# Patient Record
Sex: FEMALE | Race: WHITE | NOT HISPANIC OR LATINO | Employment: OTHER | URBAN - NONMETROPOLITAN AREA
[De-identification: names, ages, dates, MRNs, and addresses within clinical notes are randomized per-mention and may not be internally consistent; named-entity substitution may affect disease eponyms.]

---

## 2017-01-13 ENCOUNTER — APPOINTMENT (EMERGENCY)
Dept: RADIOLOGY | Facility: HOSPITAL | Age: 82
End: 2017-01-13
Payer: COMMERCIAL

## 2017-01-13 ENCOUNTER — HOSPITAL ENCOUNTER (OUTPATIENT)
Facility: HOSPITAL | Age: 82
Setting detail: OBSERVATION
Discharge: HOME | End: 2017-01-14
Attending: EMERGENCY MEDICINE | Admitting: HOSPITALIST
Payer: COMMERCIAL

## 2017-01-13 DIAGNOSIS — R07.9 CHEST PAIN: Primary | ICD-10-CM

## 2017-01-13 DIAGNOSIS — R11.2 NAUSEA AND VOMITING: ICD-10-CM

## 2017-01-13 DIAGNOSIS — R19.7 DIARRHEA: ICD-10-CM

## 2017-01-13 PROBLEM — R79.89 ELEVATED LACTIC ACID LEVEL: Status: ACTIVE | Noted: 2017-01-13

## 2017-01-13 PROBLEM — I10 HYPERTENSION: Status: ACTIVE | Noted: 2017-01-13

## 2017-01-13 PROBLEM — E11.8 TYPE 2 DIABETES MELLITUS WITH COMPLICATION, WITHOUT LONG-TERM CURRENT USE OF INSULIN (HCC): Status: ACTIVE | Noted: 2017-01-13

## 2017-01-13 PROBLEM — E78.5 HYPERLIPIDEMIA: Status: ACTIVE | Noted: 2017-01-13

## 2017-01-13 LAB
ALBUMIN SERPL BCP-MCNC: 3.8 G/DL (ref 3.5–5)
ALP SERPL-CCNC: 47 U/L (ref 46–116)
ALT SERPL W P-5'-P-CCNC: 23 U/L (ref 12–78)
ANION GAP SERPL CALCULATED.3IONS-SCNC: 8 MMOL/L (ref 4–13)
AST SERPL W P-5'-P-CCNC: 19 U/L (ref 5–45)
ATRIAL RATE: 62 BPM
BASOPHILS # BLD AUTO: 0.02 THOUSANDS/ΜL (ref 0–0.1)
BASOPHILS NFR BLD AUTO: 0 % (ref 0–1)
BILIRUB DIRECT SERPL-MCNC: 0.13 MG/DL (ref 0–0.2)
BILIRUB SERPL-MCNC: 0.6 MG/DL (ref 0.2–1)
BUN SERPL-MCNC: 11 MG/DL (ref 5–25)
CALCIUM SERPL-MCNC: 9 MG/DL (ref 8.3–10.1)
CHLORIDE SERPL-SCNC: 101 MMOL/L (ref 100–108)
CO2 SERPL-SCNC: 32 MMOL/L (ref 21–32)
CREAT SERPL-MCNC: 0.81 MG/DL (ref 0.6–1.3)
EOSINOPHIL # BLD AUTO: 0.11 THOUSAND/ΜL (ref 0–0.61)
EOSINOPHIL NFR BLD AUTO: 1 % (ref 0–6)
ERYTHROCYTE [DISTWIDTH] IN BLOOD BY AUTOMATED COUNT: 13.9 % (ref 11.6–15.1)
GFR SERPL CREATININE-BSD FRML MDRD: >60 ML/MIN/1.73SQ M
GLUCOSE SERPL-MCNC: 109 MG/DL (ref 65–140)
GLUCOSE SERPL-MCNC: 117 MG/DL (ref 65–140)
GLUCOSE SERPL-MCNC: 123 MG/DL (ref 65–140)
GLUCOSE SERPL-MCNC: 131 MG/DL (ref 65–140)
GLUCOSE SERPL-MCNC: 77 MG/DL (ref 65–140)
GLUCOSE SERPL-MCNC: 89 MG/DL (ref 65–140)
HCT VFR BLD AUTO: 41.8 % (ref 34.8–46.1)
HGB BLD-MCNC: 13.3 G/DL (ref 11.5–15.4)
HOLD SPECIMEN: NORMAL
HOLD SPECIMEN: NORMAL
HOLD SPECIMEN: YES
LACTATE SERPL-SCNC: 0.9 MMOL/L (ref 0.5–2)
LACTATE SERPL-SCNC: 2.4 MMOL/L (ref 0.5–2)
LACTATE SERPL-SCNC: 2.5 MMOL/L (ref 0.5–2)
LACTATE SERPL-SCNC: 2.6 MMOL/L (ref 0.5–2)
LACTATE SERPL-SCNC: 2.9 MMOL/L (ref 0.5–2)
LIPASE SERPL-CCNC: 107 U/L (ref 73–393)
LYMPHOCYTES # BLD AUTO: 2.84 THOUSANDS/ΜL (ref 0.6–4.47)
LYMPHOCYTES NFR BLD AUTO: 30 % (ref 14–44)
MCH RBC QN AUTO: 29.8 PG (ref 26.8–34.3)
MCHC RBC AUTO-ENTMCNC: 31.8 G/DL (ref 31.4–37.4)
MCV RBC AUTO: 94 FL (ref 82–98)
MONOCYTES # BLD AUTO: 0.85 THOUSAND/ΜL (ref 0.17–1.22)
MONOCYTES NFR BLD AUTO: 9 % (ref 4–12)
NEUTROPHILS # BLD AUTO: 5.55 THOUSANDS/ΜL (ref 1.85–7.62)
NEUTS SEG NFR BLD AUTO: 60 % (ref 43–75)
P AXIS: 26 DEGREES
PLATELET # BLD AUTO: 242 THOUSANDS/UL (ref 149–390)
PLATELET # BLD AUTO: 258 THOUSANDS/UL (ref 149–390)
PMV BLD AUTO: 10.2 FL (ref 8.9–12.7)
PMV BLD AUTO: 10.2 FL (ref 8.9–12.7)
POTASSIUM SERPL-SCNC: 3.8 MMOL/L (ref 3.5–5.3)
PR INTERVAL: 170 MS
PROT SERPL-MCNC: 6.5 G/DL (ref 6.4–8.2)
QRS AXIS: -54 DEGREES
QRSD INTERVAL: 104 MS
QT INTERVAL: 372 MS
QTC INTERVAL: 377 MS
RBC # BLD AUTO: 4.47 MILLION/UL (ref 3.81–5.12)
SODIUM SERPL-SCNC: 141 MMOL/L (ref 136–145)
T WAVE AXIS: 70 DEGREES
TROPONIN I SERPL-MCNC: 0.04 NG/ML
TROPONIN I SERPL-MCNC: 0.05 NG/ML
VENTRICULAR RATE: 62 BPM
WBC # BLD AUTO: 9.37 THOUSAND/UL (ref 4.31–10.16)

## 2017-01-13 PROCEDURE — 93005 ELECTROCARDIOGRAM TRACING: CPT | Performed by: EMERGENCY MEDICINE

## 2017-01-13 PROCEDURE — 71020 HB CHEST X-RAY 2VW FRONTAL&LATL: CPT

## 2017-01-13 PROCEDURE — 83605 ASSAY OF LACTIC ACID: CPT | Performed by: INTERNAL MEDICINE

## 2017-01-13 PROCEDURE — 82948 REAGENT STRIP/BLOOD GLUCOSE: CPT

## 2017-01-13 PROCEDURE — 96374 THER/PROPH/DIAG INJ IV PUSH: CPT

## 2017-01-13 PROCEDURE — 83690 ASSAY OF LIPASE: CPT | Performed by: EMERGENCY MEDICINE

## 2017-01-13 PROCEDURE — 83605 ASSAY OF LACTIC ACID: CPT | Performed by: PHYSICIAN ASSISTANT

## 2017-01-13 PROCEDURE — 36415 COLL VENOUS BLD VENIPUNCTURE: CPT | Performed by: EMERGENCY MEDICINE

## 2017-01-13 PROCEDURE — 85049 AUTOMATED PLATELET COUNT: CPT | Performed by: PHYSICIAN ASSISTANT

## 2017-01-13 PROCEDURE — 84484 ASSAY OF TROPONIN QUANT: CPT | Performed by: EMERGENCY MEDICINE

## 2017-01-13 PROCEDURE — 99285 EMERGENCY DEPT VISIT HI MDM: CPT

## 2017-01-13 PROCEDURE — 85025 COMPLETE CBC W/AUTO DIFF WBC: CPT | Performed by: EMERGENCY MEDICINE

## 2017-01-13 PROCEDURE — 83605 ASSAY OF LACTIC ACID: CPT | Performed by: HOSPITALIST

## 2017-01-13 PROCEDURE — 84484 ASSAY OF TROPONIN QUANT: CPT | Performed by: PHYSICIAN ASSISTANT

## 2017-01-13 PROCEDURE — 83605 ASSAY OF LACTIC ACID: CPT | Performed by: EMERGENCY MEDICINE

## 2017-01-13 PROCEDURE — 80076 HEPATIC FUNCTION PANEL: CPT | Performed by: EMERGENCY MEDICINE

## 2017-01-13 PROCEDURE — 80048 BASIC METABOLIC PNL TOTAL CA: CPT | Performed by: EMERGENCY MEDICINE

## 2017-01-13 RX ORDER — SODIUM CHLORIDE 9 MG/ML
100 INJECTION, SOLUTION INTRAVENOUS CONTINUOUS
Status: DISCONTINUED | OUTPATIENT
Start: 2017-01-13 | End: 2017-01-14

## 2017-01-13 RX ORDER — PREDNISONE 1 MG/1
5 TABLET ORAL DAILY
Status: DISCONTINUED | OUTPATIENT
Start: 2017-01-13 | End: 2017-01-14 | Stop reason: HOSPADM

## 2017-01-13 RX ORDER — REPAGLINIDE 2 MG/1
4 TABLET ORAL
COMMUNITY
End: 2018-07-17 | Stop reason: SDUPTHER

## 2017-01-13 RX ORDER — METFORMIN HYDROCHLORIDE 500 MG/1
1000 TABLET, EXTENDED RELEASE ORAL 2 TIMES DAILY WITH MEALS
COMMUNITY
End: 2017-01-14 | Stop reason: HOSPADM

## 2017-01-13 RX ORDER — OXYBUTYNIN CHLORIDE 10 MG/1
10 TABLET, EXTENDED RELEASE ORAL 2 TIMES DAILY
COMMUNITY
End: 2018-06-06 | Stop reason: SDUPTHER

## 2017-01-13 RX ORDER — TOLTERODINE 2 MG/1
2 CAPSULE, EXTENDED RELEASE ORAL DAILY
Status: DISCONTINUED | OUTPATIENT
Start: 2017-01-13 | End: 2017-01-14 | Stop reason: HOSPADM

## 2017-01-13 RX ORDER — FLUOCINOLONE ACETONIDE 0.11 MG/ML
OIL AURICULAR (OTIC)
Status: ON HOLD | COMMUNITY
Start: 2014-12-03 | End: 2017-01-13 | Stop reason: ALTCHOICE

## 2017-01-13 RX ORDER — METFORMIN HYDROCHLORIDE 500 MG/1
1000 TABLET, EXTENDED RELEASE ORAL 2 TIMES DAILY WITH MEALS
Status: DISCONTINUED | OUTPATIENT
Start: 2017-01-13 | End: 2017-01-13

## 2017-01-13 RX ORDER — PRAVASTATIN SODIUM 20 MG
TABLET ORAL
Status: ON HOLD | COMMUNITY
Start: 2015-08-13 | End: 2017-01-13

## 2017-01-13 RX ORDER — PRAVASTATIN SODIUM 20 MG
20 TABLET ORAL
Status: DISCONTINUED | OUTPATIENT
Start: 2017-01-13 | End: 2017-01-14 | Stop reason: HOSPADM

## 2017-01-13 RX ORDER — PRAVASTATIN SODIUM 20 MG
20 TABLET ORAL DAILY
COMMUNITY
End: 2018-04-12 | Stop reason: SDUPTHER

## 2017-01-13 RX ORDER — HYDRALAZINE HYDROCHLORIDE 20 MG/ML
10 INJECTION INTRAMUSCULAR; INTRAVENOUS ONCE
Status: COMPLETED | OUTPATIENT
Start: 2017-01-13 | End: 2017-01-13

## 2017-01-13 RX ORDER — RABEPRAZOLE SODIUM 20 MG/1
TABLET, DELAYED RELEASE ORAL
Status: ON HOLD | COMMUNITY
Start: 2014-12-15 | End: 2017-01-13 | Stop reason: ALTCHOICE

## 2017-01-13 RX ORDER — AMLODIPINE BESYLATE 5 MG/1
5 TABLET ORAL DAILY
Status: DISCONTINUED | OUTPATIENT
Start: 2017-01-13 | End: 2017-01-14 | Stop reason: HOSPADM

## 2017-01-13 RX ORDER — OXYBUTYNIN CHLORIDE 10 MG/1
10 TABLET, EXTENDED RELEASE ORAL
Status: DISCONTINUED | OUTPATIENT
Start: 2017-01-13 | End: 2017-01-13 | Stop reason: RX

## 2017-01-13 RX ORDER — PREDNISONE 1 MG/1
TABLET ORAL
COMMUNITY
Start: 2011-04-07 | End: 2018-09-08 | Stop reason: SDUPTHER

## 2017-01-13 RX ORDER — PRAVASTATIN SODIUM 20 MG
20 TABLET ORAL DAILY
Status: DISCONTINUED | OUTPATIENT
Start: 2017-01-13 | End: 2017-01-13

## 2017-01-13 RX ORDER — REPAGLINIDE 1 MG/1
2 TABLET ORAL
Status: DISCONTINUED | OUTPATIENT
Start: 2017-01-13 | End: 2017-01-14 | Stop reason: HOSPADM

## 2017-01-13 RX ORDER — SORBITOL SOLUTION 70 %
15 SOLUTION, ORAL MISCELLANEOUS DAILY PRN
Status: ON HOLD | COMMUNITY
End: 2017-01-13

## 2017-01-13 RX ORDER — REPAGLINIDE 1 MG/1
2 TABLET ORAL
Status: DISCONTINUED | OUTPATIENT
Start: 2017-01-13 | End: 2017-01-13

## 2017-01-13 RX ORDER — METFORMIN HYDROCHLORIDE 500 MG/1
TABLET, EXTENDED RELEASE ORAL
Status: ON HOLD | COMMUNITY
Start: 2016-11-29 | End: 2017-01-13 | Stop reason: CLARIF

## 2017-01-13 RX ORDER — REPAGLINIDE 1 MG/1
4 TABLET ORAL
Status: DISCONTINUED | OUTPATIENT
Start: 2017-01-13 | End: 2017-01-13

## 2017-01-13 RX ORDER — LEVOTHYROXINE SODIUM 0.07 MG/1
75 TABLET ORAL
Status: DISCONTINUED | OUTPATIENT
Start: 2017-01-13 | End: 2017-01-14 | Stop reason: HOSPADM

## 2017-01-13 RX ORDER — ERGOCALCIFEROL 1.25 MG/1
CAPSULE ORAL
Status: ON HOLD | COMMUNITY
Start: 2016-12-07 | End: 2017-01-13 | Stop reason: CLARIF

## 2017-01-13 RX ORDER — ASPIRIN 81 MG/1
81 TABLET, CHEWABLE ORAL DAILY
Status: DISCONTINUED | OUTPATIENT
Start: 2017-01-13 | End: 2017-01-14 | Stop reason: HOSPADM

## 2017-01-13 RX ORDER — ONDANSETRON 2 MG/ML
4 INJECTION INTRAMUSCULAR; INTRAVENOUS ONCE
Status: COMPLETED | OUTPATIENT
Start: 2017-01-13 | End: 2017-01-13

## 2017-01-13 RX ORDER — ERGOCALCIFEROL 1.25 MG/1
50000 CAPSULE ORAL WEEKLY
COMMUNITY
End: 2018-11-08 | Stop reason: SDUPTHER

## 2017-01-13 RX ORDER — ACETAMINOPHEN 325 MG/1
650 TABLET ORAL EVERY 6 HOURS PRN
Status: DISCONTINUED | OUTPATIENT
Start: 2017-01-13 | End: 2017-01-14 | Stop reason: HOSPADM

## 2017-01-13 RX ORDER — REPAGLINIDE 2 MG/1
2 TABLET ORAL
Status: ON HOLD | COMMUNITY
Start: 2016-11-02 | End: 2017-01-13

## 2017-01-13 RX ORDER — LEVOTHYROXINE SODIUM 0.07 MG/1
75 TABLET ORAL
COMMUNITY
Start: 2014-10-21 | End: 2018-11-08 | Stop reason: SDUPTHER

## 2017-01-13 RX ORDER — PANTOPRAZOLE SODIUM 40 MG/1
40 TABLET, DELAYED RELEASE ORAL
Status: DISCONTINUED | OUTPATIENT
Start: 2017-01-13 | End: 2017-01-14 | Stop reason: HOSPADM

## 2017-01-13 RX ORDER — CLOTRIMAZOLE 1 %
CREAM (GRAM) TOPICAL
Status: ON HOLD | COMMUNITY
Start: 2016-11-30 | End: 2017-01-13

## 2017-01-13 RX ORDER — ONDANSETRON 2 MG/ML
4 INJECTION INTRAMUSCULAR; INTRAVENOUS EVERY 6 HOURS PRN
Status: DISCONTINUED | OUTPATIENT
Start: 2017-01-13 | End: 2017-01-14 | Stop reason: HOSPADM

## 2017-01-13 RX ADMIN — TOLTERODINE TARTRATE 2 MG: 2 CAPSULE, EXTENDED RELEASE ORAL at 21:31

## 2017-01-13 RX ADMIN — SODIUM CHLORIDE 100 ML/HR: 0.9 INJECTION, SOLUTION INTRAVENOUS at 12:31

## 2017-01-13 RX ADMIN — ACETAMINOPHEN 650 MG: 325 TABLET, FILM COATED ORAL at 22:46

## 2017-01-13 RX ADMIN — REPAGLINIDE 2 MG: 1 TABLET ORAL at 15:50

## 2017-01-13 RX ADMIN — SODIUM CHLORIDE 1000 ML: 0.9 INJECTION, SOLUTION INTRAVENOUS at 07:58

## 2017-01-13 RX ADMIN — AMLODIPINE BESYLATE 5 MG: 5 TABLET ORAL at 13:26

## 2017-01-13 RX ADMIN — HYDRALAZINE HYDROCHLORIDE 10 MG: 20 INJECTION INTRAMUSCULAR; INTRAVENOUS at 06:55

## 2017-01-13 RX ADMIN — ENOXAPARIN SODIUM 40 MG: 40 INJECTION SUBCUTANEOUS at 11:44

## 2017-01-13 RX ADMIN — SODIUM CHLORIDE 500 ML: 0.9 INJECTION, SOLUTION INTRAVENOUS at 04:50

## 2017-01-13 RX ADMIN — SODIUM CHLORIDE 1000 ML: 0.9 INJECTION, SOLUTION INTRAVENOUS at 10:15

## 2017-01-13 RX ADMIN — ASPIRIN 81 MG 81 MG: 81 TABLET ORAL at 13:26

## 2017-01-13 RX ADMIN — LEVOTHYROXINE SODIUM 75 MCG: 75 TABLET ORAL at 13:25

## 2017-01-13 RX ADMIN — ONDANSETRON 4 MG: 2 INJECTION INTRAMUSCULAR; INTRAVENOUS at 09:26

## 2017-01-13 RX ADMIN — PRAVASTATIN SODIUM 20 MG: 20 TABLET ORAL at 15:50

## 2017-01-13 RX ADMIN — ONDANSETRON 4 MG: 2 INJECTION INTRAMUSCULAR; INTRAVENOUS at 04:50

## 2017-01-13 RX ADMIN — SODIUM CHLORIDE 1000 ML: 0.9 INJECTION, SOLUTION INTRAVENOUS at 19:44

## 2017-01-13 RX ADMIN — SITAGLIPTIN 50 MG: 25 TABLET, FILM COATED ORAL at 13:26

## 2017-01-13 RX ADMIN — PREDNISONE 5 MG: 5 TABLET ORAL at 13:26

## 2017-01-14 ENCOUNTER — APPOINTMENT (OUTPATIENT)
Dept: CT IMAGING | Facility: HOSPITAL | Age: 82
End: 2017-01-14
Payer: COMMERCIAL

## 2017-01-14 VITALS
OXYGEN SATURATION: 94 % | SYSTOLIC BLOOD PRESSURE: 122 MMHG | DIASTOLIC BLOOD PRESSURE: 65 MMHG | HEIGHT: 62 IN | WEIGHT: 132.94 LBS | HEART RATE: 52 BPM | RESPIRATION RATE: 18 BRPM | BODY MASS INDEX: 24.46 KG/M2 | TEMPERATURE: 97.4 F

## 2017-01-14 LAB
ALBUMIN SERPL BCP-MCNC: 2.5 G/DL (ref 3.5–5)
ALP SERPL-CCNC: 35 U/L (ref 46–116)
ALT SERPL W P-5'-P-CCNC: 14 U/L (ref 12–78)
ANION GAP SERPL CALCULATED.3IONS-SCNC: 7 MMOL/L (ref 4–13)
AST SERPL W P-5'-P-CCNC: 15 U/L (ref 5–45)
BASOPHILS # BLD AUTO: 0.02 THOUSANDS/ΜL (ref 0–0.1)
BASOPHILS NFR BLD AUTO: 0 % (ref 0–1)
BILIRUB SERPL-MCNC: 0.5 MG/DL (ref 0.2–1)
BUN SERPL-MCNC: 7 MG/DL (ref 5–25)
CALCIUM SERPL-MCNC: 7.7 MG/DL (ref 8.3–10.1)
CHLORIDE SERPL-SCNC: 109 MMOL/L (ref 100–108)
CO2 SERPL-SCNC: 27 MMOL/L (ref 21–32)
CREAT SERPL-MCNC: 0.6 MG/DL (ref 0.6–1.3)
EOSINOPHIL # BLD AUTO: 0.18 THOUSAND/ΜL (ref 0–0.61)
EOSINOPHIL NFR BLD AUTO: 3 % (ref 0–6)
ERYTHROCYTE [DISTWIDTH] IN BLOOD BY AUTOMATED COUNT: 14.1 % (ref 11.6–15.1)
GFR SERPL CREATININE-BSD FRML MDRD: >60 ML/MIN/1.73SQ M
GLUCOSE SERPL-MCNC: 105 MG/DL (ref 65–140)
GLUCOSE SERPL-MCNC: 109 MG/DL (ref 65–140)
GLUCOSE SERPL-MCNC: 62 MG/DL (ref 65–140)
HCT VFR BLD AUTO: 35.6 % (ref 34.8–46.1)
HGB BLD-MCNC: 11.2 G/DL (ref 11.5–15.4)
LACTATE SERPL-SCNC: 0.8 MMOL/L (ref 0.5–2)
LYMPHOCYTES # BLD AUTO: 2.35 THOUSANDS/ΜL (ref 0.6–4.47)
LYMPHOCYTES NFR BLD AUTO: 33 % (ref 14–44)
MAGNESIUM SERPL-MCNC: 1.6 MG/DL (ref 1.6–2.6)
MCH RBC QN AUTO: 30 PG (ref 26.8–34.3)
MCHC RBC AUTO-ENTMCNC: 31.5 G/DL (ref 31.4–37.4)
MCV RBC AUTO: 95 FL (ref 82–98)
MONOCYTES # BLD AUTO: 0.55 THOUSAND/ΜL (ref 0.17–1.22)
MONOCYTES NFR BLD AUTO: 8 % (ref 4–12)
NEUTROPHILS # BLD AUTO: 3.94 THOUSANDS/ΜL (ref 1.85–7.62)
NEUTS SEG NFR BLD AUTO: 56 % (ref 43–75)
PHOSPHATE SERPL-MCNC: 2.9 MG/DL (ref 2.3–4.1)
PLATELET # BLD AUTO: 212 THOUSANDS/UL (ref 149–390)
PMV BLD AUTO: 10 FL (ref 8.9–12.7)
POTASSIUM SERPL-SCNC: 3.9 MMOL/L (ref 3.5–5.3)
PROT SERPL-MCNC: 5.1 G/DL (ref 6.4–8.2)
RBC # BLD AUTO: 3.73 MILLION/UL (ref 3.81–5.12)
SODIUM SERPL-SCNC: 143 MMOL/L (ref 136–145)
WBC # BLD AUTO: 7.04 THOUSAND/UL (ref 4.31–10.16)

## 2017-01-14 PROCEDURE — 84100 ASSAY OF PHOSPHORUS: CPT | Performed by: INTERNAL MEDICINE

## 2017-01-14 PROCEDURE — 80053 COMPREHEN METABOLIC PANEL: CPT | Performed by: INTERNAL MEDICINE

## 2017-01-14 PROCEDURE — 83605 ASSAY OF LACTIC ACID: CPT | Performed by: INTERNAL MEDICINE

## 2017-01-14 PROCEDURE — 82948 REAGENT STRIP/BLOOD GLUCOSE: CPT

## 2017-01-14 PROCEDURE — 70450 CT HEAD/BRAIN W/O DYE: CPT

## 2017-01-14 PROCEDURE — 83735 ASSAY OF MAGNESIUM: CPT | Performed by: INTERNAL MEDICINE

## 2017-01-14 PROCEDURE — 85025 COMPLETE CBC W/AUTO DIFF WBC: CPT | Performed by: INTERNAL MEDICINE

## 2017-01-14 RX ORDER — AMLODIPINE BESYLATE 5 MG/1
5 TABLET ORAL DAILY
Qty: 30 TABLET | Refills: 0 | Status: SHIPPED | OUTPATIENT
Start: 2017-01-14 | End: 2017-05-14 | Stop reason: ALTCHOICE

## 2017-01-14 RX ORDER — POLYVINYL ALCOHOL 14 MG/ML
1 SOLUTION/ DROPS OPHTHALMIC
Status: DISCONTINUED | OUTPATIENT
Start: 2017-01-14 | End: 2017-01-14 | Stop reason: HOSPADM

## 2017-01-14 RX ADMIN — REPAGLINIDE 2 MG: 1 TABLET ORAL at 12:12

## 2017-01-14 RX ADMIN — AMLODIPINE BESYLATE 5 MG: 5 TABLET ORAL at 08:25

## 2017-01-14 RX ADMIN — LEVOTHYROXINE SODIUM 75 MCG: 75 TABLET ORAL at 06:14

## 2017-01-14 RX ADMIN — ENOXAPARIN SODIUM 40 MG: 40 INJECTION SUBCUTANEOUS at 08:26

## 2017-01-14 RX ADMIN — ASPIRIN 81 MG 81 MG: 81 TABLET ORAL at 08:26

## 2017-01-14 RX ADMIN — PANTOPRAZOLE SODIUM 40 MG: 40 TABLET, DELAYED RELEASE ORAL at 06:14

## 2017-01-14 RX ADMIN — ACETAMINOPHEN 650 MG: 325 TABLET, FILM COATED ORAL at 06:51

## 2017-01-14 RX ADMIN — REPAGLINIDE 2 MG: 1 TABLET ORAL at 06:14

## 2017-01-14 RX ADMIN — SODIUM CHLORIDE 100 ML/HR: 0.9 INJECTION, SOLUTION INTRAVENOUS at 03:17

## 2017-01-14 RX ADMIN — PREDNISONE 5 MG: 5 TABLET ORAL at 08:26

## 2017-01-14 RX ADMIN — POLYVINYL ALCOHOL 1 DROP: 14 SOLUTION/ DROPS OPHTHALMIC at 10:50

## 2017-01-14 RX ADMIN — SITAGLIPTIN 50 MG: 25 TABLET, FILM COATED ORAL at 08:26

## 2017-02-17 DIAGNOSIS — R13.10 DYSPHAGIA: ICD-10-CM

## 2017-02-17 DIAGNOSIS — R00.1 BRADYCARDIA: ICD-10-CM

## 2017-02-17 DIAGNOSIS — F32.9 MAJOR DEPRESSIVE DISORDER, SINGLE EPISODE: ICD-10-CM

## 2017-02-17 DIAGNOSIS — E11.9 TYPE 2 DIABETES MELLITUS WITHOUT COMPLICATIONS (HCC): ICD-10-CM

## 2017-02-17 DIAGNOSIS — Z12.31 ENCOUNTER FOR SCREENING MAMMOGRAM FOR MALIGNANT NEOPLASM OF BREAST: ICD-10-CM

## 2017-02-23 ENCOUNTER — HOSPITAL ENCOUNTER (OUTPATIENT)
Dept: NON INVASIVE DIAGNOSTICS | Facility: HOSPITAL | Age: 82
Discharge: HOME | End: 2017-02-23
Attending: INTERNAL MEDICINE
Payer: COMMERCIAL

## 2017-02-23 ENCOUNTER — ALLSCRIPTS OFFICE VISIT (OUTPATIENT)
Dept: OTHER | Facility: OTHER | Age: 82
End: 2017-02-23

## 2017-02-23 DIAGNOSIS — R00.1 BRADYCARDIA: ICD-10-CM

## 2017-02-23 PROCEDURE — 93225 XTRNL ECG REC<48 HRS REC: CPT

## 2017-02-23 PROCEDURE — 93226 XTRNL ECG REC<48 HR SCAN A/R: CPT

## 2017-02-24 ENCOUNTER — TRANSCRIBE ORDERS (OUTPATIENT)
Dept: ADMINISTRATIVE | Facility: HOSPITAL | Age: 82
End: 2017-02-24

## 2017-02-24 ENCOUNTER — APPOINTMENT (OUTPATIENT)
Dept: LAB | Facility: HOSPITAL | Age: 82
End: 2017-02-24
Attending: INTERNAL MEDICINE
Payer: COMMERCIAL

## 2017-02-24 DIAGNOSIS — F32.9 MAJOR DEPRESSIVE DISORDER, SINGLE EPISODE: ICD-10-CM

## 2017-02-24 DIAGNOSIS — E11.9 TYPE 2 DIABETES MELLITUS WITHOUT COMPLICATIONS (HCC): ICD-10-CM

## 2017-02-24 DIAGNOSIS — R00.1 BRADYCARDIA: ICD-10-CM

## 2017-02-24 LAB
ALBUMIN SERPL BCP-MCNC: 3.3 G/DL (ref 3.5–5)
ALP SERPL-CCNC: 44 U/L (ref 46–116)
ALT SERPL W P-5'-P-CCNC: 23 U/L (ref 12–78)
ANION GAP SERPL CALCULATED.3IONS-SCNC: 7 MMOL/L (ref 4–13)
AST SERPL W P-5'-P-CCNC: 19 U/L (ref 5–45)
BILIRUB SERPL-MCNC: 0.4 MG/DL (ref 0.2–1)
BUN SERPL-MCNC: 12 MG/DL (ref 5–25)
CALCIUM SERPL-MCNC: 8.8 MG/DL (ref 8.3–10.1)
CHLORIDE SERPL-SCNC: 103 MMOL/L (ref 100–108)
CO2 SERPL-SCNC: 31 MMOL/L (ref 21–32)
CREAT SERPL-MCNC: 0.74 MG/DL (ref 0.6–1.3)
CREAT UR-MCNC: 151 MG/DL
EST. AVERAGE GLUCOSE BLD GHB EST-MCNC: 229 MG/DL
GFR SERPL CREATININE-BSD FRML MDRD: >60 ML/MIN/1.73SQ M
GLUCOSE SERPL-MCNC: 125 MG/DL (ref 65–140)
HBA1C MFR BLD: 9.6 % (ref 4.2–6.3)
LDLC SERPL DIRECT ASSAY-MCNC: 98 MG/DL (ref 0–100)
MICROALBUMIN UR-MCNC: 6.5 MG/L (ref 0–20)
MICROALBUMIN/CREAT 24H UR: 4 MG/G CREATININE (ref 0–30)
POTASSIUM SERPL-SCNC: 4.2 MMOL/L (ref 3.5–5.3)
PROT SERPL-MCNC: 5.9 G/DL (ref 6.4–8.2)
SODIUM SERPL-SCNC: 141 MMOL/L (ref 136–145)
TSH SERPL DL<=0.05 MIU/L-ACNC: 0.17 UIU/ML (ref 0.36–3.74)

## 2017-02-24 PROCEDURE — 83721 ASSAY OF BLOOD LIPOPROTEIN: CPT

## 2017-02-24 PROCEDURE — 83036 HEMOGLOBIN GLYCOSYLATED A1C: CPT

## 2017-02-24 PROCEDURE — 36415 COLL VENOUS BLD VENIPUNCTURE: CPT

## 2017-02-24 PROCEDURE — 82570 ASSAY OF URINE CREATININE: CPT

## 2017-02-24 PROCEDURE — 80053 COMPREHEN METABOLIC PANEL: CPT

## 2017-02-24 PROCEDURE — 84443 ASSAY THYROID STIM HORMONE: CPT

## 2017-02-24 PROCEDURE — 82043 UR ALBUMIN QUANTITATIVE: CPT

## 2017-02-27 ENCOUNTER — HOSPITAL ENCOUNTER (OUTPATIENT)
Dept: RADIOLOGY | Facility: HOSPITAL | Age: 82
Discharge: HOME | End: 2017-02-27
Attending: INTERNAL MEDICINE
Payer: COMMERCIAL

## 2017-02-27 DIAGNOSIS — R13.10 DYSPHAGIA: ICD-10-CM

## 2017-02-27 PROCEDURE — 74240 X-RAY XM UPR GI TRC 1CNTRST: CPT

## 2017-03-30 ENCOUNTER — ALLSCRIPTS OFFICE VISIT (OUTPATIENT)
Dept: OTHER | Facility: OTHER | Age: 82
End: 2017-03-30

## 2017-03-30 ENCOUNTER — ALLSCRIPTS OFFICE VISIT (OUTPATIENT)
Dept: FAMILY MEDICINE CLINIC | Facility: CLINIC | Age: 82
End: 2017-03-30
Payer: COMMERCIAL

## 2017-03-30 PROCEDURE — 99213 OFFICE O/P EST LOW 20 MIN: CPT | Performed by: PHYSICIAN ASSISTANT

## 2017-04-07 ENCOUNTER — ALLSCRIPTS OFFICE VISIT (OUTPATIENT)
Dept: OTHER | Facility: OTHER | Age: 82
End: 2017-04-07

## 2017-04-26 ENCOUNTER — ALLSCRIPTS OFFICE VISIT (OUTPATIENT)
Dept: OTHER | Facility: OTHER | Age: 82
End: 2017-04-26

## 2017-05-12 DIAGNOSIS — E11.9 TYPE 2 DIABETES MELLITUS WITHOUT COMPLICATIONS (HCC): ICD-10-CM

## 2017-05-12 DIAGNOSIS — R60.9 EDEMA: ICD-10-CM

## 2017-05-12 DIAGNOSIS — Z00.00 ENCOUNTER FOR GENERAL ADULT MEDICAL EXAMINATION WITHOUT ABNORMAL FINDINGS: ICD-10-CM

## 2017-05-12 DIAGNOSIS — B35.1 TINEA UNGUIUM: ICD-10-CM

## 2017-05-13 ENCOUNTER — TRANSCRIBE ORDERS (OUTPATIENT)
Dept: LAB | Facility: MEDICAL CENTER | Age: 82
End: 2017-05-13

## 2017-05-14 ENCOUNTER — APPOINTMENT (EMERGENCY)
Dept: RADIOLOGY | Facility: HOSPITAL | Age: 82
End: 2017-05-14
Payer: COMMERCIAL

## 2017-05-14 ENCOUNTER — HOSPITAL ENCOUNTER (OUTPATIENT)
Facility: HOSPITAL | Age: 82
Setting detail: OBSERVATION
Discharge: HOME | End: 2017-05-15
Attending: EMERGENCY MEDICINE | Admitting: FAMILY MEDICINE
Payer: COMMERCIAL

## 2017-05-14 ENCOUNTER — APPOINTMENT (EMERGENCY)
Dept: CT IMAGING | Facility: HOSPITAL | Age: 82
End: 2017-05-14
Payer: COMMERCIAL

## 2017-05-14 DIAGNOSIS — R11.2 INTRACTABLE VOMITING WITH NAUSEA: Primary | ICD-10-CM

## 2017-05-14 LAB
ALBUMIN SERPL BCP-MCNC: 3.5 G/DL (ref 3.5–5)
ALP SERPL-CCNC: 46 U/L (ref 46–116)
ALT SERPL W P-5'-P-CCNC: 20 U/L (ref 12–78)
ANION GAP SERPL CALCULATED.3IONS-SCNC: 9 MMOL/L (ref 4–13)
AST SERPL W P-5'-P-CCNC: 21 U/L (ref 5–45)
BASOPHILS # BLD AUTO: 0.02 THOUSANDS/ΜL (ref 0–0.1)
BASOPHILS NFR BLD AUTO: 0 % (ref 0–1)
BILIRUB SERPL-MCNC: 0.4 MG/DL (ref 0.2–1)
BUN SERPL-MCNC: 10 MG/DL (ref 5–25)
CALCIUM SERPL-MCNC: 8.7 MG/DL (ref 8.3–10.1)
CHLORIDE SERPL-SCNC: 104 MMOL/L (ref 100–108)
CO2 SERPL-SCNC: 28 MMOL/L (ref 21–32)
CREAT SERPL-MCNC: 0.85 MG/DL (ref 0.6–1.3)
EOSINOPHIL # BLD AUTO: 0.02 THOUSAND/ΜL (ref 0–0.61)
EOSINOPHIL NFR BLD AUTO: 0 % (ref 0–6)
ERYTHROCYTE [DISTWIDTH] IN BLOOD BY AUTOMATED COUNT: 13.5 % (ref 11.6–15.1)
GFR SERPL CREATININE-BSD FRML MDRD: >60 ML/MIN/1.73SQ M
GLUCOSE SERPL-MCNC: 215 MG/DL (ref 65–140)
GLUCOSE SERPL-MCNC: 222 MG/DL (ref 65–140)
HCT VFR BLD AUTO: 38.1 % (ref 34.8–46.1)
HGB BLD-MCNC: 12.1 G/DL (ref 11.5–15.4)
LACTATE SERPL-SCNC: 0.9 MMOL/L (ref 0.5–2)
LIPASE SERPL-CCNC: 88 U/L (ref 73–393)
LYMPHOCYTES # BLD AUTO: 1.27 THOUSANDS/ΜL (ref 0.6–4.47)
LYMPHOCYTES NFR BLD AUTO: 13 % (ref 14–44)
MCH RBC QN AUTO: 28.5 PG (ref 26.8–34.3)
MCHC RBC AUTO-ENTMCNC: 31.8 G/DL (ref 31.4–37.4)
MCV RBC AUTO: 90 FL (ref 82–98)
MONOCYTES # BLD AUTO: 0.51 THOUSAND/ΜL (ref 0.17–1.22)
MONOCYTES NFR BLD AUTO: 5 % (ref 4–12)
NEUTROPHILS # BLD AUTO: 7.75 THOUSANDS/ΜL (ref 1.85–7.62)
NEUTS SEG NFR BLD AUTO: 82 % (ref 43–75)
PLATELET # BLD AUTO: 262 THOUSANDS/UL (ref 149–390)
PMV BLD AUTO: 10.7 FL (ref 8.9–12.7)
POTASSIUM SERPL-SCNC: 4.3 MMOL/L (ref 3.5–5.3)
PROT SERPL-MCNC: 6.6 G/DL (ref 6.4–8.2)
RBC # BLD AUTO: 4.25 MILLION/UL (ref 3.81–5.12)
SODIUM SERPL-SCNC: 141 MMOL/L (ref 136–145)
WBC # BLD AUTO: 9.57 THOUSAND/UL (ref 4.31–10.16)

## 2017-05-14 PROCEDURE — 80053 COMPREHEN METABOLIC PANEL: CPT

## 2017-05-14 PROCEDURE — 85025 COMPLETE CBC W/AUTO DIFF WBC: CPT | Performed by: EMERGENCY MEDICINE

## 2017-05-14 PROCEDURE — 96376 TX/PRO/DX INJ SAME DRUG ADON: CPT

## 2017-05-14 PROCEDURE — 84484 ASSAY OF TROPONIN QUANT: CPT | Performed by: EMERGENCY MEDICINE

## 2017-05-14 PROCEDURE — 71020 HB CHEST X-RAY 2VW FRONTAL&LATL: CPT

## 2017-05-14 PROCEDURE — 36415 COLL VENOUS BLD VENIPUNCTURE: CPT | Performed by: EMERGENCY MEDICINE

## 2017-05-14 PROCEDURE — 83605 ASSAY OF LACTIC ACID: CPT | Performed by: EMERGENCY MEDICINE

## 2017-05-14 PROCEDURE — 74177 CT ABD & PELVIS W/CONTRAST: CPT

## 2017-05-14 PROCEDURE — 83690 ASSAY OF LIPASE: CPT | Performed by: EMERGENCY MEDICINE

## 2017-05-14 PROCEDURE — 82948 REAGENT STRIP/BLOOD GLUCOSE: CPT

## 2017-05-14 PROCEDURE — 96374 THER/PROPH/DIAG INJ IV PUSH: CPT

## 2017-05-14 PROCEDURE — 96361 HYDRATE IV INFUSION ADD-ON: CPT

## 2017-05-14 RX ORDER — ONDANSETRON 2 MG/ML
INJECTION INTRAMUSCULAR; INTRAVENOUS
Status: COMPLETED
Start: 2017-05-14 | End: 2017-05-14

## 2017-05-14 RX ORDER — ONDANSETRON 2 MG/ML
4 INJECTION INTRAMUSCULAR; INTRAVENOUS ONCE
Status: COMPLETED | OUTPATIENT
Start: 2017-05-14 | End: 2017-05-14

## 2017-05-14 RX ORDER — METFORMIN HYDROCHLORIDE EXTENDED-RELEASE TABLETS 500 MG/1
500 TABLET, FILM COATED, EXTENDED RELEASE ORAL 2 TIMES DAILY WITH MEALS
Status: ON HOLD | COMMUNITY
End: 2018-05-15

## 2017-05-14 RX ORDER — PANTOPRAZOLE SODIUM 40 MG/1
TABLET, DELAYED RELEASE ORAL
COMMUNITY
Start: 2017-04-26 | End: 2018-09-08 | Stop reason: SDUPTHER

## 2017-05-14 RX ORDER — ONDANSETRON 2 MG/ML
4 INJECTION INTRAMUSCULAR; INTRAVENOUS ONCE AS NEEDED
Status: COMPLETED | OUTPATIENT
Start: 2017-05-14 | End: 2017-05-14

## 2017-05-14 RX ADMIN — IOHEXOL 100 ML: 350 INJECTION, SOLUTION INTRAVENOUS at 22:56

## 2017-05-14 RX ADMIN — ONDANSETRON 4 MG: 2 INJECTION INTRAMUSCULAR; INTRAVENOUS at 21:53

## 2017-05-14 RX ADMIN — ONDANSETRON 4 MG: 2 INJECTION INTRAMUSCULAR; INTRAVENOUS at 23:06

## 2017-05-14 RX ADMIN — SODIUM CHLORIDE 1000 ML: 0.9 INJECTION, SOLUTION INTRAVENOUS at 22:28

## 2017-05-15 ENCOUNTER — TRANSCRIBE ORDERS (OUTPATIENT)
Dept: ADMINISTRATIVE | Facility: HOSPITAL | Age: 82
End: 2017-05-15

## 2017-05-15 ENCOUNTER — APPOINTMENT (OUTPATIENT)
Dept: LAB | Facility: HOSPITAL | Age: 82
End: 2017-05-15
Attending: INTERNAL MEDICINE
Payer: COMMERCIAL

## 2017-05-15 VITALS
DIASTOLIC BLOOD PRESSURE: 85 MMHG | OXYGEN SATURATION: 94 % | TEMPERATURE: 98.2 F | HEART RATE: 62 BPM | BODY MASS INDEX: 24.67 KG/M2 | RESPIRATION RATE: 18 BRPM | HEIGHT: 62 IN | SYSTOLIC BLOOD PRESSURE: 186 MMHG | WEIGHT: 134.04 LBS

## 2017-05-15 DIAGNOSIS — Z00.00 ENCOUNTER FOR GENERAL ADULT MEDICAL EXAMINATION WITHOUT ABNORMAL FINDINGS: ICD-10-CM

## 2017-05-15 PROBLEM — R11.10 VOMITING: Status: RESOLVED | Noted: 2017-01-13 | Resolved: 2017-05-15

## 2017-05-15 PROBLEM — R11.10 VOMITING: Status: ACTIVE | Noted: 2017-01-13

## 2017-05-15 PROBLEM — R07.9 CHEST PAIN: Status: RESOLVED | Noted: 2017-01-13 | Resolved: 2017-05-15

## 2017-05-15 LAB
ALBUMIN SERPL BCP-MCNC: 2.5 G/DL (ref 3.5–5)
ALBUMIN SERPL BCP-MCNC: 3 G/DL (ref 3.5–5)
ALP SERPL-CCNC: 35 U/L (ref 46–116)
ALP SERPL-CCNC: 43 U/L (ref 46–116)
ALT SERPL W P-5'-P-CCNC: 11 U/L (ref 12–78)
ALT SERPL W P-5'-P-CCNC: 22 U/L (ref 12–78)
ANION GAP SERPL CALCULATED.3IONS-SCNC: 6 MMOL/L (ref 4–13)
AST SERPL W P-5'-P-CCNC: 16 U/L (ref 5–45)
AST SERPL W P-5'-P-CCNC: 20 U/L (ref 5–45)
ATRIAL RATE: 67 BPM
BILIRUB DIRECT SERPL-MCNC: 0.15 MG/DL (ref 0–0.2)
BILIRUB SERPL-MCNC: 0.4 MG/DL (ref 0.2–1)
BILIRUB SERPL-MCNC: 0.4 MG/DL (ref 0.2–1)
BILIRUB UR QL STRIP: NEGATIVE
BUN SERPL-MCNC: 8 MG/DL (ref 5–25)
CALCIUM SERPL-MCNC: 7.6 MG/DL (ref 8.3–10.1)
CHLORIDE SERPL-SCNC: 111 MMOL/L (ref 100–108)
CLARITY UR: ABNORMAL
CO2 SERPL-SCNC: 28 MMOL/L (ref 21–32)
COLOR UR: YELLOW
CREAT SERPL-MCNC: 0.74 MG/DL (ref 0.6–1.3)
ERYTHROCYTE [DISTWIDTH] IN BLOOD BY AUTOMATED COUNT: 13.8 % (ref 11.6–15.1)
EST. AVERAGE GLUCOSE BLD GHB EST-MCNC: 232 MG/DL
GFR SERPL CREATININE-BSD FRML MDRD: >60 ML/MIN/1.73SQ M
GLUCOSE SERPL-MCNC: 113 MG/DL (ref 65–140)
GLUCOSE SERPL-MCNC: 120 MG/DL (ref 65–140)
GLUCOSE SERPL-MCNC: 183 MG/DL (ref 65–140)
GLUCOSE UR STRIP-MCNC: ABNORMAL MG/DL
HBA1C MFR BLD: 9.7 % (ref 4.2–6.3)
HCT VFR BLD AUTO: 33.7 % (ref 34.8–46.1)
HGB BLD-MCNC: 10.6 G/DL (ref 11.5–15.4)
HGB UR QL STRIP.AUTO: NEGATIVE
HOLD SPECIMEN: NORMAL
KETONES UR STRIP-MCNC: NEGATIVE MG/DL
LEUKOCYTE ESTERASE UR QL STRIP: NEGATIVE
MAGNESIUM SERPL-MCNC: 1.6 MG/DL (ref 1.6–2.6)
MCH RBC QN AUTO: 28.6 PG (ref 26.8–34.3)
MCHC RBC AUTO-ENTMCNC: 31.5 G/DL (ref 31.4–37.4)
MCV RBC AUTO: 91 FL (ref 82–98)
NITRITE UR QL STRIP: NEGATIVE
P AXIS: 37 DEGREES
PH UR STRIP.AUTO: 5.5 [PH] (ref 4.5–8)
PHOSPHATE SERPL-MCNC: 2.7 MG/DL (ref 2.3–4.1)
PLATELET # BLD AUTO: 221 THOUSANDS/UL (ref 149–390)
PLATELET # BLD AUTO: 245 THOUSANDS/UL (ref 149–390)
PMV BLD AUTO: 10.4 FL (ref 8.9–12.7)
PMV BLD AUTO: 10.5 FL (ref 8.9–12.7)
POTASSIUM SERPL-SCNC: 4.1 MMOL/L (ref 3.5–5.3)
PR INTERVAL: 190 MS
PROT SERPL-MCNC: 5.1 G/DL (ref 6.4–8.2)
PROT SERPL-MCNC: 5.6 G/DL (ref 6.4–8.2)
PROT UR STRIP-MCNC: NEGATIVE MG/DL
QRS AXIS: -53 DEGREES
QRSD INTERVAL: 94 MS
QT INTERVAL: 432 MS
QTC INTERVAL: 456 MS
RBC # BLD AUTO: 3.7 MILLION/UL (ref 3.81–5.12)
SODIUM SERPL-SCNC: 145 MMOL/L (ref 136–145)
SP GR UR STRIP.AUTO: 1.01 (ref 1–1.03)
T WAVE AXIS: 37 DEGREES
TROPONIN I SERPL-MCNC: 0.03 NG/ML
TROPONIN I SERPL-MCNC: 0.03 NG/ML
TROPONIN I SERPL-MCNC: 0.04 NG/ML
TROPONIN I SERPL-MCNC: 0.04 NG/ML
TSH SERPL DL<=0.05 MIU/L-ACNC: 0.08 UIU/ML (ref 0.36–3.74)
UROBILINOGEN UR QL STRIP.AUTO: 0.2 E.U./DL
VENTRICULAR RATE: 67 BPM
WBC # BLD AUTO: 8.43 THOUSAND/UL (ref 4.31–10.16)

## 2017-05-15 PROCEDURE — 81003 URINALYSIS AUTO W/O SCOPE: CPT | Performed by: EMERGENCY MEDICINE

## 2017-05-15 PROCEDURE — 84484 ASSAY OF TROPONIN QUANT: CPT | Performed by: INTERNAL MEDICINE

## 2017-05-15 PROCEDURE — 85049 AUTOMATED PLATELET COUNT: CPT | Performed by: INTERNAL MEDICINE

## 2017-05-15 PROCEDURE — 93005 ELECTROCARDIOGRAM TRACING: CPT | Performed by: EMERGENCY MEDICINE

## 2017-05-15 PROCEDURE — C9113 INJ PANTOPRAZOLE SODIUM, VIA: HCPCS | Performed by: INTERNAL MEDICINE

## 2017-05-15 PROCEDURE — 84443 ASSAY THYROID STIM HORMONE: CPT | Performed by: INTERNAL MEDICINE

## 2017-05-15 PROCEDURE — 85027 COMPLETE CBC AUTOMATED: CPT | Performed by: INTERNAL MEDICINE

## 2017-05-15 PROCEDURE — 36415 COLL VENOUS BLD VENIPUNCTURE: CPT

## 2017-05-15 PROCEDURE — 80076 HEPATIC FUNCTION PANEL: CPT

## 2017-05-15 PROCEDURE — 83735 ASSAY OF MAGNESIUM: CPT | Performed by: INTERNAL MEDICINE

## 2017-05-15 PROCEDURE — 99285 EMERGENCY DEPT VISIT HI MDM: CPT

## 2017-05-15 PROCEDURE — 82948 REAGENT STRIP/BLOOD GLUCOSE: CPT

## 2017-05-15 PROCEDURE — 80053 COMPREHEN METABOLIC PANEL: CPT | Performed by: INTERNAL MEDICINE

## 2017-05-15 PROCEDURE — 84100 ASSAY OF PHOSPHORUS: CPT | Performed by: INTERNAL MEDICINE

## 2017-05-15 PROCEDURE — 83036 HEMOGLOBIN GLYCOSYLATED A1C: CPT | Performed by: INTERNAL MEDICINE

## 2017-05-15 PROCEDURE — 96375 TX/PRO/DX INJ NEW DRUG ADDON: CPT

## 2017-05-15 RX ORDER — MAGNESIUM SULFATE HEPTAHYDRATE 40 MG/ML
2 INJECTION, SOLUTION INTRAVENOUS ONCE
Status: COMPLETED | OUTPATIENT
Start: 2017-05-15 | End: 2017-05-15

## 2017-05-15 RX ORDER — ASPIRIN 81 MG/1
81 TABLET, CHEWABLE ORAL DAILY
Status: DISCONTINUED | OUTPATIENT
Start: 2017-05-15 | End: 2017-05-15 | Stop reason: HOSPADM

## 2017-05-15 RX ORDER — METOCLOPRAMIDE HYDROCHLORIDE 5 MG/ML
10 INJECTION INTRAMUSCULAR; INTRAVENOUS ONCE
Status: COMPLETED | OUTPATIENT
Start: 2017-05-15 | End: 2017-05-15

## 2017-05-15 RX ORDER — PRAVASTATIN SODIUM 20 MG
20 TABLET ORAL
Status: DISCONTINUED | OUTPATIENT
Start: 2017-05-15 | End: 2017-05-15 | Stop reason: HOSPADM

## 2017-05-15 RX ORDER — OXYBUTYNIN CHLORIDE 5 MG/1
10 TABLET, EXTENDED RELEASE ORAL 2 TIMES DAILY
Status: DISCONTINUED | OUTPATIENT
Start: 2017-05-15 | End: 2017-05-15 | Stop reason: HOSPADM

## 2017-05-15 RX ORDER — ONDANSETRON 2 MG/ML
4 INJECTION INTRAMUSCULAR; INTRAVENOUS EVERY 6 HOURS PRN
Status: DISCONTINUED | OUTPATIENT
Start: 2017-05-15 | End: 2017-05-15 | Stop reason: HOSPADM

## 2017-05-15 RX ORDER — PANTOPRAZOLE SODIUM 40 MG/1
40 INJECTION, POWDER, FOR SOLUTION INTRAVENOUS EVERY 12 HOURS SCHEDULED
Status: DISCONTINUED | OUTPATIENT
Start: 2017-05-15 | End: 2017-05-15 | Stop reason: HOSPADM

## 2017-05-15 RX ORDER — PREDNISONE 1 MG/1
5 TABLET ORAL DAILY
Status: DISCONTINUED | OUTPATIENT
Start: 2017-05-15 | End: 2017-05-15 | Stop reason: HOSPADM

## 2017-05-15 RX ORDER — ERGOCALCIFEROL 1.25 MG/1
50000 CAPSULE ORAL WEEKLY
Status: DISCONTINUED | OUTPATIENT
Start: 2017-05-15 | End: 2017-05-15 | Stop reason: HOSPADM

## 2017-05-15 RX ORDER — SODIUM CHLORIDE 9 MG/ML
100 INJECTION, SOLUTION INTRAVENOUS CONTINUOUS
Status: DISCONTINUED | OUTPATIENT
Start: 2017-05-15 | End: 2017-05-15 | Stop reason: HOSPADM

## 2017-05-15 RX ORDER — LEVOTHYROXINE SODIUM 0.07 MG/1
75 TABLET ORAL
Status: DISCONTINUED | OUTPATIENT
Start: 2017-05-15 | End: 2017-05-15 | Stop reason: HOSPADM

## 2017-05-15 RX ADMIN — METOCLOPRAMIDE 10 MG: 5 INJECTION, SOLUTION INTRAMUSCULAR; INTRAVENOUS at 00:52

## 2017-05-15 RX ADMIN — LEVOTHYROXINE SODIUM 75 MCG: 75 TABLET ORAL at 06:08

## 2017-05-15 RX ADMIN — SODIUM CHLORIDE 1000 ML: 0.9 INJECTION, SOLUTION INTRAVENOUS at 01:38

## 2017-05-15 RX ADMIN — OXYBUTYNIN CHLORIDE 10 MG: 5 TABLET, EXTENDED RELEASE ORAL at 10:51

## 2017-05-15 RX ADMIN — PREDNISONE 5 MG: 5 TABLET ORAL at 10:51

## 2017-05-15 RX ADMIN — SODIUM CHLORIDE 100 ML/HR: 0.9 INJECTION, SOLUTION INTRAVENOUS at 03:53

## 2017-05-15 RX ADMIN — ENOXAPARIN SODIUM 40 MG: 40 INJECTION SUBCUTANEOUS at 10:50

## 2017-05-15 RX ADMIN — ERGOCALCIFEROL 50000 UNITS: 1.25 CAPSULE ORAL at 10:51

## 2017-05-15 RX ADMIN — PANTOPRAZOLE SODIUM 40 MG: 40 INJECTION, POWDER, FOR SOLUTION INTRAVENOUS at 03:53

## 2017-05-15 RX ADMIN — ASPIRIN 81 MG 81 MG: 81 TABLET ORAL at 10:51

## 2017-05-15 RX ADMIN — MAGNESIUM SULFATE HEPTAHYDRATE 2 G: 40 INJECTION, SOLUTION INTRAVENOUS at 12:13

## 2017-05-20 ENCOUNTER — GENERIC CONVERSION - ENCOUNTER (OUTPATIENT)
Dept: OTHER | Facility: OTHER | Age: 82
End: 2017-05-20

## 2017-05-30 ENCOUNTER — APPOINTMENT (OUTPATIENT)
Dept: LAB | Facility: MEDICAL CENTER | Age: 82
End: 2017-05-30
Payer: COMMERCIAL

## 2017-05-30 ENCOUNTER — ALLSCRIPTS OFFICE VISIT (OUTPATIENT)
Dept: OTHER | Facility: OTHER | Age: 82
End: 2017-05-30

## 2017-05-30 ENCOUNTER — TRANSCRIBE ORDERS (OUTPATIENT)
Dept: LAB | Facility: MEDICAL CENTER | Age: 82
End: 2017-05-30

## 2017-05-30 DIAGNOSIS — R60.9 EDEMA: ICD-10-CM

## 2017-05-30 LAB
ANION GAP SERPL CALCULATED.3IONS-SCNC: 8 MMOL/L (ref 4–13)
BUN SERPL-MCNC: 11 MG/DL (ref 5–25)
CALCIUM SERPL-MCNC: 8.7 MG/DL (ref 8.3–10.1)
CHLORIDE SERPL-SCNC: 96 MMOL/L (ref 100–108)
CO2 SERPL-SCNC: 29 MMOL/L (ref 21–32)
CREAT SERPL-MCNC: 0.8 MG/DL (ref 0.6–1.3)
GFR SERPL CREATININE-BSD FRML MDRD: >60 ML/MIN/1.73SQ M
GLUCOSE SERPL-MCNC: 258 MG/DL (ref 65–140)
POTASSIUM SERPL-SCNC: 4.2 MMOL/L (ref 3.5–5.3)
SODIUM SERPL-SCNC: 133 MMOL/L (ref 136–145)

## 2017-05-30 PROCEDURE — 36415 COLL VENOUS BLD VENIPUNCTURE: CPT

## 2017-05-30 PROCEDURE — 80048 BASIC METABOLIC PNL TOTAL CA: CPT

## 2017-06-01 ENCOUNTER — ALLSCRIPTS OFFICE VISIT (OUTPATIENT)
Dept: FAMILY MEDICINE CLINIC | Facility: CLINIC | Age: 82
End: 2017-06-01
Payer: COMMERCIAL

## 2017-06-01 LAB — GLUCOSE SERPL-MCNC: 175 MG/DL (ref 65–140)

## 2017-06-01 PROCEDURE — 82948 REAGENT STRIP/BLOOD GLUCOSE: CPT | Performed by: PHYSICIAN ASSISTANT

## 2017-06-01 PROCEDURE — 99214 OFFICE O/P EST MOD 30 MIN: CPT | Performed by: PHYSICIAN ASSISTANT

## 2017-06-11 ENCOUNTER — APPOINTMENT (OUTPATIENT)
Dept: LAB | Facility: HOSPITAL | Age: 82
End: 2017-06-11
Attending: INTERNAL MEDICINE
Payer: COMMERCIAL

## 2017-06-11 DIAGNOSIS — B35.1 TINEA UNGUIUM: ICD-10-CM

## 2017-06-11 LAB
ALBUMIN SERPL BCP-MCNC: 3.3 G/DL (ref 3.5–5)
ALP SERPL-CCNC: 45 U/L (ref 46–116)
ALT SERPL W P-5'-P-CCNC: 22 U/L (ref 12–78)
AST SERPL W P-5'-P-CCNC: 21 U/L (ref 5–45)
BILIRUB DIRECT SERPL-MCNC: 0.09 MG/DL (ref 0–0.2)
BILIRUB SERPL-MCNC: 0.3 MG/DL (ref 0.2–1)
PROT SERPL-MCNC: 6 G/DL (ref 6.4–8.2)

## 2017-06-11 PROCEDURE — 80076 HEPATIC FUNCTION PANEL: CPT

## 2017-06-11 PROCEDURE — 36415 COLL VENOUS BLD VENIPUNCTURE: CPT

## 2017-07-06 ENCOUNTER — ALLSCRIPTS OFFICE VISIT (OUTPATIENT)
Dept: OTHER | Facility: OTHER | Age: 82
End: 2017-07-06

## 2017-07-06 ENCOUNTER — TRANSCRIBE ORDERS (OUTPATIENT)
Dept: LAB | Facility: MEDICAL CENTER | Age: 82
End: 2017-07-06

## 2017-07-06 ENCOUNTER — APPOINTMENT (OUTPATIENT)
Dept: LAB | Facility: MEDICAL CENTER | Age: 82
End: 2017-07-06
Payer: COMMERCIAL

## 2017-07-06 DIAGNOSIS — E11.649 TYPE 2 DIABETES MELLITUS WITH HYPOGLYCEMIA WITHOUT COMA (HCC): ICD-10-CM

## 2017-07-06 DIAGNOSIS — E11.9 TYPE 2 DIABETES MELLITUS WITHOUT COMPLICATIONS (HCC): ICD-10-CM

## 2017-07-06 DIAGNOSIS — E11.40 TYPE 2 DIABETES MELLITUS WITH DIABETIC NEUROPATHY (HCC): ICD-10-CM

## 2017-07-06 LAB
EST. AVERAGE GLUCOSE BLD GHB EST-MCNC: 223 MG/DL
HBA1C MFR BLD: 9.4 % (ref 4.2–6.3)

## 2017-07-06 PROCEDURE — 36415 COLL VENOUS BLD VENIPUNCTURE: CPT

## 2017-07-06 PROCEDURE — 83036 HEMOGLOBIN GLYCOSYLATED A1C: CPT

## 2017-07-19 ENCOUNTER — GENERIC CONVERSION - ENCOUNTER (OUTPATIENT)
Dept: OTHER | Facility: OTHER | Age: 82
End: 2017-07-19

## 2017-07-31 ENCOUNTER — TRANSCRIBE ORDERS (OUTPATIENT)
Dept: ADMINISTRATIVE | Facility: HOSPITAL | Age: 82
End: 2017-07-31

## 2017-07-31 ENCOUNTER — APPOINTMENT (OUTPATIENT)
Dept: LAB | Facility: HOSPITAL | Age: 82
End: 2017-07-31
Payer: COMMERCIAL

## 2017-07-31 ENCOUNTER — GENERIC CONVERSION - ENCOUNTER (OUTPATIENT)
Dept: OTHER | Facility: OTHER | Age: 82
End: 2017-07-31

## 2017-07-31 DIAGNOSIS — R06.02 SOB (SHORTNESS OF BREATH): ICD-10-CM

## 2017-07-31 DIAGNOSIS — R06.02 SOB (SHORTNESS OF BREATH): Primary | ICD-10-CM

## 2017-07-31 LAB — NT-PROBNP SERPL-MCNC: 299 PG/ML

## 2017-07-31 PROCEDURE — 83880 ASSAY OF NATRIURETIC PEPTIDE: CPT

## 2017-07-31 PROCEDURE — 36415 COLL VENOUS BLD VENIPUNCTURE: CPT

## 2017-08-02 ENCOUNTER — GENERIC CONVERSION - ENCOUNTER (OUTPATIENT)
Dept: OTHER | Facility: OTHER | Age: 82
End: 2017-08-02

## 2017-08-17 ENCOUNTER — APPOINTMENT (EMERGENCY)
Dept: ULTRASOUND IMAGING | Facility: HOSPITAL | Age: 82
DRG: 281 | End: 2017-08-17
Payer: COMMERCIAL

## 2017-08-17 ENCOUNTER — HOSPITAL ENCOUNTER (INPATIENT)
Facility: HOSPITAL | Age: 82
LOS: 3 days | Discharge: HOME | DRG: 281 | End: 2017-08-21
Attending: EMERGENCY MEDICINE | Admitting: INTERNAL MEDICINE
Payer: COMMERCIAL

## 2017-08-17 ENCOUNTER — APPOINTMENT (EMERGENCY)
Dept: CT IMAGING | Facility: HOSPITAL | Age: 82
DRG: 281 | End: 2017-08-17
Payer: COMMERCIAL

## 2017-08-17 DIAGNOSIS — E05.90 HYPERTHYROIDISM: ICD-10-CM

## 2017-08-17 DIAGNOSIS — E11.8 TYPE 2 DIABETES MELLITUS WITH COMPLICATION, WITHOUT LONG-TERM CURRENT USE OF INSULIN (HCC): Primary | ICD-10-CM

## 2017-08-17 DIAGNOSIS — I21.4 NSTEMI, INITIAL EPISODE OF CARE (HCC): ICD-10-CM

## 2017-08-17 DIAGNOSIS — R07.9 CHEST PAIN: ICD-10-CM

## 2017-08-17 DIAGNOSIS — E78.5 HYPERLIPIDEMIA: ICD-10-CM

## 2017-08-17 DIAGNOSIS — R53.1 WEAKNESS: ICD-10-CM

## 2017-08-17 DIAGNOSIS — I10 HYPERTENSION: ICD-10-CM

## 2017-08-17 PROBLEM — E03.9 HYPOTHYROIDISM: Status: ACTIVE | Noted: 2017-08-17

## 2017-08-17 PROBLEM — R19.7 FREQUENT LOOSE STOOLS: Status: ACTIVE | Noted: 2017-08-17

## 2017-08-17 PROBLEM — R79.89 LOW TSH LEVEL: Status: ACTIVE | Noted: 2017-08-17

## 2017-08-17 LAB
ALBUMIN SERPL BCP-MCNC: 3.2 G/DL (ref 3.5–5)
ALP SERPL-CCNC: 45 U/L (ref 46–116)
ALT SERPL W P-5'-P-CCNC: 19 U/L (ref 12–78)
ANION GAP SERPL CALCULATED.3IONS-SCNC: 8 MMOL/L (ref 4–13)
APTT PPP: 25 SECONDS (ref 23–35)
AST SERPL W P-5'-P-CCNC: 20 U/L (ref 5–45)
BASOPHILS # BLD AUTO: 0.02 THOUSANDS/ΜL (ref 0–0.1)
BASOPHILS NFR BLD AUTO: 0 % (ref 0–1)
BILIRUB SERPL-MCNC: 0.3 MG/DL (ref 0.2–1)
BUN SERPL-MCNC: 11 MG/DL (ref 5–25)
CALCIUM SERPL-MCNC: 8.5 MG/DL (ref 8.3–10.1)
CHLORIDE SERPL-SCNC: 97 MMOL/L (ref 100–108)
CO2 SERPL-SCNC: 29 MMOL/L (ref 21–32)
CREAT SERPL-MCNC: 0.78 MG/DL (ref 0.6–1.3)
EOSINOPHIL # BLD AUTO: 0.06 THOUSAND/ΜL (ref 0–0.61)
EOSINOPHIL NFR BLD AUTO: 1 % (ref 0–6)
ERYTHROCYTE [DISTWIDTH] IN BLOOD BY AUTOMATED COUNT: 14.4 % (ref 11.6–15.1)
GFR SERPL CREATININE-BSD FRML MDRD: 71 ML/MIN/1.73SQ M
GLUCOSE SERPL-MCNC: 220 MG/DL (ref 65–140)
HCT VFR BLD AUTO: 33.7 % (ref 34.8–46.1)
HGB BLD-MCNC: 10.8 G/DL (ref 11.5–15.4)
INR PPP: 0.94 (ref 0.86–1.16)
LYMPHOCYTES # BLD AUTO: 1.35 THOUSANDS/ΜL (ref 0.6–4.47)
LYMPHOCYTES NFR BLD AUTO: 17 % (ref 14–44)
MCH RBC QN AUTO: 26.9 PG (ref 26.8–34.3)
MCHC RBC AUTO-ENTMCNC: 32 G/DL (ref 31.4–37.4)
MCV RBC AUTO: 84 FL (ref 82–98)
MONOCYTES # BLD AUTO: 0.72 THOUSAND/ΜL (ref 0.17–1.22)
MONOCYTES NFR BLD AUTO: 9 % (ref 4–12)
NEUTROPHILS # BLD AUTO: 5.87 THOUSANDS/ΜL (ref 1.85–7.62)
NEUTS SEG NFR BLD AUTO: 73 % (ref 43–75)
PLATELET # BLD AUTO: 257 THOUSANDS/UL (ref 149–390)
PLATELET # BLD AUTO: 274 THOUSANDS/UL (ref 149–390)
PMV BLD AUTO: 9.6 FL (ref 8.9–12.7)
PMV BLD AUTO: 9.9 FL (ref 8.9–12.7)
POTASSIUM SERPL-SCNC: 4.2 MMOL/L (ref 3.5–5.3)
PROT SERPL-MCNC: 6.4 G/DL (ref 6.4–8.2)
PROTHROMBIN TIME: 12.5 SECONDS (ref 12.1–14.4)
RBC # BLD AUTO: 4.02 MILLION/UL (ref 3.81–5.12)
SODIUM SERPL-SCNC: 134 MMOL/L (ref 136–145)
TROPONIN I SERPL-MCNC: 0.05 NG/ML
TSH SERPL DL<=0.05 MIU/L-ACNC: 0.03 UIU/ML (ref 0.36–3.74)
WBC # BLD AUTO: 8.02 THOUSAND/UL (ref 4.31–10.16)

## 2017-08-17 PROCEDURE — 84484 ASSAY OF TROPONIN QUANT: CPT | Performed by: FAMILY MEDICINE

## 2017-08-17 PROCEDURE — 96361 HYDRATE IV INFUSION ADD-ON: CPT

## 2017-08-17 PROCEDURE — 70450 CT HEAD/BRAIN W/O DYE: CPT

## 2017-08-17 PROCEDURE — 93970 EXTREMITY STUDY: CPT

## 2017-08-17 PROCEDURE — 36415 COLL VENOUS BLD VENIPUNCTURE: CPT | Performed by: EMERGENCY MEDICINE

## 2017-08-17 PROCEDURE — 85025 COMPLETE CBC W/AUTO DIFF WBC: CPT | Performed by: EMERGENCY MEDICINE

## 2017-08-17 PROCEDURE — 71275 CT ANGIOGRAPHY CHEST: CPT

## 2017-08-17 PROCEDURE — 93005 ELECTROCARDIOGRAM TRACING: CPT | Performed by: EMERGENCY MEDICINE

## 2017-08-17 PROCEDURE — 99285 EMERGENCY DEPT VISIT HI MDM: CPT

## 2017-08-17 PROCEDURE — 84484 ASSAY OF TROPONIN QUANT: CPT | Performed by: EMERGENCY MEDICINE

## 2017-08-17 PROCEDURE — 96360 HYDRATION IV INFUSION INIT: CPT

## 2017-08-17 PROCEDURE — 85610 PROTHROMBIN TIME: CPT | Performed by: EMERGENCY MEDICINE

## 2017-08-17 PROCEDURE — 80053 COMPREHEN METABOLIC PANEL: CPT | Performed by: EMERGENCY MEDICINE

## 2017-08-17 PROCEDURE — 85730 THROMBOPLASTIN TIME PARTIAL: CPT | Performed by: EMERGENCY MEDICINE

## 2017-08-17 PROCEDURE — 84443 ASSAY THYROID STIM HORMONE: CPT

## 2017-08-17 PROCEDURE — 85049 AUTOMATED PLATELET COUNT: CPT | Performed by: FAMILY MEDICINE

## 2017-08-17 RX ORDER — PANTOPRAZOLE SODIUM 40 MG/1
40 TABLET, DELAYED RELEASE ORAL
Status: DISCONTINUED | OUTPATIENT
Start: 2017-08-18 | End: 2017-08-21 | Stop reason: HOSPADM

## 2017-08-17 RX ORDER — REPAGLINIDE 1 MG/1
2 TABLET ORAL
Status: DISCONTINUED | OUTPATIENT
Start: 2017-08-18 | End: 2017-08-18

## 2017-08-17 RX ORDER — CHOLECALCIFEROL (VITAMIN D3) 10 MCG
1 TABLET ORAL DAILY
Status: DISCONTINUED | OUTPATIENT
Start: 2017-08-18 | End: 2017-08-21 | Stop reason: HOSPADM

## 2017-08-17 RX ORDER — HYDROCHLOROTHIAZIDE 12.5 MG/1
12.5 TABLET ORAL DAILY
Status: DISCONTINUED | OUTPATIENT
Start: 2017-08-18 | End: 2017-08-19

## 2017-08-17 RX ORDER — LEVOTHYROXINE SODIUM 0.07 MG/1
75 TABLET ORAL
Status: DISCONTINUED | OUTPATIENT
Start: 2017-08-18 | End: 2017-08-17

## 2017-08-17 RX ORDER — SODIUM CHLORIDE 9 MG/ML
100 INJECTION, SOLUTION INTRAVENOUS CONTINUOUS
Status: DISCONTINUED | OUTPATIENT
Start: 2017-08-18 | End: 2017-08-19

## 2017-08-17 RX ORDER — ASPIRIN 81 MG/1
81 TABLET, CHEWABLE ORAL DAILY
Status: DISCONTINUED | OUTPATIENT
Start: 2017-08-18 | End: 2017-08-18

## 2017-08-17 RX ORDER — OXYBUTYNIN CHLORIDE 5 MG/1
10 TABLET, EXTENDED RELEASE ORAL
Status: DISCONTINUED | OUTPATIENT
Start: 2017-08-17 | End: 2017-08-21 | Stop reason: HOSPADM

## 2017-08-17 RX ORDER — HYDROCHLOROTHIAZIDE 12.5 MG/1
12.5 CAPSULE, GELATIN COATED ORAL DAILY
Status: ON HOLD | COMMUNITY
End: 2018-03-22

## 2017-08-17 RX ORDER — ASPIRIN 81 MG/1
324 TABLET, CHEWABLE ORAL ONCE
Status: COMPLETED | OUTPATIENT
Start: 2017-08-17 | End: 2017-08-17

## 2017-08-17 RX ORDER — PREDNISONE 1 MG/1
5 TABLET ORAL DAILY
Status: DISCONTINUED | OUTPATIENT
Start: 2017-08-18 | End: 2017-08-21 | Stop reason: HOSPADM

## 2017-08-17 RX ORDER — ERGOCALCIFEROL 1.25 MG/1
50000 CAPSULE ORAL WEEKLY
Status: DISCONTINUED | OUTPATIENT
Start: 2017-08-17 | End: 2017-08-21 | Stop reason: HOSPADM

## 2017-08-17 RX ORDER — PRAVASTATIN SODIUM 20 MG
20 TABLET ORAL
Status: DISCONTINUED | OUTPATIENT
Start: 2017-08-18 | End: 2017-08-21 | Stop reason: HOSPADM

## 2017-08-17 RX ADMIN — SODIUM CHLORIDE 100 ML/HR: 0.9 INJECTION, SOLUTION INTRAVENOUS at 23:59

## 2017-08-17 RX ADMIN — ASPIRIN 81 MG 324 MG: 81 TABLET ORAL at 20:58

## 2017-08-17 RX ADMIN — SODIUM CHLORIDE 1000 ML: 0.9 INJECTION, SOLUTION INTRAVENOUS at 18:11

## 2017-08-17 RX ADMIN — IOHEXOL 85 ML: 350 INJECTION, SOLUTION INTRAVENOUS at 20:29

## 2017-08-17 RX ADMIN — OXYBUTYNIN CHLORIDE 10 MG: 5 TABLET, EXTENDED RELEASE ORAL at 23:57

## 2017-08-18 ENCOUNTER — APPOINTMENT (INPATIENT)
Dept: NON INVASIVE DIAGNOSTICS | Facility: HOSPITAL | Age: 82
DRG: 281 | End: 2017-08-18
Payer: COMMERCIAL

## 2017-08-18 PROBLEM — R00.0 TACHYCARDIA: Status: ACTIVE | Noted: 2017-08-18

## 2017-08-18 PROBLEM — Z86.73 HISTORY OF CVA (CEREBROVASCULAR ACCIDENT): Status: ACTIVE | Noted: 2017-08-18

## 2017-08-18 LAB
ATRIAL RATE: 51 BPM
GLUCOSE SERPL-MCNC: 205 MG/DL (ref 65–140)
GLUCOSE SERPL-MCNC: 209 MG/DL (ref 65–140)
GLUCOSE SERPL-MCNC: 238 MG/DL (ref 65–140)
GLUCOSE SERPL-MCNC: 271 MG/DL (ref 65–140)
GLUCOSE SERPL-MCNC: 79 MG/DL (ref 65–140)
QRS AXIS: -49 DEGREES
QRSD INTERVAL: 92 MS
QT INTERVAL: 342 MS
QTC INTERVAL: 485 MS
T WAVE AXIS: 109 DEGREES
TROPONIN I SERPL-MCNC: 0.06 NG/ML
TROPONIN I SERPL-MCNC: 0.07 NG/ML
TROPONIN I SERPL-MCNC: 0.08 NG/ML
VENTRICULAR RATE: 121 BPM

## 2017-08-18 PROCEDURE — 93306 TTE W/DOPPLER COMPLETE: CPT

## 2017-08-18 PROCEDURE — 93005 ELECTROCARDIOGRAM TRACING: CPT | Performed by: FAMILY MEDICINE

## 2017-08-18 PROCEDURE — 84484 ASSAY OF TROPONIN QUANT: CPT | Performed by: FAMILY MEDICINE

## 2017-08-18 PROCEDURE — 84484 ASSAY OF TROPONIN QUANT: CPT | Performed by: INTERNAL MEDICINE

## 2017-08-18 PROCEDURE — 82948 REAGENT STRIP/BLOOD GLUCOSE: CPT

## 2017-08-18 PROCEDURE — 84484 ASSAY OF TROPONIN QUANT: CPT | Performed by: PHYSICIAN ASSISTANT

## 2017-08-18 RX ORDER — REPAGLINIDE 1 MG/1
4 TABLET ORAL
Status: DISCONTINUED | OUTPATIENT
Start: 2017-08-18 | End: 2017-08-18

## 2017-08-18 RX ORDER — ASPIRIN 325 MG
325 TABLET ORAL DAILY
Status: DISCONTINUED | OUTPATIENT
Start: 2017-08-18 | End: 2017-08-21 | Stop reason: HOSPADM

## 2017-08-18 RX ORDER — REPAGLINIDE 1 MG/1
4 TABLET ORAL
Status: DISCONTINUED | OUTPATIENT
Start: 2017-08-18 | End: 2017-08-21 | Stop reason: HOSPADM

## 2017-08-18 RX ORDER — NITROGLYCERIN 0.4 MG/1
0.4 TABLET SUBLINGUAL
Status: DISCONTINUED | OUTPATIENT
Start: 2017-08-18 | End: 2017-08-21 | Stop reason: HOSPADM

## 2017-08-18 RX ORDER — LEVOTHYROXINE SODIUM 0.07 MG/1
75 TABLET ORAL
Status: DISCONTINUED | OUTPATIENT
Start: 2017-08-18 | End: 2017-08-21 | Stop reason: HOSPADM

## 2017-08-18 RX ADMIN — INSULIN LISPRO 4 UNITS: 100 INJECTION, SOLUTION INTRAVENOUS; SUBCUTANEOUS at 08:08

## 2017-08-18 RX ADMIN — REPAGLINIDE 2 MG: 1 TABLET ORAL at 06:16

## 2017-08-18 RX ADMIN — HYDROCHLOROTHIAZIDE 12.5 MG: 12.5 TABLET ORAL at 08:10

## 2017-08-18 RX ADMIN — SODIUM CHLORIDE 100 ML/HR: 0.9 INJECTION, SOLUTION INTRAVENOUS at 10:32

## 2017-08-18 RX ADMIN — PANTOPRAZOLE SODIUM 40 MG: 40 TABLET, DELAYED RELEASE ORAL at 06:03

## 2017-08-18 RX ADMIN — SITAGLIPTIN 50 MG: 25 TABLET, FILM COATED ORAL at 08:10

## 2017-08-18 RX ADMIN — ENOXAPARIN SODIUM 40 MG: 40 INJECTION SUBCUTANEOUS at 08:11

## 2017-08-18 RX ADMIN — INSULIN LISPRO 4 UNITS: 100 INJECTION, SOLUTION INTRAVENOUS; SUBCUTANEOUS at 16:45

## 2017-08-18 RX ADMIN — METOPROLOL TARTRATE 12.5 MG: 25 TABLET ORAL at 12:48

## 2017-08-18 RX ADMIN — ASPIRIN 325 MG ORAL TABLET 325 MG: 325 PILL ORAL at 08:11

## 2017-08-18 RX ADMIN — Medication 1 CAPSULE: at 08:10

## 2017-08-18 RX ADMIN — REPAGLINIDE 4 MG: 1 TABLET ORAL at 12:48

## 2017-08-18 RX ADMIN — PRAVASTATIN SODIUM 20 MG: 20 TABLET ORAL at 17:30

## 2017-08-18 RX ADMIN — PREDNISONE 5 MG: 5 TABLET ORAL at 08:11

## 2017-08-18 RX ADMIN — OXYBUTYNIN CHLORIDE 10 MG: 5 TABLET, EXTENDED RELEASE ORAL at 21:40

## 2017-08-18 RX ADMIN — SODIUM CHLORIDE 100 ML/HR: 0.9 INJECTION, SOLUTION INTRAVENOUS at 20:43

## 2017-08-18 RX ADMIN — REPAGLINIDE 4 MG: 1 TABLET ORAL at 16:29

## 2017-08-19 LAB
ANION GAP SERPL CALCULATED.3IONS-SCNC: 5 MMOL/L (ref 4–13)
BASOPHILS # BLD AUTO: 0.02 THOUSANDS/ΜL (ref 0–0.1)
BASOPHILS NFR BLD AUTO: 0 % (ref 0–1)
BUN SERPL-MCNC: 10 MG/DL (ref 5–25)
CALCIUM SERPL-MCNC: 8 MG/DL (ref 8.3–10.1)
CHLORIDE SERPL-SCNC: 104 MMOL/L (ref 100–108)
CO2 SERPL-SCNC: 29 MMOL/L (ref 21–32)
CREAT SERPL-MCNC: 0.64 MG/DL (ref 0.6–1.3)
EOSINOPHIL # BLD AUTO: 0.24 THOUSAND/ΜL (ref 0–0.61)
EOSINOPHIL NFR BLD AUTO: 4 % (ref 0–6)
ERYTHROCYTE [DISTWIDTH] IN BLOOD BY AUTOMATED COUNT: 14.9 % (ref 11.6–15.1)
FERRITIN SERPL-MCNC: 10 NG/ML (ref 8–388)
GFR SERPL CREATININE-BSD FRML MDRD: 83 ML/MIN/1.73SQ M
GLUCOSE SERPL-MCNC: 111 MG/DL (ref 65–140)
GLUCOSE SERPL-MCNC: 124 MG/DL (ref 65–140)
GLUCOSE SERPL-MCNC: 201 MG/DL (ref 65–140)
GLUCOSE SERPL-MCNC: 280 MG/DL (ref 65–140)
GLUCOSE SERPL-MCNC: 322 MG/DL (ref 65–140)
HCT VFR BLD AUTO: 31.9 % (ref 34.8–46.1)
HGB BLD-MCNC: 9.9 G/DL (ref 11.5–15.4)
IRON SATN MFR SERPL: 11 %
IRON SERPL-MCNC: 30 UG/DL (ref 50–170)
LYMPHOCYTES # BLD AUTO: 1.94 THOUSANDS/ΜL (ref 0.6–4.47)
LYMPHOCYTES NFR BLD AUTO: 30 % (ref 14–44)
MAGNESIUM SERPL-MCNC: 1.7 MG/DL (ref 1.6–2.6)
MCH RBC QN AUTO: 26.5 PG (ref 26.8–34.3)
MCHC RBC AUTO-ENTMCNC: 31 G/DL (ref 31.4–37.4)
MCV RBC AUTO: 86 FL (ref 82–98)
MONOCYTES # BLD AUTO: 0.6 THOUSAND/ΜL (ref 0.17–1.22)
MONOCYTES NFR BLD AUTO: 9 % (ref 4–12)
NEUTROPHILS # BLD AUTO: 3.62 THOUSANDS/ΜL (ref 1.85–7.62)
NEUTS SEG NFR BLD AUTO: 57 % (ref 43–75)
PLATELET # BLD AUTO: 241 THOUSANDS/UL (ref 149–390)
PMV BLD AUTO: 10.2 FL (ref 8.9–12.7)
POTASSIUM SERPL-SCNC: 3.8 MMOL/L (ref 3.5–5.3)
RBC # BLD AUTO: 3.73 MILLION/UL (ref 3.81–5.12)
SODIUM SERPL-SCNC: 138 MMOL/L (ref 136–145)
T3FREE SERPL-MCNC: 1.36 PG/ML (ref 2.3–4.2)
T4 FREE SERPL-MCNC: 1.24 NG/DL (ref 0.76–1.46)
TIBC SERPL-MCNC: 270 UG/DL (ref 250–450)
WBC # BLD AUTO: 6.42 THOUSAND/UL (ref 4.31–10.16)

## 2017-08-19 PROCEDURE — 83550 IRON BINDING TEST: CPT | Performed by: INTERNAL MEDICINE

## 2017-08-19 PROCEDURE — 82728 ASSAY OF FERRITIN: CPT | Performed by: INTERNAL MEDICINE

## 2017-08-19 PROCEDURE — 80048 BASIC METABOLIC PNL TOTAL CA: CPT | Performed by: PHYSICIAN ASSISTANT

## 2017-08-19 PROCEDURE — 83540 ASSAY OF IRON: CPT | Performed by: INTERNAL MEDICINE

## 2017-08-19 PROCEDURE — 84439 ASSAY OF FREE THYROXINE: CPT | Performed by: PHYSICIAN ASSISTANT

## 2017-08-19 PROCEDURE — 83735 ASSAY OF MAGNESIUM: CPT | Performed by: INTERNAL MEDICINE

## 2017-08-19 PROCEDURE — 84481 FREE ASSAY (FT-3): CPT | Performed by: PHYSICIAN ASSISTANT

## 2017-08-19 PROCEDURE — 85025 COMPLETE CBC W/AUTO DIFF WBC: CPT | Performed by: PHYSICIAN ASSISTANT

## 2017-08-19 PROCEDURE — 82948 REAGENT STRIP/BLOOD GLUCOSE: CPT

## 2017-08-19 RX ORDER — MAGNESIUM SULFATE 1 G/100ML
1 INJECTION INTRAVENOUS ONCE
Status: COMPLETED | OUTPATIENT
Start: 2017-08-19 | End: 2017-08-19

## 2017-08-19 RX ORDER — DOCUSATE SODIUM 100 MG/1
100 CAPSULE, LIQUID FILLED ORAL 2 TIMES DAILY
Status: DISCONTINUED | OUTPATIENT
Start: 2017-08-19 | End: 2017-08-21 | Stop reason: HOSPADM

## 2017-08-19 RX ORDER — POTASSIUM CHLORIDE 20 MEQ/1
20 TABLET, EXTENDED RELEASE ORAL ONCE
Status: COMPLETED | OUTPATIENT
Start: 2017-08-19 | End: 2017-08-19

## 2017-08-19 RX ORDER — FERROUS SULFATE 325(65) MG
325 TABLET ORAL
Status: DISCONTINUED | OUTPATIENT
Start: 2017-08-20 | End: 2017-08-21 | Stop reason: HOSPADM

## 2017-08-19 RX ADMIN — REPAGLINIDE 4 MG: 1 TABLET ORAL at 11:54

## 2017-08-19 RX ADMIN — DOCUSATE SODIUM 100 MG: 100 CAPSULE, LIQUID FILLED ORAL at 17:17

## 2017-08-19 RX ADMIN — SITAGLIPTIN 50 MG: 25 TABLET, FILM COATED ORAL at 09:51

## 2017-08-19 RX ADMIN — Medication 400 MG: at 17:17

## 2017-08-19 RX ADMIN — LEVOTHYROXINE SODIUM 75 MCG: 75 TABLET ORAL at 06:15

## 2017-08-19 RX ADMIN — OXYBUTYNIN CHLORIDE 10 MG: 5 TABLET, EXTENDED RELEASE ORAL at 21:49

## 2017-08-19 RX ADMIN — PRAVASTATIN SODIUM 20 MG: 20 TABLET ORAL at 17:17

## 2017-08-19 RX ADMIN — Medication 400 MG: at 09:51

## 2017-08-19 RX ADMIN — INSULIN LISPRO 5 UNITS: 100 INJECTION, SOLUTION INTRAVENOUS; SUBCUTANEOUS at 16:32

## 2017-08-19 RX ADMIN — HYDROCHLOROTHIAZIDE 12.5 MG: 12.5 TABLET ORAL at 09:51

## 2017-08-19 RX ADMIN — MAGNESIUM SULFATE HEPTAHYDRATE 1 G: 1 INJECTION, SOLUTION INTRAVENOUS at 09:52

## 2017-08-19 RX ADMIN — SODIUM CHLORIDE 100 ML/HR: 0.9 INJECTION, SOLUTION INTRAVENOUS at 06:56

## 2017-08-19 RX ADMIN — Medication 1 CAPSULE: at 09:51

## 2017-08-19 RX ADMIN — POTASSIUM CHLORIDE 20 MEQ: 1500 TABLET, EXTENDED RELEASE ORAL at 10:05

## 2017-08-19 RX ADMIN — ASPIRIN 325 MG ORAL TABLET 325 MG: 325 PILL ORAL at 09:51

## 2017-08-19 RX ADMIN — PREDNISONE 5 MG: 5 TABLET ORAL at 09:51

## 2017-08-19 RX ADMIN — PANTOPRAZOLE SODIUM 40 MG: 40 TABLET, DELAYED RELEASE ORAL at 06:14

## 2017-08-19 RX ADMIN — ENOXAPARIN SODIUM 40 MG: 40 INJECTION SUBCUTANEOUS at 09:53

## 2017-08-19 RX ADMIN — REPAGLINIDE 4 MG: 1 TABLET ORAL at 06:14

## 2017-08-19 RX ADMIN — REPAGLINIDE 4 MG: 1 TABLET ORAL at 16:32

## 2017-08-20 PROBLEM — G47.30 SLEEP APNEA: Chronic | Status: ACTIVE | Noted: 2017-08-20

## 2017-08-20 LAB
ANION GAP SERPL CALCULATED.3IONS-SCNC: 5 MMOL/L (ref 4–13)
BASOPHILS # BLD AUTO: 0.02 THOUSANDS/ΜL (ref 0–0.1)
BASOPHILS NFR BLD AUTO: 0 % (ref 0–1)
BUN SERPL-MCNC: 10 MG/DL (ref 5–25)
CA-I BLD-SCNC: 1.16 MMOL/L (ref 1.12–1.32)
CALCIUM SERPL-MCNC: 8.1 MG/DL (ref 8.3–10.1)
CHLORIDE SERPL-SCNC: 101 MMOL/L (ref 100–108)
CO2 SERPL-SCNC: 30 MMOL/L (ref 21–32)
CREAT SERPL-MCNC: 0.65 MG/DL (ref 0.6–1.3)
EOSINOPHIL # BLD AUTO: 0.22 THOUSAND/ΜL (ref 0–0.61)
EOSINOPHIL NFR BLD AUTO: 4 % (ref 0–6)
ERYTHROCYTE [DISTWIDTH] IN BLOOD BY AUTOMATED COUNT: 14.9 % (ref 11.6–15.1)
GFR SERPL CREATININE-BSD FRML MDRD: 82 ML/MIN/1.73SQ M
GLUCOSE SERPL-MCNC: 128 MG/DL (ref 65–140)
GLUCOSE SERPL-MCNC: 156 MG/DL (ref 65–140)
GLUCOSE SERPL-MCNC: 228 MG/DL (ref 65–140)
GLUCOSE SERPL-MCNC: 312 MG/DL (ref 65–140)
HCT VFR BLD AUTO: 31.5 % (ref 34.8–46.1)
HGB BLD-MCNC: 9.9 G/DL (ref 11.5–15.4)
LYMPHOCYTES # BLD AUTO: 1.84 THOUSANDS/ΜL (ref 0.6–4.47)
LYMPHOCYTES NFR BLD AUTO: 30 % (ref 14–44)
MCH RBC QN AUTO: 26.7 PG (ref 26.8–34.3)
MCHC RBC AUTO-ENTMCNC: 31.4 G/DL (ref 31.4–37.4)
MCV RBC AUTO: 85 FL (ref 82–98)
MONOCYTES # BLD AUTO: 0.56 THOUSAND/ΜL (ref 0.17–1.22)
MONOCYTES NFR BLD AUTO: 9 % (ref 4–12)
NEUTROPHILS # BLD AUTO: 3.41 THOUSANDS/ΜL (ref 1.85–7.62)
NEUTS SEG NFR BLD AUTO: 57 % (ref 43–75)
PLATELET # BLD AUTO: 250 THOUSANDS/UL (ref 149–390)
PMV BLD AUTO: 9.9 FL (ref 8.9–12.7)
POTASSIUM SERPL-SCNC: 3.9 MMOL/L (ref 3.5–5.3)
RBC # BLD AUTO: 3.71 MILLION/UL (ref 3.81–5.12)
SODIUM SERPL-SCNC: 136 MMOL/L (ref 136–145)
WBC # BLD AUTO: 6.05 THOUSAND/UL (ref 4.31–10.16)

## 2017-08-20 PROCEDURE — 82330 ASSAY OF CALCIUM: CPT | Performed by: INTERNAL MEDICINE

## 2017-08-20 PROCEDURE — 82948 REAGENT STRIP/BLOOD GLUCOSE: CPT

## 2017-08-20 PROCEDURE — 85025 COMPLETE CBC W/AUTO DIFF WBC: CPT | Performed by: PHYSICIAN ASSISTANT

## 2017-08-20 PROCEDURE — 94760 N-INVAS EAR/PLS OXIMETRY 1: CPT

## 2017-08-20 PROCEDURE — 93005 ELECTROCARDIOGRAM TRACING: CPT | Performed by: PHYSICIAN ASSISTANT

## 2017-08-20 PROCEDURE — G8979 MOBILITY GOAL STATUS: HCPCS | Performed by: PHYSICAL THERAPIST

## 2017-08-20 PROCEDURE — 80048 BASIC METABOLIC PNL TOTAL CA: CPT | Performed by: PHYSICIAN ASSISTANT

## 2017-08-20 PROCEDURE — 94660 CPAP INITIATION&MGMT: CPT

## 2017-08-20 PROCEDURE — 97162 PT EVAL MOD COMPLEX 30 MIN: CPT | Performed by: PHYSICAL THERAPIST

## 2017-08-20 PROCEDURE — G8978 MOBILITY CURRENT STATUS: HCPCS | Performed by: PHYSICAL THERAPIST

## 2017-08-20 RX ADMIN — PRAVASTATIN SODIUM 20 MG: 20 TABLET ORAL at 17:52

## 2017-08-20 RX ADMIN — DOCUSATE SODIUM 100 MG: 100 CAPSULE, LIQUID FILLED ORAL at 17:52

## 2017-08-20 RX ADMIN — LEVOTHYROXINE SODIUM 75 MCG: 75 TABLET ORAL at 06:05

## 2017-08-20 RX ADMIN — OXYBUTYNIN CHLORIDE 10 MG: 5 TABLET, EXTENDED RELEASE ORAL at 22:06

## 2017-08-20 RX ADMIN — REPAGLINIDE 4 MG: 1 TABLET ORAL at 12:28

## 2017-08-20 RX ADMIN — PANTOPRAZOLE SODIUM 40 MG: 40 TABLET, DELAYED RELEASE ORAL at 06:05

## 2017-08-20 RX ADMIN — PREDNISONE 5 MG: 5 TABLET ORAL at 08:28

## 2017-08-20 RX ADMIN — ASPIRIN 325 MG ORAL TABLET 325 MG: 325 PILL ORAL at 08:28

## 2017-08-20 RX ADMIN — SITAGLIPTIN 50 MG: 25 TABLET, FILM COATED ORAL at 08:28

## 2017-08-20 RX ADMIN — Medication 325 MG: at 08:27

## 2017-08-20 RX ADMIN — Medication 400 MG: at 08:28

## 2017-08-20 RX ADMIN — DOCUSATE SODIUM 100 MG: 100 CAPSULE, LIQUID FILLED ORAL at 08:28

## 2017-08-20 RX ADMIN — REPAGLINIDE 4 MG: 1 TABLET ORAL at 16:48

## 2017-08-20 RX ADMIN — Medication 1 CAPSULE: at 08:29

## 2017-08-20 RX ADMIN — Medication 400 MG: at 17:52

## 2017-08-20 RX ADMIN — METFORMIN HYDROCHLORIDE 500 MG: 500 TABLET, FILM COATED ORAL at 16:48

## 2017-08-20 RX ADMIN — REPAGLINIDE 4 MG: 1 TABLET ORAL at 06:05

## 2017-08-20 RX ADMIN — ENOXAPARIN SODIUM 40 MG: 40 INJECTION SUBCUTANEOUS at 08:28

## 2017-08-21 ENCOUNTER — APPOINTMENT (INPATIENT)
Dept: OCCUPATIONAL THERAPY | Facility: HOSPITAL | Age: 82
DRG: 281 | End: 2017-08-21
Payer: COMMERCIAL

## 2017-08-21 VITALS
RESPIRATION RATE: 18 BRPM | BODY MASS INDEX: 24.99 KG/M2 | SYSTOLIC BLOOD PRESSURE: 146 MMHG | HEIGHT: 62 IN | HEART RATE: 58 BPM | WEIGHT: 135.8 LBS | DIASTOLIC BLOOD PRESSURE: 97 MMHG | TEMPERATURE: 98.1 F | OXYGEN SATURATION: 100 %

## 2017-08-21 PROBLEM — R19.7 FREQUENT LOOSE STOOLS: Status: RESOLVED | Noted: 2017-08-17 | Resolved: 2017-08-21

## 2017-08-21 PROBLEM — R00.0 TACHYCARDIA: Status: RESOLVED | Noted: 2017-08-18 | Resolved: 2017-08-21

## 2017-08-21 PROBLEM — R79.89 ELEVATED LACTIC ACID LEVEL: Status: RESOLVED | Noted: 2017-01-13 | Resolved: 2017-08-21

## 2017-08-21 PROBLEM — R07.9 CHEST PAIN: Status: RESOLVED | Noted: 2017-08-17 | Resolved: 2017-08-21

## 2017-08-21 PROBLEM — R53.1 GENERALIZED WEAKNESS: Status: RESOLVED | Noted: 2017-08-17 | Resolved: 2017-08-21

## 2017-08-21 LAB
ANION GAP SERPL CALCULATED.3IONS-SCNC: 4 MMOL/L (ref 4–13)
BASOPHILS # BLD AUTO: 0.03 THOUSANDS/ΜL (ref 0–0.1)
BASOPHILS NFR BLD AUTO: 1 % (ref 0–1)
BUN SERPL-MCNC: 14 MG/DL (ref 5–25)
CALCIUM SERPL-MCNC: 8.2 MG/DL (ref 8.3–10.1)
CHLORIDE SERPL-SCNC: 101 MMOL/L (ref 100–108)
CO2 SERPL-SCNC: 30 MMOL/L (ref 21–32)
CREAT SERPL-MCNC: 0.68 MG/DL (ref 0.6–1.3)
EOSINOPHIL # BLD AUTO: 0.2 THOUSAND/ΜL (ref 0–0.61)
EOSINOPHIL NFR BLD AUTO: 4 % (ref 0–6)
ERYTHROCYTE [DISTWIDTH] IN BLOOD BY AUTOMATED COUNT: 14.8 % (ref 11.6–15.1)
GFR SERPL CREATININE-BSD FRML MDRD: 81 ML/MIN/1.73SQ M
GLUCOSE SERPL-MCNC: 109 MG/DL (ref 65–140)
GLUCOSE SERPL-MCNC: 123 MG/DL (ref 65–140)
GLUCOSE SERPL-MCNC: 255 MG/DL (ref 65–140)
GLUCOSE SERPL-MCNC: 262 MG/DL (ref 65–140)
HCT VFR BLD AUTO: 31.8 % (ref 34.8–46.1)
HGB BLD-MCNC: 10.1 G/DL (ref 11.5–15.4)
LYMPHOCYTES # BLD AUTO: 1.67 THOUSANDS/ΜL (ref 0.6–4.47)
LYMPHOCYTES NFR BLD AUTO: 30 % (ref 14–44)
MAGNESIUM SERPL-MCNC: 1.9 MG/DL (ref 1.6–2.6)
MCH RBC QN AUTO: 26.9 PG (ref 26.8–34.3)
MCHC RBC AUTO-ENTMCNC: 31.8 G/DL (ref 31.4–37.4)
MCV RBC AUTO: 85 FL (ref 82–98)
MONOCYTES # BLD AUTO: 0.64 THOUSAND/ΜL (ref 0.17–1.22)
MONOCYTES NFR BLD AUTO: 12 % (ref 4–12)
NEUTROPHILS # BLD AUTO: 3.03 THOUSANDS/ΜL (ref 1.85–7.62)
NEUTS SEG NFR BLD AUTO: 53 % (ref 43–75)
PLATELET # BLD AUTO: 277 THOUSANDS/UL (ref 149–390)
PMV BLD AUTO: 10.1 FL (ref 8.9–12.7)
POTASSIUM SERPL-SCNC: 4 MMOL/L (ref 3.5–5.3)
RBC # BLD AUTO: 3.76 MILLION/UL (ref 3.81–5.12)
SODIUM SERPL-SCNC: 135 MMOL/L (ref 136–145)
WBC # BLD AUTO: 5.57 THOUSAND/UL (ref 4.31–10.16)

## 2017-08-21 PROCEDURE — 97166 OT EVAL MOD COMPLEX 45 MIN: CPT

## 2017-08-21 PROCEDURE — 97535 SELF CARE MNGMENT TRAINING: CPT

## 2017-08-21 PROCEDURE — 94762 N-INVAS EAR/PLS OXIMTRY CONT: CPT

## 2017-08-21 PROCEDURE — G8988 SELF CARE GOAL STATUS: HCPCS

## 2017-08-21 PROCEDURE — G8987 SELF CARE CURRENT STATUS: HCPCS

## 2017-08-21 PROCEDURE — 80048 BASIC METABOLIC PNL TOTAL CA: CPT | Performed by: PHYSICIAN ASSISTANT

## 2017-08-21 PROCEDURE — 94760 N-INVAS EAR/PLS OXIMETRY 1: CPT

## 2017-08-21 PROCEDURE — G8989 SELF CARE D/C STATUS: HCPCS

## 2017-08-21 PROCEDURE — 85025 COMPLETE CBC W/AUTO DIFF WBC: CPT | Performed by: PHYSICIAN ASSISTANT

## 2017-08-21 PROCEDURE — 82948 REAGENT STRIP/BLOOD GLUCOSE: CPT

## 2017-08-21 PROCEDURE — 83735 ASSAY OF MAGNESIUM: CPT | Performed by: PHYSICIAN ASSISTANT

## 2017-08-21 PROCEDURE — 94660 CPAP INITIATION&MGMT: CPT

## 2017-08-21 RX ORDER — DOCUSATE SODIUM 100 MG/1
100 CAPSULE, LIQUID FILLED ORAL 2 TIMES DAILY
Qty: 60 CAPSULE | Refills: 0 | Status: SHIPPED | OUTPATIENT
Start: 2017-08-21 | End: 2018-12-11

## 2017-08-21 RX ORDER — BISACODYL 10 MG
10 SUPPOSITORY, RECTAL RECTAL DAILY PRN
Status: DISCONTINUED | OUTPATIENT
Start: 2017-08-21 | End: 2017-08-21 | Stop reason: HOSPADM

## 2017-08-21 RX ORDER — SENNOSIDES 8.6 MG
2 TABLET ORAL 2 TIMES DAILY
Status: DISCONTINUED | OUTPATIENT
Start: 2017-08-21 | End: 2017-08-21 | Stop reason: HOSPADM

## 2017-08-21 RX ORDER — FERROUS SULFATE 325(65) MG
325 TABLET ORAL
Qty: 30 TABLET | Refills: 0 | Status: ON HOLD | OUTPATIENT
Start: 2017-08-21 | End: 2018-03-22

## 2017-08-21 RX ADMIN — REPAGLINIDE 4 MG: 1 TABLET ORAL at 09:21

## 2017-08-21 RX ADMIN — METFORMIN HYDROCHLORIDE 500 MG: 500 TABLET, FILM COATED ORAL at 09:20

## 2017-08-21 RX ADMIN — ASPIRIN 325 MG ORAL TABLET 325 MG: 325 PILL ORAL at 09:12

## 2017-08-21 RX ADMIN — SENNOSIDES 17.2 MG: 8.6 TABLET, FILM COATED ORAL at 09:22

## 2017-08-21 RX ADMIN — PREDNISONE 5 MG: 5 TABLET ORAL at 09:21

## 2017-08-21 RX ADMIN — REPAGLINIDE 4 MG: 1 TABLET ORAL at 11:31

## 2017-08-21 RX ADMIN — LEVOTHYROXINE SODIUM 75 MCG: 75 TABLET ORAL at 06:16

## 2017-08-21 RX ADMIN — Medication 325 MG: at 09:13

## 2017-08-21 RX ADMIN — Medication 400 MG: at 09:20

## 2017-08-21 RX ADMIN — DOCUSATE SODIUM 100 MG: 100 CAPSULE, LIQUID FILLED ORAL at 09:13

## 2017-08-21 RX ADMIN — Medication 1 CAPSULE: at 09:12

## 2017-08-21 RX ADMIN — SITAGLIPTIN 50 MG: 25 TABLET, FILM COATED ORAL at 09:22

## 2017-08-21 RX ADMIN — PANTOPRAZOLE SODIUM 40 MG: 40 TABLET, DELAYED RELEASE ORAL at 06:16

## 2017-08-21 RX ADMIN — ENOXAPARIN SODIUM 40 MG: 40 INJECTION SUBCUTANEOUS at 09:13

## 2017-08-22 LAB
ATRIAL RATE: 51 BPM
ATRIAL RATE: 52 BPM
P AXIS: 14 DEGREES
P AXIS: 15 DEGREES
PR INTERVAL: 170 MS
PR INTERVAL: 184 MS
QRS AXIS: -45 DEGREES
QRS AXIS: -46 DEGREES
QRSD INTERVAL: 106 MS
QRSD INTERVAL: 106 MS
QT INTERVAL: 462 MS
QT INTERVAL: 494 MS
QTC INTERVAL: 429 MS
QTC INTERVAL: 455 MS
T WAVE AXIS: 17 DEGREES
T WAVE AXIS: 37 DEGREES
VENTRICULAR RATE: 51 BPM
VENTRICULAR RATE: 52 BPM

## 2017-08-23 LAB
ATRIAL RATE: 48 BPM
P AXIS: 23 DEGREES
PR INTERVAL: 172 MS
QRS AXIS: -43 DEGREES
QRSD INTERVAL: 104 MS
QT INTERVAL: 446 MS
QTC INTERVAL: 398 MS
T WAVE AXIS: 43 DEGREES
VENTRICULAR RATE: 48 BPM

## 2017-08-24 DIAGNOSIS — E11.9 TYPE 2 DIABETES MELLITUS WITHOUT COMPLICATIONS (HCC): ICD-10-CM

## 2017-08-28 ENCOUNTER — ALLSCRIPTS OFFICE VISIT (OUTPATIENT)
Dept: OTHER | Facility: OTHER | Age: 82
End: 2017-08-28

## 2017-08-29 ENCOUNTER — TRANSCRIBE ORDERS (OUTPATIENT)
Dept: SLEEP CENTER | Facility: HOSPITAL | Age: 82
End: 2017-08-29

## 2017-08-29 DIAGNOSIS — G47.33 OBSTRUCTIVE SLEEP APNEA (ADULT) (PEDIATRIC): Primary | ICD-10-CM

## 2017-09-01 ENCOUNTER — GENERIC CONVERSION - ENCOUNTER (OUTPATIENT)
Dept: OTHER | Facility: OTHER | Age: 82
End: 2017-09-01

## 2017-09-13 ENCOUNTER — ALLSCRIPTS OFFICE VISIT (OUTPATIENT)
Dept: FAMILY MEDICINE CLINIC | Facility: CLINIC | Age: 82
End: 2017-09-13
Payer: COMMERCIAL

## 2017-09-13 PROCEDURE — 99214 OFFICE O/P EST MOD 30 MIN: CPT | Performed by: FAMILY MEDICINE

## 2017-09-19 ENCOUNTER — GENERIC CONVERSION - ENCOUNTER (OUTPATIENT)
Dept: OTHER | Facility: OTHER | Age: 82
End: 2017-09-19

## 2017-09-27 ENCOUNTER — TRANSCRIBE ORDERS (OUTPATIENT)
Dept: LAB | Facility: MEDICAL CENTER | Age: 82
End: 2017-09-27

## 2017-09-27 ENCOUNTER — APPOINTMENT (OUTPATIENT)
Dept: LAB | Facility: MEDICAL CENTER | Age: 82
End: 2017-09-27
Payer: COMMERCIAL

## 2017-09-27 ENCOUNTER — ALLSCRIPTS OFFICE VISIT (OUTPATIENT)
Dept: OTHER | Facility: OTHER | Age: 82
End: 2017-09-27

## 2017-09-27 DIAGNOSIS — E55.9 VITAMIN D DEFICIENCY: ICD-10-CM

## 2017-09-27 DIAGNOSIS — E03.9 HYPOTHYROIDISM: ICD-10-CM

## 2017-09-27 DIAGNOSIS — D64.9 ANEMIA: ICD-10-CM

## 2017-09-27 LAB
25(OH)D3 SERPL-MCNC: >135 NG/ML (ref 30–100)
IRON SERPL-MCNC: 182 UG/DL (ref 50–170)
TSH SERPL DL<=0.05 MIU/L-ACNC: 0.07 UIU/ML (ref 0.36–3.74)
VIT B12 SERPL-MCNC: 347 PG/ML (ref 100–900)

## 2017-09-27 PROCEDURE — 82306 VITAMIN D 25 HYDROXY: CPT

## 2017-09-27 PROCEDURE — 82607 VITAMIN B-12: CPT

## 2017-09-27 PROCEDURE — 84443 ASSAY THYROID STIM HORMONE: CPT

## 2017-09-27 PROCEDURE — 36415 COLL VENOUS BLD VENIPUNCTURE: CPT

## 2017-09-27 PROCEDURE — 84207 ASSAY OF VITAMIN B-6: CPT

## 2017-09-27 PROCEDURE — 83540 ASSAY OF IRON: CPT

## 2017-09-29 LAB — VIT B6 SERPL-MCNC: 18.9 UG/L (ref 2–32.8)

## 2017-10-03 ENCOUNTER — GENERIC CONVERSION - ENCOUNTER (OUTPATIENT)
Dept: OTHER | Facility: OTHER | Age: 82
End: 2017-10-03

## 2017-10-06 ENCOUNTER — HOSPITAL ENCOUNTER (OUTPATIENT)
Dept: SLEEP CENTER | Facility: HOSPITAL | Age: 82
Discharge: HOME | End: 2017-10-06
Attending: INTERNAL MEDICINE
Payer: COMMERCIAL

## 2017-10-17 ENCOUNTER — GENERIC CONVERSION - ENCOUNTER (OUTPATIENT)
Dept: OTHER | Facility: OTHER | Age: 82
End: 2017-10-17

## 2017-10-30 ENCOUNTER — GENERIC CONVERSION - ENCOUNTER (OUTPATIENT)
Dept: INTERNAL MEDICINE CLINIC | Facility: CLINIC | Age: 82
End: 2017-10-30

## 2017-10-30 ENCOUNTER — GENERIC CONVERSION - ENCOUNTER (OUTPATIENT)
Dept: OTHER | Facility: OTHER | Age: 82
End: 2017-10-30

## 2017-10-30 ENCOUNTER — ALLSCRIPTS OFFICE VISIT (OUTPATIENT)
Dept: OTHER | Facility: OTHER | Age: 82
End: 2017-10-30

## 2017-10-30 DIAGNOSIS — E11.9 TYPE 2 DIABETES MELLITUS WITHOUT COMPLICATIONS (HCC): ICD-10-CM

## 2017-10-30 DIAGNOSIS — E78.5 HYPERLIPIDEMIA: ICD-10-CM

## 2017-11-06 ENCOUNTER — GENERIC CONVERSION - ENCOUNTER (OUTPATIENT)
Dept: OTHER | Facility: OTHER | Age: 82
End: 2017-11-06

## 2017-11-07 ENCOUNTER — GENERIC CONVERSION - ENCOUNTER (OUTPATIENT)
Dept: OTHER | Facility: OTHER | Age: 82
End: 2017-11-07

## 2017-11-10 NOTE — PROGRESS NOTES
Assessment    1  DMII (diabetes mellitus, type 2) (250 00) (E11 9)   2  Bradycardia (427 89) (R00 1)    Plan  History of CVA (cerebrovascular accident)    · From  Prandin 2 MG Oral Tablet TAKE 2 TABS TID BEFORE MEALS ToRepaglinide 2 MG Oral Tablet (Prandin) TAKE 2 TABS TID BEFORE MEALS    Discussion/Summary    Pt to follow up with cardiology for bradycardia and no changes to dm meds as patient does not notice highs or lows a1c is 9 4 but at this tie for her age I feel no changes should be made  The patient was counseled regarding  Chief Complaint  Patient is here for a check up for her diabetes  Patient states that her sugars are running pretty good  She complains of aches throughout her body and is very tired  [place] [facility] [place] she wasn't feeling well  She states her heart rate was very low  Patient states Dr Cole would like her to have a CPAP and possibly a pacemaker  History of Present Illness  pt is here for dm checkup and was recently hospitalized and was told may need cpap had sleep study again this week and one in past was positive Appetite is ok and sleeps up and down and bm are ok      Review of Systems   Constitutional: feeling tired, but-- No fever, no chills, feels well, no tiredness, no recent weight gain or weight loss  Eyes: No complaints of eye pain, no red eyes, no eyesight problems, no discharge, no dry eyes, no itching of eyes  ENT: no complaints of earache, no loss of hearing, no nose bleeds, no nasal discharge, no sore throat, no hoarseness  Cardiovascular: No complaints of slow heart rate, no fast heart rate, no chest pain, no palpitations, no leg claudication, no lower extremity edema  Respiratory: No complaints of shortness of breath, no wheezing, no cough, no SOB on exertion, no orthopnea, no PND  Gastrointestinal: No complaints of abdominal pain, no constipation, no nausea or vomiting, no diarrhea, no bloody stools  Genitourinary: No complaints of dysuria, no incontinence, no pelvic pain, no dysmenorrhea, no vaginal discharge or bleeding  Musculoskeletal: body aches, but-- No complaints of arthralgias, no myalgias, no joint swelling or stiffness, no limb pain or swelling  Integumentary: No complaints of skin rash or lesions, no itching, no skin wounds, no breast pain or lump  Neurological: No complaints of headache, no confusion, no convulsions, no numbness, no dizziness or fainting, no tingling, no limb weakness, no difficulty walking  Psychiatric: Not suicidal, no sleep disturbance, no anxiety or depression, no change in personality, no emotional problems  Endocrine: No complaints of proptosis, no hot flashes, no muscle weakness, no deepening of the voice, no feelings of weakness  Hematologic/Lymphatic: No complaints of swollen glands, no swollen glands in the neck, does not bleed easily, does not bruise easily  ROS reviewed  Active Problems  1  Abnormal heart rate (785 3) (R00 9)   2  Activities of daily living deficit involving meal and kitchen skills (V40 39)   3  Anemia (285 9) (D64 9)   4  Bilateral hearing loss (389 9) (H91 93)   5  Bradycardia (427 89) (R00 1)   6  Chronic bilateral low back pain without sciatica (724 2,338 29) (M54 5,G89 29)   7  Depression (311) (F32 9)   8  Diabetic hypoglycemia (250 80) (E11 649)   9  Diabetic neuropathy (250 60,357 2) (E11 40)   10  DMII (diabetes mellitus, type 2) (250 00) (E11 9)   11  Dysphagia, unspecified type (787 20) (R13 10)   12  Edema (782 3) (R60 9)   13  Encounter for mammogram to establish baseline mammogram (V76 12) (Z12 31)   14  Encounter for routine laboratory testing (V72 60) (Z00 00)   15  Esophageal dysmotility (530 5) (K22 4)   16  Esophageal reflux (530 81) (K21 9)   17  Esophageal spasm (530 5) (K22 4)   18  Fatigue (780 79) (R53 83)   19  Generalized osteoarthritis of multiple sites (715 09) (M15 9)   20  Heart murmur (785 2) (R01 1)   21  Hiatal hernia with gastroesophageal reflux (118 56,412  3) (K21 9,K44 9)   22  History of allergy (V15 09) (Z88 9)   23  History of CVA (cerebrovascular accident) (V12 54) (Z86 73)   24  Hyperlipidemia (272 4) (E78 5)   25  Hypertension (401 9) (I10)   26  Hypothyroidism (244 9) (E03 9)   27  Need for influenza vaccination (V04 81) (Z23)   28  NSTEMI, initial episode of care (410 71) (I21 4)   29  Onychomycosis (110 1) (B35 1)   30  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)   31  Pruritus, unspecified (698 9) (L29 9)   32  Right hip pain (719 45) (M25 551)   33  Sleep apnea (780 57) (G47 30)   34  Vitamin D deficiency (268 9) (E55 9)    Past Medical History  1  History of Chest pain (786 50) (R07 9)   2  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,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,L97 209)   3  History of Encounter for screening colonoscopy (V76 51) (Z12 11)   4  History of Female Stress Incontinence (625 6)   5  History of constipation (V12 79) (Z87 19)   6  History of Need for pneumococcal vaccination (V03 82) (Z23)   7  History of Visit for screening mammogram (V76 12) (Z12 31)   8  History of Wound, open, leg (891 0) (S81 099A)    The active problems and past medical history were reviewed and updated today  Surgical History  1  History of Ankle Surgery   2  History of Appendectomy   3  History of Cataract Surgery   4  History of Excision Of Pituitary Gland   5  History of Hemorrhoidectomy   6  History of Hernia Repair   7  History of Hip Replacement   8  History of Hysterectomy   9  History of Neuroplasty Decompression Median Nerve At Carpal Tunnel    The surgical history was reviewed and updated today  Family History  Father    1  Family history of Coal Workers' Pneumoconiosis   2  Family history of Heart Disease (V17 49)  Brother    3  Family history of Diabetes Mellitus (V18 0)   4  Family history of Heart Disease (V17 49)   5  Family history of Prostate Cancer (V16 42)  Maternal Grandmother    6   Family history of Gastric Cancer (V16 0)  Paternal Aunt    7  Family history of Diabetes Mellitus (V18 0)  Family History    8  Denied: Family history of Drug abuse    The family history was reviewed and updated today  Social History     · Caffeine Use   · Dental care, regularly   ·    · Denied: History of Drug Use   · Good dental hygiene   · Never a smoker   · Denied: History of No history of alcohol use (Z78 9)   · Uses Safety Equipment - Seatbelts  The social history was reviewed and updated today  Current Meds   1  Aspirin 81 MG TABS; TAKE 1 TABLET DAILY; Therapy: (Recorded:22Zuh8856) to Recorded   2  B Complex Oral Capsule; One daily; Therapy: (Solmon Mina) to Recorded   3  [employer] 2 Test In Vitro Disk; TEST BLOOD SUGAR 4 TIMES A DAY; Therapy: 98LRK4495 to (Last Rx:88Hbm5820)  Requested for: 16TDJ7760 Ordered   4  Docusate Sodium 100 MG Oral Tablet; one tab BID; Therapy: (Solmon Mina) to Recorded   5  Ergocalciferol 81154 IU TABS; ONE TAB ONCE WEEKLY; Therapy: (Solmon Mina) to Recorded   6  FerrouSul 325 (65 Fe) MG Oral Tablet; TAKE 1 TABLET DAILY; Therapy: 47Mrm7391 to (Evaluate:71Ktc4449)  Requested for: 17Sxq6847; Last Rx:88Chk9076 Ordered   7  HydroCHLOROthiazide 12 5 MG Oral Tablet; Take 1 daily; Therapy: 67EML2349 to (Last Rx:59Huk5209)  Requested for: 38Wvh2097 Ordered   8  Januvia 50 MG Oral Tablet; TAKE 1 TABLET DAILY; Therapy: 03WRP7412 to (Evaluate:13Nov2016)  Requested for: 47Rpx6489; Last Rx:25Qlc5577 Ordered   9  Levothyroxine Sodium 75 MCG Oral Tablet; TAKE (1) TABLET BY MOUTH DAILY; Therapy: 55DKB5379 to (Last Rx:98Ubu3245)  Requested for: 30OTG3024 Ordered   10  MetFORMIN HCl - 500 MG Oral Tablet; TAKE 1 TABLET EVERY 12 HOURS WITH FOOD; Therapy: (Solmon Mina) to Recorded   11  MiraLax POWD;  Therapy: (Recorded:34Drd5016) to Recorded   12  Oxybutynin Chloride ER 10 MG Oral Tablet Extended Release 24 Hour; TAKE ONE  TABLET BY MOUTH TWICE A DAY;   Therapy: 21OYX9907 to (Last Rx:35Hro4992)  Requested for: 11XKF3238 Ordered   13  Pantoprazole Sodium 40 MG Oral Tablet Delayed Release; take one tablet by mouth one  time daily; Therapy: 81BAM0793 to (Evaluate:34Tld7389)  Requested for: 19Bbj4232; Last  Rx:23Hsb6695 Ordered   14  Prandin 2 MG Oral Tablet; TAKE 2 TABS TID BEFORE MEALS; Therapy: (Brad Rose) to Recorded   15  Pravastatin Sodium 20 MG Oral Tablet; Take 1 tablet daily; Therapy: 26SCF0194 to (Evaluate:81Krs8944)  Requested for: 56EYY6493; Last  Rx:25Vui3988 Ordered   16  PredniSONE 5 MG Oral Tablet; Take 1 daily; Therapy: 65VCE8796 to (Nickolas Tabares)  Requested for: 14KRV1585; Last  Rx:70Owl5529 Ordered   17  Vitamin D (Ergocalciferol) 36564 UNIT Oral Capsule; TAKE 1 CAPSULE WEEKLY; Therapy: 65Kqh9838 to (Evaluate:92Vfo2231)  Requested for: 71Vhm4568; Last  Rx:36Vna9249 Ordered   18  Vitamin D (Ergocalciferol) 30836 UNIT Oral Capsule; TAKE 1 WEEKLY FOR 12 WEEKS; Therapy: 67KNG6124 to (Last Rx:08Kxf3692)  Requested for: 30Dmj1803 Ordered    The medication list was reviewed and updated today  Allergies  1  Cephalosporins   2  Clindamycin HCl CAPS   3  HYDROcodone Bitartrate Powder   4  Penicillins   5  Percocet TABS   6  Simvastatin TABS   7  Tetracyclines   8  Vicodin TABS    Vitals  Vital Signs    Recorded: 13Sep2017 02:00PM   Temperature 98 F   Heart Rate 57   Respiration 17   Systolic 128   Diastolic 72   Height 5 ft 2 in   Weight 139 lb    BMI Calculated 25 42   BSA Calculated 1 64   O2 Saturation 97       Physical Exam   Constitutional  General appearance: No acute distress, well appearing and well nourished  Eyes  Conjunctiva and lids: No swelling, erythema or discharge  Pupils and irises: Equal, round and reactive to light  Ears, Nose, Mouth, and Throat  External inspection of ears and nose: Normal    Otoscopic examination: Tympanic membranes translucent with normal light reflex  Canals patent without erythema     Nasal mucosa, septum, and turbinates: Normal without edema or erythema  Oropharynx: Normal with no erythema, edema, exudate or lesions  Pulmonary  Respiratory effort: No increased work of breathing or signs of respiratory distress  Auscultation of lungs: Clear to auscultation  Cardiovascular  Palpation of heart: Normal PMI, no thrills  Auscultation of heart: Normal rate and rhythm, normal S1 and S2, without murmurs  Examination of extremities for edema and/or varicosities: Normal    Carotid pulses: Normal    Abdomen  Abdomen: Non-tender, no masses  Liver and spleen: No hepatomegaly or splenomegaly  Lymphatic  Palpation of lymph nodes in neck: No lymphadenopathy  Musculoskeletal  Gait and station: Normal    Digits and nails: Normal without clubbing or cyanosis  Inspection/palpation of joints, bones, and muscles: Normal    Skin  Skin and subcutaneous tissue: Normal without rashes or lesions  Neurologic  Cranial nerves: Cranial nerves 2-12 intact  Reflexes: 2+ and symmetric  Sensation: No sensory loss  Psychiatric  Orientation to person, place, and time: Normal    Mood and affect: Normal          Health Management  Health Maintenance   Medicare Annual Wellness Visit; every 1 year;  Last 05SVC4579; Next Due: 10GQH1914; Near Due    Future Appointments    Date/Time Provider Specialty Site   09/27/2017 10:45 AM Samanta Ortiz,  Internal Medicine [facility] [address]       Signatures   Electronically signed by : [employer], [facility]; Sep 13 2017  2:44PM EST                       (Author)    Electronically signed by : Edelmira Nowak MD; Nov 9 2017  9:49AM EST                       (Author)

## 2017-11-27 ENCOUNTER — GENERIC CONVERSION - ENCOUNTER (OUTPATIENT)
Dept: OTHER | Facility: OTHER | Age: 82
End: 2017-11-27

## 2017-12-20 ENCOUNTER — ALLSCRIPTS OFFICE VISIT (OUTPATIENT)
Dept: FAMILY MEDICINE CLINIC | Facility: CLINIC | Age: 82
End: 2017-12-20
Payer: COMMERCIAL

## 2017-12-20 PROCEDURE — 99214 OFFICE O/P EST MOD 30 MIN: CPT | Performed by: FAMILY MEDICINE

## 2017-12-21 NOTE — PROGRESS NOTES
Assessment   1  DMII (diabetes mellitus, type 2) (250 00) (E11 9)   2  Diabetic neuropathy (250 60,357 2) (E11 40)   3  Depression (311) (F32 9)   4  Hypothyroidism (244 9) (E03 9)    Plan   Unlinked    · NovoLOG FlexPen 100 UNIT/ML Subcutaneous Solution Pen-injector    Discussion/Summary      Discussed need to eat before bed and also discussed novolog Pt has low blood sugars after insulin and afraid to sleep will d/c novolog as low dose and pt has hypoglycemic unawareness  The patient was counseled regarding  Chief Complaint   Patient is here for a check up for her diabetes  She states her sugars are running ok  Her sister called and stated that ENDO Dr Lopez put her on insulin 2 units daily with biggest meal once daily  Patient states she is sneezing from the CPAP  History of Present Illness   Pt is here for checkup and a1c in Nov was 9 1 and she is feeling ok and sleeps ok and bm are ok vision is ok and feet are ok and saw Dr Lainey Brian who added      Review of Systems        Constitutional: No fever, no chills, feels well, no tiredness, no recent weight gain or weight loss  Eyes: No complaints of eye pain, no red eyes, no eyesight problems, no discharge, no dry eyes, no itching of eyes  ENT: no complaints of earache, no loss of hearing, no nose bleeds, no nasal discharge, no sore throat, no hoarseness  Cardiovascular: No complaints of slow heart rate, no fast heart rate, no chest pain, no palpitations, no leg claudication, no lower extremity edema  Respiratory: No complaints of shortness of breath, no wheezing, no cough, no SOB on exertion, no orthopnea, no PND  Gastrointestinal: No complaints of abdominal pain, no constipation, no nausea or vomiting, no diarrhea, no bloody stools  Genitourinary: No complaints of dysuria, no incontinence, no pelvic pain, no dysmenorrhea, no vaginal discharge or bleeding        Musculoskeletal: No complaints of arthralgias, no myalgias, no joint swelling or stiffness, no limb pain or swelling  Integumentary: No complaints of skin rash or lesions, no itching, no skin wounds, no breast pain or lump  Neurological: No complaints of headache, no confusion, no convulsions, no numbness, no dizziness or fainting, no tingling, no limb weakness, no difficulty walking  Psychiatric: Not suicidal, no sleep disturbance, no anxiety or depression, no change in personality, no emotional problems  Endocrine: No complaints of proptosis, no hot flashes, no muscle weakness, no deepening of the voice, no feelings of weakness  Hematologic/Lymphatic: No complaints of swollen glands, no swollen glands in the neck, does not bleed easily, does not bruise easily  Other Symptoms: sneezing  ROS reviewed  Active Problems   1  Abnormal heart rate (785 3) (R00 9)   2  Activities of daily living deficit involving meal and kitchen skills (V40 39)   3  Anemia (285 9) (D64 9)   4  Bilateral hearing loss (389 9) (H91 93)   5  Bradycardia (427 89) (R00 1)   6  Chronic bilateral low back pain without sciatica (724 2,338 29) (M54 5,G89 29)   7  Depression (311) (F32 9)   8  Diabetic hypoglycemia (250 80) (E11 649)   9  Diabetic neuropathy (250 60,357 2) (E11 40)   10  DMII (diabetes mellitus, type 2) (250 00) (E11 9)   11  Dysphagia, unspecified type (787 20) (R13 10)   12  Edema (782 3) (R60 9)   13  Encounter for mammogram to establish baseline mammogram (V76 12) (Z12 31)   14  Esophageal dysmotility (530 5) (K22 4)   15  Esophageal reflux (530 81) (K21 9)   16  Esophageal spasm (530 5) (K22 4)   17  Fatigue (780 79) (R53 83)   18  Generalized osteoarthritis of multiple sites (715 09) (M15 9)   19  Heart murmur (785 2) (R01 1)   20  Hiatal hernia with gastroesophageal reflux (341 71,308  3) (K21 9,K44 9)   21  History of CVA (cerebrovascular accident) (V12 54) (Z86 73)   22  Hyperlipidemia (272 4) (E78 5)   23  Hypertension (401 9) (I10)   24  Hypothyroidism (244 9) (E03 9)   25  Need for immunization against influenza (V04 81) (Z23)   26  NSTEMI, initial episode of care (410 71) (I21 4)   27  Onychomycosis (110 1) (B35 1)   28  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)   29  Pruritus, unspecified (698 9) (L29 9)   30  Sleep apnea (780 57) (G47 30)   31  Vitamin D deficiency (268 9) (E55 9)    Past Medical History   1  History of Chest pain (786 50) (R07 9)     The active problems and past medical history were reviewed and updated today  Surgical History   1  History of Ankle Surgery   2  History of Appendectomy   3  History of Cataract Surgery   4  History of Excision Of Pituitary Gland   5  History of Hemorrhoidectomy   6  History of Hernia Repair   7  History of Hip Replacement   8  History of Hysterectomy   9  History of Neuroplasty Decompression Median Nerve At Carpal Tunnel     The surgical history was reviewed and updated today  Family History   Father    1  Family history of Coal Workers' Pneumoconiosis   2  Family history of Heart Disease (V17 49)  Brother    3  Family history of Diabetes Mellitus (V18 0)   4  Family history of Heart Disease (V17 49)   5  Family history of Prostate Cancer (V16 42)  Maternal Grandmother    6  Family history of Gastric Cancer (V16 0)  Paternal Aunt    9  Family history of Diabetes Mellitus (V18 0)  Family History    8  Denied: Family history of Drug abuse     The family history was reviewed and updated today  Social History    · Caffeine Use   · Dental care, regularly   ·    · Denied: History of Drug Use   · Good dental hygiene   · Never a smoker   · Denied: History of No history of alcohol use (Z78 9)   · Uses Safety Equipment - Seatbelts  The social history was reviewed and updated today  Current Meds    1  Aspirin 81 MG TABS; TAKE 1 TABLET DAILY; Therapy: (Recorded:51Dip5620) to Recorded   2  B Complex Oral Capsule; One daily; Therapy: (Donivan Peeks) to Recorded   3   [employer] 2 Test In Vitro Disk; TEST BLOOD SUGAR 4 TIMES A DAY; Therapy: 89HLP4210 to (Last Rx:49Kwg2683)  Requested for: 67RAB6207 Ordered   4  Docusate Sodium 100 MG Oral Tablet; one tab BID; Therapy: (Sheryle Reeds) to Recorded   5  Ergocalciferol 05008 IU TABS; ONE TAB ONCE WEEKLY; Therapy: (Sheryle Reeds) to Recorded   6  FerrouSul 325 (65 Fe) MG Oral Tablet; TAKE 1 TABLET DAILY; Therapy: 10Yse2499 to (Evaluate:43Kzj8681)  Requested for: 32Igt1743; Last     Rx:06Pzk7971 Ordered   7  HydroCHLOROthiazide 12 5 MG Oral Tablet; Take 1 daily; Therapy: 18GZU1238 to (Last Rx:88Cac8567)  Requested for: 77Vqz6822 Ordered   8  Januvia 50 MG Oral Tablet; Take 1 tablet daily; Therapy: 59WOK7226 to (Cristian Elliott)  Requested for: 42KPE4850; Last     Rx:12Oct2017 Ordered   9  Levothyroxine Sodium 75 MCG Oral Tablet; TAKE (1) TABLET BY MOUTH DAILY; Therapy: 44XMM4790 to (Last Rx:28Rfc7089)  Requested for: 94CUS5879 Ordered   10  MetFORMIN HCl - 500 MG Oral Tablet; TAKE 1 TABLET EVERY 12 HOURS WITH FOOD; Therapy: (Sheryle Reeds) to Recorded   11  MiraLax POWD;      Therapy: (Recorded:10Rya0934) to Recorded   12  NovoFine 32G X 6 MM Miscellaneous; USE AS DIRECTED; Therapy: (Recorded:70Qpr8632) to Recorded   13  NovoLOG FlexPen 100 UNIT/ML Subcutaneous Solution Pen-injector; INJECT 2 UNITS      BEFORE BIGGEST MEAL OF DAY ONCE DAILY; Therapy: (Recorded:81Zjd1398) to Recorded   14  Oxybutynin Chloride ER 10 MG Oral Tablet Extended Release 24 Hour; TAKE ONE      TABLET BY MOUTH TWICE A DAY; Therapy: 68TGR9015 to (Last Rx:29Nov2016)  Requested for: 32CZH7735 Ordered   15  Pantoprazole Sodium 40 MG Oral Tablet Delayed Release; take one tablet by mouth one      time daily; Therapy: 17EMD6165 to (Evaluate:23Aaz1626)  Requested for: 63Rmt5630; Last      Rx:07Ldu7790 Ordered   16  Pravastatin Sodium 20 MG Oral Tablet; Take 1 tablet daily;       Therapy: 39NFB7225 to (Margot Jailene)  Requested for: 85JFS9457; Last      Rx:03Cek3677 Ordered   17  PredniSONE 5 MG Oral Tablet; Take 1 tablet daily; Therapy: 12QGC4798 to (Last Rx:12Oct2017)  Requested for: 12Oct2017 Ordered   18  Repaglinide 2 MG Oral Tablet; TAKE 2 TABLET 3 times daily  Requested for: 16STW9361;      Last Rx:94Olm8804 Ordered   19  Vitamin D (Ergocalciferol) 21098 UNIT Oral Capsule; TAKE 1 CAPSULE WEEKLY; Therapy: 20Vga2332 to (Evaluate:04Qxz9994)  Requested for: 95Dnz5361; Last      Rx:84Nqr2099 Ordered     The medication list was reviewed and updated today  Allergies   1  Cephalosporins   2  Clindamycin HCl CAPS   3  HYDROcodone Bitartrate Powder   4  Penicillins   5  Percocet TABS   6  Simvastatin TABS   7  Tetracyclines   8  Vicodin TABS    Vitals   Vital Signs    Recorded: 80Hco7337 01:45PM   Temperature 99 4 F   Heart Rate 69   Respiration 16   Systolic 635   Diastolic 74   Height 5 ft 2 in   Weight 138 lb    BMI Calculated 25 24   BSA Calculated 1 63   O2 Saturation 97     Physical Exam        Constitutional      General appearance: No acute distress, well appearing and well nourished  Eyes      Conjunctiva and lids: No swelling, erythema or discharge  Pupils and irises: Equal, round and reactive to light  Ears, Nose, Mouth, and Throat      External inspection of ears and nose: Normal        Otoscopic examination: Tympanic membranes translucent with normal light reflex  Canals patent without erythema  Nasal mucosa, septum, and turbinates: Normal without edema or erythema  Oropharynx: Normal with no erythema, edema, exudate or lesions  Pulmonary      Respiratory effort: No increased work of breathing or signs of respiratory distress  Auscultation of lungs: Clear to auscultation  Cardiovascular      Palpation of heart: Normal PMI, no thrills  Auscultation of heart: Normal rate and rhythm, normal S1 and S2, without murmurs  Examination of extremities for edema and/or varicosities: Normal        Carotid pulses: Normal        Abdomen      Abdomen: Non-tender, no masses  Liver and spleen: No hepatomegaly or splenomegaly  Lymphatic      Palpation of lymph nodes in neck: No lymphadenopathy  Musculoskeletal      Gait and station: Normal        Digits and nails: Normal without clubbing or cyanosis  Inspection/palpation of joints, bones, and muscles: Normal        Skin      Skin and subcutaneous tissue: Normal without rashes or lesions  Neurologic      Cranial nerves: Cranial nerves 2-12 intact  Reflexes: 2+ and symmetric  Sensation: No sensory loss  Psychiatric      Orientation to person, place, and time: Normal        Mood and affect: Normal           Health Management   Health Maintenance   Medicare Annual Wellness Visit; every 1 year; Last 84DCA2424; Next Due: 38MFJ0176;  Active    Signatures    Electronically signed by : Ruddy Higgins, [facility]; Dec 20 2017  2:18PM EST                       (Author)     Electronically signed by : Robert Rich MD; Dec 20 2017  2:46PM EST                       (Author)

## 2018-01-12 VITALS
SYSTOLIC BLOOD PRESSURE: 112 MMHG | TEMPERATURE: 99 F | DIASTOLIC BLOOD PRESSURE: 74 MMHG | HEART RATE: 70 BPM | RESPIRATION RATE: 16 BRPM | HEIGHT: 62 IN | WEIGHT: 136 LBS | BODY MASS INDEX: 25.03 KG/M2 | OXYGEN SATURATION: 97 %

## 2018-01-12 VITALS
WEIGHT: 139 LBS | HEIGHT: 62 IN | OXYGEN SATURATION: 97 % | BODY MASS INDEX: 25.58 KG/M2 | HEART RATE: 52 BPM | TEMPERATURE: 97.4 F

## 2018-01-13 VITALS
HEIGHT: 62 IN | RESPIRATION RATE: 18 BRPM | WEIGHT: 136.38 LBS | SYSTOLIC BLOOD PRESSURE: 110 MMHG | TEMPERATURE: 97.9 F | OXYGEN SATURATION: 97 % | BODY MASS INDEX: 25.1 KG/M2 | HEART RATE: 58 BPM | DIASTOLIC BLOOD PRESSURE: 60 MMHG

## 2018-01-14 VITALS
HEIGHT: 62 IN | HEART RATE: 70 BPM | OXYGEN SATURATION: 95 % | WEIGHT: 137.13 LBS | TEMPERATURE: 97.8 F | BODY MASS INDEX: 25.23 KG/M2 | SYSTOLIC BLOOD PRESSURE: 126 MMHG | DIASTOLIC BLOOD PRESSURE: 72 MMHG

## 2018-01-14 VITALS
BODY MASS INDEX: 25.03 KG/M2 | HEIGHT: 62 IN | OXYGEN SATURATION: 97 % | DIASTOLIC BLOOD PRESSURE: 72 MMHG | HEART RATE: 73 BPM | WEIGHT: 136 LBS | SYSTOLIC BLOOD PRESSURE: 122 MMHG | TEMPERATURE: 97.1 F

## 2018-01-14 VITALS
BODY MASS INDEX: 25.58 KG/M2 | SYSTOLIC BLOOD PRESSURE: 114 MMHG | DIASTOLIC BLOOD PRESSURE: 72 MMHG | HEART RATE: 57 BPM | HEIGHT: 62 IN | TEMPERATURE: 98 F | OXYGEN SATURATION: 97 % | WEIGHT: 139 LBS | RESPIRATION RATE: 17 BRPM

## 2018-01-14 VITALS
BODY MASS INDEX: 25.64 KG/M2 | OXYGEN SATURATION: 93 % | DIASTOLIC BLOOD PRESSURE: 72 MMHG | HEART RATE: 82 BPM | TEMPERATURE: 97.6 F | WEIGHT: 139.31 LBS | SYSTOLIC BLOOD PRESSURE: 124 MMHG | HEIGHT: 62 IN

## 2018-01-14 VITALS
DIASTOLIC BLOOD PRESSURE: 72 MMHG | HEIGHT: 62 IN | TEMPERATURE: 97 F | WEIGHT: 137 LBS | SYSTOLIC BLOOD PRESSURE: 124 MMHG | BODY MASS INDEX: 25.21 KG/M2 | OXYGEN SATURATION: 97 %

## 2018-01-14 VITALS
WEIGHT: 137.13 LBS | DIASTOLIC BLOOD PRESSURE: 72 MMHG | HEIGHT: 62 IN | SYSTOLIC BLOOD PRESSURE: 116 MMHG | BODY MASS INDEX: 25.23 KG/M2 | TEMPERATURE: 97.1 F | HEART RATE: 78 BPM | OXYGEN SATURATION: 97 %

## 2018-01-15 VITALS
HEART RATE: 62 BPM | HEIGHT: 62 IN | TEMPERATURE: 98.2 F | BODY MASS INDEX: 24.7 KG/M2 | DIASTOLIC BLOOD PRESSURE: 74 MMHG | WEIGHT: 134.25 LBS | SYSTOLIC BLOOD PRESSURE: 122 MMHG | OXYGEN SATURATION: 94 %

## 2018-01-17 NOTE — CONSULTS
I had the pleasure of evaluating your patient, Ephraim Mendiola  My full evaluation follows:      Chief Complaint  Chief Complaint Free Text Note Form: Itchy Ears/Ref by Dr Derrick Casanova      History of Present Illness  HPI: 68-year-old female referred by Dr Derrick Casanova for pruritic ears  She notes ongoing bilateral pruritus without any inflammation, tenderness, pain, ear drainage, ear fullness, or dizziness  She does have some mild hearing loss  She has not had a recent audiogram  No previous ear surgeries  No neck masses  No weight loss  Review of Systems  Complete ENT ROS [facility]:   Eyes: No complaints of itching, excessive tearing or vision changes  Ears: Itchy Ears  Nose: No nasal obstruction, no discharge or runniness, no bleeding, no dryness, no sneezing and no loss of smell  Mouth: No sores in mouth, no altered taste, no dental problems  Throat: No complaints of throat pain, no difficulty swallowing, no hoarseness  Neck: No neck soreness, no neck pain, no neck lumps or swelling  Genitourinary: No complaints of dysuria, flank pain or frequent urination  Cardiovascular: No complaints of chest pain or palpitations  Respiratory: No complaints of shortness of breath, cough or wheezing  Gastrointestinal: No complaints of heartburn, nausea/vomiting, or constipation  Neurological: No complaints of headache, convulsions or memory loss  ROS Reviewed:   ROS reviewed  Active Problems    1  Abnormal heart rate (785 3) (R00 9)   2  Activities of daily living deficit involving meal and kitchen skills (V40 39)   3  Arthralgia Of The Left Humerus/Elbow (719 42)   4  Bradycardia (427 89) (R00 1)   5  Chronic bilateral low back pain without sciatica (724 2,338 29) (M54 5,G89 29)   6  Depression (311) (F32 9)   7  Diabetic hypoglycemia (250 80) (E11 649)   8  DMII (diabetes mellitus, type 2) (250 00) (E11 9)   9  Dysphagia, unspecified type (787 20) (R13 10)   10   Encounter for mammogram to establish baseline mammogram (V76 12) (Z12 31)   11  Esophageal reflux (530 81) (K21 9)   12  Esophageal spasm (530 5) (K22 4)   13  Fatigue (780 79) (R53 83)   14  Female pelvic pain (625 9) (R10 2)   15  Generalized osteoarthritis of multiple sites (715 09) (M15 9)   16  Heart murmur (785 2) (R01 1)   17  History of allergy (V15 09) (Z88 9)   18  Hyperlipidemia (272 4) (E78 5)   19  Hypothyroidism (244 9) (E03 9)   20  Need for influenza vaccination (V04 81) (Z23)   21  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)   22  Right hip pain (719 45) (M25 551)   23  Skin infection (686 9) (L08 9)   24  Skin rash (782 1) (R21)   25  Vitamin D deficiency (268 9) (E55 9)    Past Medical History    1  History of Chest cold (519 8) (J22)   2  History of Chest pain (786 50) (R07 9)   3  History of Cough (786 2) (R05)   4  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,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,L97 209)   5  History of Encounter for screening colonoscopy (V76 51) (Z12 11)   6  History of Female Stress Incontinence (625 6)   7  History of constipation (V12 79) (Z87 19)   8  History of Need for pneumococcal vaccination (V03 82) (Z23)   9  History of Visit for screening mammogram (V76 12) (Z12 31)   10  History of Wound, open, leg (891 0) (R76 429T)  Past Medical History Reviewed: The past medical history was reviewed and updated today  Surgical History    1  History of Ankle Surgery   2  History of Appendectomy   3  History of Cataract Surgery   4  History of Excision Of Pituitary Gland   5  History of Hemorrhoidectomy   6  History of Hernia Repair   7  History of Hip Replacement   8  History of Hysterectomy   9  History of Neuroplasty Decompression Median Nerve At Carpal Tunnel  Surgical History Reviewed: The surgical history was reviewed and updated today  Family History    1  Family history of Coal Workers' Pneumoconiosis   2  Family history of Heart Disease (V17 49)    3  Family history of Diabetes Mellitus (V18 0)   4   Family history of Heart Disease (V17 49)   5  Family history of Prostate Cancer (V16 42)    6  Family history of Gastric Cancer (V16 0)    7  Family history of Diabetes Mellitus (V18 0)    8  Denied: Family history of Drug abuse  Family History Reviewed: The family history was reviewed and updated today  Social History    · Caffeine Use   · Dental care, regularly   ·    · Denied: History of Drug Use   · Good dental hygiene   · Never a smoker   · Denied: History of No history of alcohol use (Z78 9)   · Uses Safety Equipment - Seatbelts  Social History Reviewed: The social history was reviewed and updated today  The social history was reviewed and is unchanged  Current Meds   1  Perez Breeze 2 Test In Vitro Disk; TEST BLOOD SUGAR 4 TIMES A DAY; Therapy: 41IWK4496 to (Last Rx:62Ygc4706)  Requested for: 37ZHA3679 Ordered   2  Fluocinolone Acetonide 0 01 % Otic Oil; Therapy: 79ZGC5957 to (Evaluate:95Tms3906) Recorded   3  Januvia 50 MG Oral Tablet; TAKE 1 TABLET DAILY; Therapy: 25YPC4664 to (Evaluate:13Nov2016)  Requested for: 90Jsz8680; Last   Rx:41Ghs1454 Ordered   4  Levothyroxine Sodium 75 MCG Oral Tablet; TAKE (1) TABLET BY MOUTH DAILY; Therapy: 86IFX3740 to (Last Rx:32Czp6107)  Requested for: 27RCS8491 Ordered   5  MetFORMIN HCl  MG Oral Tablet Extended Release 24 Hour; take 2 tablets by   mouth twice a day; Therapy: 47OBT2318 to (Last Rx:14Clp2503)  Requested for: 97XYD3267 Ordered   6  Oxybutynin Chloride ER 10 MG Oral Tablet Extended Release 24 Hour; TAKE ONE   TABLET BY MOUTH TWICE A DAY; Therapy: 37IER6446 to (Last Rx:22Zmp3471)  Requested for: 07EBW5851 Ordered   7  Polyethylene Glycol 3350 Oral Powder; FILL TO INDICATOR TERE ON CAP WITH   POWDER  DISOLVE POWDER IN 8OZ OF WATER AND DRINK DAILY; Therapy: 30EZS7966 to (Aristides Briggs)  Requested for: 22JAK5048; Last   MY:84QPE1674 Ordered   8  Pravastatin Sodium 20 MG Oral Tablet; Take 1 tablet daily;    Therapy: 14IKR5923 to (Evaluate:67Ejx3537) Requested for: 86QQI4739; Last   Rx:75Qvk4029 Ordered   9  PredniSONE 5 MG Oral Tablet; Take 1 daily; Therapy: 97AJM0200 to (Evaluate:80Did8423)  Requested for: 05Gzn3261; Last   Rx:06Xfp5213 Ordered   10  RABEprazole Sodium 20 MG Oral Tablet Delayed Release; TAKE 1 TABLET DAILY; Therapy: 60WLS5668 to Recorded   11  Repaglinide 2 MG Oral Tablet; TAKE 1 TABLET 3 TIMES DAILY 30 MINUTES BEFORE    MEALS; Therapy: 94VIT2647 to (Evaluate:66Iss2608); Last Rx:98Rab1836 Ordered   12  Repaglinide 2 MG Oral Tablet; TAKE 1 TABLET AT BREAKFAST 1 TABLET AT LUNCH AND    TWO TABLETS AT [facility]  Requested for: 10LYX8456; Last Rx:13Zpy6750 Ordered   13  Vitamin D (Ergocalciferol) 14199 UNIT Oral Capsule; TAKE 1 WEEKLY FOR 12 WEEKS; Therapy: 25YGE9209 to (Last Rx:50Wzw5926)  Requested for: 37OVY0092 Ordered   14  Vitamin D CAPS; take 1 capsule daily; Therapy: (Recorded:66Fjl4805) to Recorded    Allergies    1  Cephalosporins   2  Clindamycin HCl CAPS   3  HYDROcodone Bitartrate Powder   4  Penicillins   5  Percocet TABS   6  Tetracyclines   7  Vicodin TABS    Vitals  Signs   Recorded: 07Apr2017 01:42PM   Heart Rate: 54  Systolic: 641, LUE, Sitting  Diastolic: 64, LUE, Sitting  Height: 5 ft 2 in  Weight: 138 lb 6 oz  BMI Calculated: 25 31  BSA Calculated: 1 63  O2 Saturation: 98    Physical Exam    Constitutional:   General appearance: Well developed, well nourished  Ability to communicate: Voice normal  Speech normal    Head and Face:   Head and face: Head normocephalic, atraumatic with no lesions or palpable masses  Submandibular glands and parotid glands: non tender, no masses  Eyes:   Test of Ocular Motility: Gaze normal  No nystagmus  Ears:   Otoscopic Examination: Tympanic membranes intact and normal in appearance, no retraction of tympanic membranes observed, no serous effusion observed, no evidence of tympanosclerosis      Hearing: Normal    Nose:   External auditory canals: No cerumen impaction noted, no drainage observed, no edema noted in EAC, no exostoses present, no osteoma present, no tenderness noted  External Inspection of Nose: No deformities observed, no deviation of bone structure, no skin lesion present, no swelling present  Nares are symmetric, no deviation of caudal portion of septum  Nasal Mucosa: No congestion observed, no mucosal lesion or masses present, no ulcerations observed  Cartilaginous Septum: midline, no bleeding noted, no crusting present, no perforation noted  Turbinates: No hypertrophy or inflammation noted  Mouth: Inspection of Lips, Teeth, Gums: Lips normal in color, moist, no cracks or lesions  No loose teeth, no missing teeth  Gingiva: no bleeding observed, no inflammation present  Hard Palate: no asymmetry observed, no torus present  Soft palate normal with no ulcers noted  Throat:   Examination of Oropharynx: Oral Mucosa: no masses, lesions, leukoplakia, or scarring  Normal Makenna's ducts, pink and moist, no discoloration noted  Floor of mouth: normal Warthin's ducts, no lesions, ulcerations, leukoplakia or torus mandibularis  Tonsils: no hypertrophy or ulcerations noted  Tongue: normal mobility, surfaces without fissures, leukoplakia, ulceration or masses, not enlarged, no pallor noted, no white patches present  Neck:   Examination of Neck: No decreased range of motion, trachea midline  Examination of Thyroid: Normal size, non-tender, no palpable masses  Lymphatic:   Palpation of Lymph Nodes: Neck: No generalized lymphadenopathy  Neurological/Psychiatric:   Cranial nerves II-VII grossly intact  Oriented to person, place, and time  Cooperative, in no acute distress  Additional Findings: Minimal cerumen in the left ear debrided  Assessment    1  History of Hip Replacement   2  Never a smoker   3  Bilateral hearing loss (389 9) (H91 93)   4  Pruritus, unspecified (698 9) (L29 9)    Plan    1   Comprehensive Audiogram with Tympanometry; Status:Active; Requested for:22Ner6431;       Discussion/Summary  Discussion Summary:   Pruritus of the ear: We discussed use of baby oil to address some dryness in the ear  She does not have any obvious seborrheic dermatitis, erythema, irritation, or other lesions of the ear at this time  We will reevaluate in 6 weeks  Bilateral hearing loss: We will obtain an audiogram  She is medically cleared for hearing aids  Thank you very much for allowing me to participate in the care of this patient  If you have any questions, please do not hesitate to contact me        Signatures   Electronically signed by : TORSTEN Moe ; Apr 7 2017  1:52PM EST                       (Author)

## 2018-01-22 VITALS
DIASTOLIC BLOOD PRESSURE: 64 MMHG | BODY MASS INDEX: 25.47 KG/M2 | OXYGEN SATURATION: 98 % | HEART RATE: 54 BPM | HEIGHT: 62 IN | SYSTOLIC BLOOD PRESSURE: 130 MMHG | WEIGHT: 138.38 LBS

## 2018-01-22 VITALS
BODY MASS INDEX: 24.7 KG/M2 | HEART RATE: 56 BPM | HEIGHT: 62 IN | OXYGEN SATURATION: 80 % | WEIGHT: 134.25 LBS | TEMPERATURE: 95.4 F

## 2018-01-22 VITALS — DIASTOLIC BLOOD PRESSURE: 72 MMHG | SYSTOLIC BLOOD PRESSURE: 126 MMHG

## 2018-01-23 VITALS
TEMPERATURE: 99.4 F | HEART RATE: 69 BPM | WEIGHT: 138 LBS | RESPIRATION RATE: 16 BRPM | OXYGEN SATURATION: 97 % | HEIGHT: 62 IN | DIASTOLIC BLOOD PRESSURE: 74 MMHG | SYSTOLIC BLOOD PRESSURE: 118 MMHG | BODY MASS INDEX: 25.4 KG/M2

## 2018-01-23 NOTE — PROGRESS NOTES
Assessment   1  Encounter for preventive health examination (V70 0) (Z00 00)1   2  1   3  1   4  1   5  1      1 Amended By: Luann Treviño; Oct 24 2016 10:05 PM EST    Plan  DMII (diabetes mellitus, type 2)    · *VB - Eye Exam; Status:Active - Retrospective By Protocol Authorization; Requested  QUP:[id number];    · *VB - Foot Exam; Status:Temporary Deferral - Pt requests deferral;    10/24/2017  DMII (diabetes mellitus, type 2), Hyperlipidemia, Hypothyroidism    · Follow-up visit in 5 months Evaluation and Treatment  Follow-up  Status: Hold For -  Scheduling  Requested for:  Circular Maintenance    · *VB - Fall Risk Assessment  (Dx Z13 89 Screen for Neurologic Disorder);  Status:Complete - Retrospective By Protocol Authorization;   Done: 10YHE8388 02:00PM   · *VB - PHQ-9 Tool; Status:Complete - Retrospective By Protocol Authorization;   Done:  82HZJ7052 02:00PM   · Good balance will help you avoid falls  There are many things you can do to maintain or  improve your sense of balance ; Status:Complete - Retrospective By Protocol  Authorization;   Done: 56YTN6601   · There ways to avoid falling ; Status:Complete - Retrospective By Protocol Authorization;    Done: 05QUP5558   · Medicare Annual Wellness Visit ; every 1 year; Last 2016; Next ;  Status:Active    Discussion/Summary    Blood sugar doing better,continue present rx  Flu shot today  Eye exam uocoming1      Impression:1  Subsequent Annual Wellness Visit1 , with preventive exam as well as age and risk appropriate counseling completed1   Cardiovascular screening and counselin  screening is current1 , counseling was given on ways to improve exercise tolerance1  and due for a lipid panel1   Diabetes screening and counselin  screening is current1  and counseling was given on maintaining a healthy diet1      Colorectal cancer screening and counselin  screening not indicated1  and counseling was given on ways to eat a high fiber diet1     Breast cancer screening and counselin  screening is current1   Cervical cancer screening and counselin  the patient declines screening1   Osteoporosis screening and counselin  screening is current1   Abdominal aortic aneurysm screening and counselin  screening not indicated1   Glaucoma screening and counselin  screening is current1   HIV screening and counselin  screening not indicated1   Immunizations:1  Influenza vaccine is due today1 , influenza vaccination is recommended annually1 , the lifetime pneumococcal vaccine has been completed1 , hepatitis B vaccination series is not indicated at this time due to the patient's low risk of javier the disease1 , the patient declines the Zostavax vaccine1 , Td vaccination status is unknown1  and Tdap vaccination status is unknown1   Advance Directive Plannin  not complete1 , paperwork and instructions were given to the patient1   Advice and education were given regarding1  increasing physical activity1   Patient Discussion:1  plan discussed with the patient1 , plan discussed with the patient's family1 , follow-up visit needed in 5 months1 , follow-up as needed1   Self Referrals: No       1 Amended By: Ira Mcclelland; Oct 24 2016 10:08 PM EST    Chief Complaint  Pt presents for Well Exam with family  Pt states she had a fall a few weeks ago in her driveway when she tripped over a rock  States she injured her hand and bumped her forehead  She does use her cane  No other falls  She did bring her sugar logs today  She also states she had an episode of n/v while eating [place] food recently  She would like her Left lower ext looked at today due to freq bruising  No refills needed today  Appetite is regular  History of Present Illness  HPI: Pt doing ok  Blood sugars have been better  Fell(tripped) on cement , no injury1    Welcome to [employer] and Wellness Visits:  The patient is being seen for the  subsequent annual wellness visit1  1   Medicare Screening and Risk Factors1    Hospitalizations:1  she has been previously hospitalizied1  and she has been hospitalized none this year times1   Once per lifetime medicare screening tests:1  ECG1  and AAA screening US has not yet been done1   Medicare Screening Tests Risk Questions   Abdominal aortic aneurysm risk assessment:1  none indicated1   Osteoporosis risk assessment:1  caucasian1 , female gender1  and over 48years of age2   HIV risk assessment:1  none indicated1   Drug and Alcohol Use:1  The patient  has never smoked cigarettes1  1   The patient reports  never drinking alcohol1  1   Alcohol concern: 1   The patient has no concerns about alcohol abuse1   She  has never used illicit drugs1  1   Diet and Physical Activity:1  Current diet includes  low fat food choices1  ,  low carbohydrate food choices1  ,  3 servings of vegetables per day1  and  1 cups of coffee per day1  1   She  limited by djd1  1   The patient does not exercise1    Mood Disorder and Cognitive Impairment Screenin    Depression screening1   Negative for symptoms1   She denies feeling down, depressed, or hopeless over the past two weeks1   She denies feeling little interest or pleasure in doing things over the past two weeks1   Cognitive impairment screenin  denies difficulty learning/retaining new information1 , denies difficulty handling complex tasks1 , denies difficulty with reasoning1 , denies difficulty with spatial ability and orientation1 , denies difficulty with language1  and denies difficulty with behavior1   Functional Ability/Level of Safety:1  Hearing is1  slightly decreased1  and a hearing aid is not used1    She denies hearing difficulties1  The patient is currently1  able to do activities of daily living with limitations1 , able to do instrumental activities of daily living with limitations1 , able to participate in social activities with limitations1  and unable to drive1   Activities of daily living details:1  does not need help using the phone1 , no transportation help needed1 , does not need help shopping1 , no meal preparation help needed1 , does not need help doing housework1 , does not need help doing laundry1 , does not need help managing medications1  and does not need help managing money1   Fall risk factors: 1  The patient fell 0 times in the past 12 months  1   Home safety risk factors: 1  no unfamiliar surroundings1 , no loose rugs1 , no poor household lighting1 , no uneven floors1 , no household clutter1 , grab bars in the bathroom1  and handrails on the stairs1   Advance Directives:1  Advance directives: 1  no living will1 , no durable power of  for health care directives1  and no advance directives1   End of life decisions were reviewed with the patient1  and I agree with the patient's decisions1   Co-Managers and Medical Equipment/Suppliers: See Patient Care Team   Preventive Quality Program 65 and Older: Falls Risk: The patient fell 1 times in the past 12 months  Symptoms Include: recent fall         1 Amended By: Ana Buckley; Oct 24 2016 10:05 PM EST    Patient Care Team    Care Team Member Role Specialty Office Number   [facility]  Endocrinology [phone]      Ana Buckley [place]  Internal Medicine [phone]     Review of Systems  Over the past 2 weeks, how often have you been bothered by the following problems? 1 ) Little interest or pleasure in doing things? 1  Several days1     2 ) Feeling down, depressed or hopeless? 1  Not at all1     3 ) Trouble falling asleep or sleeping too much? 1  Several days1     4 ) Feeling tired or having little energy? 1  Several days1     5 ) Poor appetite or overeating? 1  Not at all1     6 ) Feeling bad about yourself, or that you are a failure, or have let yourself or your family down? 1  Not at all1     7 ) Trouble concentrating on things, such as reading a newspaper or watching television? 1  Not at all1     8 ) Moving or speaking so slowly that other people could have noticed, or the opposite, moving or speaking faster than usual?1  Not at all1     9 ) Thoughts that you would be better off dead or of hurting yourself in some way? 1  Not at all1   Score 41        1 Amended By: Aminata Palomino; Oct 24 2016 10:05 PM EST    Active Problems   1  Abnormal heart rate (785 3) (R00 9)  2  Activities of daily living deficit involving meal and kitchen skills (V40 39) (R46 89)  3  Arthralgia Of The Left Humerus/Elbow (719 42)  4  Bradycardia (427 89) (R00 1)  5  Chronic bilateral low back pain without sciatica (724 2,338 29) (M54 5,G89 29)  6  Depression (311) (F32 9)  7  Diabetic hypoglycemia (250 80) (E11 649)  8  DMII (diabetes mellitus, type 2) (250 00) (E11 9)  9  Encounter for mammogram to establish baseline mammogram (V76 12) (Z12 31)  10  Esophageal reflux (530 81) (K21 9)  11  Fatigue (780 79) (R53 83)  12  Female pelvic pain (625 9) (R10 2)  13  Generalized osteoarthritis of multiple sites (715 09) (M15 9)  14  Heart murmur (785 2) (R01 1)  15  History of allergy (V15 09) (Z88 9)  16  Hyperlipidemia (272 4) (E78 5)  17  Hypothyroidism (244 9) (E03 9)  18  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)  19  Right hip pain (719 45) (M25 551)  20  Skin infection (686 9) (L08 9)  21   Vitamin D deficiency (268 9) (E55 9)    Past Medical History    · History of Chest cold (519 8) (J22)   · History of Chest pain (786 50) (R07 9)   · History of Cough (786 2) (R05)   · 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,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,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)   · History of Encounter for screening colonoscopy (V76 51) (Z12 11)   · History of Female Stress Incontinence (625 6)   · History of constipation (V12 79) (Z87 19)   · History of Need for influenza vaccination (V04 81) (Z23)   · History of Need for pneumococcal vaccination (V03 82) (Z23)   · History of Visit for screening mammogram (V76 12) (Z12 31)   · History of Wound, open, leg (891 0) (I37 830I)    The active problems and past medical history were reviewed and updated today  Surgical History    · History of Ankle Surgery   · History of Appendectomy   · History of Cataract Surgery   · History of Excision Of Pituitary Gland   · History of Hemorrhoidectomy   · History of Hernia Repair   · History of Hip Replacement   · History of Hysterectomy   · History of Neuroplasty Decompression Median Nerve At Carpal Tunnel    The surgical history was reviewed and updated today  Family History  Father    · Family history of Coal Workers' Pneumoconiosis   · Family history of Heart Disease (V17 49)  Brother    · Family history of Diabetes Mellitus (V18 0)   · Family history of Heart Disease (V17 49)   · Family history of Prostate Cancer (V16 42)  Maternal Grandmother    · Family history of Gastric Cancer (V16 0)  Paternal Aunt    · Family history of Diabetes Mellitus (V18 0)  Family History    · Denied: Family history of Drug abuse    The family history was reviewed and updated today  Social History    · Denied: History of Alcohol Use (History)   · Caffeine Use   · Dental care, regularly   ·    · Denied: History of Drug Use   · Good dental hygiene   · Never a smoker   · Uses Safety Equipment - Seatbelts  The social history was reviewed and updated today  The social history was reviewed and is unchanged  Current Meds  1  Aspirin 81 MG TABS; TAKE 1 TABLET DAILY; Therapy: (Leila Scruggs) to Recorded  2  Perez Breeze 2 Test In Vitro Disk; TEST BLOOD SUGAR 4 TIMES A DAY; Therapy: 02IUP8005 to (Last Rx:17Wmh6866)  Requested for: 86Kiu4991 Ordered  3  Fluocinolone Acetonide 0 01 % Otic Oil; Therapy: 91DTD9740 to (Evaluate:25Goq3283) Recorded  4  Januvia 50 MG Oral Tablet; TAKE 1 TABLET DAILY; Therapy: 13WUN9471 to (Evaluate:46Txq0682)  Requested for: 96Rzz9236; Last   Rx:02Bdv5882 Ordered  5   Levothyroxine Sodium 75 MCG Oral Tablet; TAKE 1 TABLET DAILY; Therapy: 78KXO2741 to [id number])  Requested for: 99WMG9453; Last   Rx:2015 Ordered  6  MetFORMIN HCl  MG Oral Tablet Extended Release 24 Hour; take 2 tablet twice   daily  Requested for: 81Tee5316; Last Rx:20Fcx3124 Ordered  7  Oxybutynin Chloride ER 10 MG Oral Tablet Extended Release 24 Hour; take one tablet   twice daily  Requested for: 12Vjv0640; Last Rx:38Wob6566 Ordered  8  Polyethylene Glycol 3350 Oral Powder; FILL TO INDICATOR TERE ON CAP WITH   POWDER  DISOLVE POWDER IN 8OZ OF WATER AND DRINK DAILY; Therapy: 62WXX2898 to (Gil Everett)  Requested for: 48IPD0598; Last   [id number] Ordered  9  Pravastatin Sodium 20 MG Oral Tablet; Take 1 tablet daily; Therapy: 99FUA4679 to (Evaluate:2016)  Requested for: 71JML2044; Last   Rx:2015 Ordered  10  PredniSONE 5 MG Oral Tablet; Take 1 daily; Therapy: 75NZI6777 to (Evaluate:2017)  Requested for: 56Ddk1018; Last    Rx:27Dwa3659 Ordered  11  RABEprazole Sodium 20 MG Oral Tablet Delayed Release; TAKE 1 TABLET DAILY; Therapy: 73XTN1739 to Recorded  12  Repaglinide 2 MG Oral Tablet; TAKE 1 TABLET AT BREAKFAST 1 TABLET AT LUNCH    AND TWO TABLETS AT Dozwil; Therapy: (Recorded:54Pqu2367) to  Requested for: 02RUY1751 Recorded  13  Sorbitol 70 % Oral Solution Recorded  14  Vitamin D (Ergocalciferol) 61660 UNIT Oral Capsule; TAKE 1 WEEKLY FOR 12 WEEKS; Therapy: 39FRI4877 to (Last Rx:62Xas4893)  Requested for: 91Pyx4451 Ordered  15  Vitamin D CAPS; take 1 capsule daily; Therapy: (Piyush Sheikh) to Recorded    The medication list was reviewed and updated today  Allergies   1  Cephalosporins  2  Clindamycin HCl CAPS  3  HYDROcodone Bitartrate Powder  4  Penicillins  5  Percocet TABS  6  Tetracyclines  7   Vicodin TABS    Immunizations   1 2 3 4    Influenza  2012 16-Sep-2013 16-Sep-2014 23-Oct-2015    PCV  20-Aug-2015        Vitals  Signs    Systolic: 009  Diastolic: 78   Temperature: 98 6 F  Height: 5 ft 2 in  Weight: 137 lb 4 00 oz  BMI Calculated: 25 1  BSA Calculated: 1 63    Physical Exam    Constitutional1    General appearance: No acute distress, well appearing and well nourished  1    Psychiatric1    Judgment and insight: Normal 1    Orientation to person, place, and time: Normal 1    Recent and remote memory: Intact  1    Mood and affect: Normal 1        1 Amended By: Apurva Green; Oct 24 2016 10:05 PM EST    Results/Data  PHQ-9 Adult Depression Screening 24Oct2016 02:01PM User, Katie     Test Name Result Flag Reference   PHQ-9 Adult Depression Score 0     Over the last two weeks, how often have you been bothered by any of the following problems? Little interest or pleasure in doing things: Not at all - 0  Feeling down, depressed, or hopeless: Not at all - 0  Trouble falling or staying asleep, or sleeping too much: Not at all - 0  Feeling tired or having little energy: Not at all - 0  Poor appetite or over eating: Not at all - 0  Feeling bad about yourself - or that you are a failure or have let yourself or your family down: Not at all - 0  Trouble concentrating on things, such as reading the newspaper or watching television: Not at all - 0  Moving or speaking so slowly that other people could have noticed   Or the opposite -  being so fidgety or restless that you have been moving around a lot more than usual: Not at all - 0  Thoughts that you would be better off dead, or of hurting yourself in some way: Not at all - 0   PHQ-9 Adult Depression Screening Negative     PHQ-9 Difficulty Level Not difficult at all     PHQ-9 Severity No Depression       *VB - Fall Risk Assessment  (Dx Z13 89 Screen for Neurologic Disorder) 24Oct2016 02:00PM Apurva Green     Test Name Result Flag Reference   Falls Risk      Falls with injury in the past year     *VB - PHQ-9 Tool 45ZIL2858 02:00PM Apurva Green     Test Name Result Flag Reference   PHQ-9 Adult Depression Score 0     PHQ-9 Adult Depression Screening Negative Health Management  Health Maintenance   Medicare Annual Wellness Visit; every 1 year; Last 40XGE2764; Next Due: 37LIM6369;   Active    Future Appointments    Date/Time Provider Specialty Site   11/02/2016 02:45 PM Ashley Roa, [address] [place] [address]     Signatures   Electronically signed by : Mignon Livingston DO; Oct 24 2016 10:09PM EST                       (Author)

## 2018-01-23 NOTE — PROGRESS NOTES
Assessment   1  Encounter for preventive health examination (V70 0) (Z00 00)    Plan  DMII (diabetes mellitus, type 2)    · (1) COMPREHENSIVE METABOLIC PANEL; Status:Active; Requested for:2017;    · (1) HEMOGLOBIN A1C; Status:Active; Requested for:2017;   DMII (diabetes mellitus, type 2), Hyperlipidemia    · (1) LDL,DIRECT; Status:Active; Requested JUS:[id number]; Health Maintenance    · Medicare Annual Wellness Visit ; every 1 year; Last 27ETC8618; Next 71ZSP4520;  Status:Active   · Medicare Annual Wellness Visit ; every 1 year; Last 63SFN1736; Status:Completed    Discussion/Summary    Rx fbw  Continue cpap  Pt sister to look into diabetic ed classes  She has endocrine appt next week  1      Impression: Subsequent Annual Wellness Visit  Cardiovascular screening and counselin  screening is current1  and counseling was given on maintaining a healthy diet1   Diabetes screening and counselin  screening is current1  and counseling was given on ways to improve physical activity1   Colorectal cancer screening and counselin  screening not indicated1  and counseling was given on ways to eat a high fiber diet1   Breast cancer screening and counselin  screening is current1   Cervical cancer screening and counselin  screening not indicated1   Osteoporosis screening and counselin  screening is current1   Abdominal aortic aneurysm screening and counselin  screening not indicated1   Glaucoma screening and counselin  screening is current1   HIV screening and counselin  screening not indicated1    Immunizations:1  Influenza vaccine is up to date this year1 , influenza vaccination is recommended annually1 , the lifetime pneumococcal vaccine has been completed1 , hepatitis B vaccination series is not indicated at this time due to the patient's low risk of javier the disease1 , the patient declines the Zostavax vaccine1 , the patient declines the Td vaccine1  and the patient declines the Tdap vaccine1   Advance Directive Plannin  complete and up to date1 , requested copy1   Advice and education were given regarding1  increasing physical activity1   Patient Discussion:1  plan discussed with the patient1 , follow-up as needed1 , will call after cardio followup1   Self Referrals: No       Possible side effects of new medications were reviewed with the patient/guardian today  The treatment plan was reviewed with the patient/guardian  The patient/guardian understands and agrees with the treatment plan       1 Amended By: Yesenia Hayes; Oct 30 2017 9:00 PM EST    Chief Complaint  Pt presents for Well Exam today  PT has c/o b/l hip pain today  She is using a CPAP at home now, new since Friday  No falls and she does use her cane  Appetite is decreased, states she gets full easily  Advance Directives  Advance Directive [facility]:   The patient is in agreement to receive the Advance Care Planning service    NO - Patient does not have an advance health care directive  Capacity/Competence: This patient has full decision making capacity for discussion of advance care planning and This patient has full decision making competency for discussion of advance care planning  History of Present Illness  HPI: Pt doing ok  Got cpap and started using and thinks feels less tired today  She is hoping she will not need pacemaker1    Welcome to [employer] and Wellness Visits: The patient is being seen for the subsequent annual wellness visit  Medicare Screening and Risk Factors1    Hospitalizations:1  she has been previously hospitalizied1  and she has been hospitalized dm,cad times1   Once per lifetime medicare screening tests:1  ECG1  and AAA screening US has not yet been done1   Medicare Screening Tests Risk Questions   Abdominal aortic aneurysm risk assessment:1  none indicated1      Osteoporosis risk assessment:1  caucasian1 , female gender1 , over 48years of age2  and past medical history of fracture(s)1   HIV risk assessment:1  none indicated1   Drug and Alcohol Use: The patient has never smoked cigarettes  The patient reports never drinking alcohol  She has never used illicit drugs  Diet and Physical Activity: Current diet includes well balanced meals  She exercises infrequently  Exercise: walking  Mood Disorder and Cognitive Impairment Screenin    Depression screening1   Negative for symptoms1   She denies feeling down, depressed, or hopeless over the past two weeks1   She denies feeling little interest or pleasure in doing things over the past two weeks1   Cognitive impairment screenin  denies difficulty learning/retaining new information1 , denies difficulty handling complex tasks1 , denies difficulty with reasoning1 , denies difficulty with spatial ability and orientation1 , denies difficulty with language1  and denies difficulty with behavior1   Functional Ability/Level of Safety:1  Hearing is1  slightly decreased1   She denies hearing difficulties1  1  She does not use a hearing aid  1   The patient is currently1  able to do activities of daily living with limitations1 , able to do instrumental activities of daily living with limitations1 , able to participate in social activities with limitations1  and unable to drive1   Activities of daily living details:1  transportation help needed1 , needs help shopping1 , meal preparation help needed1 , needs help doing housework1 , needs help doing laundry1 , needs help managing medications1  and needs help managing money1 , but does not need help using the phone1   Fall risk factors: 1  The patient fell 0 times in the past 12 months  1   Home safety risk factors: 1  no unfamiliar surroundings1 , no loose rugs1 , no poor household lighting1 , no uneven floors1 , no household clutter1 , grab bars in the bathroom1  and handrails on the stairs1      Advance Directives: Advance directives: no living will and no advance directives  Co-Managers and Medical Equipment/Suppliers: See Patient Care Team   Reviewed Updated [facility]:   Last Medicare Wellness Visit Information was reviewed, patient interviewed, no change since last AWV  Preventive Quality Program 65 and Older: Falls Risk: The patient fell 0 times in the past 12 months  The patient is currently asymptomatic Symptoms Include:  Associated symptoms:  No associated symptoms are reported  The patient currently has no urinary incontinence symptoms  1 Amended By: Adal Waters; Oct 30 2017 8:57 PM EST    Patient Care Team    Care Team Member Role Specialty Office Number   Yinka Quinton GUADARRAMA  Specialist Endocrinology [phone]   [address]      Adal Jefferson DO Specialist Internal Medicine [phone]   Burman Dakins M D  Specialist Otolaryngology [phone]   [facility] Specialist Gastroenterology Adult [phone]     Active Problems   1  Abnormal heart rate (785 3) (R00 9)  2  Activities of daily living deficit involving meal and kitchen skills (V40 39)  3  Anemia (285 9) (D64 9)  4  Bilateral hearing loss (389 9) (H91 93)  5  Bradycardia (427 89) (R00 1)  6  Chronic bilateral low back pain without sciatica (724 2,338 29) (M54 5,G89 29)  7  Depression (311) (F32 9)  8  Diabetic hypoglycemia (250 80) (E11 649)  9  Diabetic neuropathy (250 60,357 2) (E11 40)  10  DMII (diabetes mellitus, type 2) (250 00) (E11 9)  11  Dysphagia, unspecified type (787 20) (R13 10)  12  Edema (782 3) (R60 9)  13  Encounter for mammogram to establish baseline mammogram (V76 12) (Z12 31)  14  Esophageal dysmotility (530 5) (K22 4)  15  Esophageal reflux (530 81) (K21 9)  16  Esophageal spasm (530 5) (K22 4)  17  Fatigue (780 79) (R53 83)  18  Generalized osteoarthritis of multiple sites (715 09) (M15 9)  19  Heart murmur (785 2) (R01 1)  20  Hiatal hernia with gastroesophageal reflux (277 84,261  3) (K21 9,K44 9)  21   History of CVA (cerebrovascular accident) (V12 54) (Z86 73)  22  Hyperlipidemia (272 4) (E78 5)  23  Hypertension (401 9) (I10)  24  Hypothyroidism (244 9) (E03 9)  25  Need for immunization against influenza (V04 81) (Z23)  26  NSTEMI, initial episode of care (410 71) (I21 4)  27  Onychomycosis (110 1) (B35 1)  28  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)  29  Pruritus, unspecified (698 9) (L29 9)  30  Sleep apnea (780 57) (G47 30)  31  Vitamin D deficiency (268 9) (E55 9)    Past Medical History    · History of Chest pain (786 50) (R07 9)    The active problems and past medical history were reviewed and updated today  Surgical History    · History of Ankle Surgery   · History of Appendectomy   · History of Cataract Surgery   · History of Excision Of Pituitary Gland   · History of Hemorrhoidectomy   · History of Hernia Repair   · History of Hip Replacement   · History of Hysterectomy   · History of Neuroplasty Decompression Median Nerve At Carpal Tunnel    The surgical history was reviewed and updated today  Family History  Father    · Family history of Coal Workers' Pneumoconiosis   · Family history of Heart Disease (V17 49)  Brother    · Family history of Diabetes Mellitus (V18 0)   · Family history of Heart Disease (V17 49)   · Family history of Prostate Cancer (V16 42)  Maternal Grandmother    · Family history of Gastric Cancer (V16 0)  Paternal Aunt    · Family history of Diabetes Mellitus (V18 0)  Family History    · Denied: Family history of Drug abuse    The family history was reviewed and updated today  Social History    · Caffeine Use   · Dental care, regularly   ·    · Denied: History of Drug Use   · Good dental hygiene   · Never a smoker   · Denied: History of No history of alcohol use (Z78 9)   · Uses Safety Equipment - Seatbelts  The social history was reviewed and updated today  The social history was reviewed and is unchanged  Current Meds  1  Aspirin 81 MG TABS; TAKE 1 TABLET DAILY;    Therapy: (Recorded:29Wza2364) to Recorded  2  B Complex Oral Capsule; One daily; Therapy: (Leonel Diaz) to Recorded  3  Perez Breeze 2 Test In Vitro Disk; TEST BLOOD SUGAR 4 TIMES A DAY; Therapy: 24MJL6017 to (Last Rx:40Awq7275)  Requested for: 06TWY5036 Ordered  4  Docusate Sodium 100 MG Oral Tablet; one tab BID; Therapy: (Leonel Diaz) to Recorded  5  Ergocalciferol 65323 IU TABS; ONE TAB ONCE WEEKLY; Therapy: (Leonel Diaz) to Recorded  6  FerrouSul 325 (65 Fe) MG Oral Tablet; TAKE 1 TABLET DAILY; Therapy: 04Gwx3321 to (Evaluate:13Hht6228)  Requested for: 93Egv9914; Last   Rx:34Hdt3397 Ordered  7  HydroCHLOROthiazide 12 5 MG Oral Tablet; Take 1 daily; Therapy: 13BTJ8290 to (Last Rx:17Gdi9751)  Requested for: 85Lca5292 Ordered  8  Januvia 50 MG Oral Tablet; Take 1 tablet daily; Therapy: 78WJF2785 to (Adama Morales)  Requested for: 30TZR4363; Last   Rx:12Oct2017 Ordered  9  Januvia 50 MG Oral Tablet; take 1 tablet once daily; Therapy: 75KXL8843 to (Last Rx:10Yqb9268)  Requested for: 01Zku4418 Ordered  10  Levothyroxine Sodium 75 MCG Oral Tablet; TAKE (1) TABLET BY MOUTH DAILY; Therapy: 74EDH8062 to (Last Rx:07Zhi4730)  Requested for: 88WVJ2832 Ordered  11  MetFORMIN HCl - 500 MG Oral Tablet; TAKE 1 TABLET EVERY 12 HOURS WITH FOOD; Therapy: (Leonel Diaz) to Recorded  12  MiraLax POWD;    Therapy: (Recorded:17Brt1085) to Recorded  13  Oxybutynin Chloride ER 10 MG Oral Tablet Extended Release 24 Hour; TAKE ONE    TABLET BY MOUTH TWICE A DAY; Therapy: 12NPJ5916 to (Last Rx:29Nov2016)  Requested for: 30ULT3582 Ordered  14  Pantoprazole Sodium 40 MG Oral Tablet Delayed Release; take one tablet by mouth    one time daily; Therapy: 05YZG6538 to (Evaluate:23Qcs8459)  Requested for: 18Ctt7612; Last    Rx:83Brs6420 Ordered  15  Pravastatin Sodium 20 MG Oral Tablet; Take 1 tablet daily; Therapy: 17EIE6604 to (Evaluate:99Qsn1912)  Requested for: 23WTF1888; Last    Rx:05Qdd0625 Ordered  16  PredniSONE 5 MG Oral Tablet; Take 1 tablet daily; Therapy: 03FJG1728 to (Last Rx:12Oct2017)  Requested for: 12Oct2017 Ordered  17  Repaglinide 2 MG Oral Tablet; TAKE 2 TABLET 3 times daily  Requested for: 26JME4639;    Last Rx:95Dqz1636 Ordered  18  Vitamin D (Ergocalciferol) 72821 UNIT Oral Capsule; TAKE 1 CAPSULE WEEKLY; Therapy: 88Puo1231 to (Evaluate:24Xxa1219)  Requested for: 09Uhi7688; Last    Rx:76Emt6302 Ordered    The medication list was reviewed and updated today  Allergies   1  Cephalosporins  2  Clindamycin HCl CAPS  3  HYDROcodone Bitartrate Powder  4  Penicillins  5  Percocet TABS  6  Simvastatin TABS  7  Tetracyclines  8  Vicodin TABS    Immunizations   ** Printed in Appendix #1 below  Vitals  Signs    Systolic: 921  Diastolic: 72   Temperature: 95 4 F, Tympanic  Heart Rate: 56  Height: 5 ft 2 in  Weight: 134 lb 4 oz  BMI Calculated: 24 55  BSA Calculated: 1 61  O2 Saturation: 80, RA    Physical Exam    Constitutional   General appearance: No acute distress, well appearing and well nourished  Looks brighter1   Head and Face   Head and face: Normal     Palpation of the face and sinuses: No sinus tenderness  Eyes   Conjunctiva and lids: No swelling, erythema or discharge  Pupils and irises: Equal, round, reactive to light  Ophthalmoscopic examination: Normal fundi and optic discs  Ears, Nose, Mouth, and Throat   External inspection of ears and nose: Normal     Otoscopic examination: Tympanic membranes translucent with normal light reflex  Canals patent without erythema  Hearing: Normal     Nasal mucosa, septum, and turbinates: Normal without edema or erythema  Lips, teeth, and gums: Normal, good dentition  Oropharynx: Normal with no erythema, edema, exudate or lesions  Neck   Neck: Supple, symmetric, trachea midline, no masses  Thyroid: Normal, no thyromegaly  Pulmonary   Respiratory effort: No increased work of breathing or signs of respiratory distress  Percussion of chest: Normal     Palpation of chest: Normal     Auscultation of lungs: Clear to auscultation  Cardiovascular   Palpation of heart: Normal PMI, no thrills  Auscultation of heart: Normal rate and rhythm, normal S1 and S2, no murmurs  Carotid pulses: 2+ bilaterally  Abdominal aorta: Normal     Femoral pulses: 2+ bilaterally  Pedal pulses: 2+ bilaterally  Peripheral vascular exam: Normal     Examination of extremities for edema and/or varicosities: Normal     Abdomen   Abdomen: Non-tender, no masses  Liver and spleen: No hepatomegaly or splenomegaly  Examination for hernias: No hernia appreciated  Lymphatic   Palpation of lymph nodes in neck: No lymphadenopathy  Palpation of lymph nodes in axillae: No lymphadenopathy  Palpation of lymph nodes in groin: No lymphadenopathy  Palpation of lymph nodes in other areas: No lymphadenopathy  Musculoskeletal   Gait and station: Normal     Digits and nails: Normal without clubbing or cyanosis  Joints, bones, and muscles: Normal     Range of motion: Normal     Stability: Normal     Muscle strength/tone: Normal     Skin   Skin and subcutaneous tissue: Normal without rashes or lesions  Palpation of skin and subcutaneous tissue: Normal turgor  Neurologic   Cranial nerves: Cranial nerves II-XII intact  Cortical function: Normal mental status  Reflexes: 2+ and symmetric  Sensation: No sensory loss  Coordination: Normal finger to nose and heel to shin  Psychiatric   Judgment and insight: Normal     Orientation to person, place, and time: Normal     Recent and remote memory: Intact  Mood and affect: Normal         1 Amended By: Violet Gonzalez; Oct 30 2017 8:58 PM [facility]    Health Management  Health Maintenance   Medicare Annual Wellness Visit; every 1 year; Last 98QBK8883; Next Due: 79XML6971;   Active    Future Appointments    Date/Time Provider Specialty Site   12/20/2017 01:30 PM Ch[place] [facility]     Signatures   Electronically signed by : Sylwia Pizarro DO; Oct 30 2017  9:01PM EST                       (Author)    Appendix #1     Patient: Maximo Hernandez ; : 1933; MRN: [id number]      1 2 3 4 5 6    Influenza  2012 16-Sep-2013 16-Sep-2014 23-Oct-2015 24-Oct-2016 27-Sep-2017    PCV  20-Aug-2015

## 2018-01-24 NOTE — MISCELLANEOUS
Assessment   1  DMII (diabetes mellitus, type 2) (250 00) (E11 9)  2  Esophageal reflux (530 81) (K21 9)  3  Depression (311) (F32 9)  4  Hypothyroidism (244 9) (E03 9)  5  Nausea with vomiting (787 01) (R11 2)  6  Onychomycosis (110 1) (B35 1)1      1 Amended By: Claude Plata; May 30 2017 8:51 PM EST    Plan  Onychomycosis    · (1) HEPATIC FUNCTION PANEL; Status:Active; Requested for:25Skn7948;   Perform:[facility]; PAH:[id number]; Ordered; For:Onychomycosis; Ordered   By:Taye Jensen;    Discussion/Summary  Discussion Summary:   Small frequent meals,increased water per gi note  Continue ppi  Monitor blood sugars  Has followup w dr Magda Martell in august and aware insulin may be likely  Again discussed this today w pt and her sister  Pt sister is helping with meds etc  [facility] in one month since started lamisil  Rto in July 1    Medication SE Review and Pt Understands Tx: The treatment plan was reviewed with the patient/guardian  The patient/guardian understands and agrees with the treatment plan   Self Referrals:   Self Referrals: No       1 Amended By: Claude Plata; May 30 2017 8:49 PM EST    Chief Complaint  Chief Complaint Free Text Note Form: Pt presents for hospital f/up  Pt states her n/v is getting better slowly  She is drinking fluids and her appetite is slowly getting better  Pt is urinating more freq, more at night also she states, questioning if it could be related to a UTI  No falls  Refills done as requested  History of Present Illness  TCM Communication [facility]: The patient is being contacted for follow-up after hospitalization  Hospital  records were reviewed1  1   She was hospitalized at and [address] [facility]  The dates of hospitalization:, date of admission: May 14, 2017, date of discharge: May 15, 2017  Diagnosis: nausea and vomiting  Medications reviewed and updated today  She scheduled a follow up appointment  Counseling was provided to the patient     Communication performed and completed by   HPI: Pt had another episode of n/v as well as low blood sugar but she had taken an extra metformin  She is a bit overwhelmed w all her meds  She saw Dr Daisha Goode who increased prandin but told patient that insulin would be next step  Sugars still over 200,pt tired,not eating at regular times  1        1 Amended By: Vianey Hightower; May 30 2017 8:46 PM EST    Review of Systems  Complete-Female:   Constitutional:1  No fever, no chills, feels well, no tiredness, no recent weight gain or weight loss1   Eyes:1  No complaints of eye pain, no red eyes, no eyesight problems, no discharge, no dry eyes, no itching of eyes1   ENT:1  no complaints of earache, no loss of hearing, no nose bleeds, no nasal discharge, no sore throat, no hoarseness1   Cardiovascular:1  No complaints of slow heart rate, no fast heart rate, no chest pain, no palpitations, no leg claudication, no lower extremity edema1   Respiratory:1  shortness of breath during exertion1   Gastrointestinal:1  nausea1  and vomiting1 , but as noted in HPI1   Genitourinary:1  No complaints of dysuria, no incontinence, no pelvic pain, no dysmenorrhea, no vaginal discharge or bleeding1   Musculoskeletal: arthralgias1  and joint stiffness1   Integumentary:1  skin lesion1  and onchomycosis1   Neurological:1  No complaints of headache, no confusion, no convulsions, no numbness, no dizziness or fainting, no tingling, no limb weakness, no difficulty walking1   Psychiatric:1  Not suicidal, no sleep disturbance, no anxiety or depression, no change in personality, no emotional problems1   Endocrine:1  No complaints of proptosis, no hot flashes, no muscle weakness, no deepening of the voice, no feelings of weakness1      Hematologic/Lymphatic:1  No complaints of swollen glands, no swollen glands in the neck, does not bleed easily, does not bruise easily1         1 Amended By: Vianey Hightower; May 30 2017 8:47 PM EST    Active Problems   1  Abnormal heart rate (785 3) (R00 9)  2  Activities of daily living deficit involving meal and kitchen skills (V40 39)  3  Arthralgia Of The Left Humerus/Elbow (719 42)  4  Bilateral hearing loss (389 9) (H91 93)  5  Bradycardia (427 89) (R00 1)  6  Chronic bilateral low back pain without sciatica (724 2,338 29) (M54 5,G89 29)  7  Depression (311) (F32 9)  8  Diabetic hypoglycemia (250 80) (E11 649)  9  DMII (diabetes mellitus, type 2) (250 00) (E11 9)  10  Dysphagia, unspecified type (787 20) (R13 10)  11  Edema (782 3) (R60 9)  12  Encounter for mammogram to establish baseline mammogram (V76 12) (Z12 31)  13  Encounter for routine laboratory testing (V72 60) (Z00 00)  14  Esophageal dysmotility (530 5) (K22 4)  15  Esophageal reflux (530 81) (K21 9)  16  Esophageal spasm (530 5) (K22 4)  17  Fatigue (780 79) (R53 83)  18  Female pelvic pain (625 9) (R10 2)  19  Generalized osteoarthritis of multiple sites (715 09) (M15 9)  20  Heart murmur (785 2) (R01 1)  21  Hiatal hernia with gastroesophageal reflux (239 23,822  3) (K21 9,K44 9)  22  History of allergy (V15 09) (Z88 9)  23  Hyperlipidemia (272 4) (E78 5)  24  Hypothyroidism (244 9) (E03 9)  25  Need for influenza vaccination (V04 81) (Z23)  26  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)  27  Pruritus, unspecified (698 9) (L29 9)  28  Right hip pain (719 45) (M25 551)  29  Vitamin D deficiency (268 9) (E55 9)    Past Medical History   1  History of Chest pain (786 50) (R07 9)  2  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,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,L97 209)  3  History of Encounter for screening colonoscopy (V76 51) (Z12 11)  4  History of Female Stress Incontinence (625 6)  5  History of constipation (V12 79) (Z87 19)  6  History of Need for pneumococcal vaccination (V03 82) (Z23)  7  History of Visit for screening mammogram (V76 12) (Z12 31)  8  History of Wound, open, leg (891 0) (S86 785F)    Surgical History   1  History of Ankle Surgery  2  History of Appendectomy  3  History of Cataract Surgery  4  History of Excision Of Pituitary Gland  5  History of Hemorrhoidectomy  6  History of Hernia Repair  7  History of Hip Replacement  8  History of Hysterectomy  9  History of Neuroplasty Decompression Median Nerve At Carpal Tunnel  Surgical History Reviewed: The surgical history was reviewed and updated today  Family History  Father   1  Family history of Coal Workers' Pneumoconiosis  2  Family history of Heart Disease (V17 49)  Brother   3  Family history of Diabetes Mellitus (V18 0)  4  Family history of Heart Disease (V17 49)  5  Family history of Prostate Cancer (V16 42)  Maternal Grandmother   6  Family history of Gastric Cancer (V16 0)  Paternal Aunt   9  Family history of Diabetes Mellitus (V18 0)  Family History   8  Denied: Family history of Drug abuse  Family History Reviewed: The family history was reviewed and updated today  Social History    · Caffeine Use   · Dental care, regularly   ·    · Denied: History of Drug Use   · Good dental hygiene   · Never a smoker   · Denied: History of No history of alcohol use (Z78 9)   · Uses Safety Equipment - Seatbelts  Social History Reviewed: The social history was reviewed and updated today  The social history was reviewed and is unchanged  Current Meds  1  Perez Breeze 2 Test In Vitro Disk; TEST BLOOD SUGAR 4 TIMES A DAY; Therapy: 33BPP7726 to (Last Rx:17Atr9235)  Requested for: 06GRV6120 Ordered  2  HydroCHLOROthiazide 12 5 MG Oral Tablet; TAKE 1 TABLET THREE TIMES A WEEK (   MON, WED,FRID); Therapy: 98HHX9695 to (Last Rx:35Mud4801)  Requested for: 81SKD5240 Ordered  3  Januvia 50 MG Oral Tablet; TAKE 1 TABLET DAILY; Therapy: 65DED7378 to (Evaluate:52Nuu0253)  Requested for: 09Wvg3535; Last   Rx:87Jlk8471 Ordered  4  Levothyroxine Sodium 75 MCG Oral Tablet; TAKE (1) TABLET BY MOUTH DAILY; Therapy: 78ELY3457 to (Last Rx:75Mdq6684)  Requested for: 16PRV5685 Ordered  5   MetFORMIN HCl  MG Oral Tablet Extended Release 24 Hour; take 2 tablets by   mouth twice a day; Therapy: 81RGV6051 to (Last Rx:44Rjv8684)  Requested for: 17ONS5352 Ordered  6  Oxybutynin Chloride ER 10 MG Oral Tablet Extended Release 24 Hour; TAKE ONE   TABLET BY MOUTH TWICE A DAY; Therapy: 44RSG8991 to (Last Rx:03Onu3391)  Requested for: 27COB7330 Ordered  7  Pantoprazole Sodium 40 MG Oral Tablet Delayed Release; take one tablet by mouth one   time daily; Therapy: 26ZSI7883 to ([phone])  Requested for: 75Scm6401; Last   Rx:86Qry9629 Ordered  8  Pravastatin Sodium 20 MG Oral Tablet; Take 1 tablet daily; Therapy: 63CDA9959 to (Evaluate:17May2018)  Requested for: 66SRR8357; Last   Rx:22May2017 Ordered  9  PredniSONE 5 MG Oral Tablet; Take 1 daily; Therapy: 50XAB5579 to (Evaluate:17May2018)  Requested for: 12DPG0495; Last   Rx:22May2017 Ordered  10  RABEprazole Sodium 20 MG Oral Tablet Delayed Release; TAKE 1 TABLET DAILY; Therapy: 76CEE3253 to Recorded  11  Repaglinide 2 MG Oral Tablet; TAKE 2 TABLET 3 times daily Take 4mg at breakfast, 4mg    at lunch, and 4mg at dinner; Therapy: 22XET0160 to (Evaluate:17May2018)  Requested for: 16RJG6001; Last    Rx:22May2017 Ordered  12  Terbinafine HCl - 250 MG Oral Tablet; TAKE 1 TABLET DAILY; Therapy: 89DPF8875 to (Evaluate:15Jun2017)  Requested for: 61ZWJ1442; Last    Rx:47Dwv9027 Ordered  13  Vitamin D (Ergocalciferol) 36941 UNIT Oral Capsule; TAKE 1 WEEKLY FOR 12 WEEKS; Therapy: 69QXZ8136 to (Last Rx:72Xhn7276)  Requested for: 56FRU8911 Ordered  14  Vitamin D CAPS; take 1 capsule daily; Therapy: (Cesar Porteous) to Recorded  Medication List Reviewed: The medication list was reviewed and updated today  Allergies   1  Cephalosporins  2  Clindamycin HCl CAPS  3  HYDROcodone Bitartrate Powder  4  Penicillins  5  Percocet TABS  6  Simvastatin TABS  7  Tetracyclines  8   Vicodin TABS    Vitals  Signs   Recorded: 13RDC2550 32:36NH    Systolic: 347 1 Diastolic: 72 1   Recorded: 59HGQ4290 02:58PM   Temperature: 97 1 F, Tympanic  Heart Rate: 73  Height: 5 ft 2 in  Weight: 136 lb   BMI Calculated: 24 88  BSA Calculated: 1 62  O2 Saturation: 97     1 Amended By: Maddi Salmeron; May 30 2017 8:47 PM EST    Physical Exam    Constitutional1    General appearance: No acute distress, well appearing and well nourished  1  Nad1   Eyes1    Conjunctiva and lids: No swelling, erythema or discharge  1    Pupils and irises: Equal, round and reactive to light  1    Ears, Nose, Mouth, and Throat1    External inspection of ears and nose: Normal 1    Otoscopic examination: Tympanic membranes translucent with normal light reflex  Canals patent without erythema  1    Nasal mucosa, septum, and turbinates: Normal without edema or erythema  1    Oropharynx: Normal with no erythema, edema, exudate or lesions  1  Mucosa moist1   Pulmonary1    Respiratory effort: No increased work of breathing or signs of respiratory distress  1    Auscultation of lungs: Clear to auscultation  1    Cardiovascular1    Palpation of heart: Normal PMI, no thrills  1    Auscultation of heart: Normal rate and rhythm, normal S1 and S2, without murmurs  1    Examination of extremities for edema and/or varicosities: Normal 1    Carotid pulses: Normal 1    Abdomen1    Abdomen: Non-tender, no masses  1    Liver and spleen: No hepatomegaly or splenomegaly  1    Lymphatic1    Palpation of lymph nodes in neck: No lymphadenopathy  1    Musculoskeletal1    Gait and station: Normal 1    Digits and nails: Normal without clubbing or cyanosis  1    Inspection/palpation of joints, bones, and muscles: Normal 1    Skin1    Skin and subcutaneous tissue: Normal without rashes or lesions  1    Neurologic1    Cranial nerves: Cranial nerves 2-12 intact  1    Reflexes: 2+ and symmetric  1    Sensation: No sensory loss  1    Psychiatric1    Orientation to person, place, and time: Normal 1    Mood and affect: Normal 1          1 Amended By: Johnnie Henderson; May 30 2017 8:48 PM EST    Future Appointments    Date/Time Provider Specialty Site   07/06/2017 01:30 PM Johnnie Henderson DO Internal Medicine [facility] INTERNAL MEDICINE [facility]   06/02/2017 01:15 PM TORSTEN Key   Otolaryngology [address] [facility]   06/01/2017 01:15 PM Benita Ireland, [address] [place] [address]     Signatures   Electronically signed by : Mary Aleman DO; May 30 2017  3:25PM EST                       (Author)    Electronically signed by : Mary Aleman DO; May 30 2017  8:51PM EST                       (Author)

## 2018-02-01 ENCOUNTER — LAB (OUTPATIENT)
Dept: LAB | Facility: MEDICAL CENTER | Age: 83
End: 2018-02-01
Payer: COMMERCIAL

## 2018-02-01 ENCOUNTER — TRANSCRIBE ORDERS (OUTPATIENT)
Dept: RADIOLOGY | Facility: MEDICAL CENTER | Age: 83
End: 2018-02-01

## 2018-02-01 DIAGNOSIS — E78.5 HYPERLIPIDEMIA: ICD-10-CM

## 2018-02-01 DIAGNOSIS — E23.0 HYPOPITUITARISM (HCC): ICD-10-CM

## 2018-02-01 DIAGNOSIS — Z79.4 ENCOUNTER FOR LONG-TERM (CURRENT) USE OF INSULIN (HCC): ICD-10-CM

## 2018-02-01 DIAGNOSIS — E23.0 HYPOPITUITARISM (HCC): Primary | ICD-10-CM

## 2018-02-01 DIAGNOSIS — IMO0001 UNCONTROLLED TYPE 2 DIABETES MELLITUS WITHOUT COMPLICATION, WITH LONG-TERM CURRENT USE OF INSULIN: ICD-10-CM

## 2018-02-01 DIAGNOSIS — E11.9 TYPE 2 DIABETES MELLITUS WITHOUT COMPLICATIONS (HCC): ICD-10-CM

## 2018-02-01 LAB
ALBUMIN SERPL BCP-MCNC: 3.3 G/DL (ref 3.5–5)
ALP SERPL-CCNC: 56 U/L (ref 46–116)
ALT SERPL W P-5'-P-CCNC: 22 U/L (ref 12–78)
ANION GAP SERPL CALCULATED.3IONS-SCNC: 4 MMOL/L (ref 4–13)
AST SERPL W P-5'-P-CCNC: 22 U/L (ref 5–45)
BILIRUB SERPL-MCNC: 0.29 MG/DL (ref 0.2–1)
BUN SERPL-MCNC: 8 MG/DL (ref 5–25)
CALCIUM SERPL-MCNC: 8.9 MG/DL (ref 8.3–10.1)
CHLORIDE SERPL-SCNC: 100 MMOL/L (ref 100–108)
CO2 SERPL-SCNC: 33 MMOL/L (ref 21–32)
CREAT SERPL-MCNC: 0.84 MG/DL (ref 0.6–1.3)
EST. AVERAGE GLUCOSE BLD GHB EST-MCNC: 223 MG/DL
GFR SERPL CREATININE-BSD FRML MDRD: 64 ML/MIN/1.73SQ M
GLUCOSE SERPL-MCNC: 286 MG/DL (ref 65–140)
HBA1C MFR BLD: 9.4 % (ref 4.2–6.3)
LDLC SERPL DIRECT ASSAY-MCNC: 82 MG/DL (ref 0–100)
POTASSIUM SERPL-SCNC: 4.3 MMOL/L (ref 3.5–5.3)
PROT SERPL-MCNC: 6.6 G/DL (ref 6.4–8.2)
SODIUM SERPL-SCNC: 137 MMOL/L (ref 136–145)
T4 FREE SERPL-MCNC: 1.07 NG/DL (ref 0.76–1.46)

## 2018-02-01 PROCEDURE — 84439 ASSAY OF FREE THYROXINE: CPT

## 2018-02-01 PROCEDURE — 83036 HEMOGLOBIN GLYCOSYLATED A1C: CPT

## 2018-02-01 PROCEDURE — 36415 COLL VENOUS BLD VENIPUNCTURE: CPT

## 2018-02-01 PROCEDURE — 83721 ASSAY OF BLOOD LIPOPROTEIN: CPT

## 2018-02-01 PROCEDURE — 80053 COMPREHEN METABOLIC PANEL: CPT

## 2018-02-27 RX ORDER — NITROGLYCERIN 0.4 MG/1
0.4 TABLET SUBLINGUAL
Status: ON HOLD | COMMUNITY
End: 2018-05-15

## 2018-02-27 RX ORDER — FERROUS SULFATE 325(65) MG
1 TABLET ORAL DAILY
COMMUNITY
Start: 2017-08-28 | End: 2018-03-22

## 2018-02-27 RX ORDER — ERGOCALCIFEROL 1.25 MG/1
CAPSULE ORAL WEEKLY
COMMUNITY
Start: 2017-08-28 | End: 2018-03-22

## 2018-02-27 RX ORDER — POLYETHYLENE GLYCOL 3350 17 G/17G
POWDER, FOR SOLUTION ORAL AS NEEDED
COMMUNITY
End: 2019-11-22 | Stop reason: ALTCHOICE

## 2018-02-27 RX ORDER — CHOLECALCIFEROL (VITAMIN D3) 10 MCG
1 TABLET ORAL
COMMUNITY
End: 2018-03-22

## 2018-03-01 ENCOUNTER — OFFICE VISIT (OUTPATIENT)
Dept: SLEEP CENTER | Facility: CLINIC | Age: 83
End: 2018-03-01

## 2018-03-01 VITALS
DIASTOLIC BLOOD PRESSURE: 78 MMHG | HEIGHT: 62 IN | WEIGHT: 136 LBS | SYSTOLIC BLOOD PRESSURE: 142 MMHG | BODY MASS INDEX: 25.03 KG/M2 | OXYGEN SATURATION: 98 %

## 2018-03-01 DIAGNOSIS — G47.33 OSA (OBSTRUCTIVE SLEEP APNEA): Primary | ICD-10-CM

## 2018-03-01 DIAGNOSIS — Z86.73 HISTORY OF CVA (CEREBROVASCULAR ACCIDENT): ICD-10-CM

## 2018-03-01 DIAGNOSIS — I49.5 SICK SINUS SYNDROME (HCC): ICD-10-CM

## 2018-03-01 DIAGNOSIS — I10 ESSENTIAL HYPERTENSION: ICD-10-CM

## 2018-03-01 DIAGNOSIS — G47.10 HYPERSOMNIA: ICD-10-CM

## 2018-03-01 DIAGNOSIS — E13.9 DIABETES 1.5, MANAGED AS TYPE 2 (HCC): ICD-10-CM

## 2018-03-01 NOTE — PROGRESS NOTES
Consultation - [address]  80 y o  female  IHC:52/53/9550  EFV:[id number]    Physician Requesting Consult: Jose Jaramillo       Reason for Consult : [At your kind request] I saw this patient for initial sleep evaluation today  The patient had both diagnostic and therapeutic sleep studies at [facility] is here with her family to review results and treatment options  She was seen by Dr Joselyn Juarez and was prescribed CPAP that she discontinued (and returned the equipment) because she was unable to tolerate    The diagnostic [study] confirmed [severe] obstructive sleep apnea:  [AHI] a 42/hour higher during REM and while supine  Minimum oxygen saturation 67 %  [and 13% of total sleep time was spent with saturations less than 90%  [Intermittent] snoring was noted  During the subsequent therapeutic study, sleep disordered breathing was [successfully] remediated with PAP at 9 cm H2O  There were severe periodic limb movements of sleep [ Index of] 112/ hour  Medications, Past, Family and Social Histories were reviewed  has a current medication list which includes the following prescription(s): aspirin, b complex vitamins, b complex-vitamin c-folic acid, cholecalciferol, cyanocobalamin, docusate sodium, ergocalciferol, ferrous sulfate, levothyroxine, metformin, oxybutynin, pravastatin, repaglinide, aspirin, ergocalciferol, ferrous sulfate, hydrochlorothiazide, insulin aspart, lycopene, nitroglycerin, pantoprazole, polyethylene glycol, prednisone, sitagliptin, and sitagliptin  HPI:  Her studies were undertaken because of her bradycardia and excessive drowsiness  She was diagnosed with obstructive sleep apnea in the past and reports ongoing symptoms:  Snoring awakenings with choking or gasping  She is not aware of modifying factors  She reported no features of restless leg syndrome or parasomnia    Sleep Routine:  She has a somewhat irregular sleep-wake routine but reports falling asleep within minutes and in frequent interruptions of sleep  However she is only getting 5-6 hours sleep at night  She awakens with the aid of an alarm feeling rested at 1st but has excessive drowsiness and is napping for at least 2-3 hours a day  She rated herself at Total score: 24 /24 on the Maxie Sleepiness Scale  Habits:  [ reports that she has never smoked  She does not have any smokeless tobacco history on file ], [ reports that she does not drink alcohol ], [ reports that she does not use drugs ], [she uses limited caffeine  She does not exercise ]     ROS: as attached reviewed  [Significant for weight has been stable  She denied nasal, respiratory, cardiac or gastrointestinal symptoms  ]   A 12 point review of systems was otherwise negative  Family History:  Her sister has obstructive sleep apnea  EXAM: Blood pressure 142/78, height 5' 2" (1 575 m), weight 61 7 kg (136 lb), SpO2 98 %  Body mass index is 24 87 kg/m²  This is a [well] groomed female, well appearing [at] their stated age, in no distress  Alert, orientated & cooperative  Mental state appears normal     Head and neck: Craniofacial anatomy [is normal]  Neck Circumference: 39 cm,      [appears thick] [& there is extra fatty tissue]  There [are no abnormal masses or] Lymhadenopathy  [  ]   Nasal airway: [patent]  Septum is [central ] Mucous membranes appeared [normal]  Turbinates are [normal ]  There is [no] rhinorrhea  Oral airway: [is crowded ] Base of tongue is at Modified Mallampati class [IV]  Palate [appears normal]  Fauces appear [narrowed]  There is [no] tonsillar hypertrophy  She is edentulous and has dentures in upper and lower jaws  Heart sounds are regular, there 4/6 systolic murmur, no JVD  She has 1+ pedal edema and is wearing pressure stockings  Respiratory effort is normal  Air entry is [good] bilaterally  There are no wheezes or rales  [She has truncal obesity ]  The rest of her exam was unremarkable  IMPRESSION:     1  CRISTAL (obstructive sleep apnea)  Cpap DME   2  Hypersomnia     3  Essential hypertension     4  History of CVA (cerebrovascular accident)     5  Diabetes 1 5, 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 )     6  Sick sinus syndrome ([facility] 75 )          PLAN:   1  I reviewed results of the Sleep study with the patient  2  With respect to above conditions, I counseled on pathophysiology, diagnosis, treatment options, risks and benefits; inter-relationship and effects on symptoms and comorbidities; risks of no treatment; costs and insurance aspects  3   They had many questions regarding treatment options that I answered for them  Her options are limited:  She is not a candidate for mandibular advancement device because she is edentulous  She is also not a good candidate for surgery based on severity of her sleep disordered breathing  Her best option is positive airway pressure therapy or no treatment and to except the risks  4  Patient elected to retry positive airway pressure therapy and I provided a prescription for auto titrating Pap in the range of 8-10 cm H2O  She will need refitting of the interface to avoid Mask leaks that appears to be the main reason she discontinued use  5  Follow-up to be scheduled in 2 months to monitor progress, compliance and to adjust therapy           Sincerely,    Jordi Ortiz MD  Board Certified Specialist

## 2018-03-07 NOTE — CONSULTS
Plan    1  Comprehensive Audiogram with Tympanometry; Status:Active; Requested for:07Apr2017;       Assessment    1  History of Hip Replacement   2  Never a smoker   3  Bilateral hearing loss (389 9) (H91 93)   4  Pruritus, unspecified (698 9) (L29 9)    Chief Complaint  Chief Complaint Free Text Note Form: Itchy Ears/Ref by Dr Kapil Dunn      History of Present Illness  HPI: 75-year-old female referred by Dr Kapil Dunn for pruritic ears  She notes ongoing bilateral pruritus without any inflammation, tenderness, pain, ear drainage, ear fullness, or dizziness  She does have some mild hearing loss  She has not had a recent audiogram  No previous ear surgeries  No neck masses  No weight loss  Review of Systems  Complete ENT ROS [facility]:   Eyes: No complaints of itching, excessive tearing or vision changes  Ears: Itchy Ears  Nose: No nasal obstruction, no discharge or runniness, no bleeding, no dryness, no sneezing and no loss of smell  Mouth: No sores in mouth, no altered taste, no dental problems  Throat: No complaints of throat pain, no difficulty swallowing, no hoarseness  Neck: No neck soreness, no neck pain, no neck lumps or swelling  Genitourinary: No complaints of dysuria, flank pain or frequent urination  Cardiovascular: No complaints of chest pain or palpitations  Respiratory: No complaints of shortness of breath, cough or wheezing  Gastrointestinal: No complaints of heartburn, nausea/vomiting, or constipation  Neurological: No complaints of headache, convulsions or memory loss  ROS Reviewed:   ROS reviewed  Active Problems    1  Abnormal heart rate (785 3) (R00 9)   2  Activities of daily living deficit involving meal and kitchen skills (V40 39)   3  Arthralgia Of The Left Humerus/Elbow (719 42)   4  Bradycardia (427 89) (R00 1)   5  Chronic bilateral low back pain without sciatica (724 2,338 29) (M54 5,G89 29)   6  Depression (311) (F32 9)   7   Diabetic hypoglycemia (250 80) (E11 649)   8  DMII (diabetes mellitus, type 2) (250 00) (E11 9)   9  Dysphagia, unspecified type (787 20) (R13 10)   10  Encounter for mammogram to establish baseline mammogram (V76 12) (Z12 31)   11  Esophageal reflux (530 81) (K21 9)   12  Esophageal spasm (530 5) (K22 4)   13  Fatigue (780 79) (R53 83)   14  Female pelvic pain (625 9) (R10 2)   15  Generalized osteoarthritis of multiple sites (715 09) (M15 9)   16  Heart murmur (785 2) (R01 1)   17  History of allergy (V15 09) (Z88 9)   18  Hyperlipidemia (272 4) (E78 5)   19  Hypothyroidism (244 9) (E03 9)   20  Need for influenza vaccination (V04 81) (Z23)   21  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)   22  Right hip pain (719 45) (M25 551)   23  Skin infection (686 9) (L08 9)   24  Skin rash (782 1) (R21)   25  Vitamin D deficiency (268 9) (E55 9)    Past Medical History    1  History of Chest cold (519 8) (J22)   2  History of Chest pain (786 50) (R07 9)   3  History of Cough (786 2) (R05)   4  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,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,L97 209)   5  History of Encounter for screening colonoscopy (V76 51) (Z12 11)   6  History of Female Stress Incontinence (625 6)   7  History of constipation (V12 79) (Z87 19)   8  History of Need for pneumococcal vaccination (V03 82) (Z23)   9  History of Visit for screening mammogram (V76 12) (Z12 31)   10  History of Wound, open, leg (891 0) (Z22 229T)  Past Medical History Reviewed: The past medical history was reviewed and updated today  Surgical History    1  History of Ankle Surgery   2  History of Appendectomy   3  History of Cataract Surgery   4  History of Excision Of Pituitary Gland   5  History of Hemorrhoidectomy   6  History of Hernia Repair   7  History of Hip Replacement   8  History of Hysterectomy   9  History of Neuroplasty Decompression Median Nerve At Carpal Tunnel  Surgical History Reviewed: The surgical history was reviewed and updated today  Family History  Father    1   Family history of Coal Workers' Pneumoconiosis   2  Family history of Heart Disease (V17 49)  Brother    3  Family history of Diabetes Mellitus (V18 0)   4  Family history of Heart Disease (V17 49)   5  Family history of Prostate Cancer (V16 42)  Maternal Grandmother    6  Family history of Gastric Cancer (V16 0)  Paternal Aunt    9  Family history of Diabetes Mellitus (V18 0)  Family History    8  Denied: Family history of Drug abuse  Family History Reviewed: The family history was reviewed and updated today  Social History    · Caffeine Use   · Dental care, regularly   ·    · Denied: History of Drug Use   · Good dental hygiene   · Never a smoker   · Denied: History of No history of alcohol use (Z78 9)   · Uses Safety Equipment - Seatbelts  Social History Reviewed: The social history was reviewed and updated today  The social history was reviewed and is unchanged  Current Meds   1  Perez Breeze 2 Test In Vitro Disk; TEST BLOOD SUGAR 4 TIMES A DAY; Therapy: 70UJI8156 to (Last Rx:79Izn3554)  Requested for: 24PSU4835 Ordered   2  Fluocinolone Acetonide 0 01 % Otic Oil; Therapy: 49NWG6678 to (Evaluate:16Shr5638) Recorded   3  Januvia 50 MG Oral Tablet; TAKE 1 TABLET DAILY; Therapy: 91KVL4490 to (Evaluate:13Nov2016)  Requested for: 72Mgy9989; Last   Rx:23Jrt7450 Ordered   4  Levothyroxine Sodium 75 MCG Oral Tablet; TAKE (1) TABLET BY MOUTH DAILY; Therapy: 98YVR0237 to (Last Rx:46Rqr9024)  Requested for: 48WVQ2269 Ordered   5  MetFORMIN HCl  MG Oral Tablet Extended Release 24 Hour; take 2 tablets by   mouth twice a day; Therapy: 10SJZ6518 to (Last Rx:47Rqd8121)  Requested for: 94CFN5156 Ordered   6  Oxybutynin Chloride ER 10 MG Oral Tablet Extended Release 24 Hour; TAKE ONE   TABLET BY MOUTH TWICE A DAY; Therapy: 10CEC4087 to (Last Rx:29Nov2016)  Requested for: 92LPP3542 Ordered   7  Polyethylene Glycol 3350 Oral Powder; FILL TO INDICATOR TERE ON CAP WITH   POWDER   DISOLVE POWDER IN 8OZ OF WATER AND DRINK DAILY; Therapy: 52DGF3448 to (Larondest[place])  Requested for: 05VMI8740; Last   CR:32TGN1622 Ordered   8  Pravastatin Sodium 20 MG Oral Tablet; Take 1 tablet daily; Therapy: 04APW0359 to (Evaluate:08Clp5317)  Requested for: 41PNV0444; Last   Rx:02Lyq7750 Ordered   9  PredniSONE 5 MG Oral Tablet; Take 1 daily; Therapy: 82CMV9151 to (Evaluate:10Aug2017)  Requested for: 09Xdn4205; Last   Rx:90Iml7720 Ordered   10  RABEprazole Sodium 20 MG Oral Tablet Delayed Release; TAKE 1 TABLET DAILY; Therapy: 94GCF5733 to Recorded   11  Repaglinide 2 MG Oral Tablet; TAKE 1 TABLET 3 TIMES DAILY 30 MINUTES BEFORE    MEALS; Therapy: 93TUH6287 to (Evaluate:47Vjq8173); Last Rx:38Mcy6735 Ordered   12  Repaglinide 2 MG Oral Tablet; TAKE 1 TABLET AT BREAKFAST 1 TABLET AT LUNCH AND    TWO TABLETS AT [facility]  Requested for: 53ZGT3763; Last Rx:50Bnn8731 Ordered   13  Vitamin D (Ergocalciferol) 85120 UNIT Oral Capsule; TAKE 1 WEEKLY FOR 12 WEEKS; Therapy: 73INH2803 to (Last Rx:20Jaf5769)  Requested for: 02UPK3177 Ordered   14  Vitamin D CAPS; take 1 capsule daily; Therapy: (Recorded:37Wka6230) to Recorded    Allergies    1  Cephalosporins   2  Clindamycin HCl CAPS   3  HYDROcodone Bitartrate Powder   4  Penicillins   5  Percocet TABS   6  Tetracyclines   7  Vicodin TABS    Vitals  Signs   Recorded: 07Apr2017 01:42PM   Heart Rate: 54  Systolic: 646, LUE, Sitting  Diastolic: 64, LUE, Sitting  Height: 5 ft 2 in  Weight: 138 lb 6 oz  BMI Calculated: 25 31  BSA Calculated: 1 63  O2 Saturation: 98    Physical Exam    Constitutional:   General appearance: Well developed, well nourished  Ability to communicate: Voice normal  Speech normal    Head and Face:   Head and face: Head normocephalic, atraumatic with no lesions or palpable masses  Submandibular glands and parotid glands: non tender, no masses  Eyes:   Test of Ocular Motility: Gaze normal  No nystagmus     Ears:   Otoscopic Examination: Tympanic membranes intact and normal in appearance, no retraction of tympanic membranes observed, no serous effusion observed, no evidence of tympanosclerosis  Hearing: Normal    Nose:   External auditory canals: No cerumen impaction noted, no drainage observed, no edema noted in EAC, no exostoses present, no osteoma present, no tenderness noted  External Inspection of Nose: No deformities observed, no deviation of bone structure, no skin lesion present, no swelling present  Nares are symmetric, no deviation of caudal portion of septum  Nasal Mucosa: No congestion observed, no mucosal lesion or masses present, no ulcerations observed  Cartilaginous Septum: midline, no bleeding noted, no crusting present, no perforation noted  Turbinates: No hypertrophy or inflammation noted  Mouth: Inspection of Lips, Teeth, Gums: Lips normal in color, moist, no cracks or lesions  No loose teeth, no missing teeth  Gingiva: no bleeding observed, no inflammation present  Hard Palate: no asymmetry observed, no torus present  Soft palate normal with no ulcers noted  Throat:   Examination of Oropharynx: Oral Mucosa: no masses, lesions, leukoplakia, or scarring  Normal Makenna's ducts, pink and moist, no discoloration noted  Floor of mouth: normal Warthin's ducts, no lesions, ulcerations, leukoplakia or torus mandibularis  Tonsils: no hypertrophy or ulcerations noted  Tongue: normal mobility, surfaces without fissures, leukoplakia, ulceration or masses, not enlarged, no pallor noted, no white patches present  Neck:   Examination of Neck: No decreased range of motion, trachea midline  Examination of Thyroid: Normal size, non-tender, no palpable masses  Lymphatic:   Palpation of Lymph Nodes: Neck: No generalized lymphadenopathy  Neurological/Psychiatric:   Cranial nerves II-VII grossly intact  Oriented to person, place, and time  Cooperative, in no acute distress     Additional Findings: Minimal cerumen in the left ear debrided  Discussion/Summary  Discussion Summary:   Pruritus of the ear: We discussed use of baby oil to address some dryness in the ear  She does not have any obvious seborrheic dermatitis, erythema, irritation, or other lesions of the ear at this time  We will reevaluate in 6 weeks  Bilateral hearing loss: We will obtain an audiogram  She is medically cleared for hearing aids        Signatures   Electronically signed by : TORSTEN Sparks ; Apr 7 2017  1:52PM EST                       (Author)

## 2018-03-22 ENCOUNTER — APPOINTMENT (EMERGENCY)
Dept: CT IMAGING | Facility: HOSPITAL | Age: 83
DRG: 281 | End: 2018-03-22
Payer: COMMERCIAL

## 2018-03-22 ENCOUNTER — HOSPITAL ENCOUNTER (INPATIENT)
Facility: HOSPITAL | Age: 83
LOS: 2 days | Discharge: HOME | DRG: 281 | End: 2018-03-24
Attending: EMERGENCY MEDICINE | Admitting: INTERNAL MEDICINE
Payer: COMMERCIAL

## 2018-03-22 DIAGNOSIS — E05.80 SECONDARY HYPERTHYROIDISM: ICD-10-CM

## 2018-03-22 DIAGNOSIS — E11.9 DIABETES (HCC): ICD-10-CM

## 2018-03-22 DIAGNOSIS — I21.4 NSTEMI (NON-ST ELEVATED MYOCARDIAL INFARCTION) (HCC): ICD-10-CM

## 2018-03-22 DIAGNOSIS — E78.5 HYPERLIPIDEMIA: ICD-10-CM

## 2018-03-22 DIAGNOSIS — R07.9 CHEST PAIN: ICD-10-CM

## 2018-03-22 DIAGNOSIS — I47.1 SUPRAVENTRICULAR TACHYCARDIA (HCC): Primary | ICD-10-CM

## 2018-03-22 DIAGNOSIS — R77.8 ELEVATED TROPONIN: ICD-10-CM

## 2018-03-22 LAB
ALBUMIN SERPL BCP-MCNC: 3 G/DL (ref 3.5–5)
ALP SERPL-CCNC: 48 U/L (ref 46–116)
ALT SERPL W P-5'-P-CCNC: 18 U/L (ref 12–78)
ANION GAP SERPL CALCULATED.3IONS-SCNC: 9 MMOL/L (ref 4–13)
AST SERPL W P-5'-P-CCNC: 14 U/L (ref 5–45)
BASOPHILS # BLD AUTO: 0.02 THOUSANDS/ΜL (ref 0–0.1)
BASOPHILS NFR BLD AUTO: 0 % (ref 0–1)
BILIRUB SERPL-MCNC: 0.4 MG/DL (ref 0.2–1)
BUN SERPL-MCNC: 15 MG/DL (ref 5–25)
CALCIUM SERPL-MCNC: 9 MG/DL (ref 8.3–10.1)
CHLORIDE SERPL-SCNC: 100 MMOL/L (ref 100–108)
CO2 SERPL-SCNC: 27 MMOL/L (ref 21–32)
CREAT SERPL-MCNC: 0.9 MG/DL (ref 0.6–1.3)
EOSINOPHIL # BLD AUTO: 0.04 THOUSAND/ΜL (ref 0–0.61)
EOSINOPHIL NFR BLD AUTO: 0 % (ref 0–6)
ERYTHROCYTE [DISTWIDTH] IN BLOOD BY AUTOMATED COUNT: 13.6 % (ref 11.6–15.1)
GFR SERPL CREATININE-BSD FRML MDRD: 59 ML/MIN/1.73SQ M
GLUCOSE SERPL-MCNC: 255 MG/DL (ref 65–140)
GLUCOSE SERPL-MCNC: 390 MG/DL (ref 65–140)
HCT VFR BLD AUTO: 39.3 % (ref 34.8–46.1)
HGB BLD-MCNC: 12.8 G/DL (ref 11.5–15.4)
LACTATE SERPL-SCNC: 3.2 MMOL/L (ref 0.5–2)
LYMPHOCYTES # BLD AUTO: 1.57 THOUSANDS/ΜL (ref 0.6–4.47)
LYMPHOCYTES NFR BLD AUTO: 15 % (ref 14–44)
MAGNESIUM SERPL-MCNC: 1.6 MG/DL (ref 1.6–2.6)
MCH RBC QN AUTO: 30 PG (ref 26.8–34.3)
MCHC RBC AUTO-ENTMCNC: 32.6 G/DL (ref 31.4–37.4)
MCV RBC AUTO: 92 FL (ref 82–98)
MONOCYTES # BLD AUTO: 0.77 THOUSAND/ΜL (ref 0.17–1.22)
MONOCYTES NFR BLD AUTO: 7 % (ref 4–12)
NEUTROPHILS # BLD AUTO: 7.99 THOUSANDS/ΜL (ref 1.85–7.62)
NEUTS SEG NFR BLD AUTO: 78 % (ref 43–75)
NT-PROBNP SERPL-MCNC: 345 PG/ML
PLATELET # BLD AUTO: 232 THOUSANDS/UL (ref 149–390)
PLATELET # BLD AUTO: 247 THOUSANDS/UL (ref 149–390)
PMV BLD AUTO: 10.2 FL (ref 8.9–12.7)
PMV BLD AUTO: 10.4 FL (ref 8.9–12.7)
POTASSIUM SERPL-SCNC: 4.9 MMOL/L (ref 3.5–5.3)
PROT SERPL-MCNC: 6.3 G/DL (ref 6.4–8.2)
RBC # BLD AUTO: 4.27 MILLION/UL (ref 3.81–5.12)
SODIUM SERPL-SCNC: 136 MMOL/L (ref 136–145)
TROPONIN I SERPL-MCNC: 0.04 NG/ML
TSH SERPL DL<=0.05 MIU/L-ACNC: 0.03 UIU/ML (ref 0.36–3.74)
WBC # BLD AUTO: 10.39 THOUSAND/UL (ref 4.31–10.16)

## 2018-03-22 PROCEDURE — 83605 ASSAY OF LACTIC ACID: CPT | Performed by: EMERGENCY MEDICINE

## 2018-03-22 PROCEDURE — 93005 ELECTROCARDIOGRAM TRACING: CPT

## 2018-03-22 PROCEDURE — 84484 ASSAY OF TROPONIN QUANT: CPT | Performed by: EMERGENCY MEDICINE

## 2018-03-22 PROCEDURE — 99285 EMERGENCY DEPT VISIT HI MDM: CPT

## 2018-03-22 PROCEDURE — 84481 FREE ASSAY (FT-3): CPT | Performed by: EMERGENCY MEDICINE

## 2018-03-22 PROCEDURE — 71275 CT ANGIOGRAPHY CHEST: CPT

## 2018-03-22 PROCEDURE — 83605 ASSAY OF LACTIC ACID: CPT | Performed by: PHYSICIAN ASSISTANT

## 2018-03-22 PROCEDURE — 85025 COMPLETE CBC W/AUTO DIFF WBC: CPT | Performed by: EMERGENCY MEDICINE

## 2018-03-22 PROCEDURE — 84443 ASSAY THYROID STIM HORMONE: CPT | Performed by: EMERGENCY MEDICINE

## 2018-03-22 PROCEDURE — 96374 THER/PROPH/DIAG INJ IV PUSH: CPT

## 2018-03-22 PROCEDURE — 82948 REAGENT STRIP/BLOOD GLUCOSE: CPT

## 2018-03-22 PROCEDURE — 96361 HYDRATE IV INFUSION ADD-ON: CPT

## 2018-03-22 PROCEDURE — 83735 ASSAY OF MAGNESIUM: CPT | Performed by: EMERGENCY MEDICINE

## 2018-03-22 PROCEDURE — 84484 ASSAY OF TROPONIN QUANT: CPT | Performed by: PHYSICIAN ASSISTANT

## 2018-03-22 PROCEDURE — 36415 COLL VENOUS BLD VENIPUNCTURE: CPT | Performed by: EMERGENCY MEDICINE

## 2018-03-22 PROCEDURE — 85049 AUTOMATED PLATELET COUNT: CPT | Performed by: PHYSICIAN ASSISTANT

## 2018-03-22 PROCEDURE — 80053 COMPREHEN METABOLIC PANEL: CPT | Performed by: EMERGENCY MEDICINE

## 2018-03-22 PROCEDURE — 83880 ASSAY OF NATRIURETIC PEPTIDE: CPT | Performed by: EMERGENCY MEDICINE

## 2018-03-22 RX ORDER — PRAVASTATIN SODIUM 20 MG
20 TABLET ORAL
Status: DISCONTINUED | OUTPATIENT
Start: 2018-03-23 | End: 2018-03-24 | Stop reason: HOSPADM

## 2018-03-22 RX ORDER — PANTOPRAZOLE SODIUM 40 MG/1
40 TABLET, DELAYED RELEASE ORAL
Status: DISCONTINUED | OUTPATIENT
Start: 2018-03-23 | End: 2018-03-24 | Stop reason: HOSPADM

## 2018-03-22 RX ORDER — ONDANSETRON 2 MG/ML
4 INJECTION INTRAMUSCULAR; INTRAVENOUS EVERY 6 HOURS PRN
Status: DISCONTINUED | OUTPATIENT
Start: 2018-03-22 | End: 2018-03-24 | Stop reason: HOSPADM

## 2018-03-22 RX ORDER — ADENOSINE 3 MG/ML
6 INJECTION INTRAVENOUS ONCE
Status: COMPLETED | OUTPATIENT
Start: 2018-03-22 | End: 2018-03-22

## 2018-03-22 RX ORDER — LEVOTHYROXINE SODIUM 0.07 MG/1
75 TABLET ORAL
Status: DISCONTINUED | OUTPATIENT
Start: 2018-03-23 | End: 2018-03-24 | Stop reason: HOSPADM

## 2018-03-22 RX ORDER — ADENOSINE 3 MG/ML
INJECTION, SOLUTION INTRAVENOUS
Status: COMPLETED
Start: 2018-03-22 | End: 2018-03-22

## 2018-03-22 RX ORDER — ERGOCALCIFEROL 1.25 MG/1
50000 CAPSULE ORAL WEEKLY
Status: DISCONTINUED | OUTPATIENT
Start: 2018-03-23 | End: 2018-03-24 | Stop reason: HOSPADM

## 2018-03-22 RX ORDER — DOCUSATE SODIUM 100 MG/1
100 CAPSULE, LIQUID FILLED ORAL 2 TIMES DAILY
Status: DISCONTINUED | OUTPATIENT
Start: 2018-03-23 | End: 2018-03-24 | Stop reason: HOSPADM

## 2018-03-22 RX ORDER — ASPIRIN 81 MG/1
81 TABLET, CHEWABLE ORAL DAILY
Status: DISCONTINUED | OUTPATIENT
Start: 2018-03-23 | End: 2018-03-24 | Stop reason: HOSPADM

## 2018-03-22 RX ORDER — SODIUM CHLORIDE 9 MG/ML
100 INJECTION, SOLUTION INTRAVENOUS CONTINUOUS
Status: DISCONTINUED | OUTPATIENT
Start: 2018-03-22 | End: 2018-03-23

## 2018-03-22 RX ORDER — VITAMIN B COMPLEX
1 CAPSULE ORAL DAILY
Status: DISCONTINUED | OUTPATIENT
Start: 2018-03-23 | End: 2018-03-24 | Stop reason: HOSPADM

## 2018-03-22 RX ORDER — OXYBUTYNIN CHLORIDE 5 MG/1
10 TABLET, EXTENDED RELEASE ORAL 2 TIMES DAILY
Status: DISCONTINUED | OUTPATIENT
Start: 2018-03-23 | End: 2018-03-24 | Stop reason: HOSPADM

## 2018-03-22 RX ADMIN — SODIUM CHLORIDE 100 ML/HR: 0.9 INJECTION, SOLUTION INTRAVENOUS at 23:49

## 2018-03-22 RX ADMIN — IOHEXOL 85 ML: 350 INJECTION, SOLUTION INTRAVENOUS at 19:22

## 2018-03-22 RX ADMIN — ADENOSINE 6 MG: 3 INJECTION INTRAVENOUS at 17:50

## 2018-03-22 RX ADMIN — SODIUM CHLORIDE 500 ML: 0.9 INJECTION, SOLUTION INTRAVENOUS at 17:50

## 2018-03-22 RX ADMIN — SODIUM CHLORIDE 500 ML: 0.9 INJECTION, SOLUTION INTRAVENOUS at 19:53

## 2018-03-22 RX ADMIN — ADENOSINE 6 MG: 3 INJECTION, SOLUTION INTRAVENOUS at 17:50

## 2018-03-22 NOTE — ED NOTES
Upon arrival pt had a heart rate at 135  ekg and monitor showed SVT  Pt states she feels the palpitations but not feeling any type of pain//SOB        Lisseth Rothman RN  03/22/18 2143

## 2018-03-22 NOTE — ED NOTES
Pt has no sob/chest pain at this time  HR at 87  No complaints at this time  Pt is watching tv comfortably   Call bell in reach     Kraig Koch RN  03/22/18 1680

## 2018-03-22 NOTE — ED PROVIDER NOTES
History  Chief Complaint   Patient presents with    Chest Pain     Pt states that 2 hours ago she walked to mailbox and when she returned to the house had an episode of left sided CP that radiated into jaw  Pt states that pain has resloved  75-year-old female presents by EMS complaining of chest pain and palpitations  She states the started about 30 minutes ago when she walked out to her mailbox  She states that she has some substernal chest discomfort and feels her heart is racing         Chest Pain   Pain location:  Substernal area  Pain quality: aching and burning    Pain radiates to:  L jaw  Pain severity:  Mild  Onset quality:  Gradual  Duration:  40 minutes  Timing:  Constant  Chronicity:  New  Context: not breathing, no drug use and not eating    Relieved by:  Nothing  Worsened by:  Exertion and movement  Ineffective treatments:  None tried  Associated symptoms: no abdominal pain, no AICD problem, no altered mental status, no anorexia, no anxiety, no back pain, no diaphoresis, no dizziness and no headache    Risk factors: no aortic disease, no birth control and no coronary artery disease        Prior to Admission Medications   Prescriptions Last Dose Informant Patient Reported? Taking?    Cholecalciferol 1000 units capsule   Yes No   Sig: Take 1,000 Units by mouth   LYCOPENE PO   Yes No   Sig: Take 1 tablet by mouth   aspirin 81 MG tablet   Yes No   Sig: Take 1 tablet by mouth daily   b complex vitamins tablet  Self Yes No   Sig: Take 1 tablet by mouth daily   cyanocobalamin ([employer] VITAMIN B-12) 1000 MCG tablet   Yes No   Sig: Take 1,000 mcg by mouth   docusate sodium (COLACE) 100 mg capsule   No No   Sig: Take 1 capsule by mouth 2 (two) times a day   ergocalciferol (VITAMIN D2) 50,000 units   Yes No   Sig: Take 50,000 Units by mouth once a week   ferrous sulfate 325 (65 Fe) mg tablet   No No   Sig: Take 1 tablet by mouth daily with breakfast   hydrochlorothiazide (MICROZIDE) 12 5 mg capsule  Self Yes No   Sig: Take 12 5 mg by mouth daily   insulin aspart (NovoLOG) 100 Units/mL SOPN   Yes No   Sig: Up to 6 units at supper   levothyroxine 75 mcg tablet   Yes No   Sig: Take 75 mcg by mouth daily in the early morning     metFORMIN (FORTAMET) 500 MG (OSM) 24 hr tablet   Yes No   Sig: Take 500 mg by mouth 2 (two) times a day with meals   nitroglycerin (NITROSTAT) 0 4 mg SL tablet   Yes No   Sig: Place 0 4 mg under the tongue   oxybutynin (DITROPAN-XL) 10 MG 24 hr tablet   Yes No   Sig: Take 10 mg by mouth 2 (two) times a day CL-ER 10mg twice daily    pantoprazole (PROTONIX) 40 mg tablet   Yes No   Sig: Take by mouth   polyethylene glycol (MIRALAX) powder   Yes No   Sig: Take by mouth   pravastatin (PRAVACHOL) 20 mg tablet   Yes No   Sig: Take 20 mg by mouth daily   predniSONE 5 mg tablet   Yes No   Sig: Take by mouth   repaglinide (PRANDIN) 2 mg tablet   Yes No   Sig: Take 4 mg by mouth 3 (three) times a day before meals     sitaGLIPtin (JANUVIA) 50 mg tablet   Yes No   Sig: Take 1 tablet by mouth daily      Facility-Administered Medications: None       Past Medical History:   Diagnosis Date    Bradycardia     Coronary artery disease     Depression     Depression     Diabetes mellitus (HCC)     Type 2 DM    Disease of thyroid gland     Facial droop     started 10 years ago    GERD (gastroesophageal reflux disease)     Heart murmur     Hyperlipidemia     Osteoarthritis     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HCC)     Vitamin D deficiency        Past Surgical History:   Procedure Laterality Date    ANKLE SURGERY      APPENDECTOMY      CATARACT EXTRACTION      HEMORRHOID SURGERY      JOINT REPLACEMENT      b/l hip replacements    NEUROPLASTY / TRANSPOSITION MEDIAN NERVE AT CARPAL TUNNEL      PARTIAL HYSTERECTOMY      TOTAL HIP ARTHROPLASTY      TRANSPHENOIDAL / TRANSNASAL HYPOPHYSECTOMY / RESECTION PITUITARY TUMOR         History reviewed  No pertinent family history    I have reviewed and agree with the history as documented  Social History   Substance Use Topics    Smoking status: Never Smoker    Smokeless tobacco: Never Used    Alcohol use No        Review of Systems   Constitutional: Negative for activity change, appetite change, chills and diaphoresis  HENT: Negative for congestion, dental problem and drooling  Eyes: Negative for pain, discharge and itching  Respiratory: Negative for apnea, choking and chest tightness  Cardiovascular: Positive for chest pain  Gastrointestinal: Negative for abdominal pain and anorexia  Endocrine: Negative for cold intolerance, heat intolerance and polydipsia  Genitourinary: Negative for difficulty urinating, dyspareunia, dysuria and enuresis  Musculoskeletal: Negative for arthralgias, back pain and gait problem  Skin: Negative for color change, pallor and rash  Allergic/Immunologic: Negative for environmental allergies and food allergies  Neurological: Negative for dizziness, facial asymmetry and headaches  Hematological: Negative for adenopathy  Psychiatric/Behavioral: Negative for agitation, behavioral problems, confusion and decreased concentration  Physical Exam  ED Triage Vitals [03/22/18 1733]   Temperature Pulse Respirations Blood Pressure SpO2   97 8 °F (36 6 °C) (!) 143 18 104/70 99 %      Temp Source Heart Rate Source Patient Position - Orthostatic VS BP Location FiO2 (%)   Temporal Monitor Sitting Left arm --      Pain Score       No Pain           Orthostatic Vital Signs  Vitals:    03/22/18 1733 03/22/18 1745 03/22/18 1830   BP: 104/70 104/70    Pulse: (!) 143 (!) 133 96   Patient Position - Orthostatic VS: Sitting         Physical Exam   Constitutional: She appears well-developed and well-nourished  HENT:   Head: Normocephalic  Right Ear: External ear normal    Left Ear: External ear normal    Eyes: Pupils are equal, round, and reactive to light  Right eye exhibits no discharge  Left eye exhibits no discharge     Neck: Normal range of motion  No JVD present  No tracheal deviation present  No thyromegaly present  Cardiovascular: Tachycardia present  Exam reveals no gallop and no friction rub  No murmur heard  Pulmonary/Chest: Effort normal  No respiratory distress  She has no wheezes  She has no rales  Abdominal: Soft  Bowel sounds are normal  She exhibits no distension  There is no tenderness  There is no guarding  Musculoskeletal: Normal range of motion  She exhibits no edema or deformity  Neurological: She is alert  She displays normal reflexes  No cranial nerve deficit  She exhibits normal muscle tone  Coordination normal    Skin: Skin is warm  Capillary refill takes less than 2 seconds  No rash noted  No erythema  Psychiatric: She has a normal mood and affect  Her behavior is normal  Thought content normal    Vitals reviewed        ED Medications  Medications   sodium chloride 0 9 % bolus 500 mL (not administered)   sodium chloride 0 9 % bolus 500 mL (0 mL Intravenous Stopped 3/22/18 1843)   adenosine (ADENOCARD) injection 6 mg (6 mg Intravenous Given 3/22/18 1750)       Diagnostic Studies  Results Reviewed     Procedure Component Value Units Date/Time    T3, free [[id number]]     Lab Status:  No result Specimen:  Blood     Comprehensive metabolic panel [[id number]]  (Abnormal) Collected:  03/22/18 1758    Lab Status:  Final result Specimen:  Blood from Arm, Right Updated:  03/22/18 1837     Sodium 136 mmol/L      Potassium 4 9 mmol/L      Chloride 100 mmol/L      CO2 27 mmol/L      Anion Gap 9 mmol/L      BUN 15 mg/dL      Creatinine 0 90 mg/dL      Glucose 390 (H) mg/dL      Calcium 9 0 mg/dL      AST 14 U/L      ALT 18 U/L      Alkaline Phosphatase 48 U/L      Total Protein 6 3 (L) g/dL      Albumin 3 0 (L) g/dL      Total Bilirubin 0 40 mg/dL      eGFR 59 ml/min/1 73sq m     Narrative:         National Kidney Disease Education Program recommendations are as follows:  GFR calculation is accurate only with a steady state creatinine  Chronic Kidney disease less than 60 ml/min/1 73 sq  meters  Kidney failure less than 15 ml/min/1 73 sq  meters  Magnesium [[id number]]  (Normal) Collected:  03/22/18 1758    Lab Status:  Final result Specimen:  Blood from Arm, Right Updated:  03/22/18 1837     Magnesium 1 6 mg/dL     B-type natriuretic peptide [[id number]]  (Normal) Collected:  03/22/18 1758    Lab Status:  Final result Specimen:  Blood from Arm, Right Updated:  03/22/18 1837     NT-proBNP 345 pg/mL     TSH [[id number]]  (Abnormal) Collected:  03/22/18 1758    Lab Status:  Final result Specimen:  Blood from Arm, Right Updated:  03/22/18 1830     TSH 3RD GENERATON 0 034 (L) uIU/mL     Narrative:         Patients undergoing fluorescein dye angiography may retain small amounts of fluorescein in the body for 48-72 hours post procedure  Samples containing fluorescein can produce falsely depressed TSH values  If the patient had this procedure,a specimen should be resubmitted post fluorescein clearance  The recommended reference ranges for TSH during pregnancy are as follows:  First trimester 0 1 to 2 5 uIU/mL  Second trimester  0 2 to 3 0 uIU/mL  Third trimester 0 3 to 3 0 uIU/m      Lactic acid, plasma [[id number]]  (Abnormal) Collected:  03/22/18 1758    Lab Status:  Final result Specimen:  Blood from Arm, Right Updated:  03/22/18 1827     LACTIC ACID 3 2 (HH) mmol/L     Narrative:         Result may be elevated if tourniquet was used during collection      Troponin I [[id number]]  (Normal) Collected:  03/22/18 1758    Lab Status:  Final result Specimen:  Blood from Arm, Right Updated:  03/22/18 1825     Troponin I 0 04 ng/mL     Narrative:         [employer] Chemistry analyzer 99% cutoff is > 0 04 ng/mL in network labs    o cTnI 99% cutoff is useful only when applied to patients in the clinical setting of myocardial ischemia  o cTnI 99% cutoff should be interpreted in the context of clinical history, ECG findings and possibly cardiac imaging to establish correct diagnosis  o cTnI 99% cutoff may be suggestive but clearly not indicative of a coronary event without the clinical setting of myocardial ischemia  CBC and differential [[id number]]  (Abnormal) Collected:  03/22/18 1758    Lab Status:  Final result Specimen:  Blood from Arm, Right Updated:  03/22/18 1803     WBC 10 39 (H) Thousand/uL      RBC 4 27 Million/uL      Hemoglobin 12 8 g/dL      Hematocrit 39 3 %      MCV 92 fL      MCH 30 0 pg      MCHC 32 6 g/dL      RDW 13 6 %      MPV 10 4 fL      Platelets 561 Thousands/uL      Neutrophils Relative 78 (H) %      Lymphocytes Relative 15 %      Monocytes Relative 7 %      Eosinophils Relative 0 %      Basophils Relative 0 %      Neutrophils Absolute 7 99 (H) Thousands/µL      Lymphocytes Absolute 1 57 Thousands/µL      Monocytes Absolute 0 77 Thousand/µL      Eosinophils Absolute 0 04 Thousand/µL      Basophils Absolute 0 02 Thousands/µL                  CTA ED chest PE study    (Results Pending)              Procedures  Procedures       Phone Contacts  ED Phone Contact    ED Course  ED Course          HEART Risk Score    Flowsheet Row Most Recent Value   History  1 Filed at: 03/22/2018 1750   ECG  1 Filed at: 03/22/2018 1750   Age  2 Filed at: 03/22/2018 1750   Risk Factors  1 Filed at: 03/22/2018 1750   Troponin  0 Filed at: 03/22/2018 1750   Heart Score Risk Calculator   History  1 Filed at: 03/22/2018 1750   ECG  1 Filed at: 03/22/2018 1750   Age  2 Filed at: 03/22/2018 1750   Risk Factors  1 Filed at: 03/22/2018 1750   Troponin  0 Filed at: 03/22/2018 1750   HEART Score  5 Filed at: 03/22/2018 1750   HEART Score  5 Filed at: 03/22/2018 1750                            MDM  Number of Diagnoses or Management Options  Diagnosis management comments: 17 49  Patient was given Cardizem 6 mg IV with break in her supraventricular tachycardia and heart rate  into the 90s with normal sinus rhythm    Differential diagnosis 1  Pulmonary embolism 2  Electrolyte imbalance 3  Conduction abnormality  Will check labs, CTA of chest       Amount and/or Complexity of Data Reviewed  Clinical lab tests: reviewed  Tests in the radiology section of CPT®: reviewed  Tests in the medicine section of CPT®: reviewed    Risk of Complications, Morbidity, and/or Mortality  Presenting problems: high  Diagnostic procedures: high  Management options: high      Total time providing critical care: 45 critical care time for evaluation treatment of supraventricular tachycardia  Disposition  Final diagnoses:   Supraventricular tachycardia ([facility] )   Chest pain   Diabetes ([facility] )   Hyperlipidemia     Time reflects when diagnosis was documented in both MDM as applicable and the Disposition within this note     Time User Action Codes Description Comment    3/22/2018  6:30 PM Gwynda Pancake Add [I47 1] Supraventricular tachycardia ([facility] )     3/22/2018  6:30 PM Gwynda Pancake Add [R07 9] Chest pain     3/22/2018  6:30 PM Gwynda Pancake Add [E11 9] [facility]     3/22/2018  6:30 PM Gwynda Pancake Add [E78 5] Hyperlipidemia       ED Disposition     ED Disposition Condition Comment    Admit          Follow-up Information    None       Patient's Medications   Discharge Prescriptions    No medications on file     No discharge procedures on file      ED Provider  Electronically Signed by           Marito Harrison DO  03/22/18 9536

## 2018-03-22 NOTE — ED NOTES
Adenosine 6mg  given by Brennon Uribe RN and flushed  Pt states she has no pain/sob  Pt tolerated well        Roshni Meadows RN  03/22/18 3040

## 2018-03-22 NOTE — ED NOTES
Pt converted back to sinus rhythm  HR at 96  Pt tolerated well  No complaints of sob/chest pain at this time        Ezra Baca, RN  03/22/18 0512

## 2018-03-22 NOTE — ED PROCEDURE NOTE
PROCEDURE  ECG 12 Lead Documentation  Date/Time: 3/22/2018 5:52 PM  Performed by: [employer]ushila  Authorized by: [employer]ush[employer]     Indications / Diagnosis:  Chest pain   ECG reviewed by me, the ED Provider: yes    Patient location:  ED  Previous ECG:     Previous ECG:  Unavailable  Interpretation:     Interpretation: non-specific    Rate:     ECG rate:  104    ECG rate assessment: tachycardic    Rhythm:     Rhythm: sinus tachycardia    ST segments:     ST segments:  Non-specific         Minesh Sims DO  03/22/18 1751

## 2018-03-22 NOTE — ED PROCEDURE NOTE
PROCEDURE  ECG 12 Lead Documentation  Date/Time: 3/22/2018 5:51 PM  Performed by: Kamala Patton  Authorized by: Kamala Patton     Indications / Diagnosis:  Chest pain   ECG reviewed by me, the ED Provider: yes    Patient location:  ED  Previous ECG:     Previous ECG:  Unavailable  Interpretation:     Interpretation: non-specific    Rate:     ECG rate:  138  Rhythm:     Rhythm: SVT    ST segments:     ST segments:  Non-specific  T waves:     T waves: non-specific           Marito Harrison DO  03/22/18 1753

## 2018-03-23 ENCOUNTER — APPOINTMENT (INPATIENT)
Dept: NON INVASIVE DIAGNOSTICS | Facility: HOSPITAL | Age: 83
DRG: 281 | End: 2018-03-23
Payer: COMMERCIAL

## 2018-03-23 PROBLEM — R77.8 ELEVATED TROPONIN: Status: ACTIVE | Noted: 2018-03-23

## 2018-03-23 PROBLEM — I21.4 NSTEMI (NON-ST ELEVATED MYOCARDIAL INFARCTION) (HCC): Status: ACTIVE | Noted: 2018-03-23

## 2018-03-23 PROBLEM — I50.32 CHRONIC DIASTOLIC CONGESTIVE HEART FAILURE (HCC): Status: ACTIVE | Noted: 2018-03-23

## 2018-03-23 PROBLEM — J98.11 ATELECTASIS: Status: ACTIVE | Noted: 2018-03-23

## 2018-03-23 PROBLEM — I35.0 MODERATE TO SEVERE AORTIC STENOSIS: Chronic | Status: ACTIVE | Noted: 2018-03-23

## 2018-03-23 LAB
ANION GAP SERPL CALCULATED.3IONS-SCNC: 6 MMOL/L (ref 4–13)
APTT PPP: 27 SECONDS (ref 23–35)
ATRIAL RATE: 104 BPM
ATRIAL RATE: 131 BPM
ATRIAL RATE: 55 BPM
ATRIAL RATE: 56 BPM
BUN SERPL-MCNC: 14 MG/DL (ref 5–25)
CALCIUM SERPL-MCNC: 8.6 MG/DL (ref 8.3–10.1)
CHLORIDE SERPL-SCNC: 104 MMOL/L (ref 100–108)
CHOLEST SERPL-MCNC: 175 MG/DL (ref 50–200)
CO2 SERPL-SCNC: 30 MMOL/L (ref 21–32)
CREAT SERPL-MCNC: 0.75 MG/DL (ref 0.6–1.3)
ERYTHROCYTE [DISTWIDTH] IN BLOOD BY AUTOMATED COUNT: 13.8 % (ref 11.6–15.1)
GFR SERPL CREATININE-BSD FRML MDRD: 73 ML/MIN/1.73SQ M
GLUCOSE SERPL-MCNC: 145 MG/DL (ref 65–140)
GLUCOSE SERPL-MCNC: 146 MG/DL (ref 65–140)
GLUCOSE SERPL-MCNC: 154 MG/DL (ref 65–140)
GLUCOSE SERPL-MCNC: 191 MG/DL (ref 65–140)
GLUCOSE SERPL-MCNC: 214 MG/DL (ref 65–140)
GLUCOSE SERPL-MCNC: 229 MG/DL (ref 65–140)
GLUCOSE SERPL-MCNC: 256 MG/DL (ref 65–140)
HCT VFR BLD AUTO: 39.4 % (ref 34.8–46.1)
HDLC SERPL-MCNC: 65 MG/DL (ref 40–60)
HGB BLD-MCNC: 12.9 G/DL (ref 11.5–15.4)
INR PPP: 1 (ref 0.86–1.16)
LACTATE SERPL-SCNC: 1.4 MMOL/L (ref 0.5–2)
LDLC SERPL CALC-MCNC: 89 MG/DL (ref 0–100)
MAGNESIUM SERPL-MCNC: 1.7 MG/DL (ref 1.6–2.6)
MCH RBC QN AUTO: 30.2 PG (ref 26.8–34.3)
MCHC RBC AUTO-ENTMCNC: 32.7 G/DL (ref 31.4–37.4)
MCV RBC AUTO: 92 FL (ref 82–98)
P AXIS: 22 DEGREES
P AXIS: 24 DEGREES
P AXIS: 31 DEGREES
PHOSPHATE SERPL-MCNC: 3.2 MG/DL (ref 2.3–4.1)
PLATELET # BLD AUTO: 257 THOUSANDS/UL (ref 149–390)
PMV BLD AUTO: 10.8 FL (ref 8.9–12.7)
POTASSIUM SERPL-SCNC: 4.3 MMOL/L (ref 3.5–5.3)
PR INTERVAL: 178 MS
PR INTERVAL: 180 MS
PR INTERVAL: 196 MS
PROTHROMBIN TIME: 13.1 SECONDS (ref 12.1–14.4)
QRS AXIS: -47 DEGREES
QRS AXIS: -48 DEGREES
QRS AXIS: -48 DEGREES
QRS AXIS: -50 DEGREES
QRSD INTERVAL: 100 MS
QRSD INTERVAL: 104 MS
QRSD INTERVAL: 92 MS
QRSD INTERVAL: 96 MS
QT INTERVAL: 328 MS
QT INTERVAL: 350 MS
QT INTERVAL: 434 MS
QT INTERVAL: 446 MS
QTC INTERVAL: 415 MS
QTC INTERVAL: 430 MS
QTC INTERVAL: 460 MS
QTC INTERVAL: 496 MS
RBC # BLD AUTO: 4.27 MILLION/UL (ref 3.81–5.12)
SODIUM SERPL-SCNC: 140 MMOL/L (ref 136–145)
T WAVE AXIS: 118 DEGREES
T WAVE AXIS: 16 DEGREES
T WAVE AXIS: 30 DEGREES
T WAVE AXIS: 97 DEGREES
T3FREE SERPL-MCNC: 1.73 PG/ML (ref 2.3–4.2)
T3FREE SERPL-MCNC: 1.79 PG/ML (ref 2.3–4.2)
T4 FREE SERPL-MCNC: 1.33 NG/DL (ref 0.76–1.46)
TRIGL SERPL-MCNC: 104 MG/DL
TROPONIN I SERPL-MCNC: 0.09 NG/ML
TROPONIN I SERPL-MCNC: 0.1 NG/ML
VENTRICULAR RATE: 104 BPM
VENTRICULAR RATE: 138 BPM
VENTRICULAR RATE: 55 BPM
VENTRICULAR RATE: 56 BPM
WBC # BLD AUTO: 8.34 THOUSAND/UL (ref 4.31–10.16)

## 2018-03-23 PROCEDURE — 84484 ASSAY OF TROPONIN QUANT: CPT | Performed by: PHYSICIAN ASSISTANT

## 2018-03-23 PROCEDURE — 84100 ASSAY OF PHOSPHORUS: CPT | Performed by: PHYSICIAN ASSISTANT

## 2018-03-23 PROCEDURE — 99233 SBSQ HOSP IP/OBS HIGH 50: CPT | Performed by: INTERNAL MEDICINE

## 2018-03-23 PROCEDURE — 93010 ELECTROCARDIOGRAM REPORT: CPT | Performed by: INTERNAL MEDICINE

## 2018-03-23 PROCEDURE — 83735 ASSAY OF MAGNESIUM: CPT | Performed by: PHYSICIAN ASSISTANT

## 2018-03-23 PROCEDURE — 99222 1ST HOSP IP/OBS MODERATE 55: CPT | Performed by: PHYSICIAN ASSISTANT

## 2018-03-23 PROCEDURE — 93306 TTE W/DOPPLER COMPLETE: CPT | Performed by: INTERNAL MEDICINE

## 2018-03-23 PROCEDURE — 82948 REAGENT STRIP/BLOOD GLUCOSE: CPT

## 2018-03-23 PROCEDURE — 80048 BASIC METABOLIC PNL TOTAL CA: CPT | Performed by: PHYSICIAN ASSISTANT

## 2018-03-23 PROCEDURE — 97116 GAIT TRAINING THERAPY: CPT | Performed by: PHYSICAL THERAPIST

## 2018-03-23 PROCEDURE — 93005 ELECTROCARDIOGRAM TRACING: CPT | Performed by: PHYSICIAN ASSISTANT

## 2018-03-23 PROCEDURE — G8987 SELF CARE CURRENT STATUS: HCPCS

## 2018-03-23 PROCEDURE — G8988 SELF CARE GOAL STATUS: HCPCS

## 2018-03-23 PROCEDURE — 97163 PT EVAL HIGH COMPLEX 45 MIN: CPT | Performed by: PHYSICAL THERAPIST

## 2018-03-23 PROCEDURE — 85610 PROTHROMBIN TIME: CPT | Performed by: PHYSICIAN ASSISTANT

## 2018-03-23 PROCEDURE — 99222 1ST HOSP IP/OBS MODERATE 55: CPT | Performed by: INTERNAL MEDICINE

## 2018-03-23 PROCEDURE — 85027 COMPLETE CBC AUTOMATED: CPT | Performed by: PHYSICIAN ASSISTANT

## 2018-03-23 PROCEDURE — 84484 ASSAY OF TROPONIN QUANT: CPT | Performed by: INTERNAL MEDICINE

## 2018-03-23 PROCEDURE — G8978 MOBILITY CURRENT STATUS: HCPCS | Performed by: PHYSICAL THERAPIST

## 2018-03-23 PROCEDURE — 92610 EVALUATE SWALLOWING FUNCTION: CPT | Performed by: SPEECH-LANGUAGE PATHOLOGIST

## 2018-03-23 PROCEDURE — 85730 THROMBOPLASTIN TIME PARTIAL: CPT | Performed by: PHYSICIAN ASSISTANT

## 2018-03-23 PROCEDURE — 84439 ASSAY OF FREE THYROXINE: CPT | Performed by: PHYSICIAN ASSISTANT

## 2018-03-23 PROCEDURE — G8979 MOBILITY GOAL STATUS: HCPCS | Performed by: PHYSICAL THERAPIST

## 2018-03-23 PROCEDURE — 93306 TTE W/DOPPLER COMPLETE: CPT

## 2018-03-23 PROCEDURE — 84481 FREE ASSAY (FT-3): CPT | Performed by: PHYSICIAN ASSISTANT

## 2018-03-23 PROCEDURE — 97167 OT EVAL HIGH COMPLEX 60 MIN: CPT

## 2018-03-23 PROCEDURE — 80061 LIPID PANEL: CPT | Performed by: PHYSICIAN ASSISTANT

## 2018-03-23 RX ORDER — PREDNISONE 1 MG/1
5 TABLET ORAL DAILY
Status: DISCONTINUED | OUTPATIENT
Start: 2018-03-23 | End: 2018-03-24 | Stop reason: HOSPADM

## 2018-03-23 RX ORDER — METOPROLOL SUCCINATE 25 MG/1
12.5 TABLET, EXTENDED RELEASE ORAL 2 TIMES DAILY
Status: DISCONTINUED | OUTPATIENT
Start: 2018-03-23 | End: 2018-03-24 | Stop reason: HOSPADM

## 2018-03-23 RX ORDER — METOPROLOL SUCCINATE 25 MG/1
12.5 TABLET, EXTENDED RELEASE ORAL DAILY
Status: DISCONTINUED | OUTPATIENT
Start: 2018-03-23 | End: 2018-03-23

## 2018-03-23 RX ADMIN — OXYBUTYNIN CHLORIDE 10 MG: 5 TABLET, EXTENDED RELEASE ORAL at 17:08

## 2018-03-23 RX ADMIN — PRAVASTATIN SODIUM 20 MG: 20 TABLET ORAL at 15:56

## 2018-03-23 RX ADMIN — ENOXAPARIN SODIUM 40 MG: 40 INJECTION SUBCUTANEOUS at 09:08

## 2018-03-23 RX ADMIN — PANTOPRAZOLE SODIUM 40 MG: 40 TABLET, DELAYED RELEASE ORAL at 05:05

## 2018-03-23 RX ADMIN — SODIUM CHLORIDE 100 ML/HR: 0.9 INJECTION, SOLUTION INTRAVENOUS at 10:26

## 2018-03-23 RX ADMIN — ASPIRIN 81 MG 81 MG: 81 TABLET ORAL at 09:06

## 2018-03-23 RX ADMIN — OXYBUTYNIN CHLORIDE 10 MG: 5 TABLET, EXTENDED RELEASE ORAL at 09:07

## 2018-03-23 RX ADMIN — DOCUSATE SODIUM 100 MG: 100 CAPSULE, LIQUID FILLED ORAL at 17:07

## 2018-03-23 RX ADMIN — METOPROLOL SUCCINATE 12.5 MG: 25 TABLET, EXTENDED RELEASE ORAL at 09:07

## 2018-03-23 RX ADMIN — METOPROLOL SUCCINATE 12.5 MG: 25 TABLET, EXTENDED RELEASE ORAL at 17:08

## 2018-03-23 RX ADMIN — ERGOCALCIFEROL 50000 UNITS: 1.25 CAPSULE ORAL at 09:07

## 2018-03-23 RX ADMIN — DOCUSATE SODIUM 100 MG: 100 CAPSULE, LIQUID FILLED ORAL at 09:06

## 2018-03-23 RX ADMIN — LEVOTHYROXINE SODIUM 75 MCG: 75 TABLET ORAL at 05:05

## 2018-03-23 RX ADMIN — PREDNISONE 5 MG: 5 TABLET ORAL at 15:16

## 2018-03-23 RX ADMIN — INSULIN LISPRO 6 UNITS: 100 INJECTION, SOLUTION INTRAVENOUS; SUBCUTANEOUS at 17:09

## 2018-03-23 RX ADMIN — Medication 1 EACH: at 11:50

## 2018-03-23 NOTE — PROGRESS NOTES
[address] Internal Medicine Progress Note  Patient: Annie Glez 80 y o  female   MRN: [id number]  PCP: Jake Prater DO  Unit/Bed#: 412-01 Encounter: [id number]  Date Of Visit: 03/23/18    Assessment:    Principal Problem:    Chest pain  Active Problems:    Elevated lactic acid level    Type 2 diabetes mellitus with complication, without long-term current use of insulin (HCC)    Hyperlipidemia    Hypertension    Hypothyroidism    Sleep apnea    Supraventricular tachycardia (HCC)    Chronic diastolic congestive heart failure (HCC)    Elevated troponin    NSTEMI (non-ST elevated myocardial infarction) (HCC)    Moderate to severe aortic stenosis    Atelectasis      Plan:    · NSTEMI  · Mostly type 2  · Non trending troponins  · Mostly secondary to leak from paroxysmal supraventricular tachycardia yesterday  · No chest pain today  · On aspirin 81 mg p o  Daily  · Echocardiogram results pending  · Cardiology following the patient  · Paroxysmal supraventricular tachycardia  · Patient received IV adenosine 6 mg yesterday in the ED and broke back to normal sinus rhythm  · Patient has history of sick sinus syndrome  · Cardiology discussing about possible ablation and pacemaker for the patient  Cardiology team will update me the plan  · Moderate to severe aortic stenosis  · Sick sinus syndrome  · Type 2 diabetes mellitus  · Hold oral glycemic drugs  · On insulin sliding scale in the hospital t i d  a c  HS  · Atelectasis  · Incidental finding on chest x-ray  · Incentive spirometry  · Hypothyroidism  · On Synthroid 75 mcg p o  Daily  · Hyperlipidemia  · On pravastatin 20 mg p o  daily      VTE Pharmacologic Prophylaxis:   Pharmacologic: Enoxaparin (Lovenox)  Mechanical VTE Prophylaxis in Place: Yes    Patient Centered Rounds: I have performed bedside rounds with nursing staff today      Discussions with Specialists or Other Care Team Provider:  No    Education and Discussions with Family / Patient:  No family member present at bedside    Time Spent for Care: 30 minutes  More than 50% of total time spent on counseling and coordination of care as described above  Current Length of Stay: 1 day(s)    Current Patient Status: Inpatient   Certification Statement: The patient will continue to require additional inpatient hospital stay due to NSTEMI    Discharge Plan / Estimated Discharge Date:  2018    Code Status: Level 1 - Full Code      Subjective:   Patient seen examined at bedside  Patient states she is doing much better today  Patient denies chest pain, palpitation, shortness of breath, nausea, vomiting, diarrhea, abdominal pain, headache, dizziness  I did discuss with her that Cardiology was recommended cardiac ablation and pacemaker and will he will decide when to get the procedure done  Patient agreed to the plan    Objective:     Vitals:   Temp (24hrs), Av 7 °F (36 5 °C), Min:97 2 °F (36 2 °C), Max:98 4 °F (36 9 °C)    HR:  [] 55  Resp:  [12-22] 18  BP: (104-150)/(68-82) 150/82  SpO2:  [96 %-100 %] 98 %  Body mass index is 24 31 kg/m²  Input and Output Summary (last 24 hours): Intake/Output Summary (Last 24 hours) at 18 1243  Last data filed at 18 1026   Gross per 24 hour   Intake          2731 67 ml   Output              450 ml   Net          2281 67 ml       Physical Exam:     Physical Exam   Constitutional: She is oriented to person, place, and time  She appears well-developed and well-nourished  HENT:   Head: Normocephalic and atraumatic  Eyes: Conjunctivae and EOM are normal  Pupils are equal, round, and reactive to light  Neck: Normal range of motion  Neck supple  Cardiovascular: Normal rate, regular rhythm, normal heart sounds and intact distal pulses  Pulmonary/Chest: Effort normal and breath sounds normal    Abdominal: Soft  Bowel sounds are normal    Musculoskeletal: Normal range of motion     Neurological: She is alert and oriented to person, place, and time  She has normal reflexes  Skin: Skin is warm and dry  Vitals reviewed  Additional Data:     Labs:      Results from last 7 days  Lab Units 03/23/18  0510  03/22/18  1758   WBC Thousand/uL 8 34  --  10 39*   HEMOGLOBIN g/dL 12 9  --  12 8   HEMATOCRIT % 39 4  --  39 3   PLATELETS Thousands/uL 257  < > 247   NEUTROS PCT %  --   --  78*   LYMPHS PCT %  --   --  15   MONOS PCT %  --   --  7   EOS PCT %  --   --  0   < > = values in this interval not displayed  Results from last 7 days  Lab Units 03/23/18  0510 03/22/18  1758   SODIUM mmol/L 140 136   POTASSIUM mmol/L 4 3 4 9   CHLORIDE mmol/L 104 100   CO2 mmol/L 30 27   BUN mg/dL 14 15   CREATININE mg/dL 0 75 0 90   CALCIUM mg/dL 8 6 9 0   TOTAL PROTEIN g/dL  --  6 3*   BILIRUBIN TOTAL mg/dL  --  0 40   ALK PHOS U/L  --  48   ALT U/L  --  18   AST U/L  --  14   GLUCOSE RANDOM mg/dL 214* 390*       Results from last 7 days  Lab Units 03/23/18  0510   INR  1 00       * I Have Reviewed All Lab Data Listed Above  * Additional Pertinent Lab Tests Reviewed:  [address] Admission Reviewed    Imaging:    Imaging Reports Reviewed Today Include:  No imaging done today  Imaging Personally Reviewed by Myself Includes:      Recent Cultures (last 7 days):           Last 24 Hours Medication List:     Current Facility-Administered Medications:  aspirin 81 mg Oral Daily Nitin Cardona PA-C   B Complex 1 capsule Oral Daily Nitin Cardona PA-C   docusate sodium 100 mg Oral BID Nitin Cardona PA-C   enoxaparin 40 mg Subcutaneous Daily Nitin Cardona PA-C   ergocalciferol 50,000 Units Oral Weekly Nitin Cardona PA-C   insulin lispro 1-6 Units Subcutaneous HS Kathryn Vickers MD   insulin lispro 2-12 Units Subcutaneous TID AC Kathryn Vickers MD   levothyroxine 75 mcg Oral Early Morning Nitin Cardona PA-C   metoprolol succinate 12 5 mg Oral BID Davis Eaton DO   ondansetron 4 mg Intravenous Q6H PRN Nitin Cardona PA-C   oxybutynin 10 mg Oral BID Nitin Cardona PA-SHERIF   pantoprazole 40 mg Oral Early Morning Nitin Cardona PA-C   pravastatin 20 mg Oral Daily With Dinner Nitin Cardona PA-C   predniSONE 5 mg Oral Daily Mya Pate MD        Today, Patient Was Seen By: Mya Pate MD    ** Please Note: This note has been constructed using a voice recognition system   **

## 2018-03-23 NOTE — ASSESSMENT & PLAN NOTE
- Chest pain starting at home  - Associated with palpitations  - Troponin negative  - CTA- No evidence of pulmonary embolism No acute consolidation There is new segmental atelectasis in the right middle lobe area with associated the volume loss and mild elevation of the right dome of diaphragm noted   Etiology is unclear   Follow-up to demonstrate resolution can be performed Large sized hiatal hernia  -Admit to hospital  -Telemetry monitoring  -Follow troponin through 3 sets  -AM labs  - Cardiology consult

## 2018-03-23 NOTE — ASSESSMENT & PLAN NOTE
-Last echo on 8/18/2017  -BNP at 345  -No evidence of fluid overload, no lower extremity edema  -Repeat ECHO  -Cardiology consult

## 2018-03-23 NOTE — ASSESSMENT & PLAN NOTE
-Last A1C at 9 2 on 2/1/2018  -Glucose at 390  - Sliding scale insulin, Fingerstick glucose 4 times daily AC/HS  - IV normal saline  -AM labs

## 2018-03-23 NOTE — ASSESSMENT & PLAN NOTE
-Episode of palpitations at home  - Associated with chest pain after onset  -SVT on EKG in ER  -SVT broke with 6mg of adenosine  -No subsequent episodes  -Telemety monitoring  -Cardiology consult

## 2018-03-23 NOTE — SOCIAL WORK
Cm met with the patient to evaluate the patients prior function and living situation and any barriers to d/c and form a safe d/c plan  Cm also evaluated the patient for any services in the home or needs for services  Pt resides at home alone in a house  2 [address] then on one floor  Pt had been independent with her adls and ambulation  Re: DME has a cane that she uses  No services  PCP is Yue Kaye and pharmacy is [facility] in [place]  Pt either uses [facility] or son drives her to appointments  Pt plans on returning home on dc with outpatient follow up  Cm will continue to follow

## 2018-03-23 NOTE — PHYSICAL THERAPY NOTE
PT Evaluation     03/23/18 1031   Note Type   Note type Eval/Treat   Pain Assessment   Pain Assessment No/denies pain   Pain Score No Pain   Home Living   Type of [address] One level; Able to live on main level with bedroom/bathroom; Performs ADLs on one level;Stairs to enter with rails; Laundry in basement  (2 MARCIA with HR, 9-10 steps with HR to basement to laundry)   Bathroom Shower/Tub Tub/shower unit  (but takes a tub bath)   Bathroom Toilet Standard   Bathroom Accessibility Accessible   Home Equipment Cane   Prior Function   Level of [place] Independent with ADLs and functional mobility  ((I) ambulation with SPC)   Lives With Alone   Receives Help From Family   ADL Assistance Independent   IADLs Independent   Comments pt doesn't drive, son or granddaughter drive   Restrictions/Precautions   Other Precautions Chair Alarm; Bed Alarm;Multiple lines;Telemetry; Fall Risk   General   Family/Caregiver Present No   Cognition   Arousal/Participation Alert   Orientation Level Oriented X4   Following Commands Follows all commands and directions without difficulty   RLE Assessment   RLE Assessment WFL  (4+/5)   LLE Assessment   LLE Assessment WFL  (4+/5)   Coordination   Sensation WFL   Light Touch   RLE Light Touch Grossly intact   LLE Light Touch Grossly intact   Bed Mobility   Supine to Sit 5  Supervision   Additional items Bedrails   Sit to Supine (seated in chair at bedside, bell in reach, alarm on)   Additional Comments pt with HR at rest 55 and during ambulation 65  SpO2 on room air after ambulation 100% with no complaint of chest pain dizziness lightheadedness or SOB with exertion  Transfers   Sit to Stand 5  Supervision   Stand to Sit 5  Supervision   Stand pivot 5  Supervision   Additional items (with [facility])   Additional Comments pt with good balance and stability during ambulation and transfers this date   Pt used SPC for ambulation with normal gait pattern only noting mild R groin pain after 350ft ambulation  Ambulation/Elevation   Gait pattern WNL   Gait Assistance 5  Supervision   Assistive Device Straight cane   Distance 450ft with [facility] Supervision no LOB or complaint  No chest pain or SOB throughout session  Balance   Static Sitting Good   Dynamic Sitting Good   Static Standing Good   Dynamic Standing Fair +   Ambulatory Fair +  (with SPC)   Endurance Deficit   Endurance Deficit No   Activity Tolerance   Activity Tolerance Patient tolerated treatment well   Assessment   Prognosis Good   Problem List Decreased strength; Impaired balance   Assessment Pt is an 80year old female presenting with fair to good strength balance endurance and functional mobility performing all transfers and ambulation at a Supervision level with no LOB or complaint  Pt will be safe to return home when medically stable for discharge but would benefit from continued activity in PT to improve impairments and functional deficits with return to (I) LOF  Pt would also benefit from at least one additional session to ensure (I) stair negotiation  Goals   Patient Goals To feel better and return home   STG Expiration Date 03/30/18   Short Term Goal #1 (I) with all bed mobility and transfers with [facility]   Short Term Goal #2 (I) ambulation 500ft with SPC no LOB and will ascend/descend 11 steps with HR (S)   Plan   Treatment/Interventions Functional transfer training;LE strengthening/ROM; Therapeutic exercise; Endurance training;Elevations; Patient/family training;Bed mobility;Gait training   PT Frequency (3-5x/wk)   Recommendation   Recommendation Home independently   PT - OK to Discharge Yes   Pt with SCD's on when PT entered room  SCD's reapplied and turned on with pt seated in chair at bedside with call bell in reach and chair alarm on

## 2018-03-23 NOTE — CONSULTS
Consultation - Cardiology   Slick Duong 80 y o  female MRN: [id number]  Unit/Bed#: 576-32 Encounter: [id number]    Inpatient consult to Cardiology  Consult performed by: Joy Dykes ordered by: Abeba Bustos            Physician Requesting Consult: Usama Hansen MD  Reason for Consult / Principal Problem: SVT    History of Present Illness   HPI: Slick Duong is a 80y o  year old female who has a history of paroxysmal supraventricular tachycardia, coronary artery disease, sick sinus syndrome, obstructive sleep apnea, moderate to severe aortic stenosis  She presents with a chief complaint of chest discomfort  She was walking back to her house from the [place] [address] and she had sudden onset of palpitations  She felt her heart racing and had discomfort through her chest up into her neck  She went lay down for about an hour in the symptoms did not improve  She came to the emergency department and received adenosine for supraventricular tachycardia and symptoms resolved after that  She gets 6 mg of adenosine which broke the rhythm and she has been doing well since then  She has a history of paroxysmal supraventricular tachycardia  She has baseline bradycardia and has been talked to in the past about ablation and pacemakers  She has tried to be conservative up to this point  She also has a history of aortic stenosis which is at least moderate to severe  Prior to this episode she had no exertional chest discomfort  She is able to walk to the grocery store and do her shopping she does moderate level housework with no difficulty  There are no anginal sounding symptoms other than at the time of the SVT  Review of Systems   Constitution: Negative  HENT: Negative  Eyes: Negative  Cardiovascular: Positive for chest pain, irregular heartbeat and palpitations  Respiratory: Negative  Endocrine: Negative  Hematologic/Lymphatic: Negative  Skin: Negative  Musculoskeletal: Negative  Gastrointestinal: Negative  Genitourinary: Negative  Neurological: Negative  Psychiatric/Behavioral: Negative  Historical Information   Past Medical History:   Diagnosis Date    Bradycardia     Coronary artery disease     Depression     Depression     Diabetes mellitus (HCC)     Type 2 DM    Disease of thyroid gland     Facial droop     started 10 years ago    GERD (gastroesophageal reflux disease)     Heart murmur     Hyperlipidemia     Osteoarthritis     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HCC)     Vitamin D deficiency      Past Surgical History:   Procedure Laterality Date    ANKLE SURGERY      APPENDECTOMY      CATARACT EXTRACTION      HEMORRHOID SURGERY      JOINT REPLACEMENT      b/l hip replacements    NEUROPLASTY / TRANSPOSITION MEDIAN NERVE AT CARPAL TUNNEL      PARTIAL HYSTERECTOMY      TOTAL HIP ARTHROPLASTY      TRANSPHENOIDAL / TRANSNASAL HYPOPHYSECTOMY / RESECTION PITUITARY TUMOR       History   Alcohol Use No     History   Drug Use No     History   Smoking Status    Never Smoker   Smokeless Tobacco    Never Used     Family History: non-contributory    Meds/Allergies   all current active meds have been reviewed    sodium chloride 100 mL/hr Last Rate: 100 mL/hr (03/22/18 1521)       Allergies   Allergen Reactions    Cephalosporins     Clindamycin     Hydrocodone     Oxycodone-Acetaminophen     Penicillins Hives    Simvastatin     Tetracyclines & Related        Objective   Vitals: Blood pressure 150/82, pulse 55, temperature (!) 97 2 °F (36 2 °C), temperature source Temporal, resp  rate 18, height 5' 2" (1 575 m), weight 60 3 kg (132 lb 15 oz), SpO2 98 %  , Body mass index is 24 31 kg/m² , Orthostatic Blood Pressures    Flowsheet Row Most Recent Value   Blood Pressure  150/82 filed at 03/23/2018 0806   Patient Position - Orthostatic VS  Sitting filed at 03/23/2018 8376        Systolic (36DPV), QKA:857 , Min:104 , SFM:441     Diastolic (09KIB), Av, Min:68, Max:82      Intake/Output Summary (Last 24 hours) at 18 1006  Last data filed at 18 0806   Gross per 24 hour   Intake             1670 ml   Output              450 ml   Net             1220 ml       Weight (last 2 days)     Date/Time   Weight    18 2113  60 3 (132 94)    18 1733  63 3 (139 55)              Invasive Devices     Peripheral Intravenous Line            Peripheral IV 18 Left Forearm less than 1 day                  Physical Exam   Constitutional: She is oriented to person, place, and time  She appears well-nourished  No distress  HENT:   Head: Atraumatic  Eyes: Conjunctivae are normal  Pupils are equal, round, and reactive to light  Neck: Neck supple  Cardiovascular: Normal rate, regular rhythm and S1 normal   Exam reveals no friction rub  Murmur heard  Systolic murmur is present with a grade of 2/6   Pulmonary/Chest: Effort normal and breath sounds normal  No respiratory distress  She has no wheezes  She has no rales  Abdominal: Bowel sounds are normal  She exhibits no distension  There is no tenderness  There is no rebound  Musculoskeletal: She exhibits no edema  Neurological: She is alert and oriented to person, place, and time  No cranial nerve deficit  Skin: Skin is warm and dry  No erythema  Nursing note and vitals reviewed            Laboratory Results:    Results from last 7 days  Lab Units 18  0720 18  0237 18  2326   TROPONIN I ng/mL 0 10* 0 10* 0 09*       CBC with diff:   Results from last 7 days  Lab Units 18  0510 18  2326 18  1758   WBC Thousand/uL 8 34  --  10 39*   HEMOGLOBIN g/dL 12 9  --  12 8   HEMATOCRIT % 39 4  --  39 3   MCV fL 92  --  92   PLATELETS Thousands/uL 257 232 247   MCH pg 30 2  --  30 0   MCHC g/dL 32 7  --  32 6   RDW % 13 8  --  13 6   MPV fL 10 8 10 2 10 4         CMP:  Results from last 7 days  Lab Units 18  0510 18  1758   SODIUM mmol/L 140 136 POTASSIUM mmol/L 4 3 4 9   CHLORIDE mmol/L 104 100   CO2 mmol/L 30 27   ANION GAP mmol/L 6 9   BUN mg/dL 14 15   CREATININE mg/dL 0 75 0 90   GLUCOSE RANDOM mg/dL 214* 390*   CALCIUM mg/dL 8 6 9 0   AST U/L  --  14   ALT U/L  --  18   ALK PHOS U/L  --  48   TOTAL PROTEIN g/dL  --  6 3*   BILIRUBIN TOTAL mg/dL  --  0 40   EGFR ml/min/1 73sq m 73 59         BMP:  Results from last 7 days  Lab Units 18  0510 18  1758   SODIUM mmol/L 140 136   POTASSIUM mmol/L 4 3 4 9   CHLORIDE mmol/L 104 100   CO2 mmol/L 30 27   BUN mg/dL 14 15   CREATININE mg/dL 0 75 0 90   GLUCOSE RANDOM mg/dL 214* 390*   CALCIUM mg/dL 8 6 9 0       BNP:  No results for input(s): BNP in the last 72 hours      Magnesium:   Results from last 7 days  Lab Units 18  0510 18  1758   MAGNESIUM mg/dL 1 7 1 6       Coags:   Results from last 7 days  Lab Units 18  0510   PTT seconds 27   INR  1 00       TSH:       Hemoglobin A1C       Lipid Profile:     Results from last 7 days  Lab Units 18  0510   CHOLESTEROL mg/dL 175   TRIGLYCERIDES mg/dL 104   HDL mg/dL 65*       Cardiac testing:   Results for orders placed during the hospital encounter of 17   Echo complete with contrast if indicated    Narrative [address] [facility] [address] [facility], [facility]  [phone]    Transthoracic Echocardiogram  2D, M-mode, Doppler, and Color Doppler    Study date:  18-Aug-2017    Patient: Rsoa Ronquillo  MR number: [id number]  Account number: [de-identified]  : 1933  Age: 80 years  Gender: Female  Status: Inpatient  Location: Bedside  Height: 62 in  Weight: 135 lb  BP: 131/ 62 mmHg    Diagnoses: R07 9 - Chest pain, unspecified    Sonographer:  GUME Smith  Primary Physician:  Madhuri Garcia DO  Referring Physician:  Brenton Lam MD  [facility]  [facility]  Interpreting Physician:  Shannon Oneill MD    SUMMARY    LEFT VENTRICLE:  Systolic function was normal  Ejection fraction was estimated to be 60 %  There were no regional wall motion abnormalities  Doppler parameters were consistent with abnormal left ventricular relaxation (grade 1 diastolic dysfunction)  RIGHT VENTRICLE:  The size was normal   Systolic function was normal     MITRAL VALVE:  There was mild regurgitation  AORTIC VALVE:  The valve was trileaflet  Leaflets exhibited moderately increased thickness, moderate calcification, and moderately reduced cuspal separation  Transaortic velocity was increased due to valvular stenosis  There was moderate to severe stenosis  Valve peak gradient was 48 mmHg  Valve mean gradient was 36 mmHg  Aortic valve area was 0 8 cm squared by the continuity equation  The aortic valve obstructive index (by VTI) was 0 28     TRICUSPID VALVE:  There was moderate regurgitation  PULMONIC VALVE:  There was trace regurgitation  AORTA:  There was mild dilatation of the ascending aorta at 3 2 cm in anteroposterior diameter  HISTORY: PRIOR HISTORY: HYPERTENSION, DIABETES MELLITUS    PROCEDURE: The procedure was performed at the bedside  This was a routine study  The transthoracic approach was used  The study included complete 2D imaging, M-mode, complete spectral Doppler, and color Doppler  Images were obtained from  the parasternal, apical, subcostal, and suprasternal notch acoustic windows  Image quality was adequate  LEFT VENTRICLE: Size was normal  Systolic function was normal  Ejection fraction was estimated to be 60 %  There were no regional wall motion abnormalities  Wall thickness was normal  DOPPLER: Doppler parameters were consistent with  abnormal left ventricular relaxation (grade 1 diastolic dysfunction)  VENTRICULAR SEPTUM: There was sigmoid septal appearance      RIGHT VENTRICLE: The size was normal  Systolic function was normal  Wall thickness was normal     LEFT ATRIUM: Size was normal     RIGHT ATRIUM: Size was normal     MITRAL VALVE: Valve structure was normal  There was normal leaflet separation  DOPPLER: The transmitral velocity was within the normal range  There was no evidence for stenosis  There was mild regurgitation  AORTIC VALVE: The valve was trileaflet  Leaflets exhibited moderately increased thickness, moderate calcification, and moderately reduced cuspal separation  DOPPLER: Transaortic velocity was increased due to valvular stenosis  There was  moderate to severe stenosis  There was no significant regurgitation  TRICUSPID VALVE: The valve structure was normal  There was normal leaflet separation  DOPPLER: The transtricuspid velocity was within the normal range  There was no evidence for stenosis  There was moderate regurgitation  PULMONIC VALVE: Leaflets exhibited normal thickness, no calcification, and normal cuspal separation  DOPPLER: The transpulmonic velocity was within the normal range  There was trace regurgitation  PERICARDIUM: There was no pericardial effusion  The pericardium was normal in appearance  AORTA: The root exhibited normal size  There was mild dilatation of the ascending aorta at 3 2 cm in anteroposterior diameter  SYSTEMIC VEINS: IVC: The inferior vena cava was normal in size   Respirophasic changes were normal     MEASUREMENT TABLES    DOPPLER MEASUREMENTS  Aortic valve   (Reference normals)  Peak jaskaran   345 cm/s   (--)  Peak gradient   48 mmHg   (--)  Mean gradient   36 mmHg   (--)  Valve area, cont   0 8 cm squared   (--)  Obstr index, VTI   0 28    (--)    OTHER ECHO MEASUREMENTS  (Reference normals)  Estimated CVP   5 mmHg   (--)    SYSTEM MEASUREMENT TABLES    2D  %FS: 30 03 %  Ao Diam: 2 54 cm  EDV(Teich): 70 67 ml  EF(Teich): 57 81 %  ESV(Teich): 29 82 ml  IVSd: 1 09 cm  LA Area: 18 18 cm2  LA Diam: 3 45 cm  LVEDV MOD A4C: 52 41 ml  LVEF MOD A4C: 66 21 %  LVESV MOD A4C: 17 71 ml  LVIDd: 4 02 cm  LVIDs: 2 81 cm  LVLd A4C: 6 28 cm  LVLs A4C: 5 24 cm  LVOT Diam: 1 91 cm  LVPWd: 0 69 cm  RA Area: 12 43 cm2  RV DIAM: 2 55 cm  SV MOD A4C: 34 7 ml  SV(Teich): 40 85 ml    CW  AV Env  Ti: 282 01 ms  AV VTI: 76 61 cm  AV Vmax: 3 22 m/s  AV Vmax: 3 27 m/s  AV Vmean: 2 72 m/s  AV maxP 45 mmHg  AV maxP 96 mmHg  AV meanP 16 mmHg  RAP: 0 mmHg  TR Vmax: 2 68 m/s  TR maxP 8 mmHg    MM  TAPSE: 2 96 cm    PW  KASEY (VTI): 0 81 cm2  KASEY Vmax: 0 79 cm2  KASEY Vmax: 0 81 cm2  KASEY Vmax, Pt: 0 78 cm2  KASEY Vmax, Pt: 0 79 cm2  E' Sept: 0 05 m/s  E/E' Sept: 17 7  LVOT Env  Ti: 314 88 ms  LVOT VTI: 21 8 cm  LVOT Vmax: 0 89 m/s  LVOT Vmax: 0 91 m/s  LVOT Vmean: 0 69 m/s  LVOT maxPG: 3 2 mmHg  LVOT maxPG: 3 32 mmHg  LVOT meanP 06 mmHg  LVSV Dopp: 62 19 ml  MV A Josh: 0 91 m/s  MV Dec [place]: 3 02 m/s2  MV DecT: 281 81 ms  MV E Josh: 0 85 m/s  MV E/A Ratio: 0 93  RVSP: 28 8 mmHg    [facility] Accredited Echocardiography Laboratory    Prepared and electronically signed by    Lizet Mcginnis MD  Signed 18-Aug-2017 15:14:15       No results found for this or any previous visit  No results found for this or any previous visit  No results found for this or any previous visit  Imaging: I have personally reviewed pertinent reports  Cta Ed Chest Pe Study    Result Date: 3/22/2018  Narrative: CTA - CHEST WITH IV CONTRAST - PULMONARY ANGIOGRAM INDICATION:   chest pain, short of breath  COMPARISON: None  TECHNIQUE: CTA examination of the chest was performed using angiographic technique according to a protocol specifically tailored to evaluate for pulmonary embolism  Axial, sagittal, and coronal 2D reformatted images were created from the source data and  submitted for interpretation  In addition, coronal 3D MIP postprocessing was performed on the acquisition scanner  Radiation dose length product (DLP) for this visit:  216 54 mGy-cm     This examination, like all CT scans performed in the [facility], was performed utilizing techniques to minimize radiation dose exposure, including the use of iterative  reconstruction and automated exposure control  IV Contrast:  85 mL of iohexol (OMNIPAQUE)  FINDINGS: PULMONARY ARTERIAL TREE:  There is good opacification of pulmonary arteries and there are no filling defects seen in the 1st through 4th pulmonary branches to suggest any pulmonary embolism LUNGS:  Subsegmental atelectasis seen in the right middle lobe area PLEURA:  Unremarkable  HEART/AORTA: The ascending aorta measures 3 1 cm  Moderate calcification seen in the aortic valve  MEDIASTINUM AND JO-ANN:  Unremarkable  CHEST WALL AND LOWER NECK: Left-sided nodule which measures about 1 6 cm  This demonstrates the curvilinear calcification  On the previous study in 2009 this nodule was measuring 9 mm VISUALIZED STRUCTURES IN THE UPPER ABDOMEN:  Large sized hiatal hernia seen The liver appear unremarkable Pancreas appear unremarkable and kidneys appear unremarkable The celiac trunk, SMA are patent OSSEOUS STRUCTURES:  No acute compression collapse of the vertebra seen     Impression: No evidence of pulmonary embolism No acute consolidation There is new segmental atelectasis in the right middle lobe area with associated the volume loss and mild elevation of the right dome of diaphragm noted  Etiology is unclear  Follow-up to demonstrate resolution can be performed Large sized hiatal hernia 1 6 cm left thyroid nodule with curvilinear calcification, which has been already evaluated with ultrasound on August 27, 2014 The study was marked in [facility] for immediate notification   Workstation performed: [id number]       EKG reviewed personally:  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  Telemetry reviewed personally:  No significant events resting bradycardia heart rate average 60s    Assessment:  Principal Problem:    Chest pain  Active Problems:    Elevated lactic acid level    Type 2 diabetes mellitus with complication, without long-term current use of insulin (HCC)    Hyperlipidemia    Hypertension Hypothyroidism    Sleep apnea    Supraventricular tachycardia (HCC)    Chronic diastolic congestive heart failure (HCC)    Elevated troponin      Assesment/ Plan:  1  Paroxysmal supraventricular tachycardia which is symptomatic  2  Chest pain secondary to 1   3   Aortic stenosis at least moderate to severe echocardiogram pending  4  Obstructive sleep apnea on CPAP  5  Chronic diastolic congestive heart failure currently stable  6  Reported history of coronary disease unknown anatomy  7  Hypertension  8  Sick sinus syndrome  9  Small NSTEMI type 2 secondary to SVT    Recommendations:  I believe her symptoms were most likely from the supraventricular tachycardia yesterday  She has underlying significant aortic stenosis and probable coronary disease  The SVT is symptomatic however management will be difficult secondary to sick sinus syndrome and resting bradycardia  Cannot up titrate AV dayana blocking agents due to resting bradycardia  Options would be pacemaker and aggressive up titration of AV dayana blocking agents versus consideration for SVT ablation  Need to quantify the severity of aortic stenosis 1st   Echocardiogram currently pending  Keep potassium greater than 4 magnesium greater than 2  Cont toprol will try to increase to 12 5mg BID and follow on tele close              Code Status: Level 1 - Full Code

## 2018-03-23 NOTE — NUTRITION
03/23/18 1137   Recommendations/Interventions   Interventions Diet: order adjustment  (Consider providing CCD diet modifier due to history of DM, elevated glucose levels )

## 2018-03-23 NOTE — ED NOTES
Pt laying in bed with no complaints at this time  HR 70 and NS rhythm  No sob/chest pain   Call bell in reach     Chuyita Vick RN  03/22/18 2018

## 2018-03-23 NOTE — ASSESSMENT & PLAN NOTE
-reports not wearing CPAP because she needs a new machine  -Will continue CPAP at bedtime on home settings

## 2018-03-23 NOTE — H&P
[address] Internal Medicine  H&P- Gabriel Pastures 1933, 80 y o  female MRN: [id number]    Unit/Bed#: 339-33 Encounter: [id number]    Primary Care Provider: Shalonda Centeno DO   Date and time admitted to hospital: 3/22/2018  5:31 PM        * Chest pain   Assessment & Plan    - Chest pain starting at home  - Associated with palpitations  - Troponin negative  - CTA- No evidence of pulmonary embolism No acute consolidation There is new segmental atelectasis in the right middle lobe area with associated the volume loss and mild elevation of the right dome of diaphragm noted   Etiology is unclear   Follow-up to demonstrate resolution can be performed Large sized hiatal hernia  -Admit to hospital  -Telemetry monitoring  -Follow troponin through 3 sets  -AM labs  - Cardiology consult          Chronic diastolic congestive heart failure ([facility] )   Assessment & Plan    -Last echo on 8/18/2017  -BNP at 345  -No evidence of fluid overload, no lower extremity edema  -Repeat ECHO  -Cardiology consult        Supraventricular tachycardia ([facility] )   Assessment & Plan    -Episode of palpitations at home  - Associated with chest pain after onset  -SVT on EKG in ER  -SVT broke with 6mg of adenosine  -No subsequent episodes  -Telemety monitoring  -Cardiology consult        Type 2 diabetes mellitus with complication, without long-term current use of insulin ([facility])   Assessment & Plan    -Last A1C at 9 2 on 2/1/2018  -Glucose at 390  - Sliding scale insulin, Fingerstick glucose 4 times daily AC/HS  - IV normal saline  -AM labs        Hypothyroidism   Assessment & Plan    -continue levothyroxine  -Order free T3,T4        Hypertension   Assessment & Plan    -Blood pressure stable  - Will start metoprolol 12 5 mg daily          Hyperlipidemia   Assessment & Plan    -Continue Statuns        Elevated lactic acid level   Assessment & Plan    -Lactic acid at 3 2  -IV normal saline  -Trend lactic acid levels until resolved        Sleep apnea Assessment & Plan    -reports not wearing CPAP because she needs a new machine  -Will continue CPAP at bedtime on home settings              VTE Prophylaxis: Enoxaparin (Lovenox)  / sequential compression device   Code Status: Level 1- full Code  POLST: POLST form is not discussed and not completed at this time  Anticipated Length of Stay:  Patient will be admitted on an Inpatient basis with an anticipated length of stay of  > 2 midnights  Justification for Hospital Stay: Chest pain,SVT, elevated lactic acid levels    Total Time for Visit, including Counseling / Coordination of Care: 30 minutes  Greater than 50% of this total time spent on direct patient counseling and coordination of care  Chief Complaint:   Chest pain    History of Present Illness:    Tiera Gibbons is a 80 y o  female who presents to the emergency room complaining of chest pain starting about 2 hours prior to coming to the emergency room  She reports going to the mailbox upon returning to her house had onset of chest pain  She states that the pain radiated to her jaw and had onset of palpitations soon after chest pain started  She also reported numbing sensation in both upper extremities  She said that she called her son who subsequently called EMS to take her to the emergency room  In the emergency room labs showed, glucose of 390, potassium of 4 9, magnesium of 1 6, troponin 0 04, BNP of 345, lactic acid of 3 2, WBC of 10 39, TSH of 0 034  EKG showed SVT at a rate of 138  CTA chest completed with results as shown below  She received sodium chloride 1 liter, adenosine 6 milligrams IV   SVT broke in the emergency room after administration of adenosine 6 milligrams  At bedside patient appears comfortable states that pain as resolve no additional episode of palpitation  She has been admitted on inpatient status med/surg level care for further workup and management of chest pain, SVT, lactic acidosis          Review of Systems:    Review of Systems   Constitutional: Negative for activity change, chills, fatigue and fever  HENT: Negative for congestion, sinus pain, sore throat and trouble swallowing  Eyes: Negative for photophobia, discharge, itching and visual disturbance  Respiratory: Positive for chest tightness and shortness of breath  Negative for cough and wheezing  Cardiovascular: Positive for chest pain and palpitations  Negative for leg swelling  Gastrointestinal: Negative for abdominal pain, constipation, diarrhea, nausea and vomiting  Endocrine: Negative  Genitourinary: Negative for decreased urine volume, difficulty urinating, flank pain, hematuria and urgency  Musculoskeletal: Negative for arthralgias, back pain, gait problem and neck pain  Skin: Negative for color change, pallor and rash  Allergic/Immunologic: Negative  Neurological: Positive for headaches  Negative for dizziness, tremors, speech difficulty, weakness and light-headedness  Hematological: Negative  Psychiatric/Behavioral: Negative          Past Medical and Surgical History:     Past Medical History:   Diagnosis Date    Bradycardia     Coronary artery disease     Depression     Depression     Diabetes mellitus (HCC)     Type 2 DM    Disease of thyroid gland     Facial droop     started 10 years ago    GERD (gastroesophageal reflux disease)     Heart murmur     Hyperlipidemia     Osteoarthritis     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HCC)     Vitamin D deficiency        Past Surgical History:   Procedure Laterality Date    ANKLE SURGERY      APPENDECTOMY      CATARACT EXTRACTION      HEMORRHOID SURGERY      JOINT REPLACEMENT      b/l hip replacements    NEUROPLASTY / TRANSPOSITION MEDIAN NERVE AT CARPAL TUNNEL      PARTIAL HYSTERECTOMY      TOTAL HIP ARTHROPLASTY      TRANSPHENOIDAL / TRANSNASAL HYPOPHYSECTOMY / RESECTION PITUITARY TUMOR         Meds/Allergies:    Prior to Admission medications    Medication Sig Start Date End Date Taking? Authorizing Provider   aspirin 81 MG tablet Take 1 tablet by mouth daily   Yes Historical Provider, MD   b complex vitamins tablet Take 1 tablet by mouth daily   Yes Historical Provider, MD   Cholecalciferol 1000 units capsule Take 1,000 Units by mouth   Yes Historical Provider, MD   cyanocobalamin ([employer] VITAMIN B-12) 1000 MCG tablet Take 1,000 mcg by mouth   Yes Historical Provider, MD   ergocalciferol (VITAMIN D2) 50,000 units Take 50,000 Units by mouth once a week   Yes Historical Provider, MD   levothyroxine 75 mcg tablet Take 75 mcg by mouth daily in the early morning   10/21/14  Yes Historical Provider, MD   metFORMIN (FORTAMET) 500 MG (OSM) 24 hr tablet Take 500 mg by mouth 2 (two) times a day with meals   Yes Historical Provider, MD   oxybutynin (DITROPAN-XL) 10 MG 24 hr tablet Take 10 mg by mouth 2 (two) times a day CL-ER 10mg twice daily    Yes Historical Provider, MD   pantoprazole (PROTONIX) 40 mg tablet Take by mouth 4/26/17  Yes Historical Provider, MD   pravastatin (PRAVACHOL) 20 mg tablet Take 20 mg by mouth daily   Yes Historical Provider, MD   predniSONE 5 mg tablet Take by mouth 4/7/11  Yes Historical Provider, MD   repaglinide (PRANDIN) 2 mg tablet Take 4 mg by mouth 3 (three) times a day before meals     Yes Historical Provider, MD   sitaGLIPtin (JANUVIA) 50 mg tablet Take 1 tablet by mouth daily 7/17/14  Yes Historical Provider, MD   docusate sodium (COLACE) 100 mg capsule Take 1 capsule by mouth 2 (two) times a day 8/21/17   [employer], DO   LYCOPENE PO Take 1 tablet by mouth    Historical Provider, MD   nitroglycerin (NITROSTAT) 0 4 mg SL tablet Place 0 4 mg under the tongue    Historical Provider, MD   polyethylene glycol (MIRALAX) powder Take by mouth    Historical Provider, MD     I have reviewed home medications with patient personally  Allergies:    Allergies   Allergen Reactions    Cephalosporins     Clindamycin     Hydrocodone     Oxycodone-Acetaminophen     Penicillins Hives    Simvastatin     Tetracyclines & Related        Social History:     Marital Status:    Occupation: Retired  Patient Pre-hospital Living Situation: Lives alone  Patient Pre-hospital Level of Mobility: active  Patient Pre-hospital Diet Restrictions: None reported  Substance Use History:   History   Alcohol Use No     History   Smoking Status    Never Smoker   Smokeless Tobacco    Never Used     History   Drug Use No       Family History:    History reviewed  No pertinent family history  Physical Exam:     Vitals:   Blood Pressure: 136/72 (03/22/18 2348)  Pulse: 59 (03/22/18 2348)  Temperature: (!) 97 2 °F (36 2 °C) (03/22/18 2348)  Temp Source: Temporal (03/22/18 2348)  Respirations: 20 (03/22/18 2348)  Height: 5' 2" (157 5 cm) (03/22/18 1733)  Weight - Scale: 60 3 kg (132 lb 15 oz) (03/22/18 2113)  SpO2: 98 % (03/22/18 2348)    Physical Exam   Constitutional: She is oriented to person, place, and time  She appears well-developed and well-nourished  No distress  HENT:   Head: Normocephalic and atraumatic  Eyes: EOM are normal  Pupils are equal, round, and reactive to light  Right eye exhibits no discharge  Left eye exhibits no discharge  Neck: Normal range of motion  Neck supple  No JVD present  Cardiovascular: Normal rate and regular rhythm  Murmur heard  Pulmonary/Chest: Effort normal and breath sounds normal  No stridor  No respiratory distress  She has no wheezes  She has no rales  Abdominal: Soft  Bowel sounds are normal  She exhibits no distension  There is no tenderness  Musculoskeletal: Normal range of motion  She exhibits no edema or deformity  Neurological: She is oriented to person, place, and time  Skin: Skin is warm and dry  She is not diaphoretic  Psychiatric: She has a normal mood and affect  Additional Data:     Lab Results: I have personally reviewed pertinent reports          Results from last 7 days  Lab Units 03/22/18 2326 03/22/18  1758   WBC Thousand/uL  --  10 39*   HEMOGLOBIN g/dL  --  12 8   HEMATOCRIT %  --  39 3   PLATELETS Thousands/uL 232 247   NEUTROS PCT %  --  78*   LYMPHS PCT %  --  15   MONOS PCT %  --  7   EOS PCT %  --  0       Results from last 7 days  Lab Units 03/22/18  1758   SODIUM mmol/L 136   POTASSIUM mmol/L 4 9   CHLORIDE mmol/L 100   CO2 mmol/L 27   BUN mg/dL 15   CREATININE mg/dL 0 90   CALCIUM mg/dL 9 0   TOTAL PROTEIN g/dL 6 3*   BILIRUBIN TOTAL mg/dL 0 40   ALK PHOS U/L 48   ALT U/L 18   AST U/L 14   GLUCOSE RANDOM mg/dL 390*           Imaging: I have personally reviewed pertinent reports  CTA ED chest PE study   Final Result by Jose Luis Jacobs MD (03/22 1953)      No evidence of pulmonary embolism   No acute consolidation   There is new segmental atelectasis in the right middle lobe area with associated the volume loss and mild elevation of the right dome of diaphragm noted  Etiology is unclear  Follow-up to demonstrate resolution can be performed      Large sized hiatal hernia      1 6 cm left thyroid nodule with curvilinear calcification, which has been already evaluated with ultrasound on August 27, 2014      The study was marked in [facility] for immediate notification  Workstation performed: [id number]             EKG, Pathology, and Other Studies Reviewed on Admission:   · EKG:  EKG showed SVT at a rate of 138 BPM    Allscripts / Epic Records Reviewed: Yes     ** Please Note: This note has been constructed using a voice recognition system   **

## 2018-03-23 NOTE — CASE MANAGEMENT
Initial Clinical Review    Admission: Date/Time/Statement: 3/22/18 @ 2005 Inpatient Written     Orders Placed This Encounter   Procedures    Inpatient Admission (expected length of stay for this patient is greater than two midnights)     Standing Status:   Standing     Number of Occurrences:   1     Order Specific Question:   Admitting Physician     Answer:   Yang Armenta [[id number]]     Order Specific Question:   Level of Care     Answer:   Med Surg [16]     Order Specific Question:   Estimated length of stay     Answer:   More than 2 Midnights     Order Specific Question:   Certification     Answer:   I certify that inpatient services are medically necessary for this patient for a duration of greater than two midnights  See H&P and MD Progress Notes for additional information about the patient's course of treatment  ED: Date/Time/Mode of Arrival:   ED Arrival Information     Expected Arrival Acuity Means of Arrival Escorted By Service Admission Type    - 3/22/2018 17:31 Urgent Ambulance [facility] General Medicine Urgent    Arrival Complaint    CHEST PAIN          Chief Complaint:   Chief Complaint   Patient presents with    Chest Pain     Pt states that 2 hours ago she walked to mailbox and when she returned to the house had an episode of left sided CP that radiated into jaw  Pt states that pain has resloved  History of Illness: 80-year-old female presents by EMS complaining of chest pain and palpitations  She states the started about 30 minutes ago when she walked out to her mailbox    She states that she has some substernal chest discomfort and feels her heart is racing     ED Vital Signs:   ED Triage Vitals [03/22/18 1733]   Temperature Pulse Respirations Blood Pressure SpO2   97 8 °F (36 6 °C) (!) 143 18 104/70 99 %      Temp Source Heart Rate Source Patient Position - Orthostatic VS BP Location FiO2 (%)   Temporal Monitor Sitting Left arm --      Pain Score       No Pain Wt Readings from Last 1 Encounters:   03/22/18 60 3 kg (132 lb 15 oz)       Vital Signs (abnormal):     Abnormal Labs/Diagnostic Test Results:   WBC 10 39*   NEUTROS PCT 78*     TOTAL PROTEIN 6 3*   GLUCOSE RANDOM 390*     LACTIC ACID 3 2       Component      Latest Ref Rng & Units 3/22/2018 3/23/2018 3/23/2018          11:26 PM  2:37 AM  7:20 AM   Troponin I      <=0 04 ng/mL 0 09 (HH) 0 10 (HH) 0 10 (HH)       CTA ED chest PE study   No evidence of pulmonary embolism   No acute consolidation   There is new segmental atelectasis in the right middle lobe area with associated the volume loss and mild elevation of the right dome of diaphragm noted  Etiology is unclear  Follow-up to demonstrate resolution can be performed   Large sized hiatal hernia   1 6 cm left thyroid nodule with curvilinear calcification, which has been already evaluated with ultrasound on August 27, 2014     EKG:  SVT at a rate of 138 BPM    ED Treatment:   Medication Administration from 03/22/2018 1731 to 03/22/2018 2103       Date/Time Order Dose Route Action     03/22/2018 1750 sodium chloride 0 9 % bolus 500 mL 500 mL Intravenous New Bag     03/22/2018 1750 adenosine (ADENOCARD) injection 6 mg 6 mg Intravenous Given     03/22/2018 1953 sodium chloride 0 9 % bolus 500 mL 500 mL Intravenous New Bag     03/22/2018 1922 iohexol (OMNIPAQUE) 350 MG/ML injection (SINGLE-DOSE) 85 mL 85 mL Intravenous Given          Past Medical/Surgical History:    Active Ambulatory Problems     Diagnosis Date Noted    Elevated lactic acid level 01/13/2017    Type 2 diabetes mellitus with complication, without long-term current use of insulin ([address] ) 01/13/2017    Hyperlipidemia 01/13/2017    Hypertension 01/13/2017    Low TSH level 08/17/2017    Hypothyroidism 08/17/2017    Chest pain 08/17/2017    History of CVA (cerebrovascular accident) 08/18/2017    Sleep apnea 08/20/2017    Diabetes 1 5, managed as type 2 ([address] ) 03/01/2018    S[facility] ) 03/01/2018     Resolved Ambulatory Problems     Diagnosis Date Noted    Chest pain 01/13/2017    Vomiting 01/13/2017    Generalized weakness 08/17/2017    Frequent loose stools 08/17/2017    Tachycardia 08/18/2017     Past Medical History:   Diagnosis Date    Bradycardia     Coronary artery disease     Depression     Depression     Diabetes mellitus (HCC)     Disease of thyroid gland     Facial droop     GERD (gastroesophageal reflux disease)     Heart murmur     Hyperlipidemia     Osteoarthritis     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HCC)     Vitamin D deficiency        Admitting Diagnosis: Diabetes ([place] [address] ) [E11 9]  Hyperlipidemia [E78 5]  Supraventricular tachycardia ([place] [address] ) [I47 1]  Chest pain [R07 9]  Secondary hyperthyroidism [E05 90]    Age/Sex: 80 y o  female    Assessment/Plan:   * Chest pain   Assessment & Plan     - Chest pain starting at home  - Associated with palpitations  - Troponin initial negative; repeat positive  - CTA- No evidence of pulmonary embolism No acute consolidation There is new segmental atelectasis in the right middle lobe area with associated the volume loss and mild elevation of the right dome of diaphragm noted   Etiology is unclear   Follow-up to demonstrate resolution can be performed Large sized hiatal hernia  -Admit to hospital  -Telemetry monitoring  -Follow troponin through 3 sets  -AM labs  - Cardiology consult             Chronic diastolic congestive heart failure ([facility] )   Assessment & Plan     -Last echo on 8/18/2017  -BNP at 345  -No evidence of fluid overload, no lower extremity edema  -Repeat ECHO  -Cardiology consult          Supraventricular tachycardia ([facility] )   Assessment & Plan     -Episode of palpitations at home  - Associated with chest pain after onset  -SVT on EKG in ER  -SVT broke with 6mg of adenosine  -No subsequent episodes  -Telemety monitoring  -Cardiology consult          Type 2 diabetes mellitus with complication, without long-term current use of insulin (HCC)   Assessment & Plan     -Last A1C at 9 2 on 2/1/2018  -Glucose at 390  - Sliding scale insulin, Fingerstick glucose 4 times daily AC/HS  - IV normal saline  -AM labs          Hypothyroidism   Assessment & Plan     -continue levothyroxine  -Order free T3,T4          Hypertension   Assessment & Plan     -Blood pressure stable  - Will start metoprolol 12 5 mg daily             Hyperlipidemia   Assessment & Plan     -Continue Statuns          Elevated lactic acid level   Assessment & Plan     -Lactic acid at 3 2  -IV normal saline  -Trend lactic acid levels until resolved          Sleep apnea   Assessment & Plan     -reports not wearing CPAP because she needs a new machine  -Will continue CPAP at bedtime on home settings               VTE Prophylaxis: Enoxaparin (Lovenox)  / sequential compression device   Code Status: Level 1- full Code  POLST: POLST form is not discussed and not completed at this time      Anticipated Length of Stay:  Patient will be admitted on an Inpatient basis with an anticipated length of stay of  > 2 midnights     Justification for Hospital Stay: Chest pain,SVT, elevated lactic acid levels    Admission Orders:  Telemetry, trop q3h  Accuchecks QAC and QHS with coverage  Echo  PT, OT, Speech  Sequential compression device  Repeat EKG with 2nd and 3rd trop  Consult cardiology    Scheduled Meds:   Current Facility-Administered Medications:  aspirin 81 mg Oral Daily   B Complex 1 capsule Oral Daily   docusate sodium 100 mg Oral BID   enoxaparin 40 mg Subcutaneous Daily   ergocalciferol 50,000 Units Oral Weekly   insulin lispro 1-5 Units Subcutaneous TID AC   insulin lispro 1-5 Units Subcutaneous HS   levothyroxine 75 mcg Oral Early Morning   metoprolol succinate 12 5 mg Oral BID   ondansetron 4 mg Intravenous Q6H PRN   oxybutynin 10 mg Oral BID   pantoprazole 40 mg Oral Early Morning   pravastatin 20 mg Oral Daily With Dinner   sodium chloride 100 mL/hr Intravenous Continuous Continuous Infusions:   sodium chloride 100 mL/hr Last Rate: 100 mL/hr (03/23/18 1026)     PRN Meds: ondansetron    Thank you,  [address] [facility] in the [facility] [address] for 2017  Network Utilization Review Department  Phone: [phone]; Fax [phone]  ATTENTION: The Network Utilization Review Department is now centralized for our 7 Facilities  Make a note that we have a new phone and fax numbers for our Department  Please call with any questions or concerns to [phone] and carefully follow the prompts so that you are directed to the right person  All voicemails are confidential  Fax any determinations, approvals, denials, and requests for initial or continue stay review clinical to [phone]  Due to HIGH CALL volume, it would be easier if you could please send faxed requests to expedite your requests and in part, help us provide discharge notifications faster

## 2018-03-23 NOTE — SPEECH THERAPY NOTE
Speech Language/Pathology     03/23/18 1339   Swallow Information   Current Risks for Dysphagia & Aspiration HX neurologic dx   Current Diet Regular; Thin liquid   Baseline Diet Regular; Thin liquids   Baseline Assessment   Behavior/Cognition Alert; Cooperative; Interactive   Speech/Language Status WFL   Patient Positioning Upright in chair   Swallow Mechanism Exam   Labial Symmetry Abnormal symmetry left   Labial Strength WFL   Labial ROM WFL   Labial Sensation WFL   Facial Symmetry Right drooping eyelid; Left droop   Facial Strength WFL   Facial ROM WFL;Reduced left   Facial Sensation WFL   Lingual Symmetry WFL   Lingual Strength WFL   Lingual ROM WFL   Lingual Sensation WFL   Velum WFL   Gag WFL   Mandible WFL   Dentition Dentures top   Volitional Cough Strong   Consistencies Assessed and Performance   Materials Admnistered Puree/Level 1;Regular/Solid; Thin liquid   Oral Stage WFL   Phargngeal Stage WFL   Swallow Mechanics WFL;Swallow initation; Appears prompt;Good Larygneal rise   Esophageal Concerns Hx GERDS   Summary   Swallow Summary Patient is seen secondary to h/o cva  Patient is independent to feed herself  Trials of thin liquids, puree and regular solid foods were without oral or pharyngeal s/s of dysphagia  Recommendations   Risk for Aspiration None   Recommendations Consider oral diet   Diet Solid Recommendation Regular consistency   Diet Liquid Recommendation Thin liquid   Recommended Form of Meds As desired; As tolerated   General Precautions Feed only when alert;Upright as possible for all oral intake;Remain upright for 45 mins after meals   Results Reviewed with RN;PT/Family/Caregiver     Speech/Language Pathology  Assessment    Patient Name: Carson Root  [id number] Date: 3/23/2018     Problem List  Patient Active Problem List   Diagnosis    Elevated lactic acid level    Type 2 diabetes mellitus with complication, without long-term current use of insulin (HCC)    Hyperlipidemia    Hypertension    Low TSH level    Hypothyroidism    Chest pain    History of CVA (cerebrovascular accident)    Sleep apnea    Diabetes 1 5, managed as type 2 (HCC)    Sick sinus syndrome (HCC)    Supraventricular tachycardia (HCC)    Chronic diastolic congestive heart failure (HCC)    Elevated troponin    NSTEMI (non-ST elevated myocardial infarction) (HCC)    Moderate to severe aortic stenosis    Atelectasis     Past Medical History  Past Medical History:   Diagnosis Date    Bradycardia     Coronary artery disease     Depression     Depression     Diabetes mellitus (HCC)     Type 2 DM    Disease of thyroid gland     Facial droop     started 10 years ago    GERD (gastroesophageal reflux disease)     Heart murmur     Hyperlipidemia     Osteoarthritis     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HCC)     Vitamin D deficiency      Past Surgical History  Past Surgical History:   Procedure Laterality Date    ANKLE SURGERY      APPENDECTOMY      CATARACT EXTRACTION      HEMORRHOID SURGERY      JOINT REPLACEMENT      b/l hip replacements    NEUROPLASTY / TRANSPOSITION MEDIAN NERVE AT CARPAL TUNNEL      PARTIAL HYSTERECTOMY      TOTAL HIP ARTHROPLASTY      TRANSPHENOIDAL / TRANSNASAL HYPOPHYSECTOMY / RESECTION PITUITARY TUMOR

## 2018-03-23 NOTE — ED NOTES
Pt updated on stay  Pt notified family  No complaints at this time        Saumya Cáhvez, RN  03/22/18 2019

## 2018-03-23 NOTE — PLAN OF CARE
Problem: Potential for Falls  Goal: Patient will remain free of falls  INTERVENTIONS:  - Assess patient frequently for physical needs  -  Identify cognitive and physical deficits and behaviors that affect risk of falls    -  [place] fall precautions as indicated by assessment   - Educate patient/family on patient safety including physical limitations  - Instruct patient to call for assistance with activity based on assessment  - Modify environment to reduce risk of injury  - Consider OT/PT consult to assist with strengthening/mobility   Outcome: Progressing      Problem: PAIN - ADULT  Goal: Verbalizes/displays adequate comfort level or baseline comfort level  Interventions:  - Encourage patient to monitor pain and request assistance  - Assess pain using appropriate pain scale  - Administer analgesics based on type and severity of pain and evaluate response  - Implement non-pharmacological measures as appropriate and evaluate response  - Consider cultural and social influences on pain and pain management  - Notify physician/advanced practitioner if interventions unsuccessful or patient reports new pain   Outcome: Progressing      Problem: INFECTION - ADULT  Goal: Absence or prevention of progression during hospitalization  INTERVENTIONS:  - Assess and monitor for signs and symptoms of infection  - Monitor lab/diagnostic results  - Monitor all insertion sites, i e  indwelling lines, tubes, and drains  - Monitor endotracheal (as able) and nasal secretions for changes in amount and color  - [place] appropriate cooling/warming therapies per order  - Administer medications as ordered  - Instruct and encourage patient and family to use good hand hygiene technique  - Identify and instruct in appropriate isolation precautions for identified infection/condition   Outcome: Progressing      Problem: SAFETY ADULT  Goal: Maintain or return to baseline ADL function  INTERVENTIONS:  -  Assess patient's ability to carry out ADLs; assess patient's baseline for ADL function and identify physical deficits which impact ability to perform ADLs (bathing, care of mouth/teeth, toileting, grooming, dressing, etc )  - Assess/evaluate cause of self-care deficits   - Assess range of motion  - Assess patient's mobility; develop plan if impaired  - Assess patient's need for assistive devices and provide as appropriate  - Encourage maximum independence but intervene and supervise when necessary  ¯ Involve family in performance of ADLs  ¯ Assess for home care needs following discharge   ¯ Request OT consult to assist with ADL evaluation and planning for discharge  ¯ Provide patient education as appropriate   Outcome: Progressing    Goal: Maintain or return mobility status to optimal level  INTERVENTIONS:  - Assess patient's baseline mobility status (ambulation, transfers, stairs, etc )    - Identify cognitive and physical deficits and behaviors that affect mobility  - Identify mobility aids required to assist with transfers and/or ambulation (gait belt, sit-to-stand, lift, walker, cane, etc )  - [place] fall precautions as indicated by assessment  - Record patient progress and toleration of activity level on Mobility SBAR; progress patient to next Phase/Stage  - Instruct patient to call for assistance with activity based on assessment  - Request Rehabilitation consult to assist with strengthening/weightbearing, etc    Outcome: Progressing      Problem: DISCHARGE PLANNING  Goal: Discharge to home or other facility with appropriate resources  INTERVENTIONS:  - Identify barriers to discharge w/patient and caregiver  - Arrange for needed discharge resources and transportation as appropriate  - Identify discharge learning needs (meds, wound care, etc )  - Arrange for interpretive services to assist at discharge as needed  - Refer to Case Management Department for coordinating discharge planning if the patient needs post-hospital services based on physician/advanced practitioner order or complex needs related to functional status, cognitive ability, or social support system   Outcome: Progressing      Problem: Knowledge Deficit  Goal: Patient/family/caregiver demonstrates understanding of disease process, treatment plan, medications, and discharge instructions  Complete learning assessment and assess knowledge base    Interventions:  - Provide teaching at level of understanding  - Provide teaching via preferred learning methods   Outcome: Progressing

## 2018-03-24 VITALS
DIASTOLIC BLOOD PRESSURE: 65 MMHG | HEART RATE: 56 BPM | BODY MASS INDEX: 24.46 KG/M2 | WEIGHT: 132.94 LBS | TEMPERATURE: 97.2 F | SYSTOLIC BLOOD PRESSURE: 141 MMHG | HEIGHT: 62 IN | OXYGEN SATURATION: 97 % | RESPIRATION RATE: 20 BRPM

## 2018-03-24 PROBLEM — R77.8 ELEVATED TROPONIN: Status: RESOLVED | Noted: 2018-03-23 | Resolved: 2018-03-24

## 2018-03-24 PROBLEM — R79.89 ELEVATED LACTIC ACID LEVEL: Status: RESOLVED | Noted: 2017-01-13 | Resolved: 2018-03-24

## 2018-03-24 PROBLEM — K44.9 HIATAL HERNIA: Chronic | Status: ACTIVE | Noted: 2018-03-24

## 2018-03-24 PROBLEM — R07.9 CHEST PAIN: Status: RESOLVED | Noted: 2017-08-17 | Resolved: 2018-03-24

## 2018-03-24 PROBLEM — E04.1 THYROID NODULE: Chronic | Status: ACTIVE | Noted: 2018-03-24

## 2018-03-24 LAB
ANION GAP SERPL CALCULATED.3IONS-SCNC: 8 MMOL/L (ref 4–13)
BASOPHILS # BLD AUTO: 0.02 THOUSANDS/ΜL (ref 0–0.1)
BASOPHILS NFR BLD AUTO: 0 % (ref 0–1)
BUN SERPL-MCNC: 15 MG/DL (ref 5–25)
CALCIUM SERPL-MCNC: 8.6 MG/DL (ref 8.3–10.1)
CHLORIDE SERPL-SCNC: 102 MMOL/L (ref 100–108)
CO2 SERPL-SCNC: 27 MMOL/L (ref 21–32)
CREAT SERPL-MCNC: 0.83 MG/DL (ref 0.6–1.3)
EOSINOPHIL # BLD AUTO: 0.13 THOUSAND/ΜL (ref 0–0.61)
EOSINOPHIL NFR BLD AUTO: 1 % (ref 0–6)
ERYTHROCYTE [DISTWIDTH] IN BLOOD BY AUTOMATED COUNT: 13.2 % (ref 11.6–15.1)
GFR SERPL CREATININE-BSD FRML MDRD: 65 ML/MIN/1.73SQ M
GLUCOSE SERPL-MCNC: 220 MG/DL (ref 65–140)
GLUCOSE SERPL-MCNC: 278 MG/DL (ref 65–140)
HCT VFR BLD AUTO: 38 % (ref 34.8–46.1)
HGB BLD-MCNC: 12.6 G/DL (ref 11.5–15.4)
LYMPHOCYTES # BLD AUTO: 1.69 THOUSANDS/ΜL (ref 0.6–4.47)
LYMPHOCYTES NFR BLD AUTO: 19 % (ref 14–44)
MCH RBC QN AUTO: 30.1 PG (ref 26.8–34.3)
MCHC RBC AUTO-ENTMCNC: 33.2 G/DL (ref 31.4–37.4)
MCV RBC AUTO: 91 FL (ref 82–98)
MONOCYTES # BLD AUTO: 0.66 THOUSAND/ΜL (ref 0.17–1.22)
MONOCYTES NFR BLD AUTO: 7 % (ref 4–12)
NEUTROPHILS # BLD AUTO: 6.49 THOUSANDS/ΜL (ref 1.85–7.62)
NEUTS SEG NFR BLD AUTO: 73 % (ref 43–75)
PLATELET # BLD AUTO: 159 THOUSANDS/UL (ref 149–390)
PMV BLD AUTO: 11.3 FL (ref 8.9–12.7)
POTASSIUM SERPL-SCNC: 4.7 MMOL/L (ref 3.5–5.3)
RBC # BLD AUTO: 4.19 MILLION/UL (ref 3.81–5.12)
SODIUM SERPL-SCNC: 137 MMOL/L (ref 136–145)
WBC # BLD AUTO: 8.99 THOUSAND/UL (ref 4.31–10.16)

## 2018-03-24 PROCEDURE — 99239 HOSP IP/OBS DSCHRG MGMT >30: CPT | Performed by: INTERNAL MEDICINE

## 2018-03-24 PROCEDURE — 82948 REAGENT STRIP/BLOOD GLUCOSE: CPT

## 2018-03-24 PROCEDURE — 85025 COMPLETE CBC W/AUTO DIFF WBC: CPT | Performed by: INTERNAL MEDICINE

## 2018-03-24 PROCEDURE — 80048 BASIC METABOLIC PNL TOTAL CA: CPT | Performed by: INTERNAL MEDICINE

## 2018-03-24 RX ORDER — METOPROLOL SUCCINATE 25 MG/1
12.5 TABLET, EXTENDED RELEASE ORAL 2 TIMES DAILY
Qty: 30 TABLET | Refills: 0 | Status: ON HOLD | OUTPATIENT
Start: 2018-03-24 | End: 2018-05-17

## 2018-03-24 RX ADMIN — Medication 1 EACH: at 08:58

## 2018-03-24 RX ADMIN — ASPIRIN 81 MG 81 MG: 81 TABLET ORAL at 08:59

## 2018-03-24 RX ADMIN — PREDNISONE 5 MG: 5 TABLET ORAL at 08:59

## 2018-03-24 RX ADMIN — ENOXAPARIN SODIUM 40 MG: 40 INJECTION SUBCUTANEOUS at 08:59

## 2018-03-24 RX ADMIN — LEVOTHYROXINE SODIUM 75 MCG: 75 TABLET ORAL at 05:07

## 2018-03-24 RX ADMIN — OXYBUTYNIN CHLORIDE 10 MG: 5 TABLET, EXTENDED RELEASE ORAL at 08:58

## 2018-03-24 RX ADMIN — METOPROLOL SUCCINATE 12.5 MG: 25 TABLET, EXTENDED RELEASE ORAL at 08:58

## 2018-03-24 RX ADMIN — PANTOPRAZOLE SODIUM 40 MG: 40 TABLET, DELAYED RELEASE ORAL at 05:07

## 2018-03-24 RX ADMIN — INSULIN LISPRO 4 UNITS: 100 INJECTION, SOLUTION INTRAVENOUS; SUBCUTANEOUS at 07:35

## 2018-03-24 RX ADMIN — DOCUSATE SODIUM 100 MG: 100 CAPSULE, LIQUID FILLED ORAL at 08:59

## 2018-03-24 NOTE — SOCIAL WORK
Cm spoke with the patient and they are for d/c to home today  Cm is taking into account that the patient has preferences while planning the d/c needs  Cm plan was discussed with the patient and the patient is agreeable to the plan the patient does understand the importance of follow up care and taking the medications as prescribed  The patient is aware of any symptoms that she should look for when d/c  The patient refuses the [facility] that the physician ordered on d/c to home  She said that she has plenty of family members that help her at home and she feels that she does not need them   She will make her own follow up appointments as her sister has to take her and she has to go when her sister is avail   She is agreeable to the d/c plan and she signed the imm

## 2018-03-24 NOTE — PLAN OF CARE
Problem: DISCHARGE PLANNING - CARE MANAGEMENT  Goal: Discharge to post-acute care or home with appropriate resources  INTERVENTIONS:  - Conduct assessment to determine patient/family and health care team treatment goals, and need for post-acute services based on payer coverage, community resources, and patient preferences, and barriers to discharge  - Address psychosocial, clinical, and financial barriers to discharge as identified in assessment in conjunction with the patient/family and health care team  - Arrange appropriate level of post-acute services according to patient's   needs and preference and payer coverage in collaboration with the physician and health care team  - Communicate with and update the patient/family, physician, and health care team regarding progress on the discharge plan  - Arrange appropriate transportation to post-acute venues   Outcome: Completed Date Met: 03/24/18  Refuses the [facility]

## 2018-03-24 NOTE — DISCHARGE SUMMARY
Discharge Summary - [facility] Internal Medicine    Patient Information: Bryn Gayle 80 y o  female MRN: [id number]  Unit/Bed#: 520-75 Encounter: [id number]    Discharging Physician / Practitioner: Sylwia Davenport MD  PCP: William Guillen DO  Admission Date: 3/22/2018  Discharge Date: 03/24/18    Reason for Admission:  Chest pain/SVT    Discharge Diagnoses:     Principal Problem (Resolved):    Chest pain  Active Problems:    Type 2 diabetes mellitus with complication, without long-term current use of insulin ([facility])    Hyperlipidemia    Hypertension    Hypothyroidism    Sleep apnea    [facility]    Chronic diastolic congestive heart failure ([facility])    NSTEMI (non-ST elevated myocardial infarction) ([facility] )    Moderate to severe aortic stenosis    Atelectasis    Hiatal hernia    Thyroid nodule  Resolved Problems:    Elevated lactic acid level    Elevated troponin      Consultations During Hospital Stay:  · Cardiology    Procedures Performed:     · Echocardiogram  · CTA chest rule out PE    Significant Findings / Test Results:     · Echocardiogram:  Ejection fraction was estimated at 60-65%  No regional wall motion abnormality noted  Grade 1 diastolic dysfunction  Severe aortic stenosis noted  · CTA chest:  No evidence of pulmonary embolism  No acute consolidation  There is new segmental atelectasis in the right middle lobe area with associated skull volume loss and mild elevation of the right dome of the diaphragm noted  Large size hiatal hernia  1 6 cm left thyroid nodule with circum linear calcification  Incidental Findings:   · As above     Test Results Pending at Discharge (will require follow up):    · None     Outpatient Tests Requested:  · Electrophysiology evaluation    Complications:  None    Hospital Course:     Bryn aGyle is a 80 y o  female patient who originally presented to the hospital on 3/22/2018 due to chest pain starting about 2 hours prior to coming to the emergency room  She reports going to the mailbox upon returning to her house had onset of chest pain  She states that the pain radiated to her jaw and had onset of palpitations soon after chest pain started  She also reported numbing sensation in both upper extremities  She said that she called her son who subsequently called EMS to take her to the emergency room       In the emergency room labs showed, glucose of 390, potassium of 4 9, magnesium of 1 6, troponin 0 04, BNP of 345, lactic acid of 3 2, WBC of 10 39, TSH of 0 034  EKG showed SVT at a rate of 138  CTA chest completed with results as shown below  She received sodium chloride 1 liter, adenosine 6 milligrams IV   SVT broke in the emergency room after administration of adenosine 6 milligrams      At bedside patient appears comfortable states that pain as resolve no additional episode of palpitation  She has been admitted on inpatient status med/surg level care for further workup and management of chest pain, SVT, lactic acidosis    Patient was admitted to telemetry  Patient had positive troponins with non trending  And STEMI type 2 secondary to SVT  Patient responded to IV Unasyn in the ED  Cardiology was consulted and echocardiogram was done which showed severe aortic stenosis with ejection fraction 60-65%  No regional wall motion abnormality noted  As the patient has history of paroxysmal SVT cardiology recommended patient to follow up with cardiology team as an outpatient for EP evaluation  and possible ablation  Patient's chest pain-free in the hospital and wanted to go home today  Patient said she would follow up with Cardiology as an outpatient  Patient is prescribed metoprolol XL 12 5 mg p o  daily      Condition at Discharge: good     Discharge Day Visit / Exam:     * Please refer to separate progress for these details *    Discharge instructions/Information to patient and family:   See after visit summary for information provided to patient and family  Provisions for Follow-Up Care:  See after visit summary for information related to follow-up care and any pertinent home health orders  Disposition:     Home with [facility]NA Services (Reminder: Complete face to face encounter)    For Discharges to [facility]:   · Not Applicable to this Patient - Not Applicable to this Patient    Planned Readmission:  No    Discharge Statement:  I spent 60 minutes discharging the patient  This time was spent on the day of discharge  I had direct contact with the patient on the day of discharge  Greater than 50% of the total time was spent examining patient, answering all patient questions, arranging and discussing plan of care with patient as well as directly providing post-discharge instructions  Additional time then spent on discharge activities  Discharge Medications:  See after visit summary for reconciled discharge medications provided to patient and family  ** Please Note: This note has been constructed using a voice recognition system   **

## 2018-03-24 NOTE — DISCHARGE INSTRUCTIONS
Diabetes in the Older Adult   WHAT YOU NEED TO KNOW:   Older adults with diabetes are at risk for heart disease, stroke, kidney disease, blindness, and nerve damage  You may also be at risk for any of the following:  · Poor nutrition or low blood sugar levels    · Confusion or problems with memory, attention, or learning new things    · Trouble controlling urination or frequent urinary tract infections    · Trouble with coordination or balance    · Falls and injuries    · Pain    · Depression    · Open sores on your legs or feet  DISCHARGE INSTRUCTIONS:   Call 911 for any of the following:   · You have any of the following signs of a stroke:      ¨ Numbness or drooping on one side of your face     ¨ Weakness in an arm or leg    ¨ Confusion or difficulty speaking    ¨ Dizziness, a severe headache, or vision loss    · You have any of the following signs of a heart attack:      ¨ Squeezing, pressure, or pain in your chest that lasts longer than 5 minutes or returns    ¨ Discomfort or pain in your back, neck, jaw, stomach, or arm     ¨ Trouble breathing    ¨ Nausea or vomiting    ¨ Lightheadedness or a sudden cold sweat, especially with chest pain or trouble breathing  Seek care immediately if:   · You have severe abdominal pain, or the pain spreads to your back  You may also be vomiting  · You have trouble staying awake or focusing  · You are shaking or sweating  · You have blurred or double vision  · Your breath has a fruity, sweet smell  · Your breathing is deep and labored, or rapid and shallow  · Your heartbeat is fast and weak  · You fall and get hurt  Contact your healthcare provider if:   · You are vomiting or have diarrhea  · You have an upset stomach and cannot eat the foods on your meal plan  · You feel weak or more tired than usual      · You feel dizzy, have headaches, or are easily irritated  · Your skin is red, warm, dry, or swollen       · You have a wound that does not heal      · You have numbness in your arms or legs  · You have trouble coping with your illness, or you feel anxious or depressed  · You have problems with your memory  · You have changes in your vision  · You have questions or concerns about your condition or care  Medicines  may be given to decrease the amount of sugar in your blood  You may also need medicine to lower your blood pressure or cholesterol, or medicine to prevent blood clots  Manage the ABCs and prevent problems caused by diabetes:   · Check your blood sugar levels as directed  Your healthcare provider will tell you when and how often to check during the day  Your healthcare provider will also tell you what your blood sugar levels should be before and after a meal  You may need to check for ketones in your urine or blood if your level is higher than directed  Write down your results and show them to your healthcare provider  Your provider may use the results to make changes to your medicine, food, or exercise schedules  Ask your healthcare provider for more information about how to treat a high or low blood sugar level  · Follow your meal plan as directed  A dietitian will help you make a meal plan to keep your blood sugar level steady and make sure you get enough nutrition  Do not skip meals  Your blood sugar level may drop too low if you have taken diabetes medicine and do not eat  Ask your healthcare provider about programs in your community that can deliver the meals to your home  · Try to be active for 30 to 60 minutes most days of the week  Exercise can help keep your blood sugar level steady, decrease your risk of heart disease, and help you lose weight  It can also help improve your balance and decrease your risk for falls  Work with your healthcare provider to create an exercise plan  Ask a family member or friend to exercise with you  Start slow and exercise for 5 to 10 minutes at a time  Examples of activities include walking or swimming  Include muscle strengthening activities 2 to 3 days each week  Include balance training 2 to 3 times each week  Activities that help increase balance include yoga and pina chi      · Maintain a healthy weight  Ask your healthcare provider how much you should weigh  A healthy weight can help you control your diabetes and prevent heart disease  Ask your provider to help you create a weight loss plan if you are overweight  Together you can set manageable weight loss goals  · Do not smoke  Ask your healthcare provider for information if you currently smoke and need help to quit  Do not use e-cigarettes or smokeless tobacco in place of cigarettes or to help you quit  They still contain nicotine  · Manage stress  Stress may increase your blood sugar level  Deep breathing, muscle relaxation, and music may help you relax  Ask your healthcare provider for more information about these practices  Other ways to manage your diabetes:   · Check your feet every day for sores  Look at your whole foot, including the bottom, and between and under your toes  Check for wounds, corns, and calluses  Use a mirror to see the bottom of your feet  The skin on your feet may be shiny, tight, dry, or darker than normal  Your feet may also be cold and pale  Feel your feet by running your hands along the tops, bottoms, sides, and between your toes  Redness, swelling, and warmth are signs of blood flow problems that can lead to a foot ulcer  Do not try to remove corns or calluses yourself  · Wear medical alert identification  Wear medical alert jewelry or carry a card that says you have diabetes  Ask your healthcare provider where to get these items  · Ask about vaccines  You have a higher risk for serious illness if you get the flu, pneumonia, or hepatitis   Ask your healthcare provider if you should get a flu, pneumonia, shingles, or hepatitis B vaccine, and when to get the vaccine  · Keep all appointments  You may need to return to have your A1c checked every 3 months  You will need to return at least once each year to have your feet checked  You will need an eye exam once a year to check for retinopathy  You will also need urine tests every year to check for kidney problems  You may need tests to monitor for heart disease  Write down your questions so you remember to ask them during your visits  · Get help from family and friends  You may need help checking your blood sugar level, giving insulin injections, or preparing your meals  Ask your family and friends to help you with these tasks  Talk to your healthcare provider if you do not have someone at home to help you  A healthcare provider can come to your home to help you with these tasks  Follow up with your healthcare provider as directed: You may need to return to have your A1c checked every 3 months  You will need to return at least once each year to have your feet checked  You will need an eye exam once a year to check for retinopathy  You will also need urine tests every year to check for kidney problems  You may need tests to monitor for heart disease  Write down your questions so you remember to ask them during your visits  © 2017 [address] [facility] Information is for End User's use only and may not be sold, redistributed or otherwise used for commercial purposes  All illustrations and images included in CareNotes® are the copyrighted property of [employer] D A M , Inc  or [employer]  The above information is an  only  It is not intended as medical advice for individual conditions or treatments  Talk to your doctor, nurse or pharmacist before following any medical regimen to see if it is safe and effective for you      Hiatal Hernia   WHAT YOU NEED TO KNOW:   A hiatal hernia is a condition that causes part of your stomach to bulge through the hiatus (small opening) in your diaphragm  The part of the stomach may move up and down, or it may get trapped above the diaphragm  DISCHARGE INSTRUCTIONS:   Seek care immediately if:   · You have severe abdominal pain  · You try to vomit but nothing comes out (retching)  · You have severe chest pain and sudden trouble breathing  · Your bowel movements are black or bloody  · Your vomit looks like coffee grounds or has blood in it  Contact your healthcare provider if:   · Your symptoms are getting worse  · You have nausea, and you are vomiting  · You are losing weight without trying  · You have questions or concerns about your condition or care  Medicines:   · Medicines  may be given to relieve heartburn symptoms  These medicines help to decrease or block stomach acid  You may also be given medicines that help to tighten the esophageal sphincter  · Take your medicine as directed  Contact your healthcare provider if you think your medicine is not helping or if you have side effects  Tell him or her if you are allergic to any medicine  Keep a list of the medicines, vitamins, and herbs you take  Include the amounts, and when and why you take them  Bring the list or the pill bottles to follow-up visits  Carry your medicine list with you in case of an emergency  Follow up with your healthcare provider as directed:  Write down your questions so you remember to ask them during your visits  Self care:   · Avoid foods that make your symptoms worse  These may include spicy foods, fruit juices, alcohol, caffeine, chocolate, and mint  · Eat several small meals during the day  Small meals give your stomach less food to digest     · Avoid lying down and bending forward after you eat  Do not eat meals 2 to 3 hours before bedtime  This decreases your risk for reflux  · Maintain a healthy weight  If you are overweight, weight loss may help relieve your symptoms  · Sleep with your head elevated  at least 6 inches  · Do not smoke  Smoking can increase your symptoms of heartburn  © 2017 [address] [facility] [employer] Information is for End User's use only and may not be sold, redistributed or otherwise used for commercial purposes  All illustrations and images included in CareNotes® are the copyrighted property of [employer]  or [facility]  The above information is an  only  It is not intended as medical advice for individual conditions or treatments  Talk to your doctor, nurse or pharmacist before following any medical regimen to see if it is safe and effective for you  Heart Failure   WHAT YOU NEED TO KNOW:   What is heart failure? Heart failure (HF) is a condition that does not allow your heart to fill or pump properly  Not enough oxygen in your blood gets to your organs and tissues  HF can occur in the right side, the left side, or both lower chambers of your heart  HF is often caused by damage or injury to your heart  The damage is caused by other heart problems or high blood pressure  HF is a long-term condition that tends to get worse over time  It is important to manage your health to improve your quality of life  HF can be worsened by heavy alcohol use, smoking, diabetes that is not controlled, or obesity  What are the signs and symptoms of HF? The signs and symptoms depend on how severe your HF is  You may have any of the following:  · Difficulty breathing with activity that worsens to difficulty breathing at rest    · Shortness of breath while lying flat    · Severe shortness of breath and coughing at night that usually wakes you     · Chest pain at night    · Periods of no breathing, then breathing fast    · Fatigue or lack of energy (often worsened by physical activity)     · Swelling in your ankles, legs, or abdomen    · Fast heartbeat, purple color around your mouth and nails    · Fingers and toes cool to the touch  How is HF diagnosed?   Tell your healthcare provider about your health history and the medicines you take  He or she will ask questions about your shortness of breath and other symptoms  Your healthcare provider will make a diagnosis based on your physical exam, symptoms, and tests  You may need any of the following:  · Blood tests  are used to check for any damage to your heart  Blood tests also give healthcare providers information about your kidney, liver, and thyroid function  The results can also show an infection  · An EKG  test records your heart rhythm and how fast your heart beats  It shows healthcare providers if you have heart block or have had a heart attack  · An echocardiogram  is a type of ultrasound  Sound waves are used to show the structure and function of your heart  · X-ray, CT, or MRI  pictures may be taken of your heart and lungs  The pictures may show the cause of your HF, or blood clots or fluid in your lungs  You may be given contrast liquid before a CT scan or MRI to help healthcare providers see the pictures better  Tell the healthcare provider if you have ever had an allergic reaction to contrast liquid  Do not enter the MRI room with anything metal  Metal can cause serious injury  Tell the healthcare provider if you have any metal in or on your body  How is HF treated? The goals of treatment are to manage and slow, or reverse, heart damage  Your healthcare providers will manage your other health conditions that may be causing your HF  Examples include high blood pressure and hyperlipidemia  Treatment may include the following:  · Medicines  help regulate your heart rhythm, lower your blood pressure, and get rid of extra fluids  · Cardiac rehab  is a program run by specialists who will help you safely strengthen your heart  The program includes exercise, relaxation, stress management, and heart-healthy nutrition  Healthcare providers will also make sure your medicines are helping to reduce your symptoms       · Oxygen  may help you breathe easier if your oxygen level is lower than normal  A CPAP machine may be used to keep your airway open while you sleep  · Surgery  can be done to implant a pacemaker in your chest to regulate your heart rhythm  Other types of surgery can open blocked heart vessels, replace a damaged heart valve, or remove scar tissue  What can I do to manage HF? Your quality of life may improve with treatment and the following:  · Do not smoke  Nicotine and other chemicals in cigarettes and cigars can cause lung damage and make HF difficult to manage  Ask your healthcare provider for information if you currently smoke and need help to quit  E-cigarettes or smokeless tobacco still contain nicotine  Talk to your healthcare provider before you use these products  · Do not drink alcohol or take illegal drugs  Alcohol and drugs can worsen your symptoms quickly  · Weigh yourself every morning  Use the same scale, in the same spot  Do this after you use the bathroom, but before you eat or drink anything  Wear the same type of clothing  Do not wear shoes  Record your weight each day so you will notice any sudden weight gain  Swelling and weight gain are signs of fluid retention  If you are overweight, ask how to lose weight safely  · Check your blood pressure and heart rate every day  Ask for more information about how to measure your blood pressure and heart rate correctly  Ask what these numbers should be for you  · Manage any chronic health conditions you have  These include high blood pressure, diabetes, obesity, high cholesterol, metabolic syndrome, and COPD  You will have fewer symptoms if you manage these health conditions  Follow your healthcare provider's recommendations and follow up with him or her regularly  · Eat heart-healthy foods and limit sodium (salt)  An easy way to do this is to eat more fresh fruits and vegetables and fewer canned and processed foods   Replace butter and margarine with heart-healthy oils such as olive oil and canola oil  Other heart-healthy foods include walnuts, whole-grain breads, low-fat dairy products, beans, and lean meats  Fatty fish such as salmon and tuna are also heart healthy  Ask how much salt you can eat each day  Do not use salt substitutes  · Drink liquids as directed  You may need to limit the amount of liquids you drink if you retain fluid  Ask how much liquid to drink each day and which liquids are best for you  · Stay active  If you are not active, your symptoms are likely to worsen quickly  Walking, bicycling, and other types of physical activity help maintain your strength and improve your mood  Physical activity also helps you manage your weight  Work with your healthcare provider to create an exercise plan that is right for you  · Get vaccines as directed  Get a flu shot every year  You may also need the pneumonia vaccine  The flu and pneumonia can be severe for a person who has HF  Vaccines protect you from these infections  Call 911 if:   · You have any of the following signs of a heart attack:      ¨ Squeezing, pressure, or pain in your chest that lasts longer than 5 minutes or returns    ¨ Discomfort or pain in your back, neck, jaw, stomach, or arm     ¨ Trouble breathing    ¨ Nausea or vomiting    ¨ Lightheadedness or a sudden cold sweat, especially with chest pain or trouble breathing    When should I seek immediate care? · You gain 3 or more pounds (1 4 kg) in a day, or more than your healthcare provider says you should  · Your heartbeat is fast, slow, or uneven all the time  When should I contact my healthcare provider?    · You have symptoms of worsening HF:      ¨ Shortness of breath at rest, at night, or that is getting worse in any way     ¨ Weight gain of 5 or more pounds (2 2 kg) in a week     ¨ More swelling in your legs or ankles     ¨ Abdominal pain or swelling     ¨ More coughing     ¨ Feeling tired all the time    · You feel hopeless or depressed, or you have lost interest in things you used to enjoy  · You often feel worried or afraid  · You have questions or concerns about your condition or care  CARE AGREEMENT:   You have the right to help plan your care  Learn about your health condition and how it may be treated  Discuss treatment options with your caregivers to decide what care you want to receive  You always have the right to refuse treatment  The above information is an  only  It is not intended as medical advice for individual conditions or treatments  Talk to your doctor, nurse or pharmacist before following any medical regimen to see if it is safe and effective for you  © 2017 [address] [employer]  Information is for End User's use only and may not be sold, redistributed or otherwise used for commercial purposes  All illustrations and images included in CareNotes® are the copyrighted property of [employer]  or [employer]  Home Apnea Monitor   AMBULATORY CARE:   A home apnea monitor  is a device that measures how well you breathe  Your healthcare provider may ask you to wear a home apnea monitor while you sleep  The monitor will record how strongly you breathe in and out, your respiratory rate, your heart rate, and your blood oxygen level  Contact your healthcare provider if:   · You have questions about how to use the apnea monitor  · You have questions or concerns about your condition or care  Why you may need a home apnea monitor: You may need a home apnea monitor for any of the following:  · To help identify obstructive sleep apnea (CRISTAL)    · To check how well CRISTAL treatment is working    · Before weight-loss surgery or surgery on your upper airway  How to use an apnea monitor:  Your healthcare provider will show you how to use the home apnea monitor   The following are tips:  · Wrap the belt and recorder across your chest   Position the belt as high as you can under your armpit  Women should wear the belt over their breasts  Make sure you can fit 2 fingers between your chest and the belt  This will make it easier to breathe  · Wrap the sensor around your finger as directed  Do not put lotion on your hands before you attach the sensor  Plug the wire from the sensor into the recorder as directed  · Insert the prongs into your nose with the ends pointing toward you  Do not insert the prongs with the ends pointing up  Place the tubing around your ears after you insert the prongs into your nose  Tighten the tubing near your chin to prevent it from falling off  Connect the end of the tubing to the recorder as directed  · Press the start button to record  Keep the monitor attached if you get up to use the bathroom  Stop recording when you are done sleeping for the night  Your healthcare provider may tell you to write down what time you go to sleep and wake up  Follow up with your healthcare provider as directed:  Write down your questions so you remember to ask them during your visits  © 2017 [address] [facility] Information is for End User's use only and may not be sold, redistributed or otherwise used for commercial purposes  All illustrations and images included in CareNotes® are the copyrighted property of [employer] A M , Inc  or [employer]  The above information is an  only  It is not intended as medical advice for individual conditions or treatments  Talk to your doctor, nurse or pharmacist before following any medical regimen to see if it is safe and effective for you  Hiatal Hernia   WHAT YOU NEED TO KNOW:   A hiatal hernia is a condition that causes part of your stomach to bulge through the hiatus (small opening) in your diaphragm  The part of the stomach may move up and down, or it may get trapped above the diaphragm          WHILE YOU ARE HERE:   Informed consent  is a legal document that explains the tests, treatments, or procedures that you may need  Informed consent means you understand what will be done and can make decisions about what you want  You give your permission when you sign the consent form  You can have someone sign this form for you if you are not able to sign it  You have the right to understand your medical care in words you know  Before you sign the consent form, understand the risks and benefits of what will be done  Make sure all your questions are answered  Nutrition:  A dietitian may talk to you about foods you should avoid to manage heartburn  If you continue to have trouble swallowing, a swallowing therapist may teach you a safer way to swallow  The swallowing therapist will also tell you what foods and liquids are safe to eat and drink  An IV  is a small tube placed in your vein that is used to give you medicine or liquids  Medicines:   · Antacids  decrease stomach acid that can irritate your esophagus and stomach  · A histamine type-2 receptor blocker  (H2-blocker) stops acid from being produced in the stomach  · Proton pump inhibitors (PPIs) block acid from being made in the stomach  · Promotility agents  help to tighten the esophageal sphincter  Tests:   · An endoscopy  uses a scope to see the inside of your digestive tract  A scope is a long, bendable tube with a light on the end of it  A camera may be hooked to the scope to take pictures  · An upper GI series test  includes x-rays of your esophagus, stomach, and your small intestines  It is also called a barium swallow test  You will be given barium (a chalky liquid) to drink before the pictures are taken  This liquid helps your stomach and intestines show up better on the x-rays  An upper GI series can show if you have an ulcer, a blocked intestine, or other problems  · Esophageal manometry  measures the pressure within the esophagus and stomach      · Esophageal pH monitoring  measures how much acid is in your stomach  A small probe is placed inside the esophagus and stomach to check the pH of your stomach acid  This test also measures the amount of acid that goes into your esophagus  Treatment:  Surgery may be done when medicines cannot control your symptoms, or other problems are present  Your healthcare provider may also suggest surgery depending on the type of hernia you have  Your healthcare provider can put your stomach back into its normal location  He may make the hiatus (hole) smaller and anchor your stomach in your abdomen  Fundoplication is a surgery that wraps the upper part of the stomach around the esophageal sphincter to strengthen it  RISKS:   Gastroesophageal reflux disease (GERD) caused by a hiatal hernia may lead to ulcers and bleeding in the esophagus  The hiatal hernia may also slide into your chest, get trapped, and not slide back into your abdomen  The tissue from the part of your stomach that is trapped may die if its blood supply is cut off  You may also have sudden severe chest pain and problems swallowing  This may lead to other serious health problems  CARE AGREEMENT:   You have the right to help plan your care  Learn about your health condition and how it may be treated  Discuss treatment options with your caregivers to decide what care you want to receive  You always have the right to refuse treatment  © 2017 [facility] Information is for End User's use only and may not be sold, redistributed or otherwise used for commercial purposes  All illustrations and images included in CareNotes® are the copyrighted property of [employer]  or Javon Covarrubias  The above information is an  only  It is not intended as medical advice for individual conditions or treatments  Talk to your doctor, nurse or pharmacist before following any medical regimen to see if it is safe and effective for you      Home Apnea Monitor   WHAT YOU NEED TO KNOW:   What do I need to know about a home apnea monitor? A home apnea monitor is a device that measures how well you breathe  Your healthcare provider may ask you to wear a home apnea monitor while you sleep  The monitor will record how strongly you breathe in and out, your respiratory rate, your heart rate, and your blood oxygen level  Why do I need a home apnea monitor? You may need a home apnea monitor for any of the following:  · To help identify obstructive sleep apnea (CRISTAL)    · To check how well CRISTAL treatment is working    · Before weight-loss surgery or surgery on your upper airway  How do I use an apnea monitor? Your healthcare provider will show you how to use the home apnea monitor  The following are tips:  · Wrap the belt and recorder across your chest   Position the belt as high as you can under your armpit  Women should wear the belt over their breasts  Make sure you can fit 2 fingers between your chest and the belt  This will make it easier to breathe  · Wrap the sensor around your finger as directed  Do not put lotion on your hands before you attach the sensor  Plug the wire from the sensor into the recorder as directed  · Insert the prongs into your nose with the ends pointing toward you  Do not insert the prongs with the ends pointing up  Place the tubing around your ears after you insert the prongs into your nose  Tighten the tubing near your chin to prevent it from falling off  Connect the end of the tubing to the recorder as directed  · Press the start button to record  Keep the monitor attached if you get up to use the bathroom  Stop recording when you are done sleeping for the night  Your healthcare provider may tell you to write down what time you go to sleep and wake up  When should I contact my healthcare provider? · You have questions about how to use the apnea monitor  · You have questions or concerns about your condition or care    CARE AGREEMENT:   You have the right to help plan your care  Learn about your health condition and how it may be treated  Discuss treatment options with your caregivers to decide what care you want to receive  You always have the right to refuse treatment  The above information is an  only  It is not intended as medical advice for individual conditions or treatments  Talk to your doctor, nurse or pharmacist before following any medical regimen to see if it is safe and effective for you  © 2017 [address] [facility] Information is for End User's use only and may not be sold, redistributed or otherwise used for commercial purposes  All illustrations and images included in CareNotes® are the copyrighted property of A D A M [employer]  or Javon Covarrubias  Myocardial Infarction   WHAT YOU NEED TO KNOW:   A myocardial infarction (MI) is a heart attack  A heart attack happens when the blood vessels that supply blood to your heart (coronary arteries) are blocked  This can damage your heart  It can lead to an abnormal heart rhythm, heart failure, or may become life-threatening  DISCHARGE INSTRUCTIONS:   Medicines: You may  need any of the following:  · Heart medicines  help decrease blood pressure, control your heart rate, and help your heart function better  · Nitroglycerin  opens the arteries to your heart, increases oxygen levels, and can decrease chest pain  You may get your nitroglycerin as a pill, a patch, or a paste  Ask your healthcare provider or cardiologist how to safely take this medicine  · Aspirin  helps prevent clots from forming and causing blood flow problems  If healthcare providers want you to take aspirin daily, do not take acetaminophen or ibuprofen instead  Do not take more or less aspirin than healthcare providers say to take  If you are on another blood thinner medicine, ask your healthcare provider or cardiologist before you take aspirin for any reason  · Blood thinners    help prevent blood clots  Examples of blood thinners include heparin and warfarin  Clots can cause strokes, heart attacks, and death  The following are general safety guidelines to follow while you are taking a blood thinner:    ¨ Watch for bleeding and bruising while you take blood thinners  Watch for bleeding from your gums or nose  Watch for blood in your urine and bowel movements  Use a soft washcloth on your skin, and a soft toothbrush to brush your teeth  This can keep your skin and gums from bleeding  If you shave, use an electric shaver  Do not play contact sports  ¨ Tell your dentist and other healthcare providers that you take anticoagulants  Wear a bracelet or necklace that says you take this medicine  ¨ Do not start or stop any medicines unless your healthcare provider tells you to  Many medicines cannot be used with blood thinners  ¨ Tell your healthcare provider right away if you forget to take the medicine, or if you take too much  ¨ Warfarin  is a blood thinner that you may need to take  The following are things you should be aware of if you take warfarin  § Foods and medicines can affect the amount of warfarin in your blood  Do not make major changes to your diet while you take warfarin  Warfarin works best when you eat about the same amount of vitamin K every day  Vitamin K is found in green leafy vegetables and certain other foods  Ask for more information about what to eat when you are taking warfarin  § You will need to see your healthcare provider for follow-up visits when you are on warfarin  You will need regular blood tests  These tests are used to decide how much medicine you need  · Cholesterol medicine  decreases cholesterol and the amount of plaque in your blood  · Do not take certain medicines without asking your healthcare provider first   These include NSAIDs, herbal or vitamin supplements, or hormones (estrogen or progestin)  · Take your medicine as directed    Contact your healthcare provider if you think your medicine is not helping or if you have side effects  Tell him or her if you are allergic to any medicine  Keep a list of the medicines, vitamins, and herbs you take  Include the amounts, and when and why you take them  Bring the list or the pill bottles to follow-up visits  Carry your medicine list with you in case of an emergency  Cardiac rehabilitation (rehab)  is a program run by specialists who will help you safely strengthen your heart and prevent more heart disease  The plan includes exercise, relaxation, stress management, and heart-healthy nutrition  Healthcare providers will also check to make sure any medicines you take are working  The plan may also include instructions for when you can drive, return to work, and do other normal daily activities  Follow up with your healthcare provider or cardiologist within 14 days or as directed:  Ask for information about continuing care, treatments, and home services  Write down your questions so you remember to ask them during your visits  Lifestyle changes:   · Go to cardiac rehabilitation as directed  This is a program run by specialists who will help you safely strengthen your heart and prevent more heart disease  This plan includes exercise, relaxation, stress management, and heart-healthy nutrition  Healthcare providers will also check to make sure any medicines you are taking are working  The plan may also include instructions for when you can drive, return to work, and do other normal daily activities  · Eat a heart healthy diet  Get enough calories, protein, vitamins, and minerals to help prevent poor nutrition and promote muscle strength  You may be told to eat foods low in cholesterol or sodium (salt)  You also may be told to limit saturated and trans fats  Eat foods that contain healthy fats, such as walnuts, salmon, and canola and soybean oils   Eat foods that help protect the heart, including plenty of fruits and vegetables, nuts, and sources of fiber  · Do not smoke  If you smoke, it is never too late to quit  Smoking increases your risk of another MI      · Exercise  Ask your healthcare provider or cardiologist about the best exercise plan for you  Exercise makes your heart stronger, lowers blood pressure, and helps prevent an MI  The goal is 30 to 60 minutes a day, 5 to 7 days a week  Ask your healthcare provider or cardiologist how often and how long to exercise  · Maintain a healthy weight  Ask your healthcare provider or cardiologist how much you should weigh  Ask him to help you create a weight loss plan if you are overweight  · Manage your stress  Stress may slow healing and lead to illness  Learn ways to control stress, such as relaxation, deep breathing, and music  Talk to someone about things that upset you  · Get a flu vaccine  every year as soon as it is available  The vaccine will help prevent the flu  Ask about other vaccinations you may need  Contact your healthcare provider or cardiologist if:   · You have trouble taking your heart medicine  · You have questions or concerns about your condition or care  Seek care immediately or call 911 if:   · You have any of the following signs of a heart attack:      ¨ Squeezing, pressure, or pain in your chest that lasts longer than 5 minutes or returns    ¨ Discomfort or pain in your back, neck, jaw, stomach, or arm     ¨ Trouble breathing    ¨ Nausea or vomiting    ¨ Lightheadedness or a sudden cold sweat, especially with chest pain or trouble breathing    · You are tired and cannot think clearly  · Your heart is beating faster than usual      · You are bleeding from your gums or nose  · You see blood in your urine or bowel movements  · You urinate less than usual or not at all  · You have new or increased swelling in your feet or ankles    © 2017 [address] [address] Information is for End User's use only and may not be sold, redistributed or otherwise used for commercial purposes  All illustrations and images included in CareNotes® are the copyrighted property of [employer]  or [address]  The above information is an  only  It is not intended as medical advice for individual conditions or treatments  Talk to your doctor, nurse or pharmacist before following any medical regimen to see if it is safe and effective for you  Sleep Apnea   WHAT YOU NEED TO KNOW:   Sleep apnea is also called obstructive sleep apnea  It is a condition that causes you to stop breathing for 10 seconds or more while you are sleeping  During normal sleep, your throat is kept open by muscles that let air pass through easily  Sleep apnea causes the muscles and tissues around your throat to relax and block air from passing through  Sleep apnea may happen many times while you are asleep  DISCHARGE INSTRUCTIONS:   Seek care immediately if:   · You have chest pain or trouble breathing  Contact your healthcare provider if:   · You feel tired or depressed  · You have trouble staying awake during the day  · You have trouble thinking clearly  · You have questions or concerns about your condition or care  Follow up with your healthcare provider as directed: You may need to have blood tests during your follow-up visits  You will need to work with your healthcare provider to find the right CPAP equipment and settings for you  Write down your questions so you remember to ask them during your visits  Manage your symptoms:   · Do not smoke  Nicotine and other chemicals in cigarettes and cigars can cause lung damage  Ask your healthcare provider for information if you currently smoke and need help to quit  E-cigarettes or smokeless tobacco still contain nicotine  Talk to your healthcare provider before you use these products  · Do not drink alcohol or take sedative medicine before you go to sleep  Alcohol and sedatives can relax the muscles and tissues around your throat  This can block the airflow to your lungs  · Maintain a healthy weight  Excess tissue around your throat may restrict your breathing  Ask your healthcare provider for information if you need to lose weight  · Sleep on your side or use pillows designed to prevent sleep apnea  This prevents your tongue or other tissues from blocking your throat  You can also raise the head of your bed  © 2017 [address] [address] Information is for End User's use only and may not be sold, redistributed or otherwise used for commercial purposes  All illustrations and images included in CareNotes® are the copyrighted property of A [employer] M [employer]  or Javon Covarrubias  The above information is an  only  It is not intended as medical advice for individual conditions or treatments  Talk to your doctor, nurse or pharmacist before following any medical regimen to see if it is safe and effective for you  Subclinical Hypothyroidism   WHAT YOU NEED TO KNOW:   Subclinical hypothyroidism is a condition that develops when your thyroid stimulating hormone (TSH) level is higher than normal  TSH is made in the brain and controls how much thyroid hormones are made  Thyroid hormones help control body temperature, heart rate, growth, and weight  Subclinical hypothyroidism can lead to hypothyroidism  DISCHARGE INSTRUCTIONS:   Call 911 for any of the following:   · You have sudden chest pain or shortness of breath  · You have a seizure  · You feel like you are going to faint  Return to the emergency department if:   · You have swelling in your legs, ankles, or feet  · Your heart is beating faster or slower than is normal for you, or you feel restless  Contact your healthcare provider if:   · You have a fever  · Your signs and symptoms return or become worse      · You have pain, redness, and swelling in your muscles and joints  · You have questions or concerns about your condition or care  Medicines:   · Thyroid hormone replacement medicine  may bring your thyroid hormone level back to normal     · Take your medicine as directed  Contact your healthcare provider if you think your medicine is not helping or if you have side effects  Tell him or her if you are allergic to any medicine  Keep a list of the medicines, vitamins, and herbs you take  Include the amounts, and when and why you take them  Bring the list or the pill bottles to follow-up visits  Carry your medicine list with you in case of an emergency  Follow up with your healthcare provider as directed: You will need to return for more blood tests to check your thyroid hormone level  Write down your questions so you remember to ask them during your visits  © 2017 [address] Information is for End User's use only and may not be sold, redistributed or otherwise used for commercial purposes  All illustrations and images included in CareNotes® are the copyrighted property of A D A M [employer] Inc  or Javon Marci  The above information is an  only  It is not intended as medical advice for individual conditions or treatments  Talk to your doctor, nurse or pharmacist before following any medical regimen to see if it is safe and effective for you  Supraventricular Tachycardia   AMBULATORY CARE:   Supraventricular tachycardia (SVT)  is a condition that causes your heart to beat much faster than it should  SVT is a type of abnormal heart rhythm, called an arrhythmia, that starts in the upper part of your heart  It may last from a few seconds or hours to several days  Common symptoms include the following:   · A pounding, racing, or fluttering heartbeat    · Fatigue, weakness, or shortness of breath    · Feeling lightheaded, dizzy, or faint      · Pain, pressure, or tightness in your chest, neck, jaw, arms, or upper back    · Nausea    · Feeling anxious, scared, or worried that something bad may happen  Call 911 for the following:   · You have any of the following signs of a heart attack:      ¨ Squeezing, pressure, or pain in your chest that lasts longer than 5 minutes or returns    ¨ Discomfort or pain in your back, neck, jaw, stomach, or arm     ¨ Trouble breathing    ¨ Nausea or vomiting    ¨ Lightheadedness or a sudden cold sweat, especially with chest pain or trouble breathing    Seek care immediately if:   · You have dizziness, lightheadedness, or feel faint  · You have sudden numbness or weakness in your arms or legs  Contact your healthcare provider if:   · Your symptoms get worse, or you have new symptoms  · You have swelling in your ankles or feet  · You have questions or concerns about your condition or care  Treatment  may depend on what is causing SVT and your symptoms  You may not need treatment for SVT  Instead you may need medicine or other procedures to control your heart rate  How to manage or prevent SVT:   · Perform vagal maneuvers as directed when you have symptoms of SVT  Lie down flat and bear down like you are having a bowel movement  Do this for 10 to 30 seconds  · Do not drink caffeine or alcohol  These can increase your risk for SVT  · Keep a record of your symptoms  Write down what you ate or what you were doing before an episode of SVT  Also write down anything you did to make the SVT stop  Bring your record to follow up visits with your healthcare provider  · Eat heart-healthy foods  These include fruits, vegetables, whole-grain breads, low-fat dairy products, beans, lean meats, and fish  Replace butter and margarine with heart-healthy oils such as olive oil and canola oil  · Exercise regularly and maintain a healthy weight  Ask about the best exercise plan for you  Ask your healthcare provider how much you should weigh   Ask him to help you create a weight loss plan if you are overweight  · Do not smoke  Nicotine and other chemicals in cigarettes and cigars can cause heart and lung damage  Ask your healthcare provider for information if you currently smoke and need help to quit  E-cigarettes or smokeless tobacco still contain nicotine  Talk to your healthcare provider before you use these products  Follow up with your healthcare provider as directed:  Write down your questions so you remember to ask them during your visits  © 2017 [address] [address] Information is for End User's use only and may not be sold, redistributed or otherwise used for commercial purposes  All illustrations and images included in CareNotes® are the copyrighted property of A D A M [employer] Inc  or Javon Covarrubias  The above information is an  only  It is not intended as medical advice for individual conditions or treatments  Talk to your doctor, nurse or pharmacist before following any medical regimen to see if it is safe and effective for you  Supraventricular Tachycardia   WHAT YOU NEED TO KNOW:   Supraventricular tachycardia (SVT) is a condition that causes your heart to beat much faster than it should  SVT is a type of abnormal heart rhythm, called an arrhythmia, that starts in the upper part of your heart  It may last a few seconds or hours to several days  DISCHARGE INSTRUCTIONS:   Call 911 for the following:   · You have any of the following signs of a heart attack:      ¨ Squeezing, pressure, or pain in your chest that lasts longer than 5 minutes or returns    ¨ Discomfort or pain in your back, neck, jaw, stomach, or arm     ¨ Trouble breathing    ¨ Nausea or vomiting    ¨ Lightheadedness or a sudden cold sweat, especially with chest pain or trouble breathing    Return to the emergency department if:   · You have dizziness, lightheadedness, or feel faint  · You have sudden numbness or weakness in your arms or legs    Contact your healthcare provider if:   · Your symptoms get worse, or you have new symptoms  · You have swelling in your ankles or feet  · You have questions or concerns about your condition or care  Medicines:   · Medicines  can help control your heart rate and rhythm  You may need more than one medicine to treat your symptoms  · Take your medicine as directed  Contact your healthcare provider if you think your medicine is not helping or if you have side effects  Tell him or her if you are allergic to any medicine  Keep a list of the medicines, vitamins, and herbs you take  Include the amounts, and when and why you take them  Bring the list or the pill bottles to follow-up visits  Carry your medicine list with you in case of an emergency  How to manage or prevent SVT:   · Perform vagal maneuvers as directed when you have symptoms of SVT  Lie down flat and bear down like you are having a bowel movement  Do this for 10 to 30 seconds  · Do not drink caffeine or alcohol  These can increase your risk for SVT  · Keep a record of your symptoms  Write down what you ate or what you were doing before an episode of SVT  Also write down anything you did to make the SVT stop  Bring your record to follow up visits with your healthcare provider  · Eat heart-healthy foods  These include fruits, vegetables, whole-grain breads, low-fat dairy products, beans, lean meats, and fish  Replace butter and margarine with heart-healthy oils such as olive oil and canola oil  · Exercise regularly and maintain a healthy weight  Ask about the best exercise plan for you  Ask your healthcare provider how much you should weigh  Ask him to help you create a weight loss plan if you are overweight  · Do not smoke  Nicotine and other chemicals in cigarettes and cigars can cause heart and lung damage  Ask your healthcare provider for information if you currently smoke and need help to quit  E-cigarettes or smokeless tobacco still contain nicotine  Talk to your healthcare provider before you use these products  Follow up with your healthcare provider as directed:  Write down your questions so you remember to ask them during your visits  © 2017 [address] Information is for End User's use only and may not be sold, redistributed or otherwise used for commercial purposes  All illustrations and images included in CareNotes® are the copyrighted property of A D A M [employer]  or Javon Covarrubias  The above information is an  only  It is not intended as medical advice for individual conditions or treatments  Talk to your doctor, nurse or pharmacist before following any medical regimen to see if it is safe and effective for you  Thyroid Nodules   WHAT YOU NEED TO KNOW:   What are thyroid nodules? Thyroid nodules are growths on your thyroid gland  Your thyroid makes hormones that help control your body temperature, heart rate, and growth  The hormones also control how fast your body uses food for energy  Some nodules are lumps of tissue, and others are filled with fluid  What increases my risk for thyroid nodules? A lack of iodine in the foods you eat is the most common cause of thyroid nodules  The following may increase your risk:  · Autoimmune thyroid disorders, such as Hashimoto disease    · Medical conditions, such as cancer, a thyroid infection, thyroid goiter, or a thyroid cyst    · A family history  of thyroid nodules or thyroid cancer    · Pregnancy  that causes your body to create more hormones    · Past radiation  treatment to your head or neck  What are the signs and symptoms of thyroid nodules? A small nodule may have no signs or symptoms  As your nodule grows, you may be able to see a lump on your neck   A large nodule may press on your airway or neck veins and cause the following:  · A cough or choking and hoarse voice    · Flushed face and swollen neck or neck veins    · Noisy, high-pitched breathing    · Pain when you swallow or trouble swallowing    · Trouble breathing when you lie down  How are thyroid nodules diagnosed? · Blood tests  are done to check the level of thyroid hormones in your body  A blood test may also show if you have an autoimmune disease that caused your nodules  · An ultrasound  uses sound waves to show pictures of your thyroid on a screen  · A fine-needle biopsy  is done to get a tissue sample from your thyroid gland to be tested  How are thyroid nodules treated? · Thyroid medicine  is given to bring your thyroid hormone levels back to a normal range  · Radioactive iodine  is given to damage cells in your thyroid gland and decrease the size of your nodules  · Laser ablation  is done to make your nodules smaller  Ask for more information about laser ablation  · Surgery  may be done to remove all or part of your thyroid gland  Surgery is done if your nodules are cancerous  Ask for more information about thyroid surgery  What can I do to manage my thyroid nodules? · Eat iodine-rich foods  Examples include fish, seaweed, dairy products, eggs, beans, and lean meat  Iodized salt also contains iodine  You may need to use iodized table salt when you cook and season your food  Iodine may be added to bread or to your drinking water  Ask for a list of foods that contain iodine, and ask how much iodine you need each day  · Go to follow-up appointments  Write down your questions so you remember to ask them during your visits  When should I contact my healthcare provider? · You have a new cough that does not improve  · You begin choking or have new or increased trouble swallowing  · Your voice becomes hoarse  · You are losing weight without trying  · You have questions or concerns about your condition or care  When should I seek immediate care or call 911? · You have sudden chest pain or trouble breathing      · Your symptoms worsen, even after you take your medicines  CARE AGREEMENT:   You have the right to help plan your care  Learn about your health condition and how it may be treated  Discuss treatment options with your caregivers to decide what care you want to receive  You always have the right to refuse treatment  The above information is an  only  It is not intended as medical advice for individual conditions or treatments  Talk to your doctor, nurse or pharmacist before following any medical regimen to see if it is safe and effective for you  © 2017 [address]  Information is for End User's use only and may not be sold, redistributed or otherwise used for commercial purposes  All illustrations and images included in CareNotes® are the copyrighted property of [employer]  or [employer]  Thyroid Nodules   WHAT YOU NEED TO KNOW:   Thyroid nodules are growths on your thyroid gland  Your thyroid makes hormones that help control your body temperature, heart rate, and growth  The hormones also control how fast your body uses food for energy  Some nodules are lumps of tissue, and others are filled with fluid  DISCHARGE INSTRUCTIONS:   Medicines:   · Thyroid medicine  is given to bring your thyroid hormone levels back to a normal range  · Radioactive iodine  is given to damage cells in your thyroid gland and decrease the size of your nodules  · Take your medicine as directed  Contact your healthcare provider if you think your medicine is not helping or if you have side effects  Tell him or her if you are allergic to any medicine  Keep a list of the medicines, vitamins, and herbs you take  Include the amounts, and when and why you take them  Bring the list or the pill bottles to follow-up visits  Carry your medicine list with you in case of an emergency  Follow up with your healthcare provider as directed: You may need frequent blood tests to check your thyroid hormone levels   You may also need tests such as an ultrasound to check if any nodules are growing or have returned  If you had surgery, follow directions about how to care for your wound  Write down your questions so you remember to ask them during your visits  Eat iodine-rich foods:  Examples include fish, seaweed, dairy products, eggs, beans, and lean meat  Iodized salt also contains iodine  You may need to use iodized table salt when you cook and season your food  Iodine may be added to bread or to your drinking water  Ask for a list of foods that contain iodine, and ask how much iodine you need each day  Contact your healthcare provider if:   · You have a new cough that does not improve  · You begin choking or have new or increased trouble swallowing  · Your voice becomes hoarse  · You are losing weight without trying  · You have questions or concerns about your condition or care  Seek immediate care or call 911 if:   · You have redness, swelling, or drainage at your surgery site  · You have sudden chest pain or trouble breathing  · Your symptoms worsen, even after you take your medicines  © 2017 [facility] Information is for End User's use only and may not be sold, redistributed or otherwise used for commercial purposes  All illustrations and images included in CareNotes® are the copyrighted property of [employer] M [employer]  or [employer] Cov[employer]  The above information is an  only  It is not intended as medical advice for individual conditions or treatments  Talk to your doctor, nurse or pharmacist before following any medical regimen to see if it is safe and effective for you  Type 2 Diabetes in Adults   WHAT YOU NEED TO KNOW:   What is type 2 diabetes? Type 2 diabetes is a disease that affects how your body uses glucose (sugar)  Normally, when the blood sugar level increases, the pancreas makes more insulin   Insulin helps move sugar out of the blood so it can be used for energy  Type 2 diabetes develops because either the body cannot make enough insulin, or it cannot use the insulin correctly  After many years, your pancreas may stop making insulin  What increases my risk for type 2 diabetes? · Obesity    · Physical inactivity    · Older age    · High blood pressure or high cholesterol    · A history of heart disease, gestational diabetes, or polycystic ovary syndrome     · A family member with diabetes    · Being [place] American, , , [place] American, or [facility]  What are the signs and symptoms of type 2 diabetes? You may have high blood sugar levels for a long time before symptoms appear  You may have any of the following:  · More hunger or thirst than usual     · Frequent urination     · Weight loss without trying     · Blurred vision  How is type 2 diabetes diagnosed? You may need tests to check for type 2 diabetes starting at age 39  You may need any of the following:  · An A1c test  shows the average amount of sugar in your blood over the past 2 to 3 months  Your healthcare provider will tell you the A1c level that is right for you  The goal for your A1c is usually below 7%  Your provider can help you make changes if a check shows the A1c is too high  · A fasting plasma glucose test  is when your blood sugar level is tested after you have not eaten for 8 hours  · A 2-hour plasma glucose test  starts with a blood sugar level check after you have not eaten for 8 hours  You are then given a glucose drink  Your blood sugar level is checked after 2 hours  · A random glucose test  may be done any time of day, no matter how long ago you ate  How is type 2 diabetes treated? Type 2 diabetes can be controlled to prevent damage to your heart, blood vessels, and other organs  The goal is to keep your blood sugar at a normal level  You must eat the right foods, and exercise regularly   You may need 1 or more hypoglycemic medicines or insulin if you cannot control your blood sugar level with nutrition and exercise  You may also need medicine to lower your risk for heart disease  An example includes medicine to lower or control your cholesterol  How do I check my blood sugar level? You will be taught how to check a small drop of blood in a glucose monitor  You will need to check your blood sugar level at least 3 times each day if you are on insulin  Ask your healthcare provider when and how often to check during the day  If you check your blood sugar level before a meal , it should be between 80 and 130 mg/dL  If you check your blood sugar level 1 to 2 hours after a meal , it should be less than 180 mg/dL  Ask your healthcare provider if these are good goals for you  Write down your results, and show them to your healthcare provider  Your provider may use the results to make changes to your medicine, food, and exercise schedules  What should I do if my blood sugar level is too low? Your blood sugar level is too low if it goes below 70 mg/dL  If the level is too low, eat or drink 15 grams of fast-acting carbohydrate  These are found naturally in fruits  Fast-acting carbohydrates will raise your blood sugar level quickly  Examples of 15 grams of fast-acting carbohydrate are 4 ounces (½ cup) of fruit juice or 4 ounces of regular soda  Other examples are 2 tablespoons of raisins or 3 to 4 glucose tablets  Check your blood sugar level 15 minutes later  If the level is still low (less than 100 mg/dL), eat another 15 grams of carbohydrate  When the level returns to 100 mg/dL, eat a snack or meal that contains carbohydrates  This will help prevent another drop in blood sugar  Always carefully follow your healthcare provider's instructions on how to treat low blood sugar levels  What do I need to know about nutrition? A dietitian will help you make a meal plan to keep your blood sugar level steady  Do not skip meals   Your blood sugar level may drop too low if you have taken diabetes medicine and do not eat  · Keep track of carbohydrates (sugar and starchy foods)  Your blood sugar level can get too high if you eat too many carbohydrates  Eat fruits, legumes, vegetables, and whole grains  Your dietitian will help you plan meals and snacks that have the right amount of carbohydrates  · Eat low-fat foods , such as skinless chicken and low-fat milk  · Eat less sodium (salt)  Limit high-sodium foods, such as soy sauce, potato chips, and soup  Do not add salt to food you cook  Limit your use of table salt  You should have less than 2,300 mg of sodium per day  · Eat high-fiber foods , such as vegetables, whole-grain breads, and beans  · Limit alcohol  Alcohol affects your blood sugar level and can make it harder to manage your diabetes  Limit alcohol to 1 drink a day if you are a woman  Limit alcohol to 2 drinks a day if you are a man  A drink of alcohol is 12 ounces of beer, 5 ounces of wine, or 1½ ounces of liquor  How much exercise do I need? Exercise can help keep your blood sugar level steady, decrease your risk of heart disease, and help you lose weight  Stretch before and after you exercise  Exercise for at least 150 minutes every week  Spread this amount of exercise over at least 3 days a week  Do not skip exercise more than 2 days in a row  Include muscle strengthening activities 2 to 3 days each week  Older adults should include balance training 2 to 3 times each week  Activities that help increase balance include yoga and pina chi  Work with your healthcare provider to create an exercise plan  · Check your blood sugar level before and after exercise  Healthcare providers may tell you to change the amount of insulin you take or food you eat  If your blood sugar level is high, check your blood or urine for ketones before you exercise  Do not exercise if your blood sugar level is high and you have ketones        · If your blood sugar level is less than 100 mg/dL, have a carbohydrate snack before you exercise  Examples are 4 to 6 crackers, ½ banana, 8 ounces (1 cup) of milk, or 4 ounces (½ cup) of juice  Drink water or liquids that do not contain sugar before, during, and after exercise  Ask your dietitian or healthcare provider which liquids you should drink when you exercise  · Do not sit for longer than 30 minutes  If you cannot walk around, at least stand up  This will help you stay active and keep your blood circulating  What else can I do to manage type 2 diabetes? · Check your feet each day for sores  Wear shoes and socks that fit correctly  Do not trim your toenails  Ask your healthcare provider for more information about foot care  · Maintain a healthy weight  Ask your healthcare provider how much you should weigh  A healthy weight can help you control your diabetes and prevent heart disease  Ask your provider to help you create a weight loss plan if you are overweight  Together you can set manageable weight loss goals  · Do not smoke  Nicotine and other chemicals in cigarettes and cigars can cause lung damage and make it more difficult to manage your diabetes  Ask your healthcare provider for information if you currently smoke and need help to quit  Do not use e-cigarettes or smokeless tobacco in place of cigarettes or to help you quit  They still contain nicotine  · Check your blood pressure as directed  Ask your healthcare provider what your blood pressure should be  Most adults with diabetes and high blood pressure should have a systolic blood pressure (first number) less than 140  Your diastolic blood pressure (second number) should be less than 90  · Wear medical alert identification  Wear medical alert jewelry or carry a card that says you have diabetes  Ask your healthcare provider where to get these items  · Ask about vaccines    You have a higher risk for serious illness if you get the flu, pneumonia, or hepatitis  Ask your healthcare provider if you should get a flu, pneumonia, or hepatitis B vaccine, and when to get the vaccine  What are the risks of type 2 diabetes? Uncontrolled diabetes can damage your nerves, veins, and arteries  High blood sugar levels may damage other body tissue and organs over time  Damage to arteries may increase your risk for heart attack and stroke  Nerve damage may also lead to other heart, stomach, and nerve problems  Diabetes is life-threatening if it is not controlled  Control your blood glucose levels to prevent health problems  Call 911 for any of the following:   · You have any of the following signs of a stroke:      ¨ Numbness or drooping on one side of your face     ¨ Weakness in an arm or leg    ¨ Confusion or difficulty speaking    ¨ Dizziness, a severe headache, or vision loss    · You have any of the following signs of a heart attack:      ¨ Squeezing, pressure, or pain in your chest that lasts longer than 5 minutes or returns    ¨ Discomfort or pain in your back, neck, jaw, stomach, or arm     ¨ Trouble breathing    ¨ Nausea or vomiting    ¨ Lightheadedness or a sudden cold sweat, especially with chest pain or trouble breathing  When should I seek immediate care? · You have severe abdominal pain, or the pain spreads to your back  You may also be vomiting  · You have trouble staying awake or focusing  · You are shaking or sweating  · You have blurred or double vision  · Your breath has a fruity, sweet smell  · Your breathing is deep and labored, or rapid and shallow  · Your heartbeat is fast and weak  When should I contact my healthcare provider? · You are vomiting or have diarrhea  · You have an upset stomach and cannot eat the foods on your meal plan  · You feel weak or more tired than usual      · You feel dizzy, have headaches, or are easily irritated  · Your skin is red, warm, dry, or swollen       · You have a wound that does not heal      · You have numbness in your arms or legs  · You have trouble coping with your illness, or you feel anxious or depressed  · You have questions or concerns about your condition or care  CARE AGREEMENT:   You have the right to help plan your care  Learn about your health condition and how it may be treated  Discuss treatment options with your caregivers to decide what care you want to receive  You always have the right to refuse treatment  The above information is an  only  It is not intended as medical advice for individual conditions or treatments  Talk to your doctor, nurse or pharmacist before following any medical regimen to see if it is safe and effective for you  © 2017 [address]  Information is for End User's use only and may not be sold, redistributed or otherwise used for commercial purposes  All illustrations and images included in CareNotes® are the copyrighted property of [employer]  or [address]  Aortic Stenosis   WHAT YOU NEED TO KNOW:   What is aortic stenosis? Aortic stenosis is a condition where the aortic valve in your heart is narrowed  The aortic valve is between the left ventricle and the aorta  The left ventricle is the lower left chamber of your heart  The aorta is a blood vessel that pumps blood to your head and body  The aortic valve opens and closes to direct blood flow through your heart  When the aortic valve is narrowed, blood flow may decrease  Your tissues and organs will not have enough oxygen and nutrients to function properly  What causes aortic stenosis? · Calcium buildup:  As you age, calcium can build up on the aortic valve walls  The calcium stiffens and thickens the valve  · Congenital heart defect:  Some people are born with a damaged aortic valve that leads to narrowing and blockage  · Rheumatic fever: This is fever and inflammation of your joints   It can develop after you have a strep throat infection  Rheumatic fever can cause inflammation and damage to your aortic valve  The walls of your aortic valve may narrow, and may even join together  What are the signs and symptoms of aortic stenosis? · Chest pain or tightness    · Fast, jumpy, or fluttery heartbeat    · Shortness of breath during activity or when you lie down    · Severe tiredness    · Dizziness or feeling faint  How is aortic stenosis diagnosed? Your healthcare provider will ask about your signs and symptoms and listen to your heart  He will ask if you have had strep throat or rheumatic fever in the past  You may need any of the following tests:  · Blood tests: You may need blood taken to give caregivers information about how your body is working  The blood may be taken from your hand, arm, or IV  · Chest x-ray: This is used to check the size of your heart and look for fluid around your heart and lungs  · EKG: This test records the electrical activity of your heart  It is used to check for abnormal heart rhythm caused by aortic stenosis  · An echocardiogram  is a type of ultrasound  Sound waves are used to show the structure and function of your heart  · A stress test  may show the changes that take place in your heart while it is under stress  Stress may be placed on your heart with exercise or medicine  Ask for more information about this test     · Cardiac catheterization: This procedure is done to find and treat heart blockages  A thin, bendable tube is inserted into your arm, neck, or groin and moved into your heart  An x-ray may be used to guide the tube to the right place  Dye may be put into your vein so the pictures show up better on a monitor  Tell the healthcare provider if you have ever had an allergic reaction to contrast dye  How is aortic stenosis treated? · Medicines:   You may have the following medicines to improve your symptoms or prevent problems caused by aortic stenosis:    ¨ Cholesterol medicine: This medicine will help decrease the amount of cholesterol in your blood  ¨ Antibiotics: This medicine will help fight or prevent an infection  You may need it if you had rheumatic fever in the past  You may need to take the medicine every day, or once a month  · Procedures:      ¨ Valvotomy: This helps widen your aortic valve and allow blood to flow through easier  A catheter (long thin tube) with a balloon on the tip is inserted through a small incision in your arm or groin  The catheter is guided through a blood vessel and into your left atrium near your aortic valve  When the balloon is inflated, it stretches the valve opening  ¨ Valvuloplasty:  Healthcare providers make an incision in your chest to repair and widen your aortic valve  The valve walls are  or calcium buildup is removed  This helps improve the blood flow through your heart  ¨ Replacement:  Healthcare Providers make an incision in your chest to replace your damaged aortic valve  Part or all of your aortic valve is removed, and a new valve is secured in place  The new valve may be from a donor (another person or animal), or may be a manmade valve  What are the risks of aortic stenosis? Aortic stenosis may cause endocarditis  Endocarditis is an infection of the inner lining of the heart  Aortic stenosis can also cause congestive heart failure (CHF)  This is when the heart cannot pump enough blood for the body  This may cause irregular heartbeats and can lead to cardiac arrest (the heart stops beating)  This can be life-threatening  How can I manage my symptoms? · Eat a variety of healthy foods:  Healthy foods include fruits, vegetables, whole-grain breads, low-fat dairy products, beans, lean meats, and fish  Ask if you need to be on a special diet  · Exercise: This will improve your heart health  Ask your healthcare provider about the best exercise plan for you      · Maintain a healthy weight:  Ask your healthcare provider how much you should weigh  Ask him to help you create a weight loss plan if you are overweight  When should I contact my healthcare provider? · You are bleeding from your nose or gums  · The veins in your neck look swollen or are bulging  · You have a fever  · You have blood in your urine or bowel movements  · You have questions or concerns about your condition or care  When should I seek immediate care or call 911? · Your arm or leg feels warm, tender, and painful  It may look swollen and red  · Your heart is beating faster than normal for you, and you feel fluttering in your chest     · You suddenly feel lightheaded and short of breath  · You have chest pain that feels like squeezing, pressure, fullness, or pain  · You have chest pain that lasts for more than a few minutes or returns  · You are nauseated and have trouble breathing  · You have a severe headache, cold sweats, and feel lightheaded or dizzy  · You have weakness or numbness on one side of your arm, leg, or face  · You are confused and cannot speak clearly  CARE AGREEMENT:   You have the right to help plan your care  Learn about your health condition and how it may be treated  Discuss treatment options with your caregivers to decide what care you want to receive  You always have the right to refuse treatment  The above information is an  only  It is not intended as medical advice for individual conditions or treatments  Talk to your doctor, nurse or pharmacist before following any medical regimen to see if it is safe and effective for you  © 2017 [facility] Information is for End User's use only and may not be sold, redistributed or otherwise used for commercial purposes  All illustrations and images included in CareNotes® are the copyrighted property of [employer]  or [employer]      Aortic Stenosis   WHAT YOU NEED TO KNOW:   Aortic stenosis is a condition where the aortic valve in your heart is narrowed  The aortic valve is between the left ventricle and the aorta  The left ventricle is the lower left chamber of your heart  The aorta is a blood vessel that pumps blood to your head and body  The aortic valve opens and closes to direct blood flow through your heart  When the aortic valve is narrowed, blood flow may decrease  Your tissues and organs will not have enough oxygen and nutrients to function properly  DISCHARGE INSTRUCTIONS:   Medicines:   · Cholesterol medicine: This medicine will help decrease the amount of cholesterol in your blood  · Antibiotics: This medicine will help fight or prevent an infection  You may need it if you had rheumatic fever in the past  You may need to take the medicine every day, or once a month  · Take your medicine as directed  Contact your healthcare provider if you think your medicine is not helping or if you have side effects  Tell him or her if you are allergic to any medicine  Keep a list of the medicines, vitamins, and herbs you take  Include the amounts, and when and why you take them  Bring the list or the pill bottles to follow-up visits  Carry your medicine list with you in case of an emergency  Follow up with your healthcare provider or cardiologist as directed: You may need to return for more tests to check your heart  Write down your questions so you remember to ask them during your visits  Self-care:   · Eat a variety of healthy foods:  Healthy foods include fruits, vegetables, whole-grain breads, low-fat dairy products, beans, lean meats, and fish  Ask if you need to be on a special diet  · Exercise: This will improve your heart health  Ask your healthcare provider about the best exercise plan for you  · Maintain a healthy weight:  Ask your healthcare provider how much you should weigh   Ask him to help you create a weight loss plan if you are overweight  Contact your healthcare provider or cardiologist if:   · You are bleeding from your nose or gums  · The veins in your neck look swollen or are bulging  · You have a fever  · You have blood in your urine or bowel movements  · You have questions or concerns about your condition or care  Seek care immediately or call 911 if:   · Your arm or leg feels warm, tender, and painful  It may look swollen and red  · Your heart is beating faster than normal for you, and you feel fluttering in your chest     · You suddenly feel lightheaded and short of breath  · You have chest pain that feels like squeezing, pressure, fullness, or pain  · You have chest pain that lasts for more than a few minutes or returns  · You are nauseated and have trouble breathing  · You have a severe headache, cold sweats, and feel lightheaded or dizzy  · You have weakness or numbness on one side of your arm, leg, or face  · You are confused and cannot speak clearly  © 2017 [facility] Information is for End User's use only and may not be sold, redistributed or otherwise used for commercial purposes  All illustrations and images included in CareNotes® are the copyrighted property of [employer]  or Javon Covarrubias  The above information is an  only  It is not intended as medical advice for individual conditions or treatments  Talk to your doctor, nurse or pharmacist before following any medical regimen to see if it is safe and effective for you  Atelectasis   WHAT YOU NEED TO KNOW:   What is atelectasis? Atelectasis happens when the alveoli in your lungs cannot expand fully  This may cause part or all of your lung to collapse  The exchange of oxygen and carbon dioxide cannot take place in the alveoli, when your lung collapses  Atelectasis happens very often after surgery  What causes atelectasis?    · Bronchial blockage by mucus plugs, foreign bodies, or swelling due to lung disease    · Decreased surfactant production (surfactant keeps the alveoli in your lung from collapsing when you exhale)    · Any squeezing that does not allow full lung expansion, such as a tight bandage    · Anything that prevents you from taking a deep breath, such as a rib fracture, obesity, or a chest or abdomen incision  What increases my risk for atelectasis? · General anesthesia    · Surgery on your chest, upper abdomen, head and neck, or blood vessels    · Not moving for long periods of time, such as while in intensive care or on bed rest    · A chronic lung disease, such as COPD or cystic fibrosis    · Obesity, smoking, or age older than 61     · A health condition such as heart failure, obstructive sleep apnea, or pulmonary hypertension  What are the signs and symptoms of atelectasis? You may not have any symptoms, or you may have any of the following:  · Cough or wheeze    · Difficulty breathing or feeling like you cannot get enough air    · Chest pain    · Blue lips and fingers    · Severe sweating     · Heart beats faster than it should  How is atelectasis diagnosed? Your healthcare provider will listen to your lungs with a stethoscope  He may use his fingers to tap on areas of your back and chest  If your lung has collapsed, the taps will make a different sound  You will have a chest x-ray  If the cause of your atelectasis is not known, your healthcare provider may order a bronchoscopy  The bronchoscopy will check for a tumor or foreign body blocking your airway  How is atelectasis treated? You may be given medicines to help thin mucus so that it is easier to cough out  You may be given medicines that dilate airways or treat infection  You will be admitted to the hospital if a large area of your lung has collapsed  You may need surgery or radiation to remove or shrink a blockage  How can I prevent or manage atelectasis?    · Postural drainage  means getting into positions that help mucus drain  Postural drainage is sometimes used with chest percussion  (gentle clapping to help move the mucus out of your lungs)  Ask your healthcare provider for more information about postural drainage and chest percussion  · Frequent coughing  can help clear mucus from your lungs  · Deep breathing exercises  help improve your lung function and reduce your risk for atelectasis  An incentive spirometer may be used after surgery to help you breathe deeply and slowly  Ask your healthcare provider for more information on deep breathing exercises  · Do not smoke before surgery  Ask how long before surgery you should not smoke  It is never too late to quit  Ask your healthcare provider for more information if you need help quitting  · Change your position  to promote lung expansion and reduce the risk for infection  Sit on the side of the bed or walk frequently after surgery as directed  · Drink liquids as directed  to help loosen mucus  Ask how much liquid to drink each day and which liquids are best for you  When should I call 911? You cough up blood continuously or more than 3 teaspoons  When should I seek immediate care? · Your symptoms return  · You have a fever  · You cough up blood  When should I contact my healthcare provider? · You are coughing up a large amount of mucus  · You are more tired than usual      · You have trouble catching your breath while you exercise or walk up stairs  · You have questions or concerns about your condition or care  CARE AGREEMENT:   You have the right to help plan your care  Learn about your health condition and how it may be treated  Discuss treatment options with your caregivers to decide what care you want to receive  You always have the right to refuse treatment  The above information is an  only  It is not intended as medical advice for individual conditions or treatments   Talk to your doctor, nurse or pharmacist before following any medical regimen to see if it is safe and effective for you  © 2017 [address] Information is for End User's use only and may not be sold, redistributed or otherwise used for commercial purposes  All illustrations and images included in CareNotes® are the copyrighted property of [employer] M [employer]  or Javon Covarrubias  Atelectasis   WHAT YOU NEED TO KNOW:   Atelectasis happens when the alveoli in your lungs cannot expand fully  This may cause part or all of your lung to collapse  The exchange of oxygen and carbon dioxide cannot take place in the alveoli, when your lung collapses  Atelectasis happens very often after surgery  Atelectasis may last for days  It may be caused by not being able to take a deep breath due to blocked airways or surgery  It may also be due to disease, infection, or trauma  DISCHARGE INSTRUCTIONS:   Call 911 if:  You cough up blood continuously or more than 3 teaspoons  Seek care immediately if:   · Your symptoms return  · You have a fever  · You cough up blood  Contact your healthcare provider if:   · You are coughing up a large amount of mucus  · You are more tired than usual      · You have trouble catching your breath while you exercise or walk up stairs  · You have questions or concerns about your condition or care  Follow up with your healthcare provider as directed: You may need more tests  Write down your questions so you remember to ask them during your visits  Do not smoke:  Smoking can make your symptoms worse  It is never too late to quit  Ask your healthcare provider for more information if you need help quitting  Manage and prevent atelectasis:   · Postural drainage  means getting into positions that help mucus drain  Postural drainage is sometimes used with chest percussion  (gentle clapping to help move the mucus out of your lungs)   Ask your healthcare provider for more information about postural drainage and chest percussion  · Frequent coughing  can help clear mucus from your lungs  · Deep breathing exercises  help improve your lung function and reduce your risk for atelectasis  An incentive spirometer may be used after surgery to help you breathe deeply and slowly  Ask your healthcare provider for more information on deep breathing exercises  · Change your position  to promote lung expansion and reduce the risk for infection  Sit on the side of the bed or walk frequently after surgery as directed  · Drink liquids as directed  to help loosen mucus  Ask how much liquid to drink each day and which liquids are best for you  © 2017 [address] Information is for End User's use only and may not be sold, redistributed or otherwise used for commercial purposes  All illustrations and images included in CareNotes® are the copyrighted property of [employer]  or Javon Covarrubias  The above information is an  only  It is not intended as medical advice for individual conditions or treatments  Talk to your doctor, nurse or pharmacist before following any medical regimen to see if it is safe and effective for you  Diabetes in the Older Adult   WHAT YOU NEED TO KNOW:   Older adults with diabetes are at risk for heart disease, stroke, kidney disease, blindness, and nerve damage   You may also be at risk for any of the following:  · Poor nutrition or low blood sugar levels    · Confusion or problems with memory, attention, or learning new things    · Trouble controlling urination or frequent urinary tract infections    · Trouble with coordination or balance    · Falls and injuries    · Pain    · Depression    · Open sores on your legs or feet  DISCHARGE INSTRUCTIONS:   Call 911 for any of the following:   · You have any of the following signs of a stroke:      ¨ Numbness or drooping on one side of your face     ¨ Weakness in an arm or leg    ¨ Confusion or difficulty speaking    ¨ Dizziness, a severe headache, or vision loss    · You have any of the following signs of a heart attack:      ¨ Squeezing, pressure, or pain in your chest that lasts longer than 5 minutes or returns    ¨ Discomfort or pain in your back, neck, jaw, stomach, or arm     ¨ Trouble breathing    ¨ Nausea or vomiting    ¨ Lightheadedness or a sudden cold sweat, especially with chest pain or trouble breathing  Seek care immediately if:   · You have severe abdominal pain, or the pain spreads to your back  You may also be vomiting  · You have trouble staying awake or focusing  · You are shaking or sweating  · You have blurred or double vision  · Your breath has a fruity, sweet smell  · Your breathing is deep and labored, or rapid and shallow  · Your heartbeat is fast and weak  · You fall and get hurt  Contact your healthcare provider if:   · You are vomiting or have diarrhea  · You have an upset stomach and cannot eat the foods on your meal plan  · You feel weak or more tired than usual      · You feel dizzy, have headaches, or are easily irritated  · Your skin is red, warm, dry, or swollen  · You have a wound that does not heal      · You have numbness in your arms or legs  · You have trouble coping with your illness, or you feel anxious or depressed  · You have problems with your memory  · You have changes in your vision  · You have questions or concerns about your condition or care  Medicines  may be given to decrease the amount of sugar in your blood  You may also need medicine to lower your blood pressure or cholesterol, or medicine to prevent blood clots  Manage the ABCs and prevent problems caused by diabetes:   · Check your blood sugar levels as directed  Your healthcare provider will tell you when and how often to check during the day   Your healthcare provider will also tell you what your blood sugar levels should be before and after a meal  You may need to check for ketones in your urine or blood if your level is higher than directed  Write down your results and show them to your healthcare provider  Your provider may use the results to make changes to your medicine, food, or exercise schedules  Ask your healthcare provider for more information about how to treat a high or low blood sugar level  · Follow your meal plan as directed  A dietitian will help you make a meal plan to keep your blood sugar level steady and make sure you get enough nutrition  Do not skip meals  Your blood sugar level may drop too low if you have taken diabetes medicine and do not eat  Ask your healthcare provider about programs in your community that can deliver the meals to your home  · Try to be active for 30 to 60 minutes most days of the week  Exercise can help keep your blood sugar level steady, decrease your risk of heart disease, and help you lose weight  It can also help improve your balance and decrease your risk for falls  Work with your healthcare provider to create an exercise plan  Ask a family member or friend to exercise with you  Start slow and exercise for 5 to 10 minutes at a time  Examples of activities include walking or swimming  Include muscle strengthening activities 2 to 3 days each week  Include balance training 2 to 3 times each week  Activities that help increase balance include yoga and pina chi      · Maintain a healthy weight  Ask your healthcare provider how much you should weigh  A healthy weight can help you control your diabetes and prevent heart disease  Ask your provider to help you create a weight loss plan if you are overweight  Together you can set manageable weight loss goals  · Do not smoke  Ask your healthcare provider for information if you currently smoke and need help to quit  Do not use e-cigarettes or smokeless tobacco in place of cigarettes or to help you quit  They still contain nicotine       · Manage stress  Stress may increase your blood sugar level  Deep breathing, muscle relaxation, and music may help you relax  Ask your healthcare provider for more information about these practices  Other ways to manage your diabetes:   · Check your feet every day for sores  Look at your whole foot, including the bottom, and between and under your toes  Check for wounds, corns, and calluses  Use a mirror to see the bottom of your feet  The skin on your feet may be shiny, tight, dry, or darker than normal  Your feet may also be cold and pale  Feel your feet by running your hands along the tops, bottoms, sides, and between your toes  Redness, swelling, and warmth are signs of blood flow problems that can lead to a foot ulcer  Do not try to remove corns or calluses yourself  · Wear medical alert identification  Wear medical alert jewelry or carry a card that says you have diabetes  Ask your healthcare provider where to get these items  · Ask about vaccines  You have a higher risk for serious illness if you get the flu, pneumonia, or hepatitis  Ask your healthcare provider if you should get a flu, pneumonia, shingles, or hepatitis B vaccine, and when to get the vaccine  · Keep all appointments  You may need to return to have your A1c checked every 3 months  You will need to return at least once each year to have your feet checked  You will need an eye exam once a year to check for retinopathy  You will also need urine tests every year to check for kidney problems  You may need tests to monitor for heart disease  Write down your questions so you remember to ask them during your visits  · Get help from family and friends  You may need help checking your blood sugar level, giving insulin injections, or preparing your meals  Ask your family and friends to help you with these tasks  Talk to your healthcare provider if you do not have someone at home to help you   A healthcare provider can come to your home to help you with these tasks  Follow up with your healthcare provider as directed: You may need to return to have your A1c checked every 3 months  You will need to return at least once each year to have your feet checked  You will need an eye exam once a year to check for retinopathy  You will also need urine tests every year to check for kidney problems  You may need tests to monitor for heart disease  Write down your questions so you remember to ask them during your visits  © 2017 [address] Information is for End User's use only and may not be sold, redistributed or otherwise used for commercial purposes  All illustrations and images included in CareNotes® are the copyrighted property of [employer]  or Javon Covarrubias  The above information is an  only  It is not intended as medical advice for individual conditions or treatments  Talk to your doctor, nurse or pharmacist before following any medical regimen to see if it is safe and effective for you

## 2018-03-24 NOTE — PLAN OF CARE
Problem: Potential for Falls  Goal: Patient will remain free of falls  INTERVENTIONS:  - Assess patient frequently for physical needs  -  Identify cognitive and physical deficits and behaviors that affect risk of falls    -  [place] fall precautions as indicated by assessment   - Educate patient/family on patient safety including physical limitations  - Instruct patient to call for assistance with activity based on assessment  - Modify environment to reduce risk of injury  - Consider OT/PT consult to assist with strengthening/mobility   Outcome: Progressing      Problem: PAIN - ADULT  Goal: Verbalizes/displays adequate comfort level or baseline comfort level  Interventions:  - Encourage patient to monitor pain and request assistance  - Assess pain using appropriate pain scale  - Administer analgesics based on type and severity of pain and evaluate response  - Implement non-pharmacological measures as appropriate and evaluate response  - Consider cultural and social influences on pain and pain management  - Notify physician/advanced practitioner if interventions unsuccessful or patient reports new pain   Outcome: Progressing      Problem: INFECTION - ADULT  Goal: Absence or prevention of progression during hospitalization  INTERVENTIONS:  - Assess and monitor for signs and symptoms of infection  - Monitor lab/diagnostic results  - Monitor all insertion sites, i e  indwelling lines, tubes, and drains  - Monitor endotracheal (as able) and nasal secretions for changes in amount and color  - [place] appropriate cooling/warming therapies per order  - Administer medications as ordered  - Instruct and encourage patient and family to use good hand hygiene technique  - Identify and instruct in appropriate isolation precautions for identified infection/condition   Outcome: Progressing      Problem: SAFETY ADULT  Goal: Maintain or return to baseline ADL function  INTERVENTIONS:  -  Assess patient's ability to carry out ADLs; assess patient's baseline for ADL function and identify physical deficits which impact ability to perform ADLs (bathing, care of mouth/teeth, toileting, grooming, dressing, etc )  - Assess/evaluate cause of self-care deficits   - Assess range of motion  - Assess patient's mobility; develop plan if impaired  - Assess patient's need for assistive devices and provide as appropriate  - Encourage maximum independence but intervene and supervise when necessary  ¯ Involve family in performance of ADLs  ¯ Assess for home care needs following discharge   ¯ Request OT consult to assist with ADL evaluation and planning for discharge  ¯ Provide patient education as appropriate   Outcome: Progressing    Goal: Maintain or return mobility status to optimal level  INTERVENTIONS:  - Assess patient's baseline mobility status (ambulation, transfers, stairs, etc )    - Identify cognitive and physical deficits and behaviors that affect mobility  - Identify mobility aids required to assist with transfers and/or ambulation (gait belt, sit-to-stand, lift, walker, cane, etc )  - [place] fall precautions as indicated by assessment  - Record patient progress and toleration of activity level on Mobility SBAR; progress patient to next Phase/Stage  - Instruct patient to call for assistance with activity based on assessment  - Request Rehabilitation consult to assist with strengthening/weightbearing, etc    Outcome: Progressing      Problem: DISCHARGE PLANNING  Goal: Discharge to home or other facility with appropriate resources  INTERVENTIONS:  - Identify barriers to discharge w/patient and caregiver  - Arrange for needed discharge resources and transportation as appropriate  - Identify discharge learning needs (meds, wound care, etc )  - Arrange for interpretive services to assist at discharge as needed  - Refer to Case Management Department for coordinating discharge planning if the patient needs post-hospital services based on physician/advanced practitioner order or complex needs related to functional status, cognitive ability, or social support system   Outcome: Progressing      Problem: Knowledge Deficit  Goal: Patient/family/caregiver demonstrates understanding of disease process, treatment plan, medications, and discharge instructions  Complete learning assessment and assess knowledge base  Interventions:  - Provide teaching at level of understanding  - Provide teaching via preferred learning methods   Outcome: Progressing      Problem: Nutrition/Hydration-ADULT  Goal: Nutrient/Hydration intake appropriate for improving, restoring or maintaining nutritional needs  Monitor and assess patient's nutrition/hydration status for malnutrition (ex- brittle hair, bruises, dry skin, pale skin and conjunctiva, muscle wasting, smooth red tongue, and disorientation)  Collaborate with interdisciplinary team and initiate plan and interventions as ordered  Monitor patient's weight and dietary intake as ordered or per policy  Utilize nutrition screening tool and intervene per policy  Determine patient's food preferences and provide high-protein, high-caloric foods as appropriate       INTERVENTIONS:  - Monitor oral intake, urinary output, labs, and treatment plans  - Assess nutrition and hydration status and recommend course of action  - Evaluate amount of meals eaten  - Assist patient with eating if necessary   - Allow adequate time for meals  - Recommend/ encourage appropriate diets, oral nutritional supplements, and vitamin/mineral supplements  - Order, calculate, and assess calorie counts as needed  - Recommend, monitor, and adjust tube feedings and TPN/PPN based on assessed needs  - Assess need for intravenous fluids  - Provide specific nutrition/hydration education as appropriate  - Include patient/family/caregiver in decisions related to nutrition   Outcome: Progressing      Problem: DISCHARGE PLANNING - CARE MANAGEMENT  Goal: Discharge to post-acute care or home with appropriate resources  INTERVENTIONS:  - Conduct assessment to determine patient/family and health care team treatment goals, and need for post-acute services based on payer coverage, community resources, and patient preferences, and barriers to discharge  - Address psychosocial, clinical, and financial barriers to discharge as identified in assessment in conjunction with the patient/family and health care team  - Arrange appropriate level of post-acute services according to patient's   needs and preference and payer coverage in collaboration with the physician and health care team  - Communicate with and update the patient/family, physician, and health care team regarding progress on the discharge plan  - Arrange appropriate transportation to post-acute venues   Outcome: Progressing

## 2018-03-24 NOTE — NURSING NOTE
Pt wheeled off unit by RN, accompanied by sister  Discharge instructions given to pt  Verbal understanding of same  Pt in no acute distress

## 2018-03-26 ENCOUNTER — TRANSITIONAL CARE MANAGEMENT (OUTPATIENT)
Dept: INTERNAL MEDICINE CLINIC | Facility: CLINIC | Age: 83
End: 2018-03-26

## 2018-04-12 ENCOUNTER — TELEPHONE (OUTPATIENT)
Dept: INTERNAL MEDICINE CLINIC | Facility: CLINIC | Age: 83
End: 2018-04-12

## 2018-04-12 ENCOUNTER — OFFICE VISIT (OUTPATIENT)
Dept: INTERNAL MEDICINE CLINIC | Facility: CLINIC | Age: 83
End: 2018-04-12
Payer: COMMERCIAL

## 2018-04-12 VITALS
HEART RATE: 65 BPM | SYSTOLIC BLOOD PRESSURE: 122 MMHG | BODY MASS INDEX: 24.68 KG/M2 | TEMPERATURE: 97.3 F | DIASTOLIC BLOOD PRESSURE: 70 MMHG | OXYGEN SATURATION: 97 % | WEIGHT: 134.13 LBS | HEIGHT: 62 IN

## 2018-04-12 DIAGNOSIS — IMO0001 UNCONTROLLED TYPE 2 DIABETES MELLITUS WITHOUT COMPLICATION, WITH LONG-TERM CURRENT USE OF INSULIN: Primary | ICD-10-CM

## 2018-04-12 DIAGNOSIS — E78.2 MIXED HYPERLIPIDEMIA: Primary | ICD-10-CM

## 2018-04-12 DIAGNOSIS — M15.9 GENERALIZED OSTEOARTHRITIS OF MULTIPLE SITES: ICD-10-CM

## 2018-04-12 DIAGNOSIS — I35.0 MODERATE TO SEVERE AORTIC STENOSIS: Chronic | ICD-10-CM

## 2018-04-12 DIAGNOSIS — I47.1 SUPRAVENTRICULAR TACHYCARDIA (HCC): ICD-10-CM

## 2018-04-12 PROBLEM — K22.4 ESOPHAGEAL DYSMOTILITY: Status: ACTIVE | Noted: 2017-04-26

## 2018-04-12 PROBLEM — B35.1 ONYCHOMYCOSIS: Status: ACTIVE | Noted: 2017-05-16

## 2018-04-12 PROBLEM — K22.4 ESOPHAGEAL SPASM: Status: ACTIVE | Noted: 2017-03-07

## 2018-04-12 PROBLEM — K21.9 HIATAL HERNIA WITH GASTROESOPHAGEAL REFLUX: Status: ACTIVE | Noted: 2017-04-26

## 2018-04-12 PROBLEM — L29.9 PRURITUS: Status: ACTIVE | Noted: 2017-04-07

## 2018-04-12 PROBLEM — E11.40 DIABETIC NEUROPATHY (HCC): Status: ACTIVE | Noted: 2017-07-06

## 2018-04-12 PROBLEM — H91.93 BILATERAL HEARING LOSS: Status: ACTIVE | Noted: 2017-04-07

## 2018-04-12 PROBLEM — D64.9 ANEMIA: Status: ACTIVE | Noted: 2017-08-28

## 2018-04-12 PROBLEM — K44.9 HIATAL HERNIA WITH GASTROESOPHAGEAL REFLUX: Status: ACTIVE | Noted: 2017-04-26

## 2018-04-12 PROCEDURE — 99215 OFFICE O/P EST HI 40 MIN: CPT | Performed by: INTERNAL MEDICINE

## 2018-04-12 RX ORDER — PRAVASTATIN SODIUM 20 MG
20 TABLET ORAL DAILY
Qty: 90 TABLET | Refills: 3 | Status: SHIPPED | OUTPATIENT
Start: 2018-04-12 | End: 2018-11-08 | Stop reason: SDUPTHER

## 2018-04-12 NOTE — PROGRESS NOTES
Assessment/Plan:         Diagnoses and all orders for this visit:    Uncontrolled type 2 diabetes mellitus without complication, with long-term current use of insulin ([facility] )  Patient sugars remain fluctuant  She is agreeable to have Dr Alexia Montoya follow her sugars - he had recommended insulin in the past and she had insulin in the hospital recently but patient stopped use in the home in past  Offered diabetic ed classes but she wants to settle cardio issues first  Moderate to severe aortic stenosis  She is followed by Dr Issa Moore and in hospital was told to have eval for possible surgery but cardio has put on hold for now  She has monitor end of the month    Supraventricular tachycardia ([facility] )  She is scheduled for heart monitor at end of April and cardio follow up following   She is taking the medication as per Dr crouch BID and tolerating  Increase water intake  Use cane for stability/fall prevention    Generalized osteoarthritis of multiple sites  Patient doing ok  Uses cane No recent falls and no worsening pain Tylenol prn    rto 2 month       Patient ID: Javier Eller is a 80 y o  female  HPI   Pt recently hospitalized for tachycardia  She is seeing Dr crouch and will have recheck monitor at end of month  She feels ok No lightheadedness or dizziness  No chest pain  She is using her cpap  Blood sugars still between 150 and 300 range  No falls  She has appt with Dr Alexia Montoya and is aware Sylvie Zamudio is no longer with [facility]   She agrees to see Dr Alexia Montoya for her sugars but said he likely will want her back on insulin which patient had stopped in past     The following portions of the patient's history were reviewed and updated as appropriate:   Past Medical History:   Diagnosis Date    Bradycardia     Coronary artery disease     Depression     Depression     Diabetes mellitus ([facility] )     Type 2 DM    Disease of thyroid gland     Facial droop     started 10 years ago    GERD (gastroesophageal reflux disease)     Heart murmur     Hyperlipidemia     Osteoarthritis     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HCC)     Vitamin D deficiency      Social History     Social History    Marital status:      Spouse name: N/A    Number of children: N/A    Years of education: N/A     Occupational History    Not on file  Social History Main Topics    Smoking status: Never Smoker    Smokeless tobacco: Never Used    Alcohol use No    Drug use: No    Sexual activity: No     Other Topics Concern    Not on file     Social History Narrative    Caffeine use    Dental care regularly    Good dental hygiene    Uses safety equipment: seatbelts         Past Surgical History:   Procedure Laterality Date    ANKLE SURGERY Left 2005   [facility]    CATARACT EXTRACTION Bilateral 09/1995    HEMORRHOID SURGERY  1978    HERNIA REPAIR  1990    JOINT REPLACEMENT      b/l hip replacements, right 2006    NEUROPLASTY / TRANSPOSITION MEDIAN NERVE AT CARPAL TUNNEL  1977    PARTIAL HYSTERECTOMY  1971    TOTAL HIP ARTHROPLASTY      TRANSPHENOIDAL / TRANSNASAL HYPOPHYSECTOMY / RESECTION PITUITARY TUMOR  1989    excision of pituitary gland     Allergies   Allergen Reactions    Cephalosporins     Clindamycin     Hydrocodone     Oxycodone-Acetaminophen     Penicillins Hives    Simvastatin     Tetracyclines & Related      Family History   Problem Relation Age of Onset    Other Father      coal worker's pneumoconiosis    Heart disease Father     Diabetes Brother     Heart disease Brother     Prostate cancer Brother     Stomach cancer Maternal Grandmother     Diabetes Paternal Aunt          Review of Systems   Constitutional: Negative  HENT: Positive for postnasal drip  Eyes: Negative  Respiratory: Negative  Cardiovascular: Negative for chest pain, palpitations and leg swelling  Gastrointestinal: Negative  Endocrine: Negative  Genitourinary: Negative  Musculoskeletal: Positive for arthralgias  Skin: Negative  Allergic/Immunologic: Negative  Neurological: Negative for dizziness and light-headedness  Hematological: Negative  Psychiatric/Behavioral: Negative  /70   Pulse 65   Temp (!) 97 3 °F (36 3 °C) (Tympanic)   Ht 5' 2" (1 575 m)   Wt 60 8 kg (134 lb 2 oz)   SpO2 97%   BMI 24 53 kg/m²      Physical Exam   Constitutional: She is oriented to person, place, and time  She appears well-developed and well-nourished  No distress  HENT:   Head: Normocephalic and atraumatic  Right Ear: External ear normal    Left Ear: External ear normal    Nose: Nose normal    Mouth/Throat: Oropharynx is clear and moist  No oropharyngeal exudate  Eyes: Conjunctivae and EOM are normal  Pupils are equal, round, and reactive to light  No scleral icterus  Neck: Normal range of motion  Neck supple  No JVD present  Cardiovascular: Regular rhythm, normal heart sounds and intact distal pulses  HR 46 bpm   Pulmonary/Chest: Effort normal and breath sounds normal  No respiratory distress  She has no wheezes  She has no rales  Abdominal: Soft  Bowel sounds are normal  She exhibits no distension  There is no tenderness  There is no rebound and no guarding  Musculoskeletal: She exhibits edema and deformity  She exhibits no tenderness  Non pitting edema  Deformity of knee small effusion   Lymphadenopathy:     She has no cervical adenopathy  Neurological: She is alert and oriented to person, place, and time  She has normal reflexes  She displays normal reflexes  No cranial nerve deficit  She exhibits abnormal muscle tone  Coordination abnormal    Skin: Skin is warm and dry  She is not diaphoretic  Psychiatric: She has a normal mood and affect  Her behavior is normal  Judgment and thought content normal    Nursing note and vitals reviewed

## 2018-05-11 LAB
ALBUMIN SERPL BCP-MCNC: 3.8 G/DL (ref 3.5–5.7)
ALP SERPL-CCNC: 47 IU/L (ref 55–165)
ALT SERPL W P-5'-P-CCNC: 9 IU/L (ref 5–26)
ANION GAP SERPL CALCULATED.3IONS-SCNC: 16.3 MM/L
APTT PPP: 27.4 SEC (ref 24.4–37.6)
AST SERPL W P-5'-P-CCNC: 14 U/L (ref 7–26)
BASOPHILS # BLD AUTO: 0 X3/UL (ref 0–0.3)
BASOPHILS # BLD AUTO: 0.4 % (ref 0–2)
BILIRUB SERPL-MCNC: 0.6 MG/DL (ref 0.3–1)
BUN SERPL-MCNC: 13 MG/DL (ref 7–25)
CALCIUM SERPL-MCNC: 9.3 MG/DL (ref 8.6–10.5)
CHLORIDE SERPL-SCNC: 97 MM/L (ref 98–107)
CO2 SERPL-SCNC: 26 MM/L (ref 21–31)
CREAT SERPL-MCNC: 0.95 MG/DL (ref 0.6–1.2)
D-DIMER QUANTITATIVE (HISTORICAL): 590 NG/ML
DEPRECATED RDW RBC AUTO: 14.6 % (ref 11.5–14.5)
EGFR (HISTORICAL): 56 GFR
EGFR AFRICAN AMERICAN (HISTORICAL): > 60 GFR
EOSINOPHIL # BLD AUTO: 0 X3/UL (ref 0–0.5)
EOSINOPHIL NFR BLD AUTO: 0.4 % (ref 0–5)
GLUCOSE (HISTORICAL): 324 MG/DL (ref 65–99)
HCT VFR BLD AUTO: 39.8 % (ref 37–47)
HGB BLD-MCNC: 12.9 G/DL (ref 12–16)
INR PPP: 0.94 (ref 0.9–1.5)
LDL CHOLESTEROL DIRECT (HISTORICAL): 89.2 MG/DL
LYMPHOCYTES # BLD AUTO: 1.4 X3/UL (ref 1.2–4.2)
LYMPHOCYTES NFR BLD AUTO: 17.2 % (ref 20.5–51.1)
MCH RBC QN AUTO: 29.4 PG (ref 26–34)
MCHC RBC AUTO-ENTMCNC: 32.5 G/DL (ref 31–36)
MCV RBC AUTO: 90.5 FL (ref 81–99)
MONOCYTES # BLD AUTO: 0.5 X3/UL (ref 0–1)
MONOCYTES NFR BLD AUTO: 6.8 % (ref 1.7–12)
NEUTROPHILS # BLD AUTO: 6 X3/UL (ref 1.4–6.5)
NEUTS SEG NFR BLD AUTO: 75.2 % (ref 42.2–75.2)
OSMOLALITY, SERUM (HISTORICAL): 283 MOSM (ref 262–291)
PLATELET # BLD AUTO: 265 X3/UL (ref 130–400)
PMV BLD AUTO: 8.8 FL (ref 8.6–11.7)
POTASSIUM SERPL-SCNC: 4.3 MM/L (ref 3.5–5.5)
PROTHROMBIN TIME (HISTORICAL): 10.9 SEC (ref 10.1–12.9)
RBC # BLD AUTO: 4.39 X6/UL (ref 3.9–5.2)
SODIUM SERPL-SCNC: 135 MM/L (ref 134–143)
T4 FREE SERPL-MCNC: 1.39 NG/DL (ref 0.6–1.7)
TOTAL PROTEIN (HISTORICAL): 6.3 G/DL (ref 6.4–8.9)
TROPONIN I SERPL-MCNC: < 0.03 NG/ML
TSH SERPL DL<=0.05 MIU/L-ACNC: 0.03 UIU/M (ref 0.45–5.33)
WBC # BLD AUTO: 7.9 X3/UL (ref 4.8–10.8)

## 2018-05-12 LAB
ANION GAP SERPL CALCULATED.3IONS-SCNC: 9.3 MM/L
BASOPHILS # BLD AUTO: 0 X3/UL (ref 0–0.3)
BASOPHILS # BLD AUTO: 0.7 % (ref 0–2)
BUN SERPL-MCNC: 11 MG/DL (ref 7–25)
CALCIUM SERPL-MCNC: 8.4 MG/DL (ref 8.6–10.5)
CHLORIDE SERPL-SCNC: 104 MM/L (ref 98–107)
CO2 SERPL-SCNC: 29 MM/L (ref 21–31)
CREAT SERPL-MCNC: 0.68 MG/DL (ref 0.6–1.2)
DEPRECATED RDW RBC AUTO: 14.5 % (ref 11.5–14.5)
EGFR (HISTORICAL): > 60 GFR
EGFR AFRICAN AMERICAN (HISTORICAL): > 60 GFR
EOSINOPHIL # BLD AUTO: 0.1 X3/UL (ref 0–0.5)
EOSINOPHIL NFR BLD AUTO: 2.2 % (ref 0–5)
EST. AVERAGE GLUCOSE BLD GHB EST-MCNC: 238 MG/DL
GLUCOSE (HISTORICAL): 189 MG/DL (ref 65–99)
HBA1C MFR BLD HPLC: 9.9 % (ref 4–6.2)
HCT VFR BLD AUTO: 35.8 % (ref 37–47)
HGB BLD-MCNC: 11.5 G/DL (ref 12–16)
LYMPHOCYTES # BLD AUTO: 2.2 X3/UL (ref 1.2–4.2)
LYMPHOCYTES NFR BLD AUTO: 33.6 % (ref 20.5–51.1)
MAGNESIUM SERPL-MCNC: 1.7 MG/DL (ref 1.9–2.7)
MCH RBC QN AUTO: 29 PG (ref 26–34)
MCHC RBC AUTO-ENTMCNC: 32.2 G/DL (ref 31–36)
MCV RBC AUTO: 90.2 FL (ref 81–99)
MONOCYTES # BLD AUTO: 0.6 X3/UL (ref 0–1)
MONOCYTES NFR BLD AUTO: 8.8 % (ref 1.7–12)
NEUTROPHILS # BLD AUTO: 3.6 X3/UL (ref 1.4–6.5)
NEUTS SEG NFR BLD AUTO: 54.7 % (ref 42.2–75.2)
OSMOLALITY, SERUM (HISTORICAL): 280 MOSM (ref 262–291)
PHOSPHATE SERPL-MCNC: 3.1 MG/DL (ref 3–5.5)
PLATELET # BLD AUTO: 219 X3/UL (ref 130–400)
PMV BLD AUTO: 8.7 FL (ref 8.6–11.7)
POTASSIUM SERPL-SCNC: 4.3 MM/L (ref 3.5–5.5)
RBC # BLD AUTO: 3.97 X6/UL (ref 3.9–5.2)
SODIUM SERPL-SCNC: 138 MM/L (ref 134–143)
WBC # BLD AUTO: 6.6 X3/UL (ref 4.8–10.8)

## 2018-05-13 LAB
ANION GAP SERPL CALCULATED.3IONS-SCNC: 11 MM/L
BASOPHILS # BLD AUTO: 0 X3/UL (ref 0–0.3)
BASOPHILS # BLD AUTO: 0.5 % (ref 0–2)
BUN SERPL-MCNC: 12 MG/DL (ref 7–25)
CALCIUM SERPL-MCNC: 9.1 MG/DL (ref 8.6–10.5)
CHLORIDE SERPL-SCNC: 102 MM/L (ref 98–107)
CO2 SERPL-SCNC: 29 MM/L (ref 21–31)
CREAT SERPL-MCNC: 0.74 MG/DL (ref 0.6–1.2)
DEPRECATED RDW RBC AUTO: 14.4 % (ref 11.5–14.5)
EGFR (HISTORICAL): > 60 GFR
EGFR AFRICAN AMERICAN (HISTORICAL): > 60 GFR
EOSINOPHIL # BLD AUTO: 0.2 X3/UL (ref 0–0.5)
EOSINOPHIL NFR BLD AUTO: 2 % (ref 0–5)
GLUCOSE (HISTORICAL): 76 MG/DL (ref 65–99)
HCT VFR BLD AUTO: 38.1 % (ref 37–47)
HGB BLD-MCNC: 12.7 G/DL (ref 12–16)
LYMPHOCYTES # BLD AUTO: 2.6 X3/UL (ref 1.2–4.2)
LYMPHOCYTES NFR BLD AUTO: 29.1 % (ref 20.5–51.1)
MCH RBC QN AUTO: 30 PG (ref 26–34)
MCHC RBC AUTO-ENTMCNC: 33.4 G/DL (ref 31–36)
MCV RBC AUTO: 89.9 FL (ref 81–99)
MONOCYTES # BLD AUTO: 0.8 X3/UL (ref 0–1)
MONOCYTES NFR BLD AUTO: 9.4 % (ref 1.7–12)
NEUTROPHILS # BLD AUTO: 5.3 X3/UL (ref 1.4–6.5)
NEUTS SEG NFR BLD AUTO: 59 % (ref 42.2–75.2)
OSMOLALITY, SERUM (HISTORICAL): 274 MOSM (ref 262–291)
PLATELET # BLD AUTO: 238 X3/UL (ref 130–400)
PMV BLD AUTO: 8.2 FL (ref 8.6–11.7)
POTASSIUM SERPL-SCNC: 4 MM/L (ref 3.5–5.5)
RBC # BLD AUTO: 4.24 X6/UL (ref 3.9–5.2)
SODIUM SERPL-SCNC: 138 MM/L (ref 134–143)
WBC # BLD AUTO: 8.9 X3/UL (ref 4.8–10.8)

## 2018-05-14 ENCOUNTER — HOSPITAL ENCOUNTER (INPATIENT)
Facility: HOSPITAL | Age: 83
LOS: 2 days | Discharge: HOME HEALTH | DRG: 643 | End: 2018-05-17
Attending: ANESTHESIOLOGY | Admitting: EMERGENCY MEDICINE
Payer: COMMERCIAL

## 2018-05-14 DIAGNOSIS — J06.9 URTI (ACUTE UPPER RESPIRATORY INFECTION): ICD-10-CM

## 2018-05-14 DIAGNOSIS — I35.0 MODERATE TO SEVERE AORTIC STENOSIS: Chronic | ICD-10-CM

## 2018-05-14 DIAGNOSIS — E03.9 HYPOTHYROIDISM, UNSPECIFIED TYPE: ICD-10-CM

## 2018-05-14 DIAGNOSIS — E23.0 HYPOPITUITARISM (HCC): ICD-10-CM

## 2018-05-14 DIAGNOSIS — I47.1 SUPRAVENTRICULAR TACHYCARDIA (HCC): ICD-10-CM

## 2018-05-14 DIAGNOSIS — IMO0001 UNCONTROLLED TYPE 2 DIABETES MELLITUS WITHOUT COMPLICATION, WITH LONG-TERM CURRENT USE OF INSULIN: ICD-10-CM

## 2018-05-14 DIAGNOSIS — Z95.0 S/P PLACEMENT OF CARDIAC PACEMAKER: Primary | ICD-10-CM

## 2018-05-14 DIAGNOSIS — I49.5 SICK SINUS SYNDROME (HCC): ICD-10-CM

## 2018-05-14 LAB
ABG-SITE (HISTORICAL): ABNORMAL
ALLEN TEST (HISTORICAL): POSITIVE
ANION GAP SERPL CALCULATED.3IONS-SCNC: 10.1 MM/L
ANION GAP SERPL CALCULATED.3IONS-SCNC: 9.5 MM/L
BASE EXCESS BLDA CALC-SCNC: -5.2 MM/L (ref -2–3)
BASOPHILS # BLD AUTO: 0 X3/UL (ref 0–0.3)
BASOPHILS # BLD AUTO: 0.1 X3/UL (ref 0–0.3)
BASOPHILS # BLD AUTO: 0.4 % (ref 0–2)
BASOPHILS # BLD AUTO: 0.7 % (ref 0–2)
BUN SERPL-MCNC: 12 MG/DL (ref 7–25)
BUN SERPL-MCNC: 15 MG/DL (ref 7–25)
CALCIUM SERPL-MCNC: 7.9 MG/DL (ref 8.6–10.5)
CALCIUM SERPL-MCNC: 8.7 MG/DL (ref 8.6–10.5)
CHLORIDE SERPL-SCNC: 103 MM/L (ref 98–107)
CHLORIDE SERPL-SCNC: 105 MM/L (ref 98–107)
CO HEMOGLOBIN (HISTORICAL): 1.2 %
CO2 SERPL-SCNC: 25 MM/L (ref 21–31)
CO2 SERPL-SCNC: 29 MM/L (ref 21–31)
CREAT SERPL-MCNC: 0.8 MG/DL (ref 0.6–1.2)
CREAT SERPL-MCNC: 0.83 MG/DL (ref 0.6–1.2)
DEOXYHEMOGLOBIN (HISTORICAL): 4.7 %
DEPRECATED RDW RBC AUTO: 14.3 % (ref 11.5–14.5)
DEPRECATED RDW RBC AUTO: 14.5 % (ref 11.5–14.5)
EGFR (HISTORICAL): > 60 GFR
EGFR (HISTORICAL): > 60 GFR
EGFR AFRICAN AMERICAN (HISTORICAL): > 60 GFR
EGFR AFRICAN AMERICAN (HISTORICAL): > 60 GFR
EOSINOPHIL # BLD AUTO: 0.1 X3/UL (ref 0–0.5)
EOSINOPHIL # BLD AUTO: 0.2 X3/UL (ref 0–0.5)
EOSINOPHIL NFR BLD AUTO: 2.1 % (ref 0–5)
EOSINOPHIL NFR BLD AUTO: 2.9 % (ref 0–5)
GLUCOSE (HISTORICAL): 128 MG/DL (ref 65–99)
GLUCOSE (HISTORICAL): 80 MG/DL (ref 65–99)
HCO3 BLDA-SCNC: 19.5 MM/L (ref 22–26)
HCT VFR BLD AUTO: 39.7 % (ref 37–47)
HCT VFR BLD AUTO: 40.1 % (ref 37–47)
HGB BLD-MCNC: 12.7 G/DL (ref 12–16)
HGB BLD-MCNC: 12.8 G/DL (ref 12–16)
LACTATE DEHYDROGENASE FLUID (HISTORICAL): 2.6 MM/L (ref 0.5–2)
LITER FLOW (HISTORICAL): 3.5 L/MIN
LYMPHOCYTES # BLD AUTO: 0.9 X3/UL (ref 1.2–4.2)
LYMPHOCYTES # BLD AUTO: 2.2 X3/UL (ref 1.2–4.2)
LYMPHOCYTES NFR BLD AUTO: 16.4 % (ref 20.5–51.1)
LYMPHOCYTES NFR BLD AUTO: 29.7 % (ref 20.5–51.1)
MCH RBC QN AUTO: 29 PG (ref 26–34)
MCH RBC QN AUTO: 29.2 PG (ref 26–34)
MCHC RBC AUTO-ENTMCNC: 31.8 G/DL (ref 31–36)
MCHC RBC AUTO-ENTMCNC: 32.3 G/DL (ref 31–36)
MCV RBC AUTO: 90.4 FL (ref 81–99)
MCV RBC AUTO: 91.2 FL (ref 81–99)
METHGB MFR BLDCO: 0.5 %
MONOCYTES # BLD AUTO: 0.4 X3/UL (ref 0–1)
MONOCYTES # BLD AUTO: 0.7 X3/UL (ref 0–1)
MONOCYTES NFR BLD AUTO: 7.5 % (ref 1.7–12)
MONOCYTES NFR BLD AUTO: 9 % (ref 1.7–12)
NEUTROPHILS # BLD AUTO: 4.1 X3/UL (ref 1.4–6.5)
NEUTROPHILS # BLD AUTO: 4.3 X3/UL (ref 1.4–6.5)
NEUTS SEG NFR BLD AUTO: 57.7 % (ref 42.2–75.2)
NEUTS SEG NFR BLD AUTO: 73.6 % (ref 42.2–75.2)
O2 DEVICE (HISTORICAL): ABNORMAL
O2 SAT (HISTORICAL): 95.2 %
OSMOLALITY, SERUM (HISTORICAL): 273 MOSM (ref 262–291)
OSMOLALITY, SERUM (HISTORICAL): 275 MOSM (ref 262–291)
OXY HEMOGLOBIN (HISTORICAL): 93.5 % (ref 94–100)
PCO2 BLDA: 36.8 MM HG (ref 35–45)
PH BLDA: 7.34 [PH] (ref 7.35–7.45)
PLATELET # BLD AUTO: 227 X3/UL (ref 130–400)
PLATELET # BLD AUTO: 232 X3/UL (ref 130–400)
PMV BLD AUTO: 8.2 FL (ref 8.6–11.7)
PMV BLD AUTO: 8.4 FL (ref 8.6–11.7)
PO2 BLDA: 66 MM HG
POTASSIUM SERPL-SCNC: 3.5 MM/L (ref 3.5–5.5)
POTASSIUM SERPL-SCNC: 4.1 MM/L (ref 3.5–5.5)
RBC # BLD AUTO: 4.39 X6/UL (ref 3.9–5.2)
RBC # BLD AUTO: 4.4 X6/UL (ref 3.9–5.2)
SODIUM SERPL-SCNC: 136 MM/L (ref 134–143)
SODIUM SERPL-SCNC: 138 MM/L (ref 134–143)
TOTAL CO2 (HISTORICAL): 17.9 MM/L (ref 24–34)
TOTAL HGB (HISTORICAL): 11.8 G/DL (ref 12–16)
WBC # BLD AUTO: 5.5 X3/UL (ref 4.8–10.8)
WBC # BLD AUTO: 7.5 X3/UL (ref 4.8–10.8)

## 2018-05-15 ENCOUNTER — APPOINTMENT (INPATIENT)
Dept: NON INVASIVE DIAGNOSTICS | Facility: HOSPITAL | Age: 83
DRG: 643 | End: 2018-05-15
Payer: COMMERCIAL

## 2018-05-15 PROBLEM — R26.2 AMBULATORY DYSFUNCTION: Status: ACTIVE | Noted: 2018-05-15

## 2018-05-15 PROBLEM — R41.0 DELIRIUM: Status: ACTIVE | Noted: 2018-05-15

## 2018-05-15 PROBLEM — Z95.0 S/P PLACEMENT OF CARDIAC PACEMAKER: Status: ACTIVE | Noted: 2018-05-15

## 2018-05-15 PROBLEM — Z86.79 H/O TACHYCARDIA-BRADYCARDIA SYNDROME: Status: ACTIVE | Noted: 2018-05-15

## 2018-05-15 PROBLEM — R68.89 FORGETFULNESS: Status: ACTIVE | Noted: 2018-05-15

## 2018-05-15 PROBLEM — R48.8 ECHOLALIA: Status: ACTIVE | Noted: 2018-05-15

## 2018-05-15 PROBLEM — R53.81 PHYSICAL DECONDITIONING: Status: ACTIVE | Noted: 2018-05-15

## 2018-05-15 PROBLEM — I95.9 HYPOTENSION: Status: ACTIVE | Noted: 2018-05-15

## 2018-05-15 PROBLEM — Z91.81 HX OF FALL: Status: ACTIVE | Noted: 2018-05-15

## 2018-05-15 LAB
ANION GAP SERPL CALCULATED.3IONS-SCNC: 5 MMOL/L (ref 4–13)
ATRIAL RATE: 66 BPM
BUN SERPL-MCNC: 11 MG/DL (ref 5–25)
CA-I BLD-SCNC: 1.13 MMOL/L (ref 1.12–1.32)
CALCIUM SERPL-MCNC: 8.4 MG/DL (ref 8.3–10.1)
CHLORIDE SERPL-SCNC: 107 MMOL/L (ref 100–108)
CO2 SERPL-SCNC: 26 MMOL/L (ref 21–32)
CREAT SERPL-MCNC: 0.81 MG/DL (ref 0.6–1.3)
ERYTHROCYTE [DISTWIDTH] IN BLOOD BY AUTOMATED COUNT: 14 % (ref 11.6–15.1)
GFR SERPL CREATININE-BSD FRML MDRD: 67 ML/MIN/1.73SQ M
GLUCOSE SERPL-MCNC: 104 MG/DL (ref 65–140)
GLUCOSE SERPL-MCNC: 157 MG/DL (ref 65–140)
GLUCOSE SERPL-MCNC: 163 MG/DL (ref 65–140)
GLUCOSE SERPL-MCNC: 405 MG/DL (ref 65–140)
GLUCOSE SERPL-MCNC: 408 MG/DL (ref 65–140)
HCT VFR BLD AUTO: 42.8 % (ref 34.8–46.1)
HGB BLD-MCNC: 14.2 G/DL (ref 11.5–15.4)
Lab: 9.5 NG/ML (ref 1.5–38.5)
MAGNESIUM SERPL-MCNC: 1.3 MG/DL (ref 1.6–2.6)
MCH RBC QN AUTO: 30.3 PG (ref 26.8–34.3)
MCHC RBC AUTO-ENTMCNC: 33.2 G/DL (ref 31.4–37.4)
MCV RBC AUTO: 91 FL (ref 82–98)
P AXIS: 21 DEGREES
PHOSPHATE SERPL-MCNC: 2.4 MG/DL (ref 2.3–4.1)
PLATELET # BLD AUTO: 215 THOUSANDS/UL (ref 149–390)
PMV BLD AUTO: 10.3 FL (ref 8.9–12.7)
POTASSIUM SERPL-SCNC: 4.3 MMOL/L (ref 3.5–5.3)
PR INTERVAL: 158 MS
QRS AXIS: -65 DEGREES
QRSD INTERVAL: 96 MS
QT INTERVAL: 396 MS
QTC INTERVAL: 415 MS
RBC # BLD AUTO: 4.69 MILLION/UL (ref 3.81–5.12)
SODIUM SERPL-SCNC: 138 MMOL/L (ref 136–145)
T WAVE AXIS: 31 DEGREES
THYROGLOBULIN AB (HISTORICAL): <1 IU/ML (ref 0–0.9)
VENTRICULAR RATE: 66 BPM
WBC # BLD AUTO: 10.98 THOUSAND/UL (ref 4.31–10.16)

## 2018-05-15 PROCEDURE — 93010 ELECTROCARDIOGRAM REPORT: CPT | Performed by: INTERNAL MEDICINE

## 2018-05-15 PROCEDURE — 82948 REAGENT STRIP/BLOOD GLUCOSE: CPT

## 2018-05-15 PROCEDURE — 99222 1ST HOSP IP/OBS MODERATE 55: CPT | Performed by: PHYSICIAN ASSISTANT

## 2018-05-15 PROCEDURE — 83735 ASSAY OF MAGNESIUM: CPT | Performed by: PHYSICIAN ASSISTANT

## 2018-05-15 PROCEDURE — 93308 TTE F-UP OR LMTD: CPT | Performed by: INTERNAL MEDICINE

## 2018-05-15 PROCEDURE — 99223 1ST HOSP IP/OBS HIGH 75: CPT | Performed by: EMERGENCY MEDICINE

## 2018-05-15 PROCEDURE — 85027 COMPLETE CBC AUTOMATED: CPT | Performed by: PHYSICIAN ASSISTANT

## 2018-05-15 PROCEDURE — 82330 ASSAY OF CALCIUM: CPT | Performed by: PHYSICIAN ASSISTANT

## 2018-05-15 PROCEDURE — 80048 BASIC METABOLIC PNL TOTAL CA: CPT | Performed by: PHYSICIAN ASSISTANT

## 2018-05-15 PROCEDURE — 93308 TTE F-UP OR LMTD: CPT

## 2018-05-15 PROCEDURE — 99222 1ST HOSP IP/OBS MODERATE 55: CPT | Performed by: INTERNAL MEDICINE

## 2018-05-15 PROCEDURE — 99232 SBSQ HOSP IP/OBS MODERATE 35: CPT | Performed by: FAMILY MEDICINE

## 2018-05-15 PROCEDURE — 93325 DOPPLER ECHO COLOR FLOW MAPG: CPT | Performed by: INTERNAL MEDICINE

## 2018-05-15 PROCEDURE — 84100 ASSAY OF PHOSPHORUS: CPT | Performed by: PHYSICIAN ASSISTANT

## 2018-05-15 PROCEDURE — 93005 ELECTROCARDIOGRAM TRACING: CPT

## 2018-05-15 RX ORDER — DOCUSATE SODIUM 100 MG/1
100 CAPSULE, LIQUID FILLED ORAL 2 TIMES DAILY
Status: DISCONTINUED | OUTPATIENT
Start: 2018-05-15 | End: 2018-05-17 | Stop reason: HOSPADM

## 2018-05-15 RX ORDER — ASPIRIN 81 MG/1
81 TABLET, CHEWABLE ORAL DAILY
Status: DISCONTINUED | OUTPATIENT
Start: 2018-05-15 | End: 2018-05-17 | Stop reason: HOSPADM

## 2018-05-15 RX ORDER — HEPARIN SODIUM 5000 [USP'U]/ML
5000 INJECTION, SOLUTION INTRAVENOUS; SUBCUTANEOUS EVERY 8 HOURS SCHEDULED
Status: DISCONTINUED | OUTPATIENT
Start: 2018-05-15 | End: 2018-05-17 | Stop reason: HOSPADM

## 2018-05-15 RX ORDER — PRAVASTATIN SODIUM 20 MG
20 TABLET ORAL DAILY
Status: DISCONTINUED | OUTPATIENT
Start: 2018-05-15 | End: 2018-05-17 | Stop reason: HOSPADM

## 2018-05-15 RX ORDER — MAGNESIUM SULFATE HEPTAHYDRATE 40 MG/ML
2 INJECTION, SOLUTION INTRAVENOUS ONCE
Status: COMPLETED | OUTPATIENT
Start: 2018-05-15 | End: 2018-05-15

## 2018-05-15 RX ORDER — PANTOPRAZOLE SODIUM 40 MG/1
40 TABLET, DELAYED RELEASE ORAL
Status: DISCONTINUED | OUTPATIENT
Start: 2018-05-16 | End: 2018-05-17 | Stop reason: HOSPADM

## 2018-05-15 RX ORDER — LEVOTHYROXINE SODIUM 0.07 MG/1
75 TABLET ORAL
Status: DISCONTINUED | OUTPATIENT
Start: 2018-05-15 | End: 2018-05-17 | Stop reason: HOSPADM

## 2018-05-15 RX ORDER — FERROUS SULFATE 325(65) MG
325 TABLET ORAL
COMMUNITY
End: 2019-04-23 | Stop reason: ALTCHOICE

## 2018-05-15 RX ORDER — POLYETHYLENE GLYCOL 3350 17 G/17G
17 POWDER, FOR SOLUTION ORAL DAILY
Status: DISCONTINUED | OUTPATIENT
Start: 2018-05-15 | End: 2018-05-17 | Stop reason: HOSPADM

## 2018-05-15 RX ORDER — OXYBUTYNIN CHLORIDE 5 MG/1
10 TABLET, EXTENDED RELEASE ORAL 2 TIMES DAILY
Status: DISCONTINUED | OUTPATIENT
Start: 2018-05-15 | End: 2018-05-17 | Stop reason: HOSPADM

## 2018-05-15 RX ORDER — FERROUS SULFATE 325(65) MG
325 TABLET ORAL
Status: DISCONTINUED | OUTPATIENT
Start: 2018-05-16 | End: 2018-05-17 | Stop reason: HOSPADM

## 2018-05-15 RX ORDER — CHOLECALCIFEROL (VITAMIN D3) 125 MCG
1000 CAPSULE ORAL DAILY
Status: DISCONTINUED | OUTPATIENT
Start: 2018-05-15 | End: 2018-05-17 | Stop reason: HOSPADM

## 2018-05-15 RX ADMIN — HEPARIN SODIUM 5000 UNITS: 5000 INJECTION, SOLUTION INTRAVENOUS; SUBCUTANEOUS at 14:46

## 2018-05-15 RX ADMIN — CYANOCOBALAMIN TAB 500 MCG 1000 MCG: 500 TAB at 14:46

## 2018-05-15 RX ADMIN — HYDROCORTISONE SODIUM SUCCINATE 50 MG: 100 INJECTION, POWDER, FOR SOLUTION INTRAMUSCULAR; INTRAVENOUS at 21:24

## 2018-05-15 RX ADMIN — ZINC 1 TABLET: TAB ORAL at 16:51

## 2018-05-15 RX ADMIN — OXYBUTYNIN CHLORIDE 10 MG: 5 TABLET, EXTENDED RELEASE ORAL at 17:56

## 2018-05-15 RX ADMIN — MAGNESIUM SULFATE HEPTAHYDRATE 2 G: 40 INJECTION, SOLUTION INTRAVENOUS at 03:07

## 2018-05-15 RX ADMIN — HEPARIN SODIUM 5000 UNITS: 5000 INJECTION, SOLUTION INTRAVENOUS; SUBCUTANEOUS at 05:50

## 2018-05-15 RX ADMIN — CALCIUM GLUCONATE 1 G: 98 INJECTION, SOLUTION INTRAVENOUS at 03:46

## 2018-05-15 RX ADMIN — INSULIN LISPRO 1 UNITS: 100 INJECTION, SOLUTION INTRAVENOUS; SUBCUTANEOUS at 08:37

## 2018-05-15 RX ADMIN — PRAVASTATIN SODIUM 20 MG: 20 TABLET ORAL at 14:46

## 2018-05-15 RX ADMIN — HYDROCORTISONE SODIUM SUCCINATE 100 MG: 100 INJECTION, POWDER, FOR SOLUTION INTRAMUSCULAR; INTRAVENOUS at 05:50

## 2018-05-15 RX ADMIN — ASPIRIN 81 MG 81 MG: 81 TABLET ORAL at 14:46

## 2018-05-15 RX ADMIN — INSULIN LISPRO 5 UNITS: 100 INJECTION, SOLUTION INTRAVENOUS; SUBCUTANEOUS at 16:50

## 2018-05-15 RX ADMIN — NOREPINEPHRINE BITARTRATE 4 MCG/MIN: 1 INJECTION, SOLUTION, CONCENTRATE INTRAVENOUS at 01:17

## 2018-05-15 RX ADMIN — HEPARIN SODIUM 5000 UNITS: 5000 INJECTION, SOLUTION INTRAVENOUS; SUBCUTANEOUS at 01:56

## 2018-05-15 RX ADMIN — INSULIN LISPRO 1 UNITS: 100 INJECTION, SOLUTION INTRAVENOUS; SUBCUTANEOUS at 12:15

## 2018-05-15 RX ADMIN — POLYETHYLENE GLYCOL 3350 17 G: 17 POWDER, FOR SOLUTION ORAL at 14:56

## 2018-05-15 RX ADMIN — INSULIN LISPRO 4 UNITS: 100 INJECTION, SOLUTION INTRAVENOUS; SUBCUTANEOUS at 21:23

## 2018-05-15 RX ADMIN — MAGNESIUM SULFATE HEPTAHYDRATE 2 G: 40 INJECTION, SOLUTION INTRAVENOUS at 07:15

## 2018-05-15 RX ADMIN — HYDROCORTISONE SODIUM SUCCINATE 50 MG: 100 INJECTION, POWDER, FOR SOLUTION INTRAMUSCULAR; INTRAVENOUS at 14:46

## 2018-05-15 RX ADMIN — HEPARIN SODIUM 5000 UNITS: 5000 INJECTION, SOLUTION INTRAVENOUS; SUBCUTANEOUS at 21:24

## 2018-05-15 NOTE — CASE MANAGEMENT
Thank you,  [facility] in the [facility] by Javon Covarrubias for 2017  Network Utilization Review Department  Phone: [phone]; Fax [phone]  ATTENTION: The Network Utilization Review Department is now centralized for our 7 Facilities  Make a note that we have a new phone and fax numbers for our Department  Please call with any questions or concerns to [phone] and carefully follow the prompts so that you are directed to the right person  All voicemails are confidential  Fax any determinations, approvals, denials, and requests for initial or continue stay review clinical to [phone]  Due to HIGH CALL volume, it would be easier if you could please send faxed requests to expedite your requests and in part, help us provide discharge notifications faster  Initial Clinical Review    Admission: Date/Time/Statement:    5/15/18 AT 0135 URGENT INPATIENT TRANSFER FROM [facility] TO [facility] 2ND PERSISTENT HYPOTENSION DESPITE IV FLUIDS + IV LEVOPHED - FOR ICU MANAGEMENT       Orders Placed This Encounter   Procedures    Inpatient Admission     Standing Status:   Standing     Number of Occurrences:   1     Order Specific Question:   Admitting Physician     Answer:   Sandrine Bridges     Order Specific Question:   Level of Care     Answer:   Critical Care [15]     Order Specific Question:   Estimated length of stay     Answer:   More than 2 Midnights     Order Specific Question:   Certification     Answer:   I certify that inpatient services are medically necessary for this patient for a duration of greater than two midnights  See H&P and MD Progress Notes for additional information about the patient's course of treatment         Date/Time/Mode of Arrival: 5/15/18 AT 0135 URGENT INPATIENT TRANSFER FROM [facility] TO [facility] 2ND PERSISTENT HYPOTENSION DESPITE IV FLUIDS + IV LEVOPHED - FOR ICU MANAGEMENT    Reason for Admission / Chief Complaint: Hypotension     History of Present Illness:  Yamile Francis is a 80 y o  female who presented to [facility] on 5/11 complaining of chest pain with radiation to neck  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, admitted to ICU and Dr Angelina Brantley was consulted for pacemaker placement  Pacemaker was placed early in the day on 5/14 and patient returned to her bed  At approximately 1730 patient was noted to be hypotensive and patient was administered 500cc NS with improvement of BP,  Another bolus of 500cc of NS was given at 1830 and levophed was started  The patient was then given 100mg of hydrocortisone  Patient transferred to [facility] for further evaluation and management         Review of Systems   Genitourinary:        Feels like she has to urinate + canales   All other systems reviewed and are negative  Physical Exam:  Constitutional: She appears well-developed and well-nourished  No distress  Sleepy appearing elderly female    Cardiovascular: Normal rate, regular rhythm and intact distal pulses  Exam reveals no gallop and no friction rub  Murmur heard  Pulmonary/Chest: Effort normal and breath sounds normal  No respiratory distress  She has no wheezes  She has no rales  She exhibits no tenderness  Abdominal: Soft  Bowel sounds are normal  She exhibits no distension  There is no tenderness  Musculoskeletal:   Trace lower extremity edema bilaterally    Neurological: She is alert  Oriented to person and time  Was unsure where she was  Follows 2 step commands briskly  Strength 5/5 throughout, gently tested in LUE 2/2 recent pacemaker insertion and unable to lift arm          ED Vital Signs:   ED Triage Vitals   Temperature Pulse Respirations Blood Pressure SpO2   05/15/18 0136 05/15/18 0130 05/15/18 0130 05/15/18 0140 05/15/18 0130   98 7 °F (37 1 °C) 68 21 116/62 98 %      Temp Source Heart Rate Source Patient Position - Orthostatic VS BP Location FiO2 (%)   05/15/18 0136 -- -- -- --   Oral Pain Score       05/15/18 0800       No Pain        Wt Readings from Last 1 Encounters:   05/15/18 65 kg (143 lb 4 8 oz)      05/14 0701  05/15 0700 05/15 0701  05/15 1710  Most Recent    Temperature (°F) 98 198 7 9898 2  98 2 (36 8)    Pulse 6080 6974  69    Respirations 1221 1418  18    Blood Pressure 93/65 87/53  109/66    SpO2 (%) 9399 9498  94        LABS/Diagnostic Test Results:   CBC  Results from last 7 days  Lab Units 05/15/18  0155   WBC Thousand/uL 10 98*   HEMOGLOBIN g/dL 14 2   HEMATOCRIT % 42 8   PLATELETS Thousands/uL 215      CMP  Results from last 7 days  Lab Units 05/15/18  0155   SODIUM mmol/L 138   POTASSIUM mmol/L 4 3   CHLORIDE mmol/L 107   CO2 mmol/L 26   BUN mg/dL 11   CREATININE mg/dL 0 81   CALCIUM mg/dL 8 4   GLUCOSE RANDOM mg/dL 104               Past Medical/Surgical History:    Active Ambulatory Problems     Diagnosis Date Noted    Uncontrolled type 2 diabetes mellitus without complication, with long-term current use of insulin ([place] [address] ) 01/13/2017    Hyperlipidemia 01/13/2017    Hypertension 01/13/2017    Low TSH level 08/17/2017    Hypothyroidism 08/17/2017    History of CVA (cerebrovascular accident) 08/18/2017    Sleep apnea 08/20/2017    Diabetes 1 5, managed as type 2 ([facility] ) 03/01/2018    Sick sinus syndrome ([facility] ) 03/01/2018    Supraventricular tachycardia ([place] [address] ) 03/22/2018    Chronic diastolic congestive heart failure ([facility] ) 03/23/2018    NSTEMI (non-ST elevated myocardial infarction) ([facility] ) 03/23/2018    Moderate to severe aortic stenosis 03/23/2018    Atelectasis 03/23/2018    Hiatal hernia 03/24/2018    Thyroid nodule 03/24/2018    Anemia 08/28/2017    Bilateral hearing loss 04/07/2017    Chronic bilateral low back pain without sciatica 08/25/2016    Depression 03/07/2014    Di[facility] ) 09/09/2014    Diabetic neuropathy ([place] [address] ) 07/06/2017    Esophageal dysmotility 04/26/2017    Esophageal reflux 02/19/2013    Esophageal spasm 03/07/2017    Generalized osteoarthritis of multiple sites 10/23/2015    Hiatal hernia with gastroesophageal reflux 04/26/2017    [facility]op[facility] ) 05/15/2015    Onychomycosis 05/16/2017    Pruritus 04/07/2017    Vitamin D deficiency 12/30/2013     Resolved Ambulatory Problems     Diagnosis Date Noted    Chest pain 01/13/2017    Vomiting 01/13/2017    Elevated lactic acid level 01/13/2017    Chest pain 08/17/2017    Generalized weakness 08/17/2017    Frequent loose stools 08/17/2017    Tachycardia 08/18/2017    Elevated troponin 03/23/2018     Past Medical History:   Diagnosis Date    Bradycardia     Coronary artery disease     Depression     Depression     Diabetes mellitus ([facility])     Disease of thyroid gland     Facial droop     GERD (gastroesophageal reflux disease)     Heart murmur     Hyperlipidemia     Osteoarthritis     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[facility])     Vitamin D deficiency        Admitting Diagnosis: Hypotension [I95 9]    Age/Sex: 80 y o  female      Assessment/Plan:     Assessment:   Principal Problem:    Hypotension  Active Problems:    Uncontrolled type 2 diabetes mellitus without complication, with long-term current use of insulin ([facility])    Hypothyroidism    Sleep apnea    Moderate to severe aortic stenosis    Depression    Hiatal hernia with gastroesophageal reflux    Hypopituitarism ([facility] )    H/O tachycardia-bradycardia syndrome s/p PPM    S/P placement of cardiac pacemaker     Plan:                 Neuro:   · Delirium precautions: CAM ICU daily, regulate sleep/wake cycle  · Trend neuro exam     CV:   · Cardiac infusions: Levophed, 4 mcg/min  ? Wean as able  ? MAP goal > 65  ? · Rhythm: NSR  ?  Follow rhythm on telemetry  · Monitor pacemaker site, post op pacemaker standard precautions  · Need to verify home medications       Lung:   · Encourage deep breathing, coughing, and incentive spirometry  · Wean NC as tolerated  · SpO2 goal >92%     GI:   · Continue PPI for GERD  · Continue home bowel regimen     FEN:   · Nutrition/diet plan: Cardiac diet  · Replete electrolytes with goals: K >4 0, Mag >2 0, and Phos >3 0     :   · Indwelling Nair present: yes   · Trend UOP and BUN/creat  · Strict I and O     ID:   · Trend temps and WBC count  · Maintain normothermia     Heme:   · Trend hgb and plts  ? Transfuse as needed for goal hgb >7 0     Endo:   · Glycemic control plan: SSI  · Hydrocortisone 100mg Q8  · Restart home synthroid      MSK/Skin:  · Mobility goal:   · Frequent turning and pressure off-loading  · Local wound care as needed     Patient and family unclear of home medications and dosages  Will contact pharmacy in AM       Disposition:  Admit to ICU     VTE Pharmacologic Prophylaxis: Heparin  VTE Mechanical Prophylaxis: sequential compression device      Given critical illness, patient length of stay will require greater than two midnights          Admission Orders:  5/15/18 AT 0135  ADMIT INPATIENT TO ICU  CARDIO PULM MONITORING  VS PER ICU Q1HR    Turn q2hrs as able  SCD      NPO    Continuous IV Infusions:    IVF norepinephrine (LEVOPHED) 4 mg (STANDARD CONCENTRATION) IV in sodium chloride 0 9% 250 mL  TITRATE MAP > 65      Scheduled Meds:   Current Facility-Administered Medications:  aspirin 81 mg Oral Daily PRESTON Santos   cyanocobalamin 1,000 mcg Oral Daily PRESTON Santos   docusate sodium 100 mg Oral BID PRESTON Santos   [START ON 5/16/2018] ferrous sulfate 325 mg Oral Daily With Breakfast PRESTON Santos   heparin (porcine) 5,000 Units Subcutaneous Q8H [facility] Amira Perla PA-C   hydrocortisone sodium succinate 50 mg Intravenous Q8H [facility] PRESTON Santos   insulin lispro 1-5 Units Subcutaneous TID AC Amira Perla PA-C   insulin lispro 1-5 Units Subcutaneous HS Ofelia Taylor MD   levothyroxine 75 mcg Oral Early Morning Amira Perla PA-C   multivitamin stress formula 1 tablet Oral Daily Fior Spironello V, CRNP   oxybutynin 10 mg Oral BID Fior Spironello V, CRNP   [START ON 5/16/2018] pantoprazole 40 mg Oral Early Morning Fior Spironello V, CRNP   polyethylene glycol 17 g Oral Daily Fior Spironello V, CRNP   pravastatin 20 mg Oral Daily Fior Spironello V, CRNP       PRN Meds:     CONSULT CARD    CONSULT ELECTROPHYSIOLOGY        Electrophysiology  Consults Date of Service: 5/15/2018 10:25 AM        Assessment/Plan:   Assessment:  1  Hypotension requiring pressors              A ) thought secondary to adrenal insufficiency  2  Tachy-sana syndrome, status post pacemaker implantation by Dr Shelton Khan on 5/14/2018  3  Preserved LV systolic function with ejection fraction 60-65% per echo 03/2018  4  Severe aortic stenosis with aortic valve area 0 61 cm2  5  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

## 2018-05-15 NOTE — PROGRESS NOTES
Transfer Note - ICU/Stepdown Transfer to [facility]/MS tele   Duane Backer 80 y o  female MRN: [id number]  [address]   Unit/Bed#: [place] 419-58 Encounter: [id number]    Code Status: Level 1 - Full Code    Reason for ICU/Stepdown admission: hypotension     Active problems: Principal Problem:    Hypotension  Active Problems:    Hypopituitarism ([address] )    H/O tachycardia-bradycardia syndrome s/p PPM    S/P placement of cardiac pacemaker    Uncontrolled type 2 diabetes mellitus without complication, with long-term current use of insulin (HCC)    Moderate to severe aortic stenosis    Hypothyroidism    Depression    Hiatal hernia with gastroesophageal reflux    Sleep apnea    Delirium    Forgetfulness    Echolalia    Hx of fall    Ambulatory dysfunction    Physical deconditioning  Resolved Problems:    * No resolved hospital problems  *      No new Assessment & Plan notes have been filed under this hospital service since the last note was generated  Service: Critical Care/ICU      Consultants:   · EP, Cardiology, Endocrine     History of Present Illness/Summary of clinical course: 80year old female with pmh of DM, SVT, CAD, Chronic diastolic HF, Severe AS, GERD, b/l hip replacement, CRISTAL, partial hysterectomy, and 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 secondary to refractory hypotension  She was admitted to [facility] on 5/11 with CP radiating to neck, and was found to have tachy-sana syndrome, for which a PPM was placed yesterday  Post-procedure she was hypotensive, with SBP as low as 60, refractory to fluid administration and was then started on Levo, and required as high as 12mcg  She is normally maintained on prednisone 5mg daily which was stopped on hospital admission, and given her history of panhypopituitarism this is the most likely etiology of her hypotension   She was given 100mg IV Hydrocortisone and maintained on this q8h  Her BP quickly improved and she was weaned off Levo by early this morning  Hydrocortisone weaned to 50mg q8h to begin this afternoon as patient's BP has stabilized  Please refer to today's progress note for further clinical details  Recent or scheduled procedures: Echo (TTE) done today    Outstanding/pending diagnostics: follow up on formal read on Echo  Wean steroids as clinically indicated and per endocrinology recommendations        Mobilization Plan: PT/OT consulted     Nutrition Plan: Cardiac Step 1 CCD1 diet     Discharge Plan: discharge to home once medically stable      Specific Diagnosis Plan:    Sepsis: N/A  Heart Failure:  Cardiology consult placed  yes    Diuresis plan: none     Beta blocker on hold today secondary to recent hypotension, re-evaluate tomorrow     Hyperglycemia:  Continue SSI coverage  Home Januvia and Prandin on hold  Need for Endocrine consult: yes secondary to hypopituitarism     [  ] Family aware of transfer out of critical care: yes     Spoke with Dr Fredy Bernheim regarding transfer @ [phone]  Patient accepted to their service      PRESTON Leyva

## 2018-05-15 NOTE — SOCIAL WORK
CM met with patient , independent lives alone in house 2 steps to  Entrance  Use a cane to assist with ambulation, has commode  Has supportive system son Earl Anguiano [phone] as per patient is her POA  Sister Cookie Corral is currently staying with patient to help out  Fills prescriptions at [facility] (between [address])  Denies ETOH, mental health dx or any inpatient stays  Son will  patient upon discharged  Patient/caregiver received discharge checklist   Content reviewed  Patient/caregiver encouraged to participate in discharge plan of care prior to discharge home  CM reviewed d/c planning process including the following: identifying help at home, patient preference for d/c planning needs, Discharge Lounge, [facility] Meds to Bed program, availability of treatment team to discuss questions or concerns patient and/or family may have regarding understanding medications and recognizing signs and symptoms once discharged  CM also encouraged patient to follow up with all recommended appointments after discharge  Patient advised of importance for patient and family to participate in managing patients medical well being

## 2018-05-15 NOTE — PROGRESS NOTES
Patient transferred from  ICU in stable condition, no complaints at this time  Accompanied by sister who jo ann be spending the night  Assessment benign, left chest incision clean dry and intact, TORSTEN

## 2018-05-15 NOTE — H&P
History and Physical - Critical Care   Emigdio Bai 80 y o  female MRN: [id number]  Unit/Bed#: [facility] 472-05 Encounter: [id number]    Reason for Admission / Chief Complaint: Hypotension    History of Present Illness:  Emigdio Bai is a 80 y o  female who presented to 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, admitted to ICU and Dr Damaso Roach was consulted for pacemaker placement  Pacemaker was placed early in the day on 5/14 and patient returned to her bed  At approximately 1730 patient was noted to be hypotensive and patient was administered 500cc NS with improvement of BP,  Another bolus of 500cc of NS was given at 1830 and levophed was started  The patient was then given 100mg of hydrocortisone  Levophed was able to be started weaning after administration of steroids  Patient transferred to [facility] for further evaluation and management  Patient currently has no complaints of pain  She reports being tired  PMHx: DM, SVT, HTN, CAD, Chronic diastolic CHF, Severe AS, 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 at [facility] in [facility], bilateral hip replacements, appendectomy, partial hysterectomy, CRISTAL    History obtained from chart review and the patient      Past Medical History:  Past Medical History:   Diagnosis Date    Bradycardia     Coronary artery disease     Depression     Depression     Diabetes mellitus ([facility] [address] )     Type 2 DM    Disease of thyroid gland     Facial droop     started 10 years ago    GERD (gastroesophageal reflux disease)     Heart murmur     Hyperlipidemia     Osteoarthritis     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[facility] [address] )     Vitamin D deficiency        Past Surgical History:  Past Surgical History:   Procedure Laterality Date    ANKLE SURGERY Left 2005   [facility]    CATARACT EXTRACTION Bilateral 09/1995    [facility]    JOINT REPLACEMENT      b/l hip replacements, right 2006    NEUROPLASTY / TRANSPOSITION MEDIAN NERVE AT CARPAL TUNNEL  1977    PARTIAL HYSTERECTOMY  1971    TOTAL HIP ARTHROPLASTY      TRANSPHENOIDAL / TRANSNASAL HYPOPHYSECTOMY / RESECTION PITUITARY TUMOR  1989    excision of pituitary gland       Past Family History:  Family History   Problem Relation Age of Onset    Other Father      coal worker's pneumoconiosis    Heart disease Father     Diabetes Brother     Heart disease Brother     Prostate cancer Brother     Stomach cancer Maternal Grandmother     Diabetes Paternal Aunt        Social History:  History   Smoking Status    Never Smoker   Smokeless Tobacco    Never Used     History   Alcohol Use No     History   Drug Use No     Marital Status:     Medications:  Current Facility-Administered Medications   Medication Dose Route Frequency    heparin (porcine) subcutaneous injection 5,000 Units  5,000 Units Subcutaneous Q8H [facility]    norepinephrine (LEVOPHED) 4 mg (STANDARD CONCENTRATION) IV in sodium chloride 0 9% 250 mL  1-30 mcg/min Intravenous Continuous     Home medications:  Prior to Admission medications    Medication Sig Start Date End Date Taking?  Authorizing Provider   aspirin 81 MG tablet Take 1 tablet by mouth daily    Historical Provider, MD   b complex vitamins tablet Take 1 tablet by mouth daily    Historical Provider, MD   Cholecalciferol 1000 units capsule Take 1,000 Units by mouth    Historical Provider, MD   cyanocobalamin ([employer] VITAMIN B-12) 1000 MCG tablet Take 1,000 mcg by mouth    Historical Provider, MD   docusate sodium (COLACE) 100 mg capsule Take 1 capsule by mouth 2 (two) times a day 8/21/17   Cory Mccray DO   ergocalciferol (VITAMIN D2) 50,000 units Take 50,000 Units by mouth once a week    Historical Provider, MD   levothyroxine 75 mcg tablet Take 75 mcg by mouth daily in the early morning   10/21/14   Historical Provider, MD   LYCOPENE PO Take 1 tablet by mouth Historical Provider, MD   metFORMIN (FORTAMET) 500 MG (OSM) 24 hr tablet Take 500 mg by mouth 2 (two) times a day with meals    Historical Provider, MD   metoprolol succinate (TOPROL-XL) 25 mg 24 hr tablet Take 0 5 tablets (12 5 mg total) by mouth 2 (two) times a day  Patient taking differently: Take 25 mg by mouth 2 (two) times a day   3/24/18   Yoana Cui MD   nitroglycerin (NITROSTAT) 0 4 mg SL tablet Place 0 4 mg under the tongue    Historical Provider, MD   oxybutynin (DITROPAN-XL) 10 MG 24 hr tablet Take 10 mg by mouth 2 (two) times a day CL-ER 10mg twice daily     Historical Provider, MD   pantoprazole (PROTONIX) 40 mg tablet Take by mouth 17   Historical Provider, MD   polyethylene glycol (MIRALAX) powder Take by mouth    Historical Provider, MD   pravastatin (PRAVACHOL) 20 mg tablet Take 1 tablet (20 mg total) by mouth daily 18   Susan Callaway DO   predniSONE 5 mg tablet Take by mouth 11   Historical Provider, MD   repaglinide (PRANDIN) 2 mg tablet Take 4 mg by mouth 3 (three) times a day before meals      Historical Provider, MD   sitaGLIPtin (JANUVIA) 50 mg tablet Take 1 tablet by mouth daily 14   Historical Provider, MD     Allergies: Allergies   Allergen Reactions    Cephalosporins     Clindamycin     Hydrocodone     Oxycodone-Acetaminophen     Penicillins Hives    Simvastatin     Tetracyclines & Related        ROS:   Review of Systems   Genitourinary:        Feels like she has to urinate + canales   All other systems reviewed and are negative  Vitals:  Vitals:    05/15/18 0136   Temp: 98 7 °F (37 1 °C)   TempSrc: Oral   Weight: 65 kg (143 lb 4 8 oz)     Temperature:   Temp (24hrs), Av 7 °F (37 1 °C), Min:98 7 °F (37 1 °C), Max:98 7 °F (37 1 °C)    Current Temperature: 98 7 °F (37 1 °C)    Weights:   IBW: -92 5 kg  Body mass index is 26 21 kg/m²      Hemodynamic Monitoring:  N/A     Non-Invasive/Invasive Ventilation Settings:  SpO2:    Respiratory    Lab Data (Last 4 hours)    None         O2/Vent Data (Last 4 hours)    None              Respiratory:                    No results found for: PHART, ZWW1ALL, PO2ART, [id number], [id number], BEART, SOURCE  SpO2:       Physical Exam:  Physical Exam   Constitutional: She appears well-developed and well-nourished  No distress  Sleepy appearing elderly female    HENT:   Head: Normocephalic and atraumatic  Eyes: Pupils are equal, round, and reactive to light  Neck: No JVD present  No tracheal deviation present  Cardiovascular: Normal rate, regular rhythm and intact distal pulses  Exam reveals no gallop and no friction rub  Murmur heard  Pulmonary/Chest: Effort normal and breath sounds normal  No respiratory distress  She has no wheezes  She has no rales  She exhibits no tenderness  Abdominal: Soft  Bowel sounds are normal  She exhibits no distension  There is no tenderness  Musculoskeletal:   Trace lower extremity edema bilaterally    Neurological: She is alert  Oriented to person and time  Was unsure where she was  Follows 2 step commands briskly  Strength 5/5 throughout, gently tested in LUE 2/2 recent pacemaker insertion and unable to lift arm  Skin: Skin is warm and dry  Labs: Invalid input(s):  EOSPCT       Invalid input(s): LABALBU                 Imaging: ECHO performed at outside hospital reported to show no evidence of pericardial effusion or tamponade  EKG: NSR with T wave inversion in III, aVF, V4-V6 This was personally reviewed by myself     Micro:  No results found for: Bibi Duran    ______________________________________________________________________    Assessment:   Principal Problem:    Hypotension  Active Problems:    Uncontrolled type 2 diabetes mellitus without complication, with long-term current use of insulin (HCC)    Hypothyroidism    Sleep apnea    Moderate to severe aortic stenosis    Depression    Hiatal hernia with gastroesophageal reflux Hypopituitarism ([facility] [address] )    H/O tachycardia-bradycardia syndrome s/p PPM    S/P placement of cardiac pacemaker    Plan:     Neuro:   · Delirium precautions: CAM ICU daily, regulate sleep/wake cycle  · Trend neuro exam    CV:   · Cardiac infusions: Levophed, 4 mcg/min  · Wean as able  · MAP goal > 65  · Would hold on arterial line and central line placement if able to rapidly wean from pressor    · Rhythm: NSR  · Follow rhythm on telemetry  · Monitor pacemaker site, post op pacemaker standard precautions  · Need to verify home medications  Lung:   · Encourage deep breathing, coughing, and incentive spirometry  · Wean NC as tolerated  · SpO2 goal >92%    GI:   · Continue PPI for GERD  · Continue home bowel regimen    FEN:   · Nutrition/diet plan: Cardiac diet  · Replete electrolytes with goals: K >4 0, Mag >2 0, and Phos >3 0    :   · Indwelling Nair present: yes   · Trend UOP and BUN/creat  · Strict I and O    ID:   · Trend temps and WBC count  · Maintain normothermia    Heme:   · Trend hgb and plts  · Transfuse as needed for goal hgb >7 0    Endo:   · Glycemic control plan: SSI  · Hydrocortisone 100mg Q8  · Restart home synthroid     MSK/Skin:  · Mobility goal:   · Frequent turning and pressure off-loading  · Local wound care as needed    Patient and family unclear of home medications and dosages  Will contact pharmacy in AM      Disposition:  Admit to ICU    ______________________________________________________________________    VTE Pharmacologic Prophylaxis: Heparin  VTE Mechanical Prophylaxis: sequential compression device    Invasive lines and devices: Invasive Devices          No matching active lines, drains, or airways          Code Status: Level 1 - Full Code  POA:    POLST:      Given critical illness, patient length of stay will require greater than two midnights  Portions of the record may have been created with voice recognition software    Occasional wrong word or "sound a like" substitutions may have occurred due to the inherent limitations of voice recognition software  Read the chart carefully and recognize, using context, where substitutions have occurred        Ally Finn PA-C

## 2018-05-15 NOTE — CONSULTS
Consultation - Electrophysiology-Cardiology (EP)   Tahir iMles 80 y o  female MRN: [id number]  Unit/Bed#: [facility] 907-29 Encounter: [id number]      Inpatient consult to Electrophysiology  Consult performed by: Almaz Sarmiento ordered by: Priscilla Parker          Assessment/Plan     Assessment:  1  Hypotension requiring pressors, now improved   A ) thought secondary to adrenal insufficiency  2  Tachy-sana syndrome, status post pacemaker implantation by Dr Natan Pritchard on 5/14/2018  3  Preserved LV systolic function with ejection fraction 60-65% per echo 03/2018  4  Severe aortic stenosis with aortic valve area 0 61 cm2  5  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, status post excision 1989  6  Obstructive sleep apnea  7  Hyperglycemia with history of uncontrolled diabetes    Plan:  1  Will arrange for limited echocardiogram this morning to rule out pericardial effusion status post pacemaker implantation  2   Would recommend PA and lateral chest x-ray once patient is transferred out of ICU and pacemaker interrogation as per post pacemaker implantation protocol  Per notes from [facility], she had a portable chest x-ray done yesterday postop which showed no pneumothorax  3   Further management per critical care team       History of Present Illness   Physician Requesting Consult: Soni Bernstein MD  Reason for Consult / Principal Problem:  Hypotension following pacemaker implantation    HPI: Tahir Miles is a 80y o  year old female with a history of severe aortic stenosis, panhypopituitarism secondary to an excised pituitary tumor, preserved LV systolic function, and obstructive sleep apnea  She typically follows with Dr Ryder Bajwa as an outpatient  She was previously admitted to [place] [address] 03/2018 for symptomatic SVT requiring adenosine to terminate  She also has a known history of junctional bradycardia  Her sister is currently present at bedside    She reports that the patient had an appointment this past Friday 5/11/2018 with Dr Velia Mcnair, however while driving to his office she began to experience chest discomfort  They arrived his office, and they were instructed to report to the nearest emergency room  She apparently was supposed to have a Holter monitor placed to gather more information regarding potential pacemaker implantation  She was seen in consultation by Dr Amanda Vitale, and she ultimately underwent pacemaker implantation in the morning on 5/14/2018  Later that evening she was noted to be hypotensive, and was given a saline bolus with improvement of her blood pressure  She was eventually started on Levophed and was also given 100 mg of hydrocortisone  It was decided that she would be transferred to [facility] for further evaluation and management  After receiving Solu-Cortef, her blood pressure has improved and she is now off of Levophed  The patient states that she is completely asymptomatic from a cardiac perspective, however she is having some cognitive impairment and has been seen mentioned gastric medicine  She denies pain at pacemaker incision  Review of Systems  ROS as noted above, otherwise 12 point review of systems was performed and is negative         Historical Information   Past Medical History:   Diagnosis Date    Bradycardia     Coronary artery disease     Depression     Depression     Diabetes mellitus ([facility] [address] )     Type 2 DM    Disease of thyroid gland     Facial droop     started 10 years ago    GERD (gastroesophageal reflux disease)     Heart murmur     Hyperlipidemia     Osteoarthritis     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[facility] [address] )     Vitamin D deficiency      Past Surgical History:   Procedure Laterality Date    ANKLE SURGERY Left 2005   [address] [facility]    CATARACT EXTRACTION Bilateral 09/1995    HEMORRHOID SURGERY  1978    HERNIA REPAIR  1990    JOINT REPLACEMENT      b/l hip replacements, right 2006    NEUROPLASTY / TRANSPOSITION MEDIAN NERVE AT CARPAL TUNNEL  1977    PARTIAL HYSTERECTOMY  1971    TOTAL HIP ARTHROPLASTY      TRANSPHENOIDAL / TRANSNASAL HYPOPHYSECTOMY / RESECTION PITUITARY TUMOR  1989    excision of pituitary gland     History   Alcohol Use No     History   Drug Use No     History   Smoking Status    Never Smoker   Smokeless Tobacco    Never Used     Family History:   Family History   Problem Relation Age of Onset    Other Father      coal worker's pneumoconiosis    Heart disease Father     Diabetes Brother     Heart disease Brother     Prostate cancer Brother     Stomach cancer Maternal Grandmother     Diabetes Paternal Aunt        Meds/Allergies   Hospital Medications: Current Facility-Administered Medications   Medication Dose Route Frequency    heparin (porcine) subcutaneous injection 5,000 Units  5,000 Units Subcutaneous Q8H [address]    hydrocortisone sodium succinate (PF) (Solu-CORTEF) injection 100 mg  100 mg Intravenous Q8H [address]    insulin lispro (HumaLOG) 100 units/mL subcutaneous injection 1-5 Units  1-5 Units Subcutaneous TID AC    levothyroxine tablet 75 mcg  75 mcg Oral Early Morning    norepinephrine (LEVOPHED) 4 mg (STANDARD CONCENTRATION) IV in sodium chloride 0 9% 250 mL  1-30 mcg/min Intravenous Continuous     Home Medications:   Prescriptions Prior to Admission   Medication    aspirin 81 MG tablet    b complex vitamins tablet    Cholecalciferol 1000 units capsule    cyanocobalamin ([employer] VITAMIN B-12) 1000 MCG tablet    ergocalciferol (VITAMIN D2) 50,000 units    levothyroxine 75 mcg tablet    metFORMIN (FORTAMET) 500 MG (OSM) 24 hr tablet    oxybutynin (DITROPAN-XL) 10 MG 24 hr tablet    pantoprazole (PROTONIX) 40 mg tablet    pravastatin (PRAVACHOL) 20 mg tablet    predniSONE 5 mg tablet    repaglinide (PRANDIN) 2 mg tablet    sitaGLIPtin (JANUVIA) 50 mg tablet    docusate sodium (COLACE) 100 mg capsule    LYCOPENE PO    metoprolol succinate (TOPROL-XL) 25 mg 24 hr tablet    nitroglycerin (NITROSTAT) 0 4 mg SL tablet    polyethylene glycol (MIRALAX) powder       Allergies   Allergen Reactions    Cephalosporins     Clindamycin     Hydrocodone     Oxycodone-Acetaminophen     Penicillins Hives    Simvastatin     Tetracyclines & Related        Objective   Vitals: Blood pressure 107/54, pulse 68, temperature 98 1 °F (36 7 °C), temperature source Axillary, resp  rate 16, height 5' 2" (1 575 m), weight 65 kg (143 lb 4 8 oz), SpO2 97 %  Orthostatic Blood Pressures      Most Recent Value   Blood Pressure  107/54 filed at 05/15/2018 0700            Intake/Output Summary (Last 24 hours) at 05/15/18 1025  Last data filed at 05/15/18 0600   Gross per 24 hour   Intake           163 28 ml   Output             1100 ml   Net          -936 72 ml       Invasive Devices     Peripheral Intravenous Line            Peripheral IV 05/13/18 Left Forearm 2 days    Peripheral IV 05/14/18 Right Antecubital 1 day          Drain            Urethral Catheter 1 day                Physical Exam   GEN: NAD, alert and oriented, well appearing  SKIN: dry without significant lesions or rashes  HEENT: NCAT, PERRL, EOMs intact  NECK: No JVD appreciated  CARDIOVASCULAR: RRR, normal S1, S2 without rubs or gallops appreciated; harsh HEIDY noted  LUNGS: Clear to auscultation bilaterally without wheezes, rhonchi, or rales  ABDOMEN: Soft, nontender, nondistended  EXTREMITIES/VASCULAR: perfused without clubbing, cyanosis, or edema b/l; SCDs in place  PSYCH: Normal mood and affect  NEURO: CN ll-Xll grossly intact  DEVICE INCISION: ecchymosis noted, bandage already removed, no swelling or hematoma        Lab Results: I have personally reviewed pertinent lab results        Results from last 7 days  Lab Units 05/15/18  0155   WBC Thousand/uL 10 98*   HEMOGLOBIN g/dL 14 2   HEMATOCRIT % 42 8   PLATELETS Thousands/uL 215       Results from last 7 days  Lab Units 05/15/18  0155   SODIUM mmol/L 138   POTASSIUM mmol/L 4 3 CHLORIDE mmol/L 107   CO2 mmol/L 26   BUN mg/dL 11   CREATININE mg/dL 0 81   GLUCOSE RANDOM mg/dL 104   CALCIUM mg/dL 8 4           Results from last 7 days  Lab Units 05/15/18  0155   MAGNESIUM mg/dL 1 3*       Imaging: I have personally reviewed pertinent reports  ECHO:   Results for orders placed during the hospital encounter of 18   Echo complete with contrast if indicated    Narrative [address] [facility] [address]  [place] [address] [address], [place] [address]  [phone]    Transthoracic Echocardiogram  2D, M-mode, Doppler, and Color Doppler    Study date:  23-Mar-2018    Patient: Kyra Moulton  MR number: [id number]  Account number: [de-identified]  : 1933  Age: 80 years  Gender: Female  Status: Inpatient  Location: Bedside  Height: 62 in  Weight: 132 lb  BP: 136/ 72 mmHg    Indications: tachycardia    Diagnoses: 427 9 - CARDIAC DYSRHYTHMIA NOS    Sonographer:  GUME Cooper  Primary Physician:  Germania Liu DO  Referring Physician:  Amanda Harris DO  Group:  [facility]  Interpreting Physician:  Michael Nathan DO    SUMMARY    LEFT VENTRICLE:  Systolic function was normal  Ejection fraction was estimated in the range of 60 % to 65 %  There were no regional wall motion abnormalities  Wall thickness was mildly increased  Doppler parameters were consistent with abnormal left ventricular relaxation (grade 1 diastolic dysfunction)  MITRAL VALVE:  There was mild regurgitation  AORTIC VALVE:  The valve was trileaflet  Leaflets exhibited moderately to markedly increased thickness and markedly reduced cuspal separation  There was severe stenosis  There was trace regurgitation  Valve mean gradient was 34 mmHg  The aortic valve obstructive index (by VTI) was 0 24  Estimated aortic valve area (by VTI) was 0 61 cm squared  TRICUSPID VALVE:  There was mild regurgitation  Estimated peak PA pressure was 33 mmHg      IVC, HEPATIC VEINS:  Respirophasic changes were blunted (less than 50% variation)  HISTORY: PRIOR HISTORY: juliette, murmur, supraventricular tachycardia    PROCEDURE: The procedure was performed at the bedside  This was a routine study  The transthoracic approach was used  The study included complete 2D imaging, M-mode, complete spectral Doppler, and color Doppler  The heart rate was 75 bpm,  at the start of the study  Image quality was adequate  LEFT VENTRICLE: Size was normal  Systolic function was normal  Ejection fraction was estimated in the range of 60 % to 65 %  There were no regional wall motion abnormalities  Wall thickness was mildly increased  DOPPLER: Doppler parameters  were consistent with abnormal left ventricular relaxation (grade 1 diastolic dysfunction)  RIGHT VENTRICLE: The size was normal  Systolic function was normal  Wall thickness was normal     LEFT ATRIUM: Size was normal     RIGHT ATRIUM: Size was normal     MITRAL VALVE: Valve structure was normal  There was normal leaflet separation  DOPPLER: The transmitral velocity was within the normal range  There was no evidence for stenosis  There was mild regurgitation  AORTIC VALVE: The valve was trileaflet  Leaflets exhibited moderately to markedly increased thickness and markedly reduced cuspal separation  DOPPLER: Transaortic velocity was within the normal range  There was severe stenosis  There was  trace regurgitation  TRICUSPID VALVE: The valve structure was normal  There was normal leaflet separation  DOPPLER: The transtricuspid velocity was within the normal range  There was no evidence for stenosis  There was mild regurgitation  Estimated peak PA  pressure was 33 mmHg  PULMONIC VALVE: Leaflets exhibited normal thickness, no calcification, and normal cuspal separation  DOPPLER: The transpulmonic velocity was within the normal range  There was no regurgitation  PERICARDIUM: There was no pericardial effusion   The pericardium was normal in appearance  AORTA: The root exhibited normal size  SYSTEMIC VEINS: IVC: Respirophasic changes were blunted (less than 50% variation)  MEASUREMENT TABLES    2D MEASUREMENTS  LVOT   (Reference normals)  Diam   18 mm   (--)    DOPPLER MEASUREMENTS  LVOT   (Reference normals)  Peak josh   100 cm/s   (--)  VTI   25 cm   (--)  Stroke vol   63 62 ml   (--)  Aortic valve   (Reference normals)  Peak josh   350 cm/s   (--)  VTI   105 cm   (--)  Mean gradient   34 mmHg   (--)  Obstr index, VTI   0 24    (--)  Valve area, VTI   0 61 cm squared   (--)  Obstr index, Vmax   0 29    (--)  Valve area, Vmax   0 74 cm squared   (--)    SYSTEM MEASUREMENT TABLES    2D  %FS: 36 9 %  Ao Diam: 2 49 cm  EDV(Teich): 63 51 ml  EF(Teich): 67 49 %  ESV(Teich): 20 64 ml  IVSd: 1 24 cm  LA Area: 12 73 cm2  LA Diam: 2 63 cm  LVEDV MOD A4C: 41 15 ml  LVEF MOD A4C: 47 01 %  LVESV MOD A4C: 21 81 ml  LVIDd: 3 84 cm  LVIDs: 2 42 cm  LVLd A4C: 6 51 cm  LVLs A4C: 5 47 cm  LVOT Diam: 1 79 cm  LVPWd: 0 93 cm  RA Area: 16 06 cm2  RV DIAM: 2 66 cm  SV MOD A4C: 19 35 ml  SV(Teich): 42 87 ml    CW  AV Env  Ti: 342 56 ms  AV VTI: 95 02 cm  AV Vmax: 3 48 m/s  AV Vmax: 3 51 m/s  AV Vmean: 2 77 m/s  AV maxP 54 mmHg  AV maxP 28 mmHg  AV meanP 76 mmHg  TR Vmax: 2 49 m/s  TR maxP 72 mmHg    MM  TAPSE: 3 11 cm    PW  KASEY (VTI): 0 55 cm2  KASEY Vmax: 0 6 cm2  KASEY Vmax: 0 61 cm2  KASEY Vmax, Pt: 0 6 cm2  KASEY Vmax, Pt: 0 61 cm2  E' Sept: 0 06 m/s  E/E' Sept: 11 15  LVOT Env  Ti: 403 11 ms  LVOT VTI: 20 8 cm  LVOT Vmax: 0 84 m/s  LVOT Vmax: 0 84 m/s  LVOT Vmean: 0 53 m/s  LVOT maxP 9 mmHg  LVOT maxP 92 mmHg  LVOT meanP 48 mmHg  LVSV Dopp: 52 47 ml  MV A Josh: 0 79 m/s  MV Dec Jay: 1 73 m/s2  MV DecT: 358 39 ms  MV E Josh: 0 62 m/s  MV E/A Ratio: 0 79    [facility]    Prepared and electronically signed by    Bryce Gamboa DO  Signed 23-Mar-2018 16:32:35         CATH/STRESS TEST:  Per outpatient office notes, she had a recent nuclear stress test which was negative    EKG:  Normal sinus rhythm, LAFB      TELEMETRY:  Normal sinus rhythm with episode of SVT (likely atrial tachycardia)      VTE Prophylaxis: Heparin subQ

## 2018-05-15 NOTE — PROGRESS NOTES
Critical Care Progress Note    I was contacted by Dr Zara Luna the patient who underwent elective two chamber pacemaker placement today for sick sinus syndrome was hypotensive requiring norepinephrine infusion to achieve normal blood pressure  Procedure was performed under MAC anesthesia by his report  Chest radiograph following the procedure "did not show pneumothorax or widened mediastinum"  During the procedure the patient received 1 L of crystalloid, intravenous lidocaine, fentanyl and propofol infusion (Exact doses are unavailable to me at this time)  She did not receive IV corticosteroids  Dr Zara Luna performed a bedside transthoracic ultrasound which by his report does not show pericardial effusion or evidence of pericardial tamponade  Biventricular function is normal     Review of the chart shows past medical history significant for supraventricular tachycardia, sick sinus syndrome, hypertension, coronary artery disease, chronic diastolic congestive heart failure, diastolic dysfunction, severe aortic stenosis with aortic valve area 0 6, hypothyroidism, poorly controlled diabetes mellitus type 2, obstructive sleep apnea, panhypopituitarism  By report she received her Toprol today and unclear if she received prednisone  Upon discussion with the nurses upon arrival to the ICU the patient was sleepy but arousable to verbal stimuli  Her blood pressure was 120/60 on 15 mcg of norepinephrine with atrial paced heart rate of 60  With 1 L of crystalloid and calcium gluconate, norepinephrine dosing reduced to 11 micrograms/minute  Nair catheter was placed with 800 mL of urine evacuated  By their report mental status improved with this intervention  Stress dose steroids are ordered at this time  Transfer to [facility] due to degree of shock and co-morbidities, discussed with Dr Karlos Mcbride  No physical exam by myself on this date of service      Sakina Lema DO

## 2018-05-15 NOTE — CONSULTS
Consultation - Reynaldo Phan 80 y o  female MRN: [id number]    Unit/Bed#: [facility] 178-41 Encounter: [id number]      Assessment/Plan     Assessment: This is a 80y o -year-old female with history of acromegaly with  excision of pituitary & panhypopituitarism since 1989    Plan:  Adrenal  crisis  Continue replacement hormone therapy with daily levothyroxine 75 mcg (measure of  TSH no longer require)  Continue Solu-Cortef 50 mg q 8 hours for another 24 hours, then decrease to half dose, then transition to home prednisone 5 mg daily    Diabetes type 2  Last A1c 9 4  At home she is on metformin Januvia and Prandin  While in hospital, continue with correctional insulin  Please have glucose check 20 morning due to concern hypoglycemic overnight      CC:  Abnormal thyroid function Consult    History of Present Illness     HPI: Reynaldo Phan is a 80y o  year old female with  past medical history of 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, on daily 5 mg prednisone and  75 mcg levothyroxine replacement therapy,  who is here at [facility] for tachy-sana syndrome status post pacemaker implantation who required pressor momentarily  Patient is now off pressors after receiving IV fluid resuscitation and  stress dose steroids, and continue to be on Solu-Cortef 50 mg 3 times daily  Other comorbidities per the patient include left internal capsule stroke, cognitive impairment, chronic diastolic heart failure, CAD, severe aortic stenosis  By chart review   blood work done in March 2018 with TSH suppressed at 0 034 free T3 1 79 free T4 1 33  also vitamin-D hypervitaminosis with last level in September 2017 more than 135  Her A1c 9 4 February 2018 and she is on metformin Januvia and Prandin at home, relative @ bedside states she used to be insulin at some point however due to episodes of hypoglycemia patient refused to be on insulin and longer              Inpatient consult to Endocrinology     Date/Time 5/15/2018 3:43 PM     Performed by  Romy Narvaez     Authorized by Ken Sow              Review of Systems   Constitutional: Negative for activity change, appetite change, chills, diaphoresis, fatigue, fever and unexpected weight change  HENT: Negative for congestion, drooling, facial swelling, sinus pain, sinus pressure and sore throat  Eyes: Negative for discharge and itching  Respiratory: Negative for apnea, cough, chest tightness, shortness of breath and wheezing  Cardiovascular: Negative for chest pain, palpitations and leg swelling  Gastrointestinal: Negative for abdominal distention, abdominal pain, constipation, diarrhea and vomiting  Endocrine: Negative for cold intolerance, heat intolerance and polyuria  Genitourinary: Negative for difficulty urinating, dysuria and frequency  Musculoskeletal: Negative for arthralgias and back pain  Skin: Negative for color change  Allergic/Immunologic: Negative for environmental allergies and food allergies  Neurological: Negative for dizziness, facial asymmetry, light-headedness and headaches  Psychiatric/Behavioral: Negative for agitation, behavioral problems, confusion and decreased concentration         Historical Information   Past Medical History:   Diagnosis Date    Bradycardia     Coronary artery disease     Depression     Depression     Diabetes mellitus [facility] )     Type 2 DM    Disease of thyroid gland     Facial droop     started 10 years ago    GERD (gastroesophageal reflux disease)     Heart murmur     Hyperlipidemia     Osteoarthritis     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[facility] )     Vitamin D deficiency      Past Surgical History:   Procedure Laterality Date    ANKLE SURGERY Left 2005   [address]    CATARACT EXTRACTION Bilateral 09/1995    HEMORRHOID SURGERY  1978    HERNIA REPAIR  1990    JOINT REPLACEMENT      b/l hip replacements, right 2006    NEUROPLASTY / TRANSPOSITION MEDIAN NERVE AT CARPAL TUNNEL  1977    PARTIAL HYSTERECTOMY  1971    TOTAL HIP ARTHROPLASTY      TRANSPHENOIDAL / TRANSNASAL HYPOPHYSECTOMY / RESECTION PITUITARY TUMOR  1989    excision of pituitary gland     Social History   History   Alcohol Use No     History   Drug Use No     History   Smoking Status    Never Smoker   Smokeless Tobacco    Never Used     Family History:   Family History   Problem Relation Age of Onset    Other Father      coal worker's pneumoconiosis    Heart disease Father     Diabetes Brother     Heart disease Brother     Prostate cancer Brother     Stomach cancer Maternal Grandmother     Diabetes Paternal Aunt        Meds/Allergies   Current Facility-Administered Medications   Medication Dose Route Frequency Provider Last Rate Last Dose    heparin (porcine) subcutaneous injection 5,000 Units  5,000 Units Subcutaneous Q8H [address] Amira Perla PA-C   5,000 Units at 05/15/18 0550    hydrocortisone sodium succinate (PF) (Solu-CORTEF) injection 50 mg  50 mg Intravenous Q8H [address] PRESTON Santos        insulin lispro (HumaLOG) 100 units/mL subcutaneous injection 1-5 Units  1-5 Units Subcutaneous TID AC Amira Perla PA-C   1 Units at 05/15/18 1215    levothyroxine tablet 75 mcg  75 mcg Oral Early Morning Amira Perla PA-C   Stopped at 05/15/18 0555    norepinephrine (LEVOPHED) 4 mg (STANDARD CONCENTRATION) IV in sodium chloride 0 9% 250 mL  1-30 mcg/min Intravenous Continuous Amira Perla PA-C   Stopped at 05/15/18 0230     Allergies   Allergen Reactions    Cephalosporins     Clindamycin     Hydrocodone     Oxycodone-Acetaminophen     Penicillins Hives    Simvastatin     Tetracyclines & Related        Objective   Vitals: Blood pressure (!) 87/53, pulse 70, temperature 98 °F (36 7 °C), temperature source Oral, resp  rate 14, height 5' 2" (1 575 m), weight 65 kg (143 lb 4 8 oz), SpO2 96 %      Intake/Output Summary (Last 24 hours) at 05/15/18 1224  Last data filed at 05/15/18 1200   Gross per 24 hour   Intake           453 28 ml   Output             1620 ml   Net         -1166 72 ml     Invasive Devices     Peripheral Intravenous Line            Peripheral IV 05/13/18 Left Forearm 2 days    Peripheral IV 05/14/18 Right Antecubital 1 day          Drain            Urethral Catheter 1 day                Physical Exam   Constitutional: She is oriented to person, place, and time  She appears well-developed and well-nourished  HENT:   Head: Normocephalic and atraumatic  Mouth/Throat: Oropharynx is clear and moist  No oropharyngeal exudate  Eyes: Conjunctivae and EOM are normal  Pupils are equal, round, and reactive to light  Right eye exhibits no discharge  Left eye exhibits no discharge  Neck: Normal range of motion  Neck supple  No JVD present  No tracheal deviation present  No thyromegaly present  Cardiovascular: Normal rate, regular rhythm and intact distal pulses  Murmur heard  Pulmonary/Chest: Effort normal and breath sounds normal  No stridor  No respiratory distress  She has no wheezes  She has no rales  She exhibits no tenderness  Abdominal: Soft  Bowel sounds are normal  She exhibits no distension  There is no tenderness  There is no rebound  Musculoskeletal: Normal range of motion  She exhibits no edema, tenderness or deformity  Lymphadenopathy:     She has no cervical adenopathy  Neurological: She is alert and oriented to person, place, and time  She displays normal reflexes  No cranial nerve deficit  She exhibits normal muscle tone  Coordination normal    Skin: Skin is warm  No rash noted  No erythema  Psychiatric: She has a normal mood and affect  Her behavior is normal  Judgment and thought content normal    Nursing note and vitals reviewed  The history was obtained from the review of the chart, patient and family      Lab Results:       Lab Results   Component Value Date    WBC 10 98 (H) 05/15/2018    HGB 14 2 05/15/2018    HCT 42 8 05/15/2018    MCV 91 05/15/2018     05/15/2018     Lab Results   Component Value Date/Time    BUN 11 05/15/2018 01:55 AM    BUN 15 12/07/2015 07:43 AM     05/15/2018 01:55 AM     12/07/2015 07:43 AM    K 4 3 05/15/2018 01:55 AM    K 4 5 12/07/2015 07:43 AM     05/15/2018 01:55 AM     12/07/2015 07:43 AM    CO2 26 05/15/2018 01:55 AM    CO2 30 7 12/07/2015 07:43 AM    CREATININE 0 81 05/15/2018 01:55 AM    CREATININE 0 64 12/07/2015 07:43 AM    AST 14 03/22/2018 05:58 PM    AST 17 12/07/2015 07:43 AM    ALT 18 03/22/2018 05:58 PM    ALT 15 12/07/2015 07:43 AM    ALB 3 0 (L) 03/22/2018 05:58 PM    ALB 3 1 (L) 12/07/2015 07:43 AM     No results for input(s): CHOL, HDL, LDL, TRIG, VLDL in the last 72 hours  No results found for: Genaro Mcmullenfer  POC Glucose   Date Value   05/15/2018 157 mg/dl (H)   05/15/2018 163 mg/dl (H)   03/24/2018 220 mg/dl (H)   03/23/2018 154 mg/dl (H)   03/23/2018 256 mg/dl (H)   03/23/2018 191 mg/dl (H)   03/23/2018 146 mg/dl (H)   03/23/2018 229 mg/dl (H)   03/23/2018 145 mg/dl (H)   03/22/2018 255 mg/dl (H)   06/30/2016 209   03/09/2016 204       Imaging Studies: I have personally reviewed pertinent reports  Suman Young DO  PGY 3    Portions of the record may have been created with voice recognition software

## 2018-05-15 NOTE — PROGRESS NOTES
Patient transferred from  ICU in stable condition, no complaints at this time  Accompanied by sister who will be spending the night  Assessment benign, left chest incision clean dry and intact, TORSTEN

## 2018-05-15 NOTE — CONSULTS
Consultation - Geriatrics   Yamile Francis 80 y o  female MRN: [id number]  Unit/Bed#: [facility] 518-01 Encounter: [id number]      Assessment/Plan  1  Delirium  Multifactorial  Continue treating underlying conditions  Recommend adding Tylenol 650 mg Q 8  Refer to geriatric pain med order set if further pain med recommendations are needed  Recommend adding Senokot S daily for bowel regimen  Do not restart home oxybutynin  Monitor for urinary retention, currently has Nair placed  Redirect unwanted patient behaviors as first line tx  Reorient patient frequently  Avoid deliriogenic meds including tramadol, benzodiazepines, benadryl  Good sleep hygiene important, limit night time interruptions  Encourage patient to stay awake during the day  Ensure adequate hydration/nutrition  Mobilize often     2  Cognitive impairment likely  With history of CVA  Currently complicated by 1  Sister reports forgetfulness  Unclear if patient is safe to live at home by herself  0/5 on mini cog  Will check B12, folate  Patient would benefit from formal cognitive assessment, this can be done at [facility] for positive aging upon discharge    3  Echolalia  Hx of CVA per sister  Residual ?  Consider further workup    4  History of fall  Fall precautions  Do not restart home oxybutynin  PT, OT when appropriate  Continue with patient's home vitamin D supplementation    5  Ambulatory dysfunction  Ambulates with cane at baseline, with history of fall  PT, OT  Continue vitamin D supplement  Rehab    6  Deconditioning  Mobilize frequently to prevent further decline  PT, OT  Rehab    7  Bilateral hearing loss  Speak loudly clearly well directly facing the patient  Patient may benefit from audiology referral as outpatient    8    Hypotension  Critical care following and managing        History of Present Illness   Physician Requesting Consult: Shalonda Howard MD  Reason for Consult / Principal Problem: forgetfulness  Hx and PE limited by: n/a  HPI: Murtaza Galvan is a 80y o  year old female with past medical history diabetes mellitus, CAD, history of stroke, severe aortic stenosis, pan hypopituitarism secondary to pituitary tumor that was excised at [facility] in [facility], who presents to [facility] as transfer from [address] [place] [address] E after patient had a pacer placed for tachy-sana syndrome, and became hypotensive afterwards  Patient was admitted under the critical care team for hypotension, panhypopituitarism, hyperglycemia, history of tachy-sana status post pacemaker, sleep apnea, GERD  Patient was noted to be confused which is why geriatrics was consulted  Prior to arrival patient lives at home by herself  She reports independence with ADLs  Sister at bedside confirms this  Patient reports she ambulated with a cane  She reports a fall about 1 year ago  Has assistance with IADLs, when asked if she still drives, she replies no, and then when asked how she gets placed the place, she reports she drives  Sister at bedside confirms patient does not drive, and that patient's son who lives locally helps transport her  Upon exam patient scores 0/5 on mini cog  Patient is unable to draw clock other than the general shape, and does not remember any words on delayed recall  Her speech is repetitive, per nurse patient had history of CVA  Patient's sister at bedside reports patient is much more confused than normal   Patient's sister reports patient is forgetful at baseline      Consults    Review of Systems   Unable to perform ROS: Mental status change       Historical Information   Past Medical History:   Diagnosis Date    Bradycardia     Coronary artery disease     Depression     Depression     Diabetes mellitus ([facility] [address] )     Type 2 DM    Disease of thyroid gland     Facial droop     started 10 years ago    GERD (gastroesophageal reflux disease)     Heart murmur     Hyperlipidemia     Osteoarthritis     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[address] )     Vitamin D deficiency      Past Surgical History:   Procedure Laterality Date    [facility]KLE SURGERY Left 2005   [facility]    CATARACT EXTRACTION Bilateral 09/1995    HEMORRHOID SURGERY  1978    HERNIA REPAIR  1990    JOINT REPLACEMENT      b/l hip replacements, right 2006    NEUROPLASTY / TRANSPOSITION MEDIAN NERVE AT CARPAL TUNNEL  1977    PARTIAL HYSTERECTOMY  1971    TOTAL HIP ARTHROPLASTY      TRANSPHENOIDAL / TRANSNASAL HYPOPHYSECTOMY / RESECTION PITUITARY TUMOR  1989    excision of pituitary gland     Social History   History   Alcohol Use No     History   Drug Use No     History   Smoking Status    Never Smoker   Smokeless Tobacco    Never Used         Family History: non-contributory    Meds/Allergies   Current meds:   Current Facility-Administered Medications   Medication Dose Route Frequency    heparin (porcine) subcutaneous injection 5,000 Units  5,000 Units Subcutaneous Q8H [address]    hydrocortisone sodium succinate (PF) (Solu-CORTEF) injection 100 mg  100 mg Intravenous Q8H [address]    insulin lispro (HumaLOG) 100 units/mL subcutaneous injection 1-5 Units  1-5 Units Subcutaneous TID AC    levothyroxine tablet 75 mcg  75 mcg Oral Early Morning    norepinephrine (LEVOPHED) 4 mg (STANDARD CONCENTRATION) IV in sodium chloride 0 9% 250 mL  1-30 mcg/min Intravenous Continuous      Current PTA meds:  Prescriptions Prior to Admission   Medication    aspirin 81 MG tablet    b complex vitamins tablet    Cholecalciferol 1000 units capsule    cyanocobalamin ([employer] VITAMIN B-12) 1000 MCG tablet    ergocalciferol (VITAMIN D2) 50,000 units    levothyroxine 75 mcg tablet    metFORMIN (FORTAMET) 500 MG (OSM) 24 hr tablet    oxybutynin (DITROPAN-XL) 10 MG 24 hr tablet    pantoprazole (PROTONIX) 40 mg tablet    pravastatin (PRAVACHOL) 20 mg tablet    predniSONE 5 mg tablet    repaglinide (PRANDIN) 2 mg tablet    sitaGLIPtin (JANUVIA) 50 mg tablet    docusate sodium (COLACE) 100 mg capsule    LYCOPENE PO    metoprolol succinate (TOPROL-XL) 25 mg 24 hr tablet    nitroglycerin (NITROSTAT) 0 4 mg SL tablet    polyethylene glycol (MIRALAX) powder        Allergies   Allergen Reactions    Cephalosporins     Clindamycin     Hydrocodone     Oxycodone-Acetaminophen     Penicillins Hives    Simvastatin     Tetracyclines & Related        Objective   Vitals: Blood pressure 107/54, pulse 68, temperature 98 1 °F (36 7 °C), temperature source Axillary, resp  rate 16, weight 65 kg (143 lb 4 8 oz), SpO2 97 %  ,Body mass index is 26 21 kg/m²  Physical Exam   Constitutional: She appears well-developed and well-nourished  No distress  HENT:   Head: Normocephalic and atraumatic  Mouth/Throat: No oropharyngeal exudate  Eyes: Conjunctivae and EOM are normal  No scleral icterus  Neck: Neck supple  Cardiovascular: Normal rate  Pulmonary/Chest: Effort normal and breath sounds normal  She has no wheezes  She has no rales  Abdominal: Soft  Bowel sounds are normal  She exhibits no distension  Musculoskeletal: She exhibits no edema  Neurological: She is alert  Skin: Skin is warm and dry  Psychiatric: Her speech is delayed  She is slowed  She is not agitated and not actively hallucinating  Cognition and memory are impaired  She expresses inappropriate judgment  Patient alert, oriented to self, "hospital"  When patient is told the hospital's name is [facility], she answers further questions "[facility]" such as what month visit, what year is it  When patient is corrected, "it is not [facility], it is May", when asked what year it is "May"  Patient scores 0/5 on mini cog, unable to draw clock, unable to recall 3 words  Sister at bedside reports patient is more confused than baseline, but reports patient does have forgetfulness at baseline  Patient's thought process is slow  Nursing note and vitals reviewed        Lab Results:   Results from last 7 days  Lab Units 05/15/18  0155   WBC Thousand/uL 10 98*   HEMOGLOBIN g/dL 14 2   HEMATOCRIT % 42 8   PLATELETS Thousands/uL 215        Results from last 7 days  Lab Units 05/15/18  0155   SODIUM mmol/L 138   POTASSIUM mmol/L 4 3   CHLORIDE mmol/L 107   CO2 mmol/L 26   BUN mg/dL 11   CREATININE mg/dL 0 81   CALCIUM mg/dL 8 4   GLUCOSE RANDOM mg/dL 104       Imaging Studies: I have personally reviewed pertinent reports  EKG, Pathology, and Other Studies: I have personally reviewed pertinent reports  VTE Prophylaxis: Sequential compression device (Venodyne)     Code Status: Level 1 - Full Code      Counseling/Coordination of Care: Total floor / unit time spent today 25 minutes  Greater than 50% of total time was spent with the patient and / or family counseling and / or coordination of care  A description of the counseling / coordination of care: Assessing examine the patient, reviewing EMR meds, speaking to nursing staff, speaking to Critical Care team, speaking to sister at bedside, performing cognition assessment

## 2018-05-16 LAB
ANION GAP SERPL CALCULATED.3IONS-SCNC: 5 MMOL/L (ref 4–13)
BASOPHILS # BLD AUTO: 0.01 THOUSANDS/ΜL (ref 0–0.1)
BASOPHILS NFR BLD AUTO: 0 % (ref 0–1)
BUN SERPL-MCNC: 19 MG/DL (ref 5–25)
CALCITONIN LEVEL (HISTORICAL): NORMAL PG/ML
CALCIUM SERPL-MCNC: 8.5 MG/DL (ref 8.3–10.1)
CHLORIDE SERPL-SCNC: 106 MMOL/L (ref 100–108)
CO2 SERPL-SCNC: 28 MMOL/L (ref 21–32)
CREAT SERPL-MCNC: 0.69 MG/DL (ref 0.6–1.3)
EOSINOPHIL # BLD AUTO: 0.01 THOUSAND/ΜL (ref 0–0.61)
EOSINOPHIL NFR BLD AUTO: 0 % (ref 0–6)
ERYTHROCYTE [DISTWIDTH] IN BLOOD BY AUTOMATED COUNT: 14.3 % (ref 11.6–15.1)
EST. AVERAGE GLUCOSE BLD GHB EST-MCNC: 232 MG/DL
FOLATE SERPL-MCNC: 11 NG/ML (ref 3.1–17.5)
GFR SERPL CREATININE-BSD FRML MDRD: 80 ML/MIN/1.73SQ M
GLUCOSE SERPL-MCNC: 208 MG/DL (ref 65–140)
GLUCOSE SERPL-MCNC: 245 MG/DL (ref 65–140)
GLUCOSE SERPL-MCNC: 287 MG/DL (ref 65–140)
GLUCOSE SERPL-MCNC: 356 MG/DL (ref 65–140)
GLUCOSE SERPL-MCNC: 357 MG/DL (ref 65–140)
GLUCOSE SERPL-MCNC: 368 MG/DL (ref 65–140)
HBA1C MFR BLD: 9.7 % (ref 4.2–6.3)
HCT VFR BLD AUTO: 34 % (ref 34.8–46.1)
HGB BLD-MCNC: 10.9 G/DL (ref 11.5–15.4)
IMM GRANULOCYTES # BLD AUTO: 0.06 THOUSAND/UL (ref 0–0.2)
IMM GRANULOCYTES NFR BLD AUTO: 0 % (ref 0–2)
LYMPHOCYTES # BLD AUTO: 1.04 THOUSANDS/ΜL (ref 0.6–4.47)
LYMPHOCYTES NFR BLD AUTO: 7 % (ref 14–44)
MAGNESIUM SERPL-MCNC: 2.4 MG/DL (ref 1.6–2.6)
MCH RBC QN AUTO: 29.7 PG (ref 26.8–34.3)
MCHC RBC AUTO-ENTMCNC: 32.1 G/DL (ref 31.4–37.4)
MCV RBC AUTO: 93 FL (ref 82–98)
MONOCYTES # BLD AUTO: 0.9 THOUSAND/ΜL (ref 0.17–1.22)
MONOCYTES NFR BLD AUTO: 6 % (ref 4–12)
NEUTROPHILS # BLD AUTO: 12.86 THOUSANDS/ΜL (ref 1.85–7.62)
NEUTS SEG NFR BLD AUTO: 86 % (ref 43–75)
NRBC BLD AUTO-RTO: 0 /100 WBCS
PHOSPHATE SERPL-MCNC: 2.2 MG/DL (ref 2.3–4.1)
PLATELET # BLD AUTO: 193 THOUSANDS/UL (ref 149–390)
PMV BLD AUTO: 10.7 FL (ref 8.9–12.7)
POTASSIUM SERPL-SCNC: 3.9 MMOL/L (ref 3.5–5.3)
QUESTION/PROBLEM (HISTORICAL): NORMAL
RBC # BLD AUTO: 3.67 MILLION/UL (ref 3.81–5.12)
SODIUM SERPL-SCNC: 139 MMOL/L (ref 136–145)
VIT B12 SERPL-MCNC: 580 PG/ML (ref 100–900)
WBC # BLD AUTO: 14.88 THOUSAND/UL (ref 4.31–10.16)

## 2018-05-16 PROCEDURE — G8978 MOBILITY CURRENT STATUS: HCPCS

## 2018-05-16 PROCEDURE — 84100 ASSAY OF PHOSPHORUS: CPT | Performed by: NURSE PRACTITIONER

## 2018-05-16 PROCEDURE — G8988 SELF CARE GOAL STATUS: HCPCS

## 2018-05-16 PROCEDURE — 99232 SBSQ HOSP IP/OBS MODERATE 35: CPT | Performed by: INTERNAL MEDICINE

## 2018-05-16 PROCEDURE — 97166 OT EVAL MOD COMPLEX 45 MIN: CPT

## 2018-05-16 PROCEDURE — 83735 ASSAY OF MAGNESIUM: CPT | Performed by: NURSE PRACTITIONER

## 2018-05-16 PROCEDURE — 83036 HEMOGLOBIN GLYCOSYLATED A1C: CPT | Performed by: NURSE PRACTITIONER

## 2018-05-16 PROCEDURE — 82948 REAGENT STRIP/BLOOD GLUCOSE: CPT

## 2018-05-16 PROCEDURE — 82746 ASSAY OF FOLIC ACID SERUM: CPT | Performed by: PHYSICIAN ASSISTANT

## 2018-05-16 PROCEDURE — 97163 PT EVAL HIGH COMPLEX 45 MIN: CPT

## 2018-05-16 PROCEDURE — 80048 BASIC METABOLIC PNL TOTAL CA: CPT | Performed by: NURSE PRACTITIONER

## 2018-05-16 PROCEDURE — 85025 COMPLETE CBC W/AUTO DIFF WBC: CPT | Performed by: NURSE PRACTITIONER

## 2018-05-16 PROCEDURE — 82607 VITAMIN B-12: CPT | Performed by: PHYSICIAN ASSISTANT

## 2018-05-16 PROCEDURE — G8987 SELF CARE CURRENT STATUS: HCPCS

## 2018-05-16 PROCEDURE — G8979 MOBILITY GOAL STATUS: HCPCS

## 2018-05-16 RX ORDER — POLYETHYLENE GLYCOL 3350 17 G/17G
17 POWDER, FOR SOLUTION ORAL DAILY
Status: DISCONTINUED | OUTPATIENT
Start: 2018-05-16 | End: 2018-05-16

## 2018-05-16 RX ORDER — ACETAMINOPHEN 325 MG/1
650 TABLET ORAL EVERY 6 HOURS PRN
Status: DISCONTINUED | OUTPATIENT
Start: 2018-05-16 | End: 2018-05-17 | Stop reason: HOSPADM

## 2018-05-16 RX ORDER — SENNOSIDES 8.6 MG
1 TABLET ORAL
Status: DISCONTINUED | OUTPATIENT
Start: 2018-05-16 | End: 2018-05-17 | Stop reason: HOSPADM

## 2018-05-16 RX ORDER — REPAGLINIDE 1 MG/1
2 TABLET ORAL
Status: DISCONTINUED | OUTPATIENT
Start: 2018-05-16 | End: 2018-05-17 | Stop reason: HOSPADM

## 2018-05-16 RX ADMIN — HYDROCORTISONE SODIUM SUCCINATE 50 MG: 100 INJECTION, POWDER, FOR SOLUTION INTRAMUSCULAR; INTRAVENOUS at 05:23

## 2018-05-16 RX ADMIN — HEPARIN SODIUM 5000 UNITS: 5000 INJECTION, SOLUTION INTRAVENOUS; SUBCUTANEOUS at 05:23

## 2018-05-16 RX ADMIN — PRAVASTATIN SODIUM 20 MG: 20 TABLET ORAL at 08:47

## 2018-05-16 RX ADMIN — INSULIN LISPRO 2 UNITS: 100 INJECTION, SOLUTION INTRAVENOUS; SUBCUTANEOUS at 07:37

## 2018-05-16 RX ADMIN — ACETAMINOPHEN 650 MG: 325 TABLET, FILM COATED ORAL at 11:13

## 2018-05-16 RX ADMIN — HYDROCORTISONE SODIUM SUCCINATE 25 MG: 100 INJECTION, POWDER, FOR SOLUTION INTRAMUSCULAR; INTRAVENOUS at 21:51

## 2018-05-16 RX ADMIN — HEPARIN SODIUM 5000 UNITS: 5000 INJECTION, SOLUTION INTRAVENOUS; SUBCUTANEOUS at 13:31

## 2018-05-16 RX ADMIN — DOCUSATE SODIUM 100 MG: 100 CAPSULE, LIQUID FILLED ORAL at 17:26

## 2018-05-16 RX ADMIN — INSULIN LISPRO 4 UNITS: 100 INJECTION, SOLUTION INTRAVENOUS; SUBCUTANEOUS at 11:11

## 2018-05-16 RX ADMIN — HEPARIN SODIUM 5000 UNITS: 5000 INJECTION, SOLUTION INTRAVENOUS; SUBCUTANEOUS at 21:51

## 2018-05-16 RX ADMIN — FERROUS SULFATE TAB 325 MG (65 MG ELEMENTAL FE) 325 MG: 325 (65 FE) TAB at 07:38

## 2018-05-16 RX ADMIN — OXYBUTYNIN CHLORIDE 10 MG: 5 TABLET, EXTENDED RELEASE ORAL at 08:47

## 2018-05-16 RX ADMIN — INSULIN LISPRO 4 UNITS: 100 INJECTION, SOLUTION INTRAVENOUS; SUBCUTANEOUS at 16:58

## 2018-05-16 RX ADMIN — PANTOPRAZOLE SODIUM 40 MG: 40 TABLET, DELAYED RELEASE ORAL at 05:24

## 2018-05-16 RX ADMIN — DOCUSATE SODIUM 100 MG: 100 CAPSULE, LIQUID FILLED ORAL at 08:47

## 2018-05-16 RX ADMIN — HYDROCORTISONE SODIUM SUCCINATE 50 MG: 100 INJECTION, POWDER, FOR SOLUTION INTRAMUSCULAR; INTRAVENOUS at 13:33

## 2018-05-16 RX ADMIN — INSULIN LISPRO 3 UNITS: 100 INJECTION, SOLUTION INTRAVENOUS; SUBCUTANEOUS at 21:52

## 2018-05-16 RX ADMIN — SENNOSIDES 8.6 MG: 8.6 TABLET ORAL at 21:51

## 2018-05-16 RX ADMIN — LEVOTHYROXINE SODIUM 75 MCG: 75 TABLET ORAL at 05:23

## 2018-05-16 RX ADMIN — ASPIRIN 81 MG 81 MG: 81 TABLET ORAL at 08:47

## 2018-05-16 RX ADMIN — CYANOCOBALAMIN TAB 500 MCG 1000 MCG: 500 TAB at 08:47

## 2018-05-16 RX ADMIN — ZINC 1 TABLET: TAB ORAL at 08:50

## 2018-05-16 RX ADMIN — REPAGLINIDE 2 MG: 1 TABLET ORAL at 16:59

## 2018-05-16 RX ADMIN — OXYBUTYNIN CHLORIDE 10 MG: 5 TABLET, EXTENDED RELEASE ORAL at 17:26

## 2018-05-16 NOTE — ASSESSMENT & PLAN NOTE
In ICU  Probably due to underlying dementia and also hypotension  Currently resolved  Geriatrics following  Appreciate recommendations  Patient will need outpatient follow-up

## 2018-05-16 NOTE — CASE MANAGEMENT
Continued Stay Review    Date:  5-16-18      Vital Signs: /58   Pulse 63   Temp 98 5 °F (36 9 °C)   Resp 18   Ht 5' 2" (1 575 m)   Wt 62 8 kg (138 lb 7 2 oz)   SpO2 92%   BMI 25 32 kg/m²     Medications:   Scheduled Meds:   Current Facility-Administered Medications:  acetaminophen 650 mg Oral Q6H PRN   aspirin 81 mg Oral Daily   cyanocobalamin 1,000 mcg Oral Daily   docusate sodium 100 mg Oral BID   ferrous sulfate 325 mg Oral Daily With Breakfast   heparin (porcine) 5,000 Units Subcutaneous Q8H MARILYN   hydrocortisone sodium succinate 50 mg Intravenous Q8H [address]   insulin lispro 1-5 Units Subcutaneous TID AC   insulin lispro 1-5 Units Subcutaneous HS   levothyroxine 75 mcg Oral Early Morning   multivitamin stress formula 1 tablet Oral Daily   oxybutynin 10 mg Oral BID   pantoprazole 40 mg Oral Early Morning   polyethylene glycol 17 g Oral Daily   pravastatin 20 mg Oral Daily   senna 1 tablet Oral HS     Continuous Infusions:    PRN Meds:   acetaminophen    Abnormal Labs/Diagnostic Results:        Age/Sex: 80 y o  female     Assessment/Plan:     1   Delirium  Improved, patient alert oriented to person, place, month, year this a m  Consider discontinuing oxybutynin secondary to unwanted side effects in the elderly, spoke with slim, they will speak to patient about this  Continue treating underlying conditions  Recommend adding Tylenol 650 mg Q 8  Refer to geriatric pain med order set if further pain med recommendations are needed  Continue with Senokot daily  Monitor for urinary retention, currently has Nair placed  Redirect unwanted patient behaviors as first line tx  Reorient patient frequently    Avoid deliriogenic meds including tramadol, benzodiazepines, benadryl  Good sleep hygiene important, limit night time interruptions  Encourage patient to stay awake during the day  Ensure adequate hydration/nutrition  Mobilize often      2    Age-related cognitive decline  Patient alert oriented x4 today  Patient scores 4/5 on mini cog today, great improvement from yesterday  Continues to struggle with clock draw  With history of CVA  Currently complicated by 1    Sister reports forgetfulness  Unclear if patient is safe to live at home by herself  B12, folate within normal limits, continue with patient's vitamin B12 supplementation  Patient would benefit from formal cognitive assessment, this can be done at [facility] for positive aging upon discharge     3  Echolalia  Improved today  Hx of CVA per sister  Residual ?  Consider further workup     4   History of fall  Fall precautions  Would not recommend oxybutynin be restarted, spoke with slim, they will speak to patient about this  PT, OT when appropriate  Continue with patient's home vitamin D supplementation     5   Ambulatory dysfunction  Ambulates with cane at baseline, with history of fall  PT, OT  Continue vitamin D supplement  Rehab     6   Deconditioning  Mobilize frequently to prevent further decline  PT, OT  Rehab     7   Bilateral hearing loss  Speak loudly clearly well directly facing the patient  Patient may benefit from audiology referral as outpatient     8   Hypotension  Critical care following and managing  Has since resolved, patient transferred to [facility] floor    PHYSICAL THERAPY  Recommendation Home with family support;Home PT  (may need [facility] home PT services at this time)         Discharge Plan: [address]

## 2018-05-16 NOTE — PLAN OF CARE
CARDIOVASCULAR - ADULT     Maintains optimal cardiac output and hemodynamic stability Progressing        DISCHARGE PLANNING - CARE MANAGEMENT     Discharge to post-acute care or home with appropriate resources Progressing        GENITOURINARY - ADULT     Maintains or returns to baseline urinary function Progressing        METABOLIC, FLUID AND ELECTROLYTES - ADULT     Electrolytes maintained within normal limits Progressing        Potential for Falls     Patient will remain free of falls Progressing        Prexisting or High Potential for Compromised Skin Integrity     Skin integrity is maintained or improved Progressing        RESPIRATORY - ADULT     Achieves optimal ventilation and oxygenation Progressing

## 2018-05-16 NOTE — PROGRESS NOTES
Progress Note - Feliz Louis 80 y o  female MRN: [id number]    Unit/Bed#: [facility] 505-01 Encounter: [id number]      Assessment/Plan  1  Delirium  Improved, patient alert oriented to person, place, month, year this a m  Consider discontinuing oxybutynin secondary to unwanted side effects in the elderly, spoke with krzysztof, they will speak to patient about this  Continue treating underlying conditions  Recommend adding Tylenol 650 mg Q 8  Refer to geriatric pain med order set if further pain med recommendations are needed  Continue with Senokot daily  Monitor for urinary retention, currently has Nair placed  Redirect unwanted patient behaviors as first line tx  Reorient patient frequently  Avoid deliriogenic meds including tramadol, benzodiazepines, benadryl  Good sleep hygiene important, limit night time interruptions  Encourage patient to stay awake during the day  Ensure adequate hydration/nutrition  Mobilize often      2    Age-related cognitive decline  Patient alert oriented x4 today  Patient scores 4/5 on mini cog today, great improvement from yesterday  Continues to struggle with clock draw  With history of CVA  Currently complicated by 1  Sister reports forgetfulness  Unclear if patient is safe to live at home by herself  B12, folate within normal limits, continue with patient's vitamin B12 supplementation  Patient would benefit from formal cognitive assessment, this can be done at [facility] for positive aging upon discharge     3  Echolalia  Improved today  Hx of CVA per sister  Residual ?  Consider further workup     4  History of fall  Fall precautions  Would not recommend oxybutynin be restarted, spoke with krzysztof, they will speak to patient about this  PT, OT when appropriate  Continue with patient's home vitamin D supplementation     5  Ambulatory dysfunction  Ambulates with cane at baseline, with history of fall  PT, OT  Continue vitamin D supplement  Rehab     6    Deconditioning  Mobilize frequently to prevent further decline  PT, OT  Rehab     7  Bilateral hearing loss  Speak loudly clearly well directly facing the patient  Patient may benefit from audiology referral as outpatient     8  Hypotension  Critical care following and managing  Has since resolved, patient transferred to [facility] floor     Subjective:   Per record, no acute events overnight  Patient alert and oriented to person, place, month, year, which is an improvement from yesterday  Patient scores 4/5 on mini cog, has difficulty drying clock, loses 1 point here  Is eventually able to recall 3 of 3 words  No signs of delirium at this time  Sister at bedside reports patient much better than yesterday  Patient complains of pain in left groin, pain at incision site were pacemaker was placed, constipation  Patient denies fatigue, headaches, vision changes, chest pain, sob, N/V/D, difficulty with urination, anxiousness, depression, confusion  Objective:     Vitals: Blood pressure 104/58, pulse 63, temperature 98 5 °F (36 9 °C), resp  rate 18, height 5' 2" (1 575 m), weight 62 8 kg (138 lb 7 2 oz), SpO2 92 %  ,Body mass index is 25 32 kg/m²  Intake/Output Summary (Last 24 hours) at 05/16/18 1127  Last data filed at 05/16/18 0900   Gross per 24 hour   Intake              600 ml   Output              800 ml   Net             -200 ml       Physical Exam: General : WD in NAD  HEENT : MMM no erythema or exudates  EOMI, sclera anicteric  Heart : Normal rate  Lungs : CTA  Abdomen : Soft, NT/ND, BS auscultated in all 4 quads  No organomegaly  Ext :  Pulses +2/4 B/L  Neg edema B/L  Neg calf swelling B/L  Skin : Pink, warm, dry, age appropriate turgor and mobility  Neuro : Nonfocal  Psych : A * O x 4, delirium improved  Patient scores 4/5 on mini cog  Patient's presentation consistent with age-related cognitive decline         Invasive Devices     Peripheral Intravenous Line            Peripheral IV 05/13/18 Left Forearm 3 days Peripheral IV 05/14/18 Right Antecubital 2 days                Lab, Imaging and other studies: I have personally reviewed pertinent reports      VTE Pharmacologic Prophylaxis: Sequential compression device (Venodyne)   VTE Mechanical Prophylaxis: sequential compression device

## 2018-05-16 NOTE — PROGRESS NOTES
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     Plan:  Adrenal  crisis  Continue replacement hormone therapy with daily levothyroxine 75 mcg  Continue Solu-Cortef 25 mg q 8 hours for another 24 hours, then transition to home prednisone 5 mg daily     Diabetes type 2  Last A1c 9 4  At home she is on metformin Januvia and Prandin  While in hospital, continue with correctional insulin, resume Prandin today  Will resume metformin tomorrow    Subjective:   Patient seen examined bedside  Doing well this afternoon, blood pressure is stable  Denies lightheadedness dizziness shortness of breath chest pain  Objective:     Vitals: Blood pressure 104/58, pulse 63, temperature 98 5 °F (36 9 °C), resp  rate 18, height 5' 2" (1 575 m), weight 62 8 kg (138 lb 7 2 oz), SpO2 92 %  ,Body mass index is 25 32 kg/m²        Intake/Output Summary (Last 24 hours) at 05/16/18 1456  Last data filed at 05/16/18 1301   Gross per 24 hour   Intake              760 ml   Output              500 ml   Net              260 ml       Physical Exam: /66   Pulse 72   Temp 98 2 °F (36 8 °C) (Oral)   Resp 18   Ht 5' 2" (1 575 m)   Wt 62 8 kg (138 lb 7 2 oz)   SpO2 93%   BMI 25 32 kg/m²     General Appearance:    Alert, cooperative, no distress, appears stated age   Head:    Normocephalic, without obvious abnormality, atraumatic   Eyes:    PERRL, conjunctiva/corneas clear, EOM's intact, fundi     benign, both eyes   Neck:   Supple, no JVD   Lungs:     Clear to auscultation bilaterally, respirations unlabored    Heart:    Regular rate and rhythm, S1 and S2 normal, no murmur, rub   or gallop   Extremities:   Extremities normal, atraumatic, no cyanosis or edema   Neurologic:   CNII-XII intact, normal strength, sensation and reflexes throughout        Invasive Devices     Peripheral Intravenous Line            Peripheral IV 05/13/18 Left Forearm 3 days    Peripheral IV 05/14/18 Right Antecubital 2 days              Surekha Inman DO  PGY 3

## 2018-05-16 NOTE — ASSESSMENT & PLAN NOTE
Secondary  Normal free T4  Continue current doses of levothyroxine  Appreciate Endocrine recommendations

## 2018-05-16 NOTE — PLAN OF CARE
Problem: OCCUPATIONAL THERAPY ADULT  Goal: Performs self-care activities at highest level of function for planned discharge setting  See evaluation for individualized goals  Treatment Interventions: ADL retraining, Functional transfer training, UE strengthening/ROM, Endurance training, Cognitive reorientation, Patient/family training, Equipment evaluation/education, Compensatory technique education, Energy conservation, Activityengagement, Cardiac education  Equipment Recommended:  (rolling walker)       See flowsheet documentation for full assessment, interventions and recommendations  Limitation: Decreased ADL status, Decreased UE strength, Decreased Safe judgement during ADL, Decreased cognition, Decreased endurance, Decreased self-care trans, Decreased high-level ADLs (pacemaker precautions L UE)  Prognosis: Good  Assessment: Pt is a 80 y o  female seen for OT evaluation s/p admit to [facility] on 5/14/2018 w/ Hypotension  Pt transferred from [facility] for pacemaker placement on 5/14/18  Comorbidities affecting pt's functional performance at time of assessment include: DM II, HTN, h/o NSTEMI, depression, h/o CVA, CAD, h/o b/l hip replacements  Personal factors affecting pt at time of IE include: lives alone, plans to stay w/ sister upon d/c  Prior to admission, pt was living alone and reports independent w/ ADLs, independent w/ IADLs, independent w/ functional transfers and mobility w/ [facility], son checks on pt daily and transports pt to appointments   Upon evaluation: Pt requires MIN assist sit>stand from chair while adhering to pacemaker precautions, MIN assist functional mobility w/ RW, MOD assist transfer to low toilet w/ grab bar use, MOD assist LB ADLs, MIN-MOD assist UB ADLS, MOD assist toileting steadying 2* the following deficits impacting occupational performance: decreased strength and endurance, impaired balance, impaired activity tolerance, increased pain, impaired cognition (STM, insight, tangential speech), impaired functional reach, pacemaker precautions  Pt and pt sister educated on pacemaker precautions and pt requires continued education  Pt to benefit from continued skilled OT tx while in the hospital to address deficits as defined above and maximize level of functional independence w ADL's and functional mobility  Occupational Performance areas to address include: grooming, bathing/shower, toilet hygiene, dressing, functional mobility and clothing management, home safety education, [facility] training, formal cognitive assessment  Educated pt on  Use of shower chair at sisters home  From OT standpoint, recommendation at time of d/c would be home w/ family support and HOME OT; assist for ADLs and functional transfers and mobility       OT Discharge Recommendation: Home OT (assist ADLS)  OT - OK to Discharge:  (when medically stable)      Comments: Laurie Belcher MS, OTR/L

## 2018-05-16 NOTE — ASSESSMENT & PLAN NOTE
Recently at the [facility]   Heart rate currently well controlled  Outpatient follow-up with her cardiologist at [facility] sheila

## 2018-05-16 NOTE — PLAN OF CARE
Problem: PHYSICAL THERAPY ADULT  Goal: Performs mobility at highest level of function for planned discharge setting  See evaluation for individualized goals  Treatment/Interventions: Functional transfer training, Endurance training, Patient/family training, Equipment eval/education, Bed mobility, Gait training, Spoke to nursing, Spoke to case management, Family  Equipment Recommended: Kayla Bermudez (RW)       See flowsheet documentation for full assessment, interventions and recommendations  Prognosis: Good  Problem List: Decreased strength, Decreased endurance, Impaired balance, Decreased mobility, Decreased cognition, Decreased safety awareness, Impaired hearing, Decreased skin integrity, Pain  Assessment: pt is a 79 y/o female admitted to [facility] 2* hypotension  Pt was transfered from [facility] with recent pacer placement 05/14/18  Pt lives alone in 1 story home with MARCIA,owns [facility] and walker,reports being independent PTA with local family supprt and A as needed  Per pt and pt's sister, pt's sister will be able to care for pt 24* per day "until she gets better"  Pt currently is not at functional mobility baseline,needs Ax1 for mobility,reports LLE groin pain,slow mobility,unsteady and ataxic gait pattern,IV medicine management,cont telemetry monitoring,multiple lines,ongoing medical care and dec cognition  Pt demonstrates moderate to minimal deficits during functional mobility and gait including dec endurance,dec balance,dec cognition (baseline?),dec BLE strength,inc pain LLE and needs minAx1 for BM,mod->minAx1 for transfers and minAx1 for gait with use of RW  Pt would cont to benefit from skilled inpt PT services to maximize functional independence  Barriers to Discharge: Inaccessible home environment (MARCIA)     Recommendation: Home with family support, Home PT (may need [place] [id number] home PT services at this time)          See flowsheet documentation for full assessment

## 2018-05-16 NOTE — PHYSICAL THERAPY NOTE
Physical Therapy Evaluation:    2 forms of pt ID verified:name,birthdate and pt ID cherylcelherman    Patient's Name: Magali Diamond    Admitting Diagnosis  Hypotension [I95 9]    Problem List  Patient Active Problem List   Diagnosis    Uncontrolled type 2 diabetes mellitus without complication, with long-term current use of insulin ([place] [address] )    Hyperlipidemia    Hypertension    Low TSH level    Hypothyroidism    History of CVA (cerebrovascular accident)    Sleep apnea    Diabetes 1 5, managed as type 2 ([place] [address] )    Sick sinus syndrome ([place] [address] )    Supraventricular tachycardia ([place] [address] )    Chronic diastolic congestive heart failure ([place] [address] )    NSTEMI (non-ST elevated myocardial infarction) ([place] [address] )    Moderate to severe aortic stenosis    Atelectasis    Hiatal hernia    Thyroid nodule    Anemia    Bilateral hearing loss    Chronic bilateral low back pain without sciatica    Depression    Diabetic hypoglycemia (HCC)    Diabetic neuropathy (HCC)    Esophageal dysmotility    Esophageal reflux    Esophageal spasm    Generalized osteoarthritis of multiple sites    Hiatal hernia with gastroesophageal reflux    Hypopituitarism ([place] [address] )    Onychomycosis    Pruritus    Vitamin D deficiency    Hypotension    H/O tachycardia-bradycardia syndrome s/p PPM    S/P placement of cardiac pacemaker    Delirium    Forgetfulness    Echolalia    Hx of fall    Ambulatory dysfunction    Physical deconditioning       Past Medical History  Past Medical History:   Diagnosis Date    Bradycardia     Coronary artery disease     Depression     Depression     Diabetes mellitus ([place] [address] )     Type 2 DM    Disease of thyroid gland     Facial droop     started 10 years ago    GERD (gastroesophageal reflux disease)     Heart murmur     Hyperlipidemia     Osteoarthritis     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[place] [address] )     Vitamin D deficiency        Past Surgical History  Past Surgical History:   Procedure Laterality Date    ANKLE SURGERY Left 2005  APPENDECTOMY  1955    CATARACT EXTRACTION Bilateral 09/1995    HEMORRHOID SURGERY  1978    HERNIA REPAIR  1990    JOINT REPLACEMENT      b/l hip replacements, right 2006    NEUROPLASTY / TRANSPOSITION MEDIAN NERVE AT CARPAL TUNNEL  1977    PARTIAL HYSTERECTOMY  1971    TOTAL HIP ARTHROPLASTY      TRANSPHENOIDAL / TRANSNASAL HYPOPHYSECTOMY / RESECTION PITUITARY TUMOR  1989    excision of pituitary gland      05/16/18 1100   Note Type   Note type Eval only   Pain Assessment   Pain Assessment 0-10   Pain Score 6   Pain Type Acute pain   Pain Location Leg;Groin   Pain Orientation Left   Hospital Pain Intervention(s) Repositioned; Ambulation/increased activity; Elevated; Emotional support; Environmental changes; Rest   Home Living   Type of [address] [place] [address] One level;Stairs to enter with rails  (pt's sister reports will provide 25* care "until better")   Bathroom Shower/Tub Tub only   [address];Walker  (owns [facility] and walker (unsure if walker has wheels))   Additional Comments 2 MARCIA reported by pt;pt reports being independent PTA with available A from family locally   Prior Function   Level of [place] Independent with ADLs and functional mobility  (per pt PTA)   Lives With Alone  (available A from family upon D/C per pt's family and pt)   Receives Help From Family  (per pt PTA)   ADL Assistance Independent   IADLs Needs assistance   Falls in the last 6 months 0   Restrictions/Precautions   Braces or Orthoses Sling  (for LUE 2* recent pacer placement)   Other Precautions Pain;Hard of hearing; Fall Risk;Telemetry;Multiple lines;Cognitive   General   Additional Pertinent History hypotension 2* adrenal sufficiency   Family/Caregiver Present Yes  (sister)   Cognition   Overall Cognitive Status Impaired   Arousal/Participation Cooperative   Orientation Level Oriented to person;Oriented to place;Oriented to time   Following Commands Follows one step commands with increased time or repetition  (2* inc pain LLE,slow mobility,dec cognition,[place])   RLE Assessment   RLE Assessment (at least 3+/5 grossly throughout)   LLE Assessment   LLE Assessment (at least 3+/5 grossly throughout)   Coordination   Movements are Fluid and Coordinated 0   Coordination and Movement Description pain,dec BLE step length,shuffling gait pattern,slow mobility   Sensation WFL   Light Touch   RLE Light Touch Grossly intact   LLE Light Touch Grossly intact   Bed Mobility   Supine to Sit 4  Minimal assistance   Additional items Assist x 1;HOB elevated; Bedrails; Increased time required;Verbal cues;LE management   Transfers   Sit to Stand 3  Moderate assistance   Additional items Assist x 1;Bedrails; Increased time required;Verbal cues   Stand to Sit 4  Minimal assistance   Additional items Assist x 1; Armrests; Increased time required;Verbal cues  (for safety,education and control descent)   Additional Comments pt was tangental with thoughts and repeat of several stories (the same stories), throughout the PT evaluation   Ambulation/Elevation   Gait pattern Poor UE support; Improper Weight shift; Antalgic;Narrow KIANA; Forward Flexion; Shuffling; Inconsistent zack; Foward flexed; Short stride; Ataxia   Gait Assistance 4  Minimal assist   Additional items Assist x 1;Verbal cues; Tactile cues   Assistive Device Rolling walker   Distance 150 feet with use of RW on tile and hardwood karen;several static stand rest breaks during upright mobility 2* pt reports pain,fatigue and weakness   Balance   Static Sitting Good  (in chair following mobility)   Dynamic Sitting Poor   Static Standing Poor +   Dynamic Standing Poor +   Ambulatory Poor +   Endurance Deficit   Endurance Deficit Yes   Endurance Deficit Description weakness,deconditioning,fatigue,pain,recent surgical procedure (pacer placement)   Activity Tolerance   Activity Tolerance Patient limited by fatigue;Patient limited by pain  (fair)   Medical Staff Made Aware KAVON Black Nurse Made Aware yes   Assessment   Prognosis Good   Problem List Decreased strength;Decreased endurance; Impaired balance;Decreased mobility; Decreased cognition;Decreased safety awareness; Impaired hearing;Decreased skin integrity;Pain   Assessment pt is a 79 y/o female admitted to [facility] 2* hypotension  Pt was transfered from [address] [place] [address] with recent pacer placement 05/14/18  Pt lives alone in 1 story home with MARCIA,owns [facility] and walker,reports being independent PTA with local family supprt and A as needed  Per pt and pt's sister, pt's sister will be able to care for pt 24* per day "until she gets better"  Pt currently is not at functional mobility baseline,needs Ax1 for mobility,reports LLE groin pain,slow mobility,unsteady and ataxic gait pattern,IV medicine management,cont telemetry monitoring,multiple lines,ongoing medical care and dec cognition  Pt demonstrates moderate to minimal deficits during functional mobility and gait including dec endurance,dec balance,dec cognition (baseline?),dec BLE strength,inc pain LLE and needs minAx1 for BM,mod->minAx1 for transfers and minAx1 for gait with use of RW  Pt would cont to benefit from skilled inpt PT services to maximize functional independence  Barriers to Discharge Inaccessible home environment  (MARCIA)   Goals   Patient Goals to go home (currently declining home PT services upon D/C which is the PT recommendation upon D/C)   STG Expiration Date 05/26/18   Short Term Goal #1 in 7-10 days:pt will be able to ambulate >200 feet with use of RW on various surfaces S without rest breaks,activity tolerance:45mins/45mins,inc balance 1 grade,BM and transfer completely I to and from various surfaces consistently,I with BLE ther ex HEP,up and down 2-4 steps with use of rail S   Treatment Day 0   Plan   Treatment/Interventions Functional transfer training; Endurance training;Patient/family training;Equipment eval/education; Bed mobility;Gait training;Spoke to nursing;Spoke to case management; Family   PT Frequency 5x/wk   Recommendation   Recommendation Home with family support;Home PT  (may need [facility] home PT services at this time)   Equipment Recommended Walker  (RW)   Barthel Index   Feeding 10   Bathing 0   Grooming Score 0   Dressing Score 5   Bladder Score 10   Bowels Score 10   Toilet Use Score 5   Transfers (Bed/Chair) Score 10   Mobility (Level Surface) Score 0   Stairs Score 0   Barthel Index Score 50   Skilled PT recommended while in hospital and upon DC to progress pt toward treatment goals         Kaseyonia Copa, PT, DPT@

## 2018-05-16 NOTE — SOCIAL WORK
CM met with pt and sister Phylicia Correa to discuss need for VNA  Per Phylicia Correa pt will be living with her after discharge, both pt and sister agreeable to VNA  Sister's address is:  [address] [place], [place] [address] [place], [address] [place] [address]  Tele: [phone]  Referral sent to  [address] [place] [address]

## 2018-05-16 NOTE — ASSESSMENT & PLAN NOTE
Takes Januvia and Prandin at home  Blood sugars currently decently controlled  Sliding scale insulin for now  Resume oral on discharge

## 2018-05-16 NOTE — ASSESSMENT & PLAN NOTE
Probably due to adrenal insufficiency as patient has known history of panhypopituitarism on chronic steroids  Improved with IV steroids  Currently on hydrocortisone 50 mg IV q 8h   Endocrine following  Plan on switching oral steroids possibly later today or tomorrow  Pending endocrine evaluation today

## 2018-05-16 NOTE — PROGRESS NOTES
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   Heart rate currently well controlled  [facility]  [facility]   Assessment & Plan    Because of surgical resection  Takes prednisone at home  Currently on IV hydrocortisone  Moderate to severe aortic stenosis   Assessment & Plan    Outpatient follow-up with Cardiology  Hypothyroidism   Assessment & Plan    Secondary  Normal free T4  Continue current doses of levothyroxine  Appreciate Endocrine recommendations  Uncontrolled type 2 diabetes mellitus without complication, with long-term current use of insulin [facility])   Assessment & Plan    Takes Januvia and Prandin at home  Blood sugars currently decently controlled  Sliding scale insulin for now  Resume oral on discharge              VTE Pharmacologic Prophylaxis:   Pharmacologic: Heparin  Mechanical VTE Prophylaxis in Place: Yes    Patient Centered Rounds: I have performed bedside rounds with nursing staff today  Discussions with Specialists or Other Care Team Provider:     Education and Discussions with Family / Patient: patient  She said she will inform her sister    Time Spent for Care: 30 minutes  More than 50% of total time spent on counseling and coordination of care as described above  Current Length of Stay: 1 day(s)    Current Patient Status: Inpatient   Certification Statement: The patient will continue to require additional inpatient hospital stay due to above    Discharge Plan: possibly tomorrow once pt on PO steroids    Code Status: Level 1 - Full Code      Subjective:   Pt seen and examined by me this morning  Pt complained of  mild pain in her groin  Also feels that she is not having good movements  Otherwise feels much better  Objective:     Vitals:   Temp (24hrs), Av °F (36 7 °C), Min:97 4 °F (36 3 °C), Max:98 5 °F (36 9 °C)    HR:  [54-74] 63  Resp:  [14-21] 18  BP: ()/(56-66) 104/58  SpO2:  [91 %-95 %] 92 %  Body mass index is 25 32 kg/m²  Input and Output Summary (last 24 hours): Intake/Output Summary (Last 24 hours) at 18 1502  Last data filed at 18 1301   Gross per 24 hour   Intake              760 ml   Output              500 ml   Net              260 ml       Physical Exam:     Physical Exam    Constitutional: Pt appears well-developed and well-nourished  Not in any acute distress  Cardiovascular: Normal rate, regular rhythm, normal heart sounds  Exam reveals no gallop and no friction rub  No murmur heard  Pulmonary/Chest: Effort normal and breath sounds normal  No respiratory distress  Pt has no wheezes or rales  Abdominal: Soft  Non-distended, Non-tender  Bowel sounds are normal    Groin:  No swelling deformity or tenderness on examination  Musculoskeletal: Normal range of motion  Neurological: alert and oriented to person, place, and time  Normal strength and sensations    Psychiatric: normal mood and affect  Additional Data:     Labs:      Results from last 7 days  Lab Units 05/16/18  0521   WBC Thousand/uL 14 88*   HEMOGLOBIN g/dL 10 9*   HEMATOCRIT % 34 0*   PLATELETS Thousands/uL 193   NEUTROS PCT % 86*   LYMPHS PCT % 7*   MONOS PCT % 6   EOS PCT % 0       Results from last 7 days  Lab Units 05/16/18  0521   SODIUM mmol/L 139   POTASSIUM mmol/L 3 9   CHLORIDE mmol/L 106   CO2 mmol/L 28   BUN mg/dL 19   CREATININE mg/dL 0 69   CALCIUM mg/dL 8 5   GLUCOSE RANDOM mg/dL 208*           Results from last 7 days  Lab Units 05/16/18  1055 05/16/18  0639 05/16/18  0157 05/15/18  2055 05/15/18  1638 05/15/18  1038 05/15/18  0723   POC GLUCOSE mg/dl 356* 245* 287* 405* 408* 157* 163*       Results from last 7 days  Lab Units 05/16/18  0521   HEMOGLOBIN A1C % 9 7*         * I Have Reviewed All Lab Data Listed Above  * Additional Pertinent Lab Tests Reviewed:  Anjelica 66 Admission Reviewed    Imaging:    Imaging Reports Reviewed Today Include:   Imaging Personally Reviewed by Myself Includes:      Recent Cultures (last 7 days):           Last 24 Hours Medication List:     Current Facility-Administered Medications:  acetaminophen 650 mg Oral Q6H PRN Williams Arguelles MD   aspirin 81 mg Oral Daily Fior Spironello V, CRNP   cyanocobalamin 1,000 mcg Oral Daily Fior Spirogabrielo V, CRNP   docusate sodium 100 mg Oral BID Fior Perlao V, CRNP   ferrous sulfate 325 mg Oral Daily With Breakfast Fior Perlao V, CRNP   heparin (porcine) 5,000 Units Subcutaneous Q8H [facility] Amira Perla PA-C   hydrocortisone sodium succinate 50 mg Intravenous Q8H [facility] Fior Perlao V, CRNP   insulin lispro 1-5 Units Subcutaneous TID AC Amira Perla PA-C   insulin lispro 1-5 Units Subcutaneous HS Frank Girard MD   levothyroxine 75 mcg Oral Early Morning Amira Perla PA-C   multivitamin stress formula 1 tablet Oral Daily Fior Perlao V, SCOTTNP   oxybutynin 10 mg Oral BID [facility] [place] PRESTON Lopez   pantoprazole 40 mg Oral Early Morning Fior Spirogabrielo PRESTON MAGDALENO   polyethylene glycol 17 g Oral Daily Fior Spirogabrielo PRESTON MGADALENO   pravastatin 20 mg Oral Daily PRESTON Santos   senna 1 tablet Oral HS Jeana Field MD        Today, Patient Was Seen By: Jeana Field MD    ** Please Note: Dictation voice to text software may have been used in the creation of this document   **

## 2018-05-16 NOTE — OCCUPATIONAL THERAPY NOTE
[address]  Evaluation     Patient Name: Saqib HINES Date: 5/16/2018  Problem List  Patient Active Problem List   Diagnosis    Uncontrolled type 2 diabetes mellitus without complication, with long-term current use of insulin ([facility] )    Hyperlipidemia    Hypertension    Low TSH level    Hypothyroidism    History of CVA (cerebrovascular accident)    Sleep apnea    Diabetes 1 5, managed as type 2 ([facility] )    [facility]    Supraventricular tachycardia ([facility]    Chronic diastolic congestive [facility] failure [facility]    NSTEMI (non-ST elevated myocardial infarction) [facility]    Moderate to severe aortic stenosis    Atelectasis    Hiatal hernia    Thyroid nodule    Anemia    Bilateral hearing loss    Chronic bilateral low back pain without sciatica    Depression    Diabetic hypoglycemia (HCC)    Diabetic neuropathy (HCC)    Esophageal dysmotility    Esophageal reflux    Esophageal spasm    Generalized osteoarthritis of multiple sites    Hiatal hernia with gastroesophageal reflux    Hypopituitarism ([facility]    Onychomycosis    Pruritus    Vitamin D deficiency    Hypotension    H/O tachycardia-bradycardia syndrome s/p PPM    S/P placement of cardiac pacemaker    Delirium    Forgetfulness    Echolalia    Hx of fall    Ambulatory dysfunction    Physical deconditioning     Past Medical History  Past Medical History:   Diagnosis Date    Bradycardia     Coronary artery disease     Depression     Depression     Diabetes mellitus ([facility] )     Type 2 DM    Disease of thyroid gland     Facial droop     started 10 years ago    GERD (gastroesophageal reflux disease)     Heart murmur     Hyperlipidemia     Osteoarthritis     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[facility] )     Vitamin D deficiency      Past Surgical History  Past Surgical History:   Procedure Laterality Date    ANKLE SURGERY Left 2005    APPENDECTOMY  1955    CATARACT EXTRACTION Bilateral 09/1995    HEMORRHOID SURGERY [facility]    JOINT REPLACEMENT      b/l hip replacements, right 2006    NEUROPLASTY / TRANSPOSITION MEDIAN NERVE AT CARPAL TUNNEL  1977    PARTIAL HYSTERECTOMY  1971    TOTAL HIP ARTHROPLASTY      TRANSPHENOIDAL / TRANSNASAL HYPOPHYSECTOMY / RESECTION PITUITARY TUMOR  1989    excision of pituitary gland           05/16/18 1310   Note Type   Note type Eval/Treat   Restrictions/Precautions   Weight Bearing Precautions Per Order (pacemaker in sling L UE)   Braces or Orthoses Sling  (L UE pacemaker placement)   Other Precautions Fall Risk;Pain;Hard of hearing;Multiple lines;Telemetry;Cognitive  (pacemaker precautions)   Pain Assessment   Pain Assessment No/denies pain   Home Living   Type of [address] One level;Stairs to enter with rails  (reports staying at sister)   Bathroom Shower/Tub Walk-in shower  (both at sisters)   Bathroom Toilet Standard   Bathroom Equipment Grab bars in shower;Built-in shower seat   [address] Walker;Cane  (has walker unsure if walker has wheels)   Additional Comments pt reports independent and living alone PTA; however at d/c plans to stay w/ sister who is available to help her and provide 24/7 care   Prior Function   Level of [place] Independent with ADLs and functional mobility  (SPC mobility)   Lives With [address] [place] Help From Family  (son and sister)   ADL Assistance Independent   IADLs Independent  (has cleaning lady)   Falls in the last 6 months 0   Vocational Retired   Comments pt reports independent prior to arrival and son would transport to appointments   Lifestyle   Autonomy per pt independent w/ ADLS, independent w/ functional transfers and mobility w/ [facility], son would drive pt PTA   Reciprocal Relationships sister and son   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, [employer]   Intrinsic Gratification dressing and fixing up her dolls and reading   ADL   Where Assessed Chair Eating Assistance 5  Supervision/Setup   Grooming Assistance 4  Minimal Assistance   Grooming Deficit Setup;Steadying;Verbal cueing;Supervision/safety; Increased time to complete;Wash/dry hands   UB Bathing Assistance 4  Minimal Assistance   LB Bathing Assistance 3  Moderate Assistance   UB Dressing Assistance 4  Minimal [address] 3  Moderate Assistance   Toileting Assistance  3  Moderate Assistance   Toileting Deficit Setup;Steadying;Verbal cueing;Supervison/safety; Increased time to complete;Clothing management up;Clothing management down;Grab bar use;Perineal hygiene   Bed Mobility   Supine to Sit Unable to assess   Additional Comments pt seated in bedside chair upon arrival   Transfers   Sit to Stand 4  Minimal assistance   Additional items Assist x 1; Increased time required;Verbal cues;Armrests  (w/ R UE hand placement)   Stand to Sit 4  Minimal assistance   Additional items Assist x 1; Increased time required;Verbal cues;Armrests   Toilet transfer 3  Moderate assistance   Additional items Assist x 1; Increased time required;Verbal cues;Standard toilet  (low toilet height w/ grab bar use)   Additional Comments VCs for positioning   Functional Mobility   Functional Mobility 4  Minimal assistance   Additional Comments assist x1    Additional items Rolling walker   Balance   Static Sitting Fair +   Dynamic Sitting Fair   Static Standing Fair -   Dynamic Standing Poor +   Ambulatory Poor +   Activity Tolerance   Activity Tolerance Patient limited by fatigue;Treatment limited secondary to medical complications (Comment)   Nurse Made Aware appropriate to see per RN   RUE Assessment   RUE Assessment WFL  (4-/5)   LUE Assessment   LUE Assessment X  (pacemaker precautions)   Hand Function   Gross Motor Coordination Functional   Fine Motor Coordination Functional   Sensation   Light Touch No apparent deficits   Proprioception   Proprioception No apparent deficits   Vision-Basic Assessment Current Vision Wears glasses all the time   Vision - Complex Assessment   Ocular Range [facility]   Acuity Able to read clock/calendar on wall without difficulty   Perception   Inattention/Neglect Appears intact   Cognition   Overall Cognitive Status Impaired   Arousal/Participation Alert; Cooperative   Attention Attends with cues to redirect   Orientation Level Oriented to person;Oriented to place;Oriented to time   Memory Decreased short term memory;Decreased recall of precautions   Following Commands Follows one step commands with increased time or repetition   Comments pt w/ tangential speech at times requiring redirection to tasks, pt w/ impaired STM; pt sister to provide 24/7 assist at d/c   Assessment   Limitation Decreased ADL status; Decreased UE strength;Decreased Safe judgement during ADL;Decreased cognition;Decreased endurance;Decreased self-care trans;Decreased high-level ADLs  (pacemaker precautions L UE)   Prognosis Good   Assessment Pt is a 80 y o  female seen for OT evaluation s/p [place] [facility] [place] w/ Hypotension  Pt [facility] pacemaker [facility] on 5/14/18  Comorbidities affecting pt's functional performance at time of assessment include: DM II, HTN, h/o NSTEMI, depression, h/o CVA, CAD, h/o b/l hip replacements  Personal factors affecting pt at time of IE include: lives alone, plans to stay w/ sister upon d/c  Prior to admission, pt was living alone and reports independent w/ ADLs, independent w/ IADLs, independent w/ functional transfers and mobility w/ [facility], son checks on pt daily and transports pt to appointments   Upon evaluation: Pt requires MIN assist sit>stand from chair while adhering to pacemaker precautions, MIN assist functional mobility w/ RW, MOD assist transfer to low toilet w/ grab bar use, MOD assist LB ADLs, MIN-MOD assist UB ADLS, MOD assist toileting steadying 2* the following deficits impacting occupational performance: decreased strength and endurance, impaired balance, impaired activity tolerance, increased pain, impaired cognition (STM, insight, tangential speech), impaired functional reach, pacemaker precautions  Pt and pt sister educated on pacemaker precautions and pt requires continued education  Pt to benefit from continued skilled OT tx while in the hospital to address deficits as defined above and maximize level of functional independence w ADL's and functional mobility  Occupational Performance areas to address include: grooming, bathing/shower, toilet hygiene, dressing, functional mobility and clothing management, home safety education, [address] [place] [address] training, formal cognitive assessment  Educated pt on  Use of shower chair at sisters home  From OT standpoint, recommendation at time of d/c would be home w/ family support and HOME OT; assist for ADLs and functional transfers and mobility  Goals   Patient Goals "to go to my sisters"   LTG Time Frame 10-14   Long Term Goal please see below goals   Plan   Treatment Interventions ADL retraining;Functional transfer training;UE strengthening/ROM; Endurance training;Cognitive reorientation;Patient/family training;Equipment evaluation/education; Compensatory technique education; Energy conservation; Activityengagement;Cardiac education   Goal Expiration Date 05/30/18   OT Frequency 3-5x/wk   Recommendation   OT Discharge Recommendation Home OT  (assist ADLS)   Equipment Recommended (rolling walker)   OT - OK to Discharge (when medically stable)   Barthel Index   Feeding 10   Bathing 0   Grooming Score 0   Dressing Score 5   Bladder Score 10   Bowels Score 10   Toilet Use Score 5   Transfers (Bed/Chair) Score 10   Mobility (Level Surface) Score 0   Stairs Score 0   Barthel Index Score 50   Modified [place] Scale   Modified [place] Scale 4     Occupational Therapy Goals to be met in 10-14 days:  1) Pt will improve activity tolerance to G for min 30 min txment sessions  2) Pt will complete ADLs/self care w/ supervision  3) Pt will complete toileting w/ supervision w/ G hygiene/thoroughness using DME PRN  4) Pt will improve functional transfers on/off all surfaces using DME PRN w/ G balance/safety including toileting w/ supervision  5) Pt will improve fx'l mobility during I/ADl/leisure tasks using DME PRN w/ g balance/safety w/ supervision  6) Pt will engage in ongoing cognitive assessment w/ G participation to A w/ safe d/c planning/recommendations  7) Pt will demonstrate G carryover of pt/caregiver education and training as appropriate w/ mod I  w/ G tolerance  8) Pt will engage in depression screen/leisure interest checklist w/ G participation to monitor s/s depression and ID 3 positive coping strategies to A w/ emotional regulation and management  9) Pt will demonstrate 100% carryover of E C  techniques w/ mod I t/o fx'l I/ADL/leisure tasks w/o cues s/p skilled education  10) Pt will demonstrate improved bed mobility to supervision  11) Pt will demonstrate 100% carryover of LHAE for LB ADLs/self care and leisure s/p skilled education w/ mod I and G participation  12) Pt will demonstrate improved standing tolerance to 3-5 minutes during functional tasks w/ no LOB to enhance ADL performance  13) Pt will adhere to pacemaker precautions 100% of time during OT sessions      Documentation completed by: Wellington Martini MS, OTR/L

## 2018-05-17 ENCOUNTER — APPOINTMENT (INPATIENT)
Dept: RADIOLOGY | Facility: HOSPITAL | Age: 83
DRG: 643 | End: 2018-05-17
Payer: COMMERCIAL

## 2018-05-17 ENCOUNTER — APPOINTMENT (INPATIENT)
Dept: RADIOLOGY | Facility: HOSPITAL | Age: 83
DRG: 643 | End: 2018-05-17
Attending: INTERNAL MEDICINE
Payer: COMMERCIAL

## 2018-05-17 VITALS
WEIGHT: 134.04 LBS | OXYGEN SATURATION: 97 % | BODY MASS INDEX: 24.67 KG/M2 | DIASTOLIC BLOOD PRESSURE: 85 MMHG | RESPIRATION RATE: 18 BRPM | TEMPERATURE: 98 F | HEIGHT: 62 IN | HEART RATE: 60 BPM | SYSTOLIC BLOOD PRESSURE: 144 MMHG

## 2018-05-17 PROBLEM — G92.8 TOXIC METABOLIC ENCEPHALOPATHY: Status: ACTIVE | Noted: 2018-05-17

## 2018-05-17 LAB
GLUCOSE SERPL-MCNC: 282 MG/DL (ref 65–140)
GLUCOSE SERPL-MCNC: 303 MG/DL (ref 65–140)

## 2018-05-17 PROCEDURE — 71046 X-RAY EXAM CHEST 2 VIEWS: CPT

## 2018-05-17 PROCEDURE — 97530 THERAPEUTIC ACTIVITIES: CPT

## 2018-05-17 PROCEDURE — 99239 HOSP IP/OBS DSCHRG MGMT >30: CPT | Performed by: INTERNAL MEDICINE

## 2018-05-17 PROCEDURE — 74176 CT ABD & PELVIS W/O CONTRAST: CPT

## 2018-05-17 PROCEDURE — 82948 REAGENT STRIP/BLOOD GLUCOSE: CPT

## 2018-05-17 PROCEDURE — 97535 SELF CARE MNGMENT TRAINING: CPT

## 2018-05-17 RX ORDER — METOPROLOL SUCCINATE 25 MG/1
12.5 TABLET, EXTENDED RELEASE ORAL DAILY
Qty: 30 TABLET | Refills: 0 | Status: SHIPPED | OUTPATIENT
Start: 2018-05-17 | End: 2018-07-17 | Stop reason: SDUPTHER

## 2018-05-17 RX ORDER — GUAIFENESIN/DEXTROMETHORPHAN 100-10MG/5
10 SYRUP ORAL EVERY 4 HOURS PRN
Qty: 118 ML | Refills: 0 | Status: SHIPPED | OUTPATIENT
Start: 2018-05-17 | End: 2018-11-08 | Stop reason: ALTCHOICE

## 2018-05-17 RX ORDER — PREDNISONE 1 MG/1
5 TABLET ORAL DAILY
Status: DISCONTINUED | OUTPATIENT
Start: 2018-05-17 | End: 2018-05-17 | Stop reason: HOSPADM

## 2018-05-17 RX ORDER — GUAIFENESIN/DEXTROMETHORPHAN 100-10MG/5
10 SYRUP ORAL EVERY 4 HOURS PRN
Status: DISCONTINUED | OUTPATIENT
Start: 2018-05-17 | End: 2018-05-17 | Stop reason: HOSPADM

## 2018-05-17 RX ADMIN — HYDROCORTISONE SODIUM SUCCINATE 25 MG: 100 INJECTION, POWDER, FOR SOLUTION INTRAMUSCULAR; INTRAVENOUS at 14:16

## 2018-05-17 RX ADMIN — ACETAMINOPHEN 650 MG: 325 TABLET, FILM COATED ORAL at 07:06

## 2018-05-17 RX ADMIN — PREDNISONE 5 MG: 5 TABLET ORAL at 17:17

## 2018-05-17 RX ADMIN — HEPARIN SODIUM 5000 UNITS: 5000 INJECTION, SOLUTION INTRAVENOUS; SUBCUTANEOUS at 06:24

## 2018-05-17 RX ADMIN — CYANOCOBALAMIN TAB 500 MCG 1000 MCG: 500 TAB at 08:43

## 2018-05-17 RX ADMIN — INSULIN LISPRO 3 UNITS: 100 INJECTION, SOLUTION INTRAVENOUS; SUBCUTANEOUS at 11:35

## 2018-05-17 RX ADMIN — HYDROCORTISONE SODIUM SUCCINATE 25 MG: 100 INJECTION, POWDER, FOR SOLUTION INTRAMUSCULAR; INTRAVENOUS at 06:24

## 2018-05-17 RX ADMIN — INSULIN LISPRO 3 UNITS: 100 INJECTION, SOLUTION INTRAVENOUS; SUBCUTANEOUS at 07:25

## 2018-05-17 RX ADMIN — REPAGLINIDE 2 MG: 1 TABLET ORAL at 11:35

## 2018-05-17 RX ADMIN — PANTOPRAZOLE SODIUM 40 MG: 40 TABLET, DELAYED RELEASE ORAL at 06:24

## 2018-05-17 RX ADMIN — OXYBUTYNIN CHLORIDE 10 MG: 5 TABLET, EXTENDED RELEASE ORAL at 08:43

## 2018-05-17 RX ADMIN — DOCUSATE SODIUM 100 MG: 100 CAPSULE, LIQUID FILLED ORAL at 08:43

## 2018-05-17 RX ADMIN — GUAIFENESIN AND DEXTROMETHORPHAN 10 ML: 100; 10 SYRUP ORAL at 00:30

## 2018-05-17 RX ADMIN — PRAVASTATIN SODIUM 20 MG: 20 TABLET ORAL at 08:43

## 2018-05-17 RX ADMIN — LEVOTHYROXINE SODIUM 75 MCG: 75 TABLET ORAL at 06:24

## 2018-05-17 RX ADMIN — ASPIRIN 81 MG 81 MG: 81 TABLET ORAL at 08:43

## 2018-05-17 RX ADMIN — HEPARIN SODIUM 5000 UNITS: 5000 INJECTION, SOLUTION INTRAVENOUS; SUBCUTANEOUS at 14:16

## 2018-05-17 RX ADMIN — REPAGLINIDE 2 MG: 1 TABLET ORAL at 07:13

## 2018-05-17 RX ADMIN — REPAGLINIDE 2 MG: 1 TABLET ORAL at 17:57

## 2018-05-17 RX ADMIN — POLYETHYLENE GLYCOL 3350 17 G: 17 POWDER, FOR SOLUTION ORAL at 11:36

## 2018-05-17 RX ADMIN — GUAIFENESIN AND DEXTROMETHORPHAN 10 ML: 100; 10 SYRUP ORAL at 07:06

## 2018-05-17 RX ADMIN — ZINC 1 TABLET: TAB ORAL at 08:44

## 2018-05-17 RX ADMIN — FERROUS SULFATE TAB 325 MG (65 MG ELEMENTAL FE) 325 MG: 325 (65 FE) TAB at 07:22

## 2018-05-17 NOTE — ASSESSMENT & PLAN NOTE
Multifactorial in the setting hypotension due to possible adrenal insufficiency, delirium and also underlying mild dementia/cognitive decline  Resolved for now  Appreciate Geriatrics recommendations  Can follow up with Geriatrics as outpatient

## 2018-05-17 NOTE — OCCUPATIONAL THERAPY NOTE
Occupational Therapy Treatment Note      Cassandra Navarro    5/17/2018    Patient Active Problem List   Diagnosis    Uncontrolled type 2 diabetes mellitus without complication, with long-term current use of insulin ([facility] [place] [facility] )    Hyperlipidemia    Hypertension    Low TSH level    Hypothyroidism    History of CVA (cerebrovascular accident)    Sleep apnea    Diabetes 1 5, managed as type 2 ([facility] [place] [facility] )    Sick sinus syndrome ([facility] [place] [facility] )    Supraventricular tachycardia ([facility] [place] [facility] )    Chronic diastolic congestive heart failure ([facility] [place] [facility] )    NSTEMI (non-ST elevated myocardial infarction) ([facility] [place] [facility] )    Moderate to severe aortic stenosis    Atelectasis    Hiatal hernia    Thyroid nodule    Anemia    Bilateral hearing loss    Chronic bilateral low back pain without sciatica    Depression    Diabetic hypoglycemia (HCC)    [facility] ([facility]    Esophageal dysmotility    Esophageal reflux    Esophageal spasm    Generalized osteoarthritis of multiple sites    Hiatal hernia with gastroesophageal reflux    Hypopituitarism ([facility] [place] [facility] )    Onychomycosis    Pruritus    Vitamin D deficiency    Hypotension    H/O tachycardia-bradycardia syndrome s/p PPM    S/P placement of cardiac pacemaker    Delirium    Forgetfulness    Echolalia    Hx of fall    Ambulatory dysfunction    Physical deconditioning       Past Medical History:   Diagnosis Date    Bradycardia     Coronary artery disease     Depression     Depression     Diabetes mellitus ([facility] [place] [facility] )     Type 2 DM    Disease of thyroid gland     Facial droop     started 10 years ago    GERD (gastroesophageal reflux disease)     Heart murmur     Hyperlipidemia     Osteoarthritis     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[facility] [place] [facility] )     Vitamin D deficiency        Past Surgical History:   Procedure Laterality Date    ANKLE SURGERY Left 2005    APPENDECTOMY  1955    CATARACT EXTRACTION Bilateral 09/1995    HEMORRHOID SURGERY  1978    HERNIA REPAIR  1990    JOINT REPLACEMENT      b/l hip replacements, right 2006    NEUROPLASTY / TRANSPOSITION MEDIAN NERVE AT CARPAL TUNNEL  1977    PARTIAL HYSTERECTOMY  1971    TOTAL HIP ARTHROPLASTY      TRANSPHENOIDAL / TRANSNASAL HYPOPHYSECTOMY / RESECTION PITUITARY TUMOR  1989    excision of pituitary gland        05/17/18 1120   Restrictions/Precautions   Weight Bearing Precautions Per Order No  (pacemaker precautions LUE in sling)   Braces or Orthoses Sling  (LUE s/p pacemaker placement)   Other Precautions Cardiac/sternal;Telemetry; Fall Risk;Pain  (pacemaker precautions)   Lifestyle   Autonomy per pt independent w/ ADLS, independent w/ functional transfers and mobility w/ SPC, son would drive pt PTA   Reciprocal Relationships sister and son   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, [employer]   Intrinsic Gratification dressing and fixing up her dolls and reading   Pain Assessment   Pain Assessment No/denies pain   Pain Score No Pain   ADL   Grooming Assistance 5  Supervision/Setup   Grooming Deficit Setup; Increased time to complete;Standing with assistive device; Wash/dry hands; Other (Comment)  (applying lotion)   Grooming Comments Pt completed grooming while standing at sink w/ RW for support  Pt able to wash/dry hands and apply lotion while standing  LB Dressing Assistance 3  Moderate Assistance  (Mod I w/ LHAE)   LB Dressing Deficit Setup; Requires assistive device for steadying;Supervision/safety; Increased time to complete; Don/doff R sock; Don/doff L sock; Thread RLE into pants; Thread LLE into pants;Pull up over hips;Use of adaptive equipment   LB Dressing Comments Pt completed LB dressing while seated/standing at chair  Pt able to don/doff socks w/ use of LHAE after education of equipment  Pt also used LHAE to don hospital pants, w/ CTG while in standing and use of RW for support while pt pulled pants up over waist  Pt then trialed socks w/o LHAE and required Mod A to don/doff     [address]  4  Minimal Assistance Toileting Deficit Steadying;Supervison/safety; Increased time to complete;Clothing management up;Clothing management down;Perineal hygiene;Grab bar use   Toileting Comments Pt completed toileting on standard toilet w/ Min A x1 for safety and balance while managing perineal hygiene  Pt stood w/ CTG while pulling up/down pants  Pt instructed on use of grab bars w/ LUE and instructed not to grab 2* to arm raising above precaution limit  Educated on use of commode for ease and safety of transfers  Functional Standing Tolerance   Time 2 min   Activity Pt stood at sink for approx 2 min while engaging in grooming tasks  pt used dynamic/static reaching w/ G balance and trunk control  Pt used RW for support and stablity  S for safety  Transfers   Sit to Stand 4  Minimal assistance   Additional items Assist x 1; Increased time required;Verbal cues   Stand to Sit 4  Minimal assistance   Additional items Assist x 1; Increased time required;Verbal cues   Toilet transfer 3  Moderate assistance   Additional items Assist x 1; Increased time required;Verbal cues;Standard toilet   Additional Comments Pt performed 2 sit-stands from chair w/ Min A x1 and 1 toilet transfer to standard toilet w/ Min A x1 for safety and balance  VC to maintain cardiac precautions w/ LUE 2* to pacemaker  Functional Mobility   Functional Mobility 4  Minimal assistance   Additional Comments Pt ambulated short distance w/ CTG for safety and balance  Use of RW for support and stability   Additional items Rolling walker   Toilet Transfers   Toilet Transfer From Rolling walker   Toilet Transfer Type To and from   Toilet Transfer to Standard toilet   Toilet Transfer Technique Ambulating   Toilet Transfers Moderate assistance   Cognition   Overall Cognitive Status Impaired   Arousal/Participation Responsive; Cooperative   Attention Within functional limits   Orientation Level Oriented X4   Memory Decreased recall of precautions   Following Commands Follows one step commands with increased time or repetition   Comments Pt is pleasant and cooperative; able to recall precautions but difficulty w/ carryover into functional tasks  Pt requiring increased VC for safety, positioning and proper technique  Additional Activities   Additional Activities Other (Comment)  (LHAE education/dynamic sitting balance)   Additional Activities Comments Pt educated on use of LHAE for LB dressing/bathing  Demstrated use of sock aide, reach, shoe horn, LH sponge, and dressing stick  Pt demontrated ability to use sock aide, dressing stick and reacher  Pt verbalized understanding of education and proper use of equipment  pt verbalized wanting to purchase equipment for use at home  Therapist recommended purchasing in pharmacy before leaving hospital  Pt also engaged in dynamic sitting exercise while seated in recliner  Pt used reacher for anterior reach towards socks placed on ground in front of pt  Pt able to maintain balance while anteriorly leaning w/ RUE to grab socks  Activity Tolerance   Activity Tolerance Patient tolerated treatment well   Medical Staff Made Aware Val FRANCO   Assessment   Assessment Patient participated in Skilled OT session 5/17/18 with interventions consisting of ADL re training with the use of correct body mechnaics, Energy Conservation techniques, safety awareness and fall prevention techniques,  long handle equipment,  therapeutic activities to: increase activity tolerance, increase standing tolerance time with unilateral UE support to complete sink level ADLs, increase dynamic sit/ stand balance during functional activity , increase postural control, increase trunk control and increase OOB/ sitting tolerance   Patient agreeable to OT treatment session, upon arrival patient was found seated OOB to Recliner  In comparison to previous session, patient with improvements in LB dressing, use of LHAE, standing tolerance, functional mobility, and activity tolerance   Patient requiring verbal cues for safety, verbal cues for correct technique and frequent rest periods  Patient continues to be functioning below baseline level, occupational performance remains limited secondary to factors listed above and increased risk for falls and injury  From OT standpoint, recommendation at time of d/c would be Home OT when medically stable w/ increased family support  Recommend pt go home w/ a RW, BSC, and LHAE for increased independence and safety w/ everyday functional tasks  Patient to benefit from continued Occupational Therapy treatment while in the hospital to address deficits as defined above and maximize level of functional independence with ADLs and functional mobility  Plan   Treatment Interventions ADL retraining;Functional transfer training; Endurance training;UE strengthening/ROM; Cognitive reorientation;Patient/family training;Equipment evaluation/education; Compensatory technique education;Cardiac education;Continued evaluation; Energy conservation; Activityengagement   Goal Expiration Date 05/30/18   Treatment Day 1   OT Frequency 3-5x/wk   Recommendation   OT Discharge Recommendation Home OT   Equipment Recommended Bedside commode; Other (comment)  (RW)   OT - OK to Discharge Yes  (when medically stable)   Barthel Index   Feeding 10   Bathing 0   Grooming Score 5   Dressing Score 5   Bladder Score 10   Bowels Score 10   Toilet Use Score 5   Transfers (Bed/Chair) Score 10   Mobility (Level Surface) Score 10   Stairs Score 0   Barthel Index Score 65   Modified Paulo Scale   Modified [place] Scale 4       Pastora Muir MOT, OTR/L

## 2018-05-17 NOTE — ASSESSMENT & PLAN NOTE
Takes metformin, Januvia and Prandin at home  Blood sugars currently decently controlled  Sliding scale insulin for now  Resume home medications on discharge

## 2018-05-17 NOTE — SOCIAL WORK
Cm spoke with Dr Micky Orantes pt cleared for d/c  [facility] accepted for PT/OT and nursing  Pt also needs a RW, script sent to [facility]

## 2018-05-17 NOTE — DISCHARGE INSTRUCTIONS
Pacemaker   WHAT YOU NEED TO KNOW:   A pacemaker is a small, battery-powered device that is implanted into your chest to help regulate your heart rate  DISCHARGE INSTRUCTIONS:   Medicines:   · Pain medicine: You may need medicine to take away or decrease pain  ¨ Learn how to take your medicine  Ask what medicine and how much you should take  Be sure you know how, when, and how often to take it  ¨ Do not wait until the pain is severe before you take your medicine  Tell caregivers if your pain does not decrease  ¨ Pain medicine can make you dizzy or sleepy  Prevent falls by calling someone when you get out of bed or if you need help  · Antibiotics: This medicine is given to fight or prevent an infection caused by bacteria  Always take your antibiotics exactly as ordered by your healthcare provider  Do not stop taking your medicine unless directed by your healthcare provider  Never save antibiotics or take leftover antibiotics that were given to you for another illness  · Take your medicine as directed  Contact your healthcare provider if you think your medicine is not helping or if you have side effects  Tell him or her if you are allergic to any medicine  Keep a list of the medicines, vitamins, and herbs you take  Include the amounts, and when and why you take them  Bring the list or the pill bottles to follow-up visits  Carry your medicine list with you in case of an emergency  Follow up with your cardiologist after your procedure: You may need a follow-up visit 7 to 10 days after you leave the hospital  Your cardiologist will check your wound and make sure that your pacemaker is working correctly  Follow the instructions to check your pacemaker: Your cardiologist or healthcare provider will check your pacemaker and the battery regularly, usually every 3 to 6 months  He may do this in his office  He may also use a computer to check your pacemaker over the telephone between visits  Pacemaker batteries usually last 5 to 8 years  The pacemaker unit will be replaced when the battery gets low  This is a simpler procedure than the original one to implant your pacemaker  Heart rate checks:   · You may need to use a heart monitor at home after your procedure  This device may be called an event monitor, Holter monitor, or mobile telemetry  Monitoring may be done for a period of time, such as a week, or at regular intervals until your heart rhythm is regular  · You may be taught how to check your pulse on your wrist or on your neck  This allows you to monitor how your pacemaker is working  A normal heart beats 50 to 70 times a minute when you are resting  Ask what your pulse should be (your pacemaker's set rate)  Wound care:  Keep your incisions clean and dry for 7 to 10 days after your procedure  Ask your healthcare provider how to care for your incisions and when you can shower or bathe  Activity:   · Arm movement and lifting:  Be careful using the arm on the side of your pacemaker  Do not move your arm for the first 24 hours after your procedure  Do not  lift your arm above your shoulder or lift more than 10 pounds for 6 weeks after your procedure  This helps the leads stay in place and helps your wound heal  Ask your healthcare provider when you can drive after your procedure  · Sports:  Ask your healthcare provider when it is okay to play tennis, golf, basketball, or any sport that requires you to lift your arms  Do not play full contact sports, such as football, that could damage your pacemaker  Ask your cardiologist or healthcare provider how much and what kinds of physical activity are safe for you  Living with a pacemaker:   · Tell all healthcare providers you have a pacemaker: This includes surgeons, radiologists, and medical technicians  You may want to wear a medical alert ID bracelet or necklace that states that you have a pacemaker  · Carry your pacemaker ID card:   Make sure you receive a pacemaker ID card  Carry it with you at all times  It lists important information about your pacemaker  Show it to airport security if you travel  · Avoid electrical interference:  Avoid welding equipment, MRI machines, and other equipment with large magnets or electric fields  These things could interfere with how your pacemaker works  Use your cell phone on the ear opposite from your pacemaker  Do not carry your cell phone in your shirt pocket over your chest      · Do not touch the skin around your pacemaker: This can cause damage to the lead wires or move the pacemaker unit from where it should be  Contact your cardiologist or healthcare provider if:   · The area around your pacemaker is painful or tender after surgery  · The skin around your stitches is red, swollen, or has drainage  This may mean that you have an infection  · You have a fever  · You have chills, a cough, and feel weak or achy  These are also signs of infection  · Your feet or ankles are swollen  Seek care immediately if:   · Your bandage becomes soaked with blood  · Your stitches open up  · You feel your heart suddenly beating very slowly or quickly  · You become too weak or dizzy to stand, or you pass out  · Your arm or leg feels warm, tender, and painful  It may look swollen and red  You have chest pain that does not go away with rest or medicine  · You feel lightheaded, short of breath, and have chest pain  You cough up blood  © 2017 [address] [employer] Information is for End User's use only and may not be sold, redistributed or otherwise used for commercial purposes  All illustrations and images included in CareNotes® are the copyrighted property of [employer] M [employer] Inc  or [employer]av[employer]arr[employer]  The above information is an  only  It is not intended as medical advice for individual conditions or treatments   Talk to your doctor, nurse or pharmacist before following any medical regimen to see if it is safe and effective for you  Pacemaker   WHAT YOU NEED TO KNOW:   What is a pacemaker? A pacemaker is a small, battery-powered device that is implanted into your chest to help regulate your heart rate  Why do I need a pacemaker? You need a pacemaker if your heart beat is too slow or is irregular  Your heart may not be able to pump blood well throughout your body  This can happen because of heart failure, aging, or heart medications  You may have the following signs and symptoms:  · You feel tired all the time or cannot do vigorous activities  · You are often dizzy  · You have a history of fainting  · You feel like you cannot catch your breath  · You are confused at times  What kinds of pacemakers are there? · Temporary pacemaker: This may be used in an emergency if you have a heart attack or if medicine caused your heart to slow down too much  A temporary pacemaker may be used after heart surgery to help your heart  All temporary pacemakers have wires that attach to a device outside of your body  A temporary pacemaker is sometimes used until a permanent pacemaker can be put in  · Permanent pacemaker: This is used to help your heart on a long-term basis  A permanent pacemaker is about the size of a large wristwatch  It is made up of thin, flexible wires, called leads, and the pacemaker unit  The pacemaker unit contains a battery, a pulse generator, and a small computer that senses your heart rate  The battery gilman the pulse generator, which sends electrical impulses to your heart so it will beat as it should  The pacemaker unit and leads are placed under the skin on your chest  Once inserted, you will probably be able to see the outline of the pacemaker unit under your skin  How does a permanent pacemaker work? Your heart rate can change with activity   For example, your heart rate is lower when you are resting than when you are exercising  Most permanent pacemakers will sense your heart rate  This type of pacemaker is called an on-demand pacemaker  When your heart rate is below a preset value, the pacemaker starts to work to help your heart beat faster  Once your heart is pumping at the right rate, it will turn off  It will turn on again when it is needed  It can be programmed to meet your individual needs after it is inserted in your chest  For example, it can be programmed to help your heart during exercise or stress  If you have had a pacemaker in the past, you may have had a fixed-rate pacemaker  This type of pacemaker keeps a set rate that does not change  How is a permanent pacemaker implanted? You will need a surgical procedure that lasts about 1 to 2 hours  Your healthcare provider will give you IV medicine to help you relax  In most cases, you will be awake, but very drowsy  Local anesthesia medicine will be injected to numb your skin  An incision will be made in the skin on your neck or chest  The leads of the pacemaker will be guided into your heart through a blood vessel  A second incision will be made to implant the pacemaker unit, usually just below your collarbone  The leads will be connected to the pacemaker unit  Both incisions will be closed with stitches  You will spend the night at the hospital after your procedure  This is done so healthcare providers can be sure your pacemaker is working as it should  What are the risks of a permanent pacemaker? · You may have temporary bruising, pain, and swelling after the procedure  You could have problems breathing during the placement procedure  An infection could occur where your pacemaker is implanted  You could bleed more than usual or get blood clots  The pacemaker unit could move out of place and cause pain and bleeding  · You may have problems years after your pacemaker is implanted   The leads could move out of place or poke a hole in your lung, heart, or blood vessel  The pacemaker itself may cause your heart to beat irregularly  This can increase your risk of a stroke  Your pacemaker could stop working  What do I need to know about a permanent pacemaker? · A pacemaker will likely help you feel better and have more energy  Most people can get back to their regular activities within several days of pacemaker surgery  You will need to limit the amount you lift and move your arm for a few weeks after surgery  · Your cardiologist will check your pacemaker and the battery at regular intervals  He may check it in his office or use a computer to check it over the telephone  · You will need to avoid welding equipment, MRI machines, and other equipment with large magnets or electric fields  These things could interfere with how your pacemaker works  · A pacemaker battery usually lasts 5 to 8 years  Eventually you will need to have the pacemaker unit replaced  Sometimes the leads need to be replaced as well  Where can I find support or more information? · [address]  [address]elani [place]   Phone: 2- 647 - [phone]  Web Address: https://[employer]/  [place]   CARE AGREEMENT:   You have the right to help plan your care  Learn about your health condition and how it may be treated  Discuss treatment options with your caregivers to decide what care you want to receive  You always have the right to refuse treatment  The above information is an  only  It is not intended as medical advice for individual conditions or treatments  Talk to your doctor, nurse or pharmacist before following any medical regimen to see if it is safe and effective for you  © 2017 [address] Information is for End User's use only and may not be sold, redistributed or otherwise used for commercial purposes   All illustrations and images included in CareNotes® are the copyrighted property of A D A M , Inc  or [facility]

## 2018-05-17 NOTE — PLAN OF CARE
Problem: OCCUPATIONAL THERAPY ADULT  Goal: Performs self-care activities at highest level of function for planned discharge setting  See evaluation for individualized goals  Treatment Interventions: ADL retraining, Functional transfer training, UE strengthening/ROM, Endurance training, Cognitive reorientation, Patient/family training, Equipment evaluation/education, Compensatory technique education, Energy conservation, Activityengagement, Cardiac education  Equipment Recommended:  (rolling walker)       See flowsheet documentation for full assessment, interventions and recommendations  Outcome: Progressing  Limitation: Decreased ADL status, Decreased UE strength, Decreased Safe judgement during ADL, Decreased cognition, Decreased endurance, Decreased self-care trans, Decreased high-level ADLs (pacemaker precautions L UE)  Prognosis: Good  Assessment: Patient participated in Skilled OT session 5/17/18 with interventions consisting of ADL re training with the use of correct body mechnaics, Energy Conservation techniques, safety awareness and fall prevention techniques,  long handle equipment,  therapeutic activities to: increase activity tolerance, increase standing tolerance time with unilateral UE support to complete sink level ADLs, increase dynamic sit/ stand balance during functional activity , increase postural control, increase trunk control and increase OOB/ sitting tolerance   Patient agreeable to OT treatment session, upon arrival patient was found seated OOB to Recliner  In comparison to previous session, patient with improvements in LB dressing, use of LHAE, standing tolerance, functional mobility, and activity tolerance   Patient requiring verbal cues for safety, verbal cues for correct technique and frequent rest periods  Patient continues to be functioning below baseline level, occupational performance remains limited secondary to factors listed above and increased risk for falls and injury  From OT standpoint, recommendation at time of d/c would be Home OT when medically stable w/ increased family support  Recommend pt go home w/ a RW, BSC, and LHAE for increased independence and safety w/ everyday functional tasks  Patient to benefit from continued Occupational Therapy treatment while in the hospital to address deficits as defined above and maximize level of functional independence with ADLs and functional mobility       OT Discharge Recommendation: Home OT  OT - OK to Discharge: Yes (when medically stable)      Comments: Pastora Muir MOT, OTR/L

## 2018-05-17 NOTE — PLAN OF CARE
Problem: Potential for Falls  Goal: Patient will remain free of falls  INTERVENTIONS:  - Assess patient frequently for physical needs  -  Identify cognitive and physical deficits and behaviors that affect risk of falls  -  [place] fall precautions as indicated by assessment   - Educate patient/family on patient safety including physical limitations  - Instruct patient to call for assistance with activity based on assessment  - Modify environment to reduce risk of injury  - Consider OT/PT consult to assist with strengthening/mobility   Outcome: Progressing      Problem: CARDIOVASCULAR - ADULT  Goal: Maintains optimal cardiac output and hemodynamic stability  INTERVENTIONS:  - Monitor I/O, vital signs and rhythm  - Monitor for S/S and trends of decreased cardiac output i e  bleeding, hypotension  - Administer and titrate ordered vasoactive medications to optimize hemodynamic stability  - Assess quality of pulses, skin color and temperature  - Assess for signs of decreased coronary artery perfusion - ex   Angina  - Instruct patient to report change in severity of symptoms   Outcome: Progressing      Problem: RESPIRATORY - ADULT  Goal: Achieves optimal ventilation and oxygenation  INTERVENTIONS:  - Assess for changes in respiratory status  - Assess for changes in mentation and behavior  - Position to facilitate oxygenation and minimize respiratory effort  - Oxygen administration by appropriate delivery method based on oxygen saturation (per order) or ABGs  - Initiate smoking cessation education as indicated  - Encourage broncho-pulmonary hygiene including cough, deep breathe, Incentive Spirometry  - Assess the need for suctioning and aspirate as needed  - Assess and instruct to report SOB or any respiratory difficulty  - Respiratory Therapy support as indicated   Outcome: Progressing      Problem: GENITOURINARY - ADULT  Goal: Maintains or returns to baseline urinary function  INTERVENTIONS:  - Assess urinary function  - Encourage oral fluids to ensure adequate hydration  - Administer IV fluids as ordered to ensure adequate hydration  - Administer ordered medications as needed  - Offer frequent toileting  - Follow urinary retention protocol if ordered   Outcome: Progressing      Problem: METABOLIC, FLUID AND ELECTROLYTES - ADULT  Goal: Electrolytes maintained within normal limits  INTERVENTIONS:  - Monitor labs and assess patient for signs and symptoms of electrolyte imbalances  - Administer electrolyte replacement as ordered  - Monitor response to electrolyte replacements, including repeat lab results as appropriate  - Instruct patient on fluid and nutrition as appropriate   Outcome: Progressing      Problem: DISCHARGE PLANNING - CARE MANAGEMENT  Goal: Discharge to post-acute care or home with appropriate resources  INTERVENTIONS:  - Conduct assessment to determine patient/family and health care team treatment goals, and need for post-acute services based on payer coverage, community resources, and patient preferences, and barriers to discharge  - Address psychosocial, clinical, and financial barriers to discharge as identified in assessment in conjunction with the patient/family and health care team  - Arrange appropriate level of post-acute services according to patient's   needs and preference and payer coverage in collaboration with the physician and health care team  - Communicate with and update the patient/family, physician, and health care team regarding progress on the discharge plan  - Arrange appropriate transportation to post-acute venues   Outcome: Progressing

## 2018-05-17 NOTE — ASSESSMENT & PLAN NOTE
Recently at the [facility] [place] operated by [facility]   Heart rate currently well controlled  Outpatient follow-up with her cardiologist Dr Janelle Hargrove at [facility]

## 2018-05-17 NOTE — ASSESSMENT & PLAN NOTE
Secondary to sick sinus/tachy-sana syndrome at [facility]  Patient to follow up with her cardiologist Dr Amanda Vitale at [facility]

## 2018-05-17 NOTE — ASSESSMENT & PLAN NOTE
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

## 2018-05-17 NOTE — DISCHARGE SUMMARY
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  Improved with IV steroids  Hydrocortisone decreased to 25 IV q 8h yesterday by Endocrinology  Will switch her back to her home dose of prednisone 5 mg  Will give 1 dose now  Patient should follow up with her endocrinologist at [facility] after discharge within 1-2 weeks  Toxic metabolic encephalopathy   Assessment & Plan    Multifactorial in the setting hypotension due to possible adrenal insufficiency, delirium and also underlying mild dementia/cognitive decline  Resolved for now  Appreciate Geriatrics recommendations  Can follow up with Geriatrics as outpatient  Delirium   Assessment & Plan    In ICU  Probably due to underlying dementia and also hypotension  Currently resolved  Geriatrics following  Appreciate recommendations  Patient will need outpatient follow-up  S/P placement of cardiac pacemaker   Assessment & Plan    Secondary to sick sinus/tachy-sana syndrome at [facility]  Patient to follow up with her cardiologist Dr Shelton Khan at [facility]  H/O tachycardia-bradycardia syndrome s/p PPM   Assessment & Plan    Recently at the [facility]   Heart rate currently well controlled  Outpatient [facility] with [facility]   Assessment & Plan    Because of surgical resection  Takes prednisone at home  Resume  Moderate to severe aortic stenosis   Assessment & Plan    Outpatient follow-up with her Cardiologist         Hypothyroidism   Assessment & Plan    Secondary  Normal free T4    Continue current doses of levothyroxine  Appreciate Endocrine recommendations  Uncontrolled type 2 diabetes mellitus without complication, with long-term current use of insulin ([facility] )   Assessment & Plan    Takes metformin, Januvia and Prandin at home  Blood sugars currently decently controlled  Sliding scale insulin for now  Resume home medications on discharge  Discharge Summary - [facility]    Patient Information: Krystian Cespedes 80 y o  female MRN: [id number]  Unit/Bed#: [facility] 505-01 Encounter: [id number]    Discharging Physician / Practitioner: Vincent Parks MD  PCP: Francois Funez DO  Admission Date: 5/14/2018  Discharge Date: 05/17/18    Reason for Admission:  Transferred from [facility] for refractory hypotension after pacemaker placement for tachy-sana syndrome  Discharge Diagnoses:     Principal Problem:    Hypotension  Active Problems:    Uncontrolled type 2 diabetes mellitus without complication, with long-term current use of insulin ([facility])    Hypothyroidism    Sleep apnea    Moderate to severe aortic stenosis    Depression    Hiatal hernia with gastroesophageal reflux    Hypopituitarism ([facility] )    H/O tachycardia-bradycardia syndrome s/p PPM    S/P placement of cardiac pacemaker    Delirium    Forgetfulness    Echolalia    Hx of fall    Ambulatory dysfunction    Physical deconditioning    Toxic metabolic encephalopathy  Resolved Problems:    * No resolved hospital problems  *      Consultations During Hospital Stay:  · Endocrinology, Critical care    Procedures Performed:     · none    Significant Findings / Test Results:     XR chest pa & lateral   Final Result by Nilton Patterson MD (05/17 4965)      No acute cardiopulmonary disease  Right basilar atelectasis  Workstation performed: [id number]         CT abdomen pelvis wo contrast   Final Result by Divina William MD (05/17 1400)      No source for pain identified in the abdomen and pelvis        Large hiatal hernia  Trace bilateral pleural effusions  Workstation performed: [id number]         ·      Incidental Findings:   · none     Test Results Pending at Discharge (will require follow up):   · none     Outpatient Tests Requested:  · none    Complications:  none    Hospital Course:     Sabrina Ceron is a 80 y o  female patient who originally presented to the hospital on 5/14/2018 as a transfer from [facility] for refractory hypotension  Patient was admitted there on 05/11 for chest pain and was found to have tachy-sana syndrome for which a permanent pacemaker was placed on 5/14/2018  After the procedure patient was hypertensive with systolic blood pressure as low as 60 refractory to fluid administration requiring pressors  Patient has known history of panhypopituitarism and is chronically on prednisone 5 mg  She was transferred to [facility] for further management  She was initially admitted to critical care  She had to be started on Levophed  Most likely cause of her hypotension was thought to be adrenal insufficiency as her prednisone was possibly stopped on admission at [facility]  She was started on 100 mg of IV hydrocortisone in critical care after which her blood pressures started improving and she was eventually weaned off Levophed  The next day hydrocortisone was decreased to 50 mg Q weight and her blood pressure was stable so patient was transferred out of ICU  She was closely followed by Endocrinology  Her hydrocortisone was slowly weaned and was decreased to 25 Q 8 yesterday  Today patient was transitioned back to oral prednisone 5 mg as per Endocrinology recommendations  She should follow up with her primary care physician and with her endocrinologist at [facility] in 1-2 weeks  Patient recently had the pacemaker placement prior to being transferred to [facility]    She should follow up with cardiologist Dr Rocky Tobin at [facility] in 1-2 weeks  Her metoprolol was initially held because of hypotension which I will resume as her blood pressure is stable and she is status post pacemaker placement  She was complaining of left groin pain since yesterday  Tylenol did not help a lot  CT of the abdomen pelvis was done today which was unremarkable  As per patient she required straight catheterization for urinary retention at [facility] after procedure and it was very painful  Most likely reason for her pain is muscular  She did not have any significant tenderness or swelling or deformity on examination  She was also complaining of mild dry cough this morning  She has been started on antitussive is which has been helping  Chest x-ray was done which was unremarkable  Rest of the plan for other problems as above  She should follow up with her PCP in 1 week  Condition at Discharge: good     Discharge Day Visit / Exam:     Subjective:  Pt seen and examined by me this morning  Pt complained of  left groin pain and mild dry cough  She was given Tylenol yesterday for pain but it did not help  Vitals: Blood Pressure: 144/85 (05/17/18 1123)  Pulse: 60 (05/17/18 1123)  Temperature: 98 °F (36 7 °C) (05/17/18 1123)  Temp Source: Oral (05/17/18 0329)  Respirations: 18 (05/17/18 1123)  Height: 5' 2" (157 5 cm) (05/15/18 0700)  Weight - Scale: 60 8 kg (134 lb 0 6 oz) (05/17/18 0600)  SpO2: 97 % (05/17/18 1123)     Exam:   Physical Exam    Constitutional: Pt appears well-developed and well-nourished  Not in any acute distress  Cardiovascular: Normal rate, regular rhythm, normal heart sounds  Exam reveals no gallop and no friction rub  No murmur heard  Left chest wall - no swelling, tenderness or bleeding  Mild ecchymosis present  Pulmonary/Chest: Effort normal and breath sounds normal  No respiratory distress  Pt has no wheezes or rales  b/l LL faint crackles  Abdominal: Soft   Non-distended, Non-tender  Bowel sounds are normal    Musculoskeletal: Normal range of motion  Neurological: alert and oriented to person, place, and time  Normal strength and sensations  Psychiatric: normal mood and affect  Discussion with Family: patient's sister at bedside  Discharge instructions/Information to patient and family:   See after visit summary for information provided to patient and family  Provisions for Follow-Up Care:  See after visit summary for information related to follow-up care and any pertinent home health orders  Disposition:     Home with VNA Services (Reminder: Complete face to face encounter)    For Discharges to [facility] SNF:   · Not Applicable to this Patient - Not Applicable to this Patient    Planned Readmission: no     Discharge Statement:  I spent 40 minutes discharging the patient  This time was spent on the day of discharge  I had direct contact with the patient on the day of discharge  Greater than 50% of the total time was spent examining patient, answering all patient questions, arranging and discussing plan of care with patient as well as directly providing post-discharge instructions  Additional time then spent on discharge activities  Discharge Medications:  See after visit summary for reconciled discharge medications provided to patient and family        ** Please Note: This note has been constructed using a voice recognition system **

## 2018-05-17 NOTE — PROGRESS NOTES
Rounding done w/ Dr Conteh  Plan is to have CT to evaluate pt's groin pain and new cough  Tapering of steroids to po will continue today  Pt and sister aware of plan and agreeable

## 2018-05-18 ENCOUNTER — TELEPHONE (OUTPATIENT)
Dept: INTERNAL MEDICINE CLINIC | Facility: CLINIC | Age: 83
End: 2018-05-18

## 2018-05-22 DIAGNOSIS — N39.0 URINARY TRACT INFECTION WITHOUT HEMATURIA, SITE UNSPECIFIED: Primary | ICD-10-CM

## 2018-05-22 RX ORDER — CIPROFLOXACIN 500 MG/1
500 TABLET, FILM COATED ORAL EVERY 12 HOURS SCHEDULED
Qty: 10 TABLET | Refills: 0 | Status: SHIPPED | OUTPATIENT
Start: 2018-05-22 | End: 2018-05-27

## 2018-06-05 ENCOUNTER — OFFICE VISIT (OUTPATIENT)
Dept: INTERNAL MEDICINE CLINIC | Facility: CLINIC | Age: 83
End: 2018-06-05
Payer: COMMERCIAL

## 2018-06-05 VITALS
HEART RATE: 75 BPM | OXYGEN SATURATION: 96 % | BODY MASS INDEX: 24.68 KG/M2 | TEMPERATURE: 98 F | HEIGHT: 62 IN | WEIGHT: 134.13 LBS

## 2018-06-05 DIAGNOSIS — E03.9 HYPOTHYROIDISM, UNSPECIFIED TYPE: ICD-10-CM

## 2018-06-05 DIAGNOSIS — I50.32 CHRONIC DIASTOLIC CONGESTIVE HEART FAILURE (HCC): ICD-10-CM

## 2018-06-05 DIAGNOSIS — Z95.0 S/P PLACEMENT OF CARDIAC PACEMAKER: Primary | ICD-10-CM

## 2018-06-05 DIAGNOSIS — E13.9 DIABETES 1.5, MANAGED AS TYPE 2 (HCC): ICD-10-CM

## 2018-06-05 PROCEDURE — 1111F DSCHRG MED/CURRENT MED MERGE: CPT | Performed by: INTERNAL MEDICINE

## 2018-06-05 PROCEDURE — 99214 OFFICE O/P EST MOD 30 MIN: CPT | Performed by: INTERNAL MEDICINE

## 2018-06-05 NOTE — PROGRESS NOTES
Assessment/Plan:      Diagnoses and all orders for this visit:    S/P placement of cardiac pacemaker  Patient sees surgeon today and seems to be doing    Hypothyroidism, unspecified type  Has followup with Dr Steph Austin in June and will get labs upcoming    Diabetes 1 5, managed as type 2 [facility])  She will be getting labs  They are interested in community nurse followup and do monitor sugars at home    Chronic diastolic congestive heart failure ([address] )  Stable at present  She follows with dr Deng Felipe 6 weeks     Patient ID: Razia Arreguin is a 80 y o  female  HPI   Pt s/p cardiac pacemaker placement and now feeling ok  She has surgeon appt today  No pain  No sob  Appetite varies, generally not great  Has followup cardio upcoming  Review of Systems   HENT: Negative  Eyes: Negative  Respiratory: Negative  Cardiovascular: Negative  Gastrointestinal: Negative  Endocrine: Negative  Genitourinary: Negative  Musculoskeletal: Positive for arthralgias  Skin: Negative  Allergic/Immunologic: Negative  Neurological: Positive for weakness  Hematological: Bruises/bleeds easily  Psychiatric/Behavioral: Negative  Allergies   Allergen Reactions    Cephalosporins     Clindamycin     Hydrocodone     Oxycodone-Acetaminophen     Penicillins Hives    Simvastatin     Tetracyclines & Related      Social History     Social History    Marital status:      Spouse name: N/A    Number of children: N/A    Years of education: N/A     Occupational History    Not on file       Social History Main Topics    Smoking status: Never Smoker    Smokeless tobacco: Never Used    Alcohol use No    Drug use: No    Sexual activity: No     Other Topics Concern    Not on file     Social History Narrative    Caffeine use    Dental care regularly    Good dental hygiene    Uses safety equipment: seatbelts         Past Medical History:   Diagnosis Date    Bradycardia     Coronary artery disease  Depression     Depression     Diabetes mellitus ([facility] )     Type 2 DM    Disease of thyroid gland     Facial droop     started 10 years ago    GERD (gastroesophageal reflux disease)     Heart murmur     Hyperlipidemia     Osteoarthritis     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[facility] )     Vitamin D deficiency      Past Surgical History:   Procedure Laterality Date    ANKLE SURGERY Left 2005    APPENDECTOMY  1955    CATARACT EXTRACTION Bilateral 09/1995    HEMORRHOID SURGERY  1978    HERNIA REPAIR  1990    JOINT REPLACEMENT      b/l hip replacements, right 2006    NEUROPLASTY / TRANSPOSITION MEDIAN NERVE AT CARPAL TUNNEL  1977    PARTIAL HYSTERECTOMY  1971    TOTAL HIP ARTHROPLASTY      TRANSPHENOIDAL / TRANSNASAL HYPOPHYSECTOMY / RESECTION PITUITARY TUMOR  1989    excision of pituitary gland     Family History   Problem Relation Age of Onset    Other Father      coal worker's pneumoconiosis    Heart disease Father     Diabetes Brother     Heart disease Brother     Prostate cancer Brother     Stomach cancer Maternal Grandmother     Diabetes Paternal Aunt      Vitals:    06/05/18 1420   Pulse: 75   Temp: 98 °F (36 7 °C)   TempSrc: Tympanic   SpO2: 96%   Weight: 60 8 kg (134 lb 2 oz)   Height: 5' 2" (1 575 m)   122/68       Physical Exam   Constitutional: She is oriented to person, place, and time  She appears well-developed and well-nourished  No distress  HENT:   Head: Normocephalic and atraumatic  Right Ear: External ear normal    Left Ear: External ear normal    Nose: Nose normal    Mouth/Throat: Oropharynx is clear and moist  No oropharyngeal exudate  Eyes: Conjunctivae and EOM are normal  Pupils are equal, round, and reactive to light  No scleral icterus  Neck: Normal range of motion  Neck supple  No JVD present  Cardiovascular: Normal rate, regular rhythm, normal heart sounds and intact distal pulses      Pacer site intact,healing   Pulmonary/Chest: Effort normal and breath sounds normal  No respiratory distress  She has no wheezes  She has no rales  She exhibits no tenderness  Abdominal: Soft  Bowel sounds are normal  She exhibits no distension  There is no tenderness  There is no rebound and no guarding  Musculoskeletal: Normal range of motion  She exhibits deformity  She exhibits no edema or tenderness  Lymphadenopathy:     She has no cervical adenopathy  Neurological: She is alert and oriented to person, place, and time  She has normal reflexes  She displays normal reflexes  No cranial nerve deficit  She exhibits abnormal muscle tone  Coordination abnormal    Skin: Skin is warm and dry  She is not diaphoretic  Psychiatric: She has a normal mood and affect  Her behavior is normal  Judgment and thought content normal    Nursing note and vitals reviewed    Cn intact

## 2018-06-06 DIAGNOSIS — N39.3 STRESS INCONTINENCE OF URINE: Primary | ICD-10-CM

## 2018-06-06 RX ORDER — OXYBUTYNIN CHLORIDE 10 MG/1
TABLET, EXTENDED RELEASE ORAL
Qty: 180 TABLET | Refills: 0 | Status: SHIPPED | OUTPATIENT
Start: 2018-06-06 | End: 2018-09-08 | Stop reason: SDUPTHER

## 2018-06-12 DIAGNOSIS — E11.9 TYPE 2 DIABETES MELLITUS WITHOUT COMPLICATION, UNSPECIFIED WHETHER LONG TERM INSULIN USE (HCC): Primary | ICD-10-CM

## 2018-06-13 ENCOUNTER — TELEPHONE (OUTPATIENT)
Dept: INTERNAL MEDICINE CLINIC | Facility: CLINIC | Age: 83
End: 2018-06-13

## 2018-06-13 RX ORDER — METFORMIN HYDROCHLORIDE EXTENDED-RELEASE TABLETS 500 MG/1
500 TABLET, FILM COATED, EXTENDED RELEASE ORAL 2 TIMES DAILY WITH MEALS
COMMUNITY
End: 2018-11-08 | Stop reason: SDUPTHER

## 2018-06-13 NOTE — TELEPHONE ENCOUNTER
Verified on last endo note that she is indeed taking Metformin ER  Updated the pharmacy and changed her medication on her med list to the correct dose

## 2018-06-13 NOTE — TELEPHONE ENCOUNTER
Pharmacist is asking to verify if her refill from yesterday is correct? Refill was done for Metformin HCL 500mg BID  She is stating they have a history of Metformin ER and the patient is telling them she also has been taking Metformin ER  I looked back thru her chart and when she was inpatient and it's been Metformin HCL BID, so they are requesting you verify which dose is correct

## 2018-07-02 ENCOUNTER — DOCUMENTATION (OUTPATIENT)
Dept: CARDIOLOGY CLINIC | Facility: CLINIC | Age: 83
End: 2018-07-02

## 2018-07-12 ENCOUNTER — IN-CLINIC DEVICE VISIT (OUTPATIENT)
Dept: CARDIOLOGY CLINIC | Facility: CLINIC | Age: 83
End: 2018-07-12
Payer: COMMERCIAL

## 2018-07-12 DIAGNOSIS — I49.5 SICK SINUS SYNDROME (HCC): ICD-10-CM

## 2018-07-12 DIAGNOSIS — Z95.0 PRESENCE OF PERMANENT CARDIAC PACEMAKER: Primary | ICD-10-CM

## 2018-07-12 PROCEDURE — 93280 PM DEVICE PROGR EVAL DUAL: CPT | Performed by: INTERNAL MEDICINE

## 2018-07-17 DIAGNOSIS — I47.1 SUPRAVENTRICULAR TACHYCARDIA (HCC): ICD-10-CM

## 2018-07-17 DIAGNOSIS — Z79.4 TYPE 2 DIABETES MELLITUS WITH HYPERGLYCEMIA, WITH LONG-TERM CURRENT USE OF INSULIN (HCC): Primary | ICD-10-CM

## 2018-07-17 DIAGNOSIS — E11.65 TYPE 2 DIABETES MELLITUS WITH HYPERGLYCEMIA, WITH LONG-TERM CURRENT USE OF INSULIN (HCC): Primary | ICD-10-CM

## 2018-07-17 RX ORDER — REPAGLINIDE 2 MG/1
TABLET ORAL
Qty: 540 TABLET | Refills: 0 | Status: SHIPPED | OUTPATIENT
Start: 2018-07-17 | End: 2018-11-08 | Stop reason: SDUPTHER

## 2018-07-17 RX ORDER — METOPROLOL SUCCINATE 25 MG/1
12.5 TABLET, EXTENDED RELEASE ORAL DAILY
Qty: 30 TABLET | Refills: 0 | Status: SHIPPED | OUTPATIENT
Start: 2018-07-17 | End: 2018-07-20 | Stop reason: SDUPTHER

## 2018-07-17 RX ORDER — METOPROLOL SUCCINATE 25 MG/1
12.5 TABLET, EXTENDED RELEASE ORAL DAILY
Qty: 30 TABLET | Refills: 0 | Status: SHIPPED | OUTPATIENT
Start: 2018-07-17 | End: 2018-07-20 | Stop reason: ALTCHOICE

## 2018-07-19 ENCOUNTER — OFFICE VISIT (OUTPATIENT)
Dept: SLEEP CENTER | Facility: CLINIC | Age: 83
End: 2018-07-19
Payer: COMMERCIAL

## 2018-07-19 VITALS
SYSTOLIC BLOOD PRESSURE: 118 MMHG | BODY MASS INDEX: 24.33 KG/M2 | HEIGHT: 62 IN | RESPIRATION RATE: 16 BRPM | DIASTOLIC BLOOD PRESSURE: 62 MMHG | WEIGHT: 132.2 LBS | HEART RATE: 60 BPM

## 2018-07-19 DIAGNOSIS — I10 ESSENTIAL HYPERTENSION: ICD-10-CM

## 2018-07-19 DIAGNOSIS — G47.10 HYPERSOMNIA: ICD-10-CM

## 2018-07-19 DIAGNOSIS — E13.9 DIABETES 1.5, MANAGED AS TYPE 2 (HCC): ICD-10-CM

## 2018-07-19 DIAGNOSIS — J34.2 DEVIATED NASAL SEPTUM: ICD-10-CM

## 2018-07-19 DIAGNOSIS — J34.89 DRY NOSE: ICD-10-CM

## 2018-07-19 DIAGNOSIS — I49.5 SICK SINUS SYNDROME (HCC): ICD-10-CM

## 2018-07-19 DIAGNOSIS — Z86.73 HISTORY OF CVA (CEREBROVASCULAR ACCIDENT): ICD-10-CM

## 2018-07-19 DIAGNOSIS — G47.33 OSA (OBSTRUCTIVE SLEEP APNEA): Primary | ICD-10-CM

## 2018-07-19 PROCEDURE — 99214 OFFICE O/P EST MOD 30 MIN: CPT | Performed by: INTERNAL MEDICINE

## 2018-07-19 NOTE — PROGRESS NOTES
Follow-Up Note - Sleep Center   Saqib Jacques  80 y o  female  :1933  WQF:[id number]    CC: I saw this patient for follow-up in clinic today for her Sleep Disordered Breathing, Coexisting Sleep and Medical Problems  PFSH, Problem List, Medications & Allergies were reviewed in EMR  Interval changes: [none reported ]   She  has a past medical history of Bradycardia; Coronary artery disease; Depression; Depression; Diabetes mellitus ([facility] [address] ); Disease of thyroid gland; Facial droop; GERD (gastroesophageal reflux disease); Heart murmur; Hyperlipidemia; Osteoarthritis; 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[facility] ); and Vitamin D deficiency  She has a current medication list which includes the following prescription(s): aspirin, b complex vitamins, cyanocobalamin, dextromethorphan-guaifenesin, docusate sodium, ergocalciferol, ferrous sulfate, levothyroxine, metformin, oxybutynin, pantoprazole, miralax, pravastatin, prednisone, sitagliptin, metoprolol succinate, metoprolol succinate, and repaglinide  ROS: Reviewed (see attached)  HPI:  With respect to sleep disordered breathing, compliance data download shows:  she discontinued use of PAP  Kalrebekah Ovalle reports  · She felt a lot better after her pacemaker was inserted   · She feels using CPAP is not necessary  I was able to obtain compliance data from [facility]   Out of 30 days, she use the machine for 21 and most of the time for less than 4 hr  She attributes this to burning sensation in her nose  (She has a deviated septum and is unable to breathe through the right nostril)  Sleep Routine:  She reports getting up to 7hr sleep; she has no difficulty initiating, but reports diffculty maintaining sleep  because she has to get up once at night to check her blood sugar  She awakens with the aid of an alarm feeling refreshed  She denies excessive drowsiness butnaps for up to 2 hr in the afternoon   [She rated herself at Total score: 16 /24 on the [place] sleepiness scale ]    Habits:[ reports that she has never smoked  She has never used smokeless tobacco ], [ reports that she does not drink alcohol ], [ reports that she does not use drugs  ], Caffeine use: limited[ ], Exercise routine: none[ ]  EXAM: /62 (BP Location: Right arm, Patient Position: Sitting, Cuff Size: Standard)   Pulse 60   Resp 16   Ht 5' 2" (1 575 m)   Wt 60 kg (132 lb 3 2 oz)   BMI 24 18 kg/m²      Patient is alert, orientated, cooperative [and in no distress]  Mental state [appears normal]  Craniofacial anatomy normal  There are [no] facial pressure marks or rashes  cm  There are no abnormal neck masses  Nasal airway is [patent ]  Mucous membranes appeared normal  The oral airway [is crowded ] Base of tongue is at Mallampati class IV (only hard palate visible)  [Apart from truncal obesity,] the rest of exam (Heart, Lungs, Abdomen, CNS and Musculoskeletal systems) was unremarkable   IMPRESSION:     1  CRISTAL (obstructive sleep apnea)     2  Hypersomnia     3  Deviated nasal septum     4  Dry nose     5  Essential hypertension     6  History of CVA (cerebrovascular accident)     7  Sick sinus syndrome ([address] )     8  Diabetes 1 5, managed as type 2 ([facility] [address] )         PLAN:  1  I reviewed results of the Sleep study with the patient  2  With respect to above conditions, I again counseled on pathophysiology, diagnosis, treatment options, risks and benefits; inter-relationship and effects on symptoms and comorbidities; risks of no treatment; costs and insurance aspects  3    She understands risk of untreated obstructive sleep apnea and elected to continue using CPAP  Pressure setting to continue in auto titrating mode 7-10 cm H2O   4    I discussed strategie to improve comfort with use:  Regular nasal saline rinse followed by topical nasal steroid in the daytime and nasal saline gel together with adjusting heat setting on her humidifier at night    She will also need to work on improving the mask fit to reduce leaks  If she continues to have difficulty, she may need to see ENT  5   Follow-up to be scheduled in 2 months to monitor progress and to adjust therapy    Thank you for allowing me to participate in the care of this patient      Sincerely,    Authenticated electronically by Keith Forde MD on 82/14/90   Board Certified Specialist

## 2018-07-19 NOTE — PROGRESS NOTES
Review of Systems      Genitourinary none   Cardiology Frequent chest pain or angina,    Gastrointestinal frequent heartburn/acid reflux   Neurology need to move extremities, forgetfulness and poor concentration or confusion,    Constitutional none   Integumentary none   Psychiatry none   Musculoskeletal back pain and sciatica   Pulmonary none   ENT none   Endocrine none   Hematological none

## 2018-07-20 ENCOUNTER — TELEPHONE (OUTPATIENT)
Dept: INTERNAL MEDICINE CLINIC | Facility: CLINIC | Age: 83
End: 2018-07-20

## 2018-07-20 ENCOUNTER — OFFICE VISIT (OUTPATIENT)
Dept: INTERNAL MEDICINE CLINIC | Facility: CLINIC | Age: 83
End: 2018-07-20
Payer: COMMERCIAL

## 2018-07-20 VITALS
TEMPERATURE: 96.1 F | DIASTOLIC BLOOD PRESSURE: 76 MMHG | OXYGEN SATURATION: 97 % | HEART RATE: 68 BPM | BODY MASS INDEX: 24.31 KG/M2 | WEIGHT: 132.13 LBS | SYSTOLIC BLOOD PRESSURE: 122 MMHG | HEIGHT: 62 IN

## 2018-07-20 DIAGNOSIS — I35.0 AORTIC VALVE STENOSIS, ETIOLOGY OF CARDIAC VALVE DISEASE UNSPECIFIED: ICD-10-CM

## 2018-07-20 DIAGNOSIS — Z95.0 S/P PLACEMENT OF CARDIAC PACEMAKER: ICD-10-CM

## 2018-07-20 DIAGNOSIS — IMO0001 UNCONTROLLED TYPE 2 DIABETES MELLITUS WITHOUT COMPLICATION, WITHOUT LONG-TERM CURRENT USE OF INSULIN: Primary | ICD-10-CM

## 2018-07-20 PROCEDURE — 99214 OFFICE O/P EST MOD 30 MIN: CPT | Performed by: INTERNAL MEDICINE

## 2018-07-20 NOTE — PROGRESS NOTES
Assessment/Plan:         Diagnoses and all orders for this visit:    Uncontrolled type 2 diabetes mellitus without complication, without long-term current use of insulin ([facility])  APpt with Dr Peyman Malik September - she is trying more to record sugars and eat more regularly and adjusting sleep pattern  Aortic valve stenosis, etiology of cardiac valve disease unspecified  -     metoprolol tartrate (LOPRESSOR) 25 mg tablet; Take 1 tablet (25 mg total) by mouth every 12 (twelve) hours for 90 days  Cardio appt ? In the fall - pt said they will call her    S/P placement of cardiac pacemaker  Pt feels better since pacemaker - no dizziness less tired    Rto 3 months       Patient ID: Yeny Ivan is a 80 y o  female  HPI   Pt doing ok  Her sugars are slightly better  She saw Dr Paula English yesterday and she is willing to retry CPAP and talked to DME for an adjustment which they are working on  No falls  No dizziness  No n/v  Has been drinking a bit more  No chest pain        The following portions of the patient's history were reviewed and updated as appropriate:   Past Medical History:   Diagnosis Date    Bradycardia     Coronary artery disease     Depression     Depression     Diabetes mellitus ([facility] [address] )     Type 2 DM    Disease of thyroid gland     Facial droop     started 10 years ago    GERD (gastroesophageal reflux disease)     Heart murmur     Hyperlipidemia     Osteoarthritis     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[facility] [address] )     Vitamin D deficiency      Past Surgical History:   Procedure Laterality Date    ANKLE SURGERY Left 2005   [facility]    CATARACT EXTRACTION Bilateral 09/1995    HEMORRHOID SURGERY  1978    HERNIA REPAIR  1990    INSERT / Juwan Dom / Heath Apo  05/14/2018    dual lead pacer st yony model 2272    JOINT REPLACEMENT      b/l hip replacements, right 2006    NEUROPLASTY / TRANSPOSITION MEDIAN NERVE AT CARPAL TUNNEL  1977    PARTIAL HYSTERECTOMY  1971    TOTAL HIP ARTHROPLASTY      TRANSPHENOIDAL / TRANSNASAL HYPOPHYSECTOMY / RESECTION PITUITARY TUMOR  1989    excision of pituitary gland     Allergies   Allergen Reactions    Acetaminophen     Cephalexin GI Intolerance    Cephalosporins GI Intolerance    Clindamycin      Other reaction(s): Unspecified    Demeclocycline     Hydrocodone      Other reaction(s): Unspecified    Hydrocodone-Acetaminophen Nausea Only    Oxycodone-Acetaminophen     Oxycodone-Acetaminophen     Penicillins Hives     Other reaction(s): Other (See Comments)  ITCHING/RASH    Simvastatin      Other reaction(s): GI upset    Tetrabenazine     Tetracycline Nausea Only    Tetracyclines & Related      Social History     Social History    Marital status:      Spouse name: N/A    Number of children: N/A    Years of education: N/A     Occupational History    Not on file  Social History Main Topics    Smoking status: Never Smoker    Smokeless tobacco: Never Used    Alcohol use No    Drug use: No    Sexual activity: No     Other Topics Concern    Not on file     Social History Narrative    Caffeine use    Dental care regularly    Good dental hygiene    Uses safety equipment: seatbelts             Review of Systems   Constitutional: Negative  HENT: Negative  Eyes: Negative  Respiratory: Negative  Cardiovascular: Negative  Gastrointestinal: Negative  Endocrine: Negative  Genitourinary: Negative  Musculoskeletal: Positive for arthralgias  Skin: Negative  Allergic/Immunologic: Negative  Neurological: Negative  Hematological: Negative  Psychiatric/Behavioral: Negative  /76   Pulse 68   Temp (!) 96 1 °F (35 6 °C) (Tympanic)   Ht 5' 2" (1 575 m)   Wt 59 9 kg (132 lb 2 oz)   SpO2 97%   BMI 24 17 kg/m²      Physical Exam   Constitutional: She is oriented to person, place, and time  She appears well-developed and well-nourished  No distress  HENT:   Head: Normocephalic and atraumatic     Right Ear: External ear normal    Left Ear: External ear normal    Nose: Nose normal    Mouth/Throat: Oropharynx is clear and moist  No oropharyngeal exudate  Eyes: Conjunctivae and EOM are normal  Pupils are equal, round, and reactive to light  No scleral icterus  Neck: Normal range of motion  Neck supple  No JVD present  Cardiovascular: Normal rate, regular rhythm, normal heart sounds and intact distal pulses  Pulmonary/Chest: Effort normal and breath sounds normal    Abdominal: Soft  Bowel sounds are normal    Musculoskeletal: Normal range of motion  She exhibits deformity  She exhibits no edema or tenderness  Lymphadenopathy:     She has no cervical adenopathy  Neurological: She is alert and oriented to person, place, and time  She displays abnormal reflex  No cranial nerve deficit  She exhibits abnormal muscle tone  Coordination abnormal    Skin: Skin is warm and dry  She is not diaphoretic  Psychiatric: She has a normal mood and affect  Her behavior is normal  Judgment and thought content normal    Nursing note and vitals reviewed    CN intact

## 2018-09-08 DIAGNOSIS — E13.9 DIABETES 1.5, MANAGED AS TYPE 2 (HCC): Primary | ICD-10-CM

## 2018-09-08 DIAGNOSIS — K21.9 GASTROESOPHAGEAL REFLUX DISEASE, ESOPHAGITIS PRESENCE NOT SPECIFIED: Primary | ICD-10-CM

## 2018-09-08 DIAGNOSIS — E27.1 ADRENAL INSUFFICIENCY (ADDISON'S DISEASE) (HCC): ICD-10-CM

## 2018-09-08 DIAGNOSIS — N39.3 STRESS INCONTINENCE OF URINE: ICD-10-CM

## 2018-09-08 RX ORDER — PREDNISONE 1 MG/1
TABLET ORAL
Qty: 90 TABLET | Refills: 0 | Status: SHIPPED | OUTPATIENT
Start: 2018-09-08 | End: 2018-11-08 | Stop reason: SDUPTHER

## 2018-09-08 RX ORDER — OXYBUTYNIN CHLORIDE 10 MG/1
TABLET, EXTENDED RELEASE ORAL
Qty: 180 TABLET | Refills: 0 | Status: SHIPPED | OUTPATIENT
Start: 2018-09-08 | End: 2019-03-30 | Stop reason: SDUPTHER

## 2018-09-08 RX ORDER — SITAGLIPTIN 50 MG/1
TABLET, FILM COATED ORAL
Qty: 90 TABLET | Refills: 0 | Status: SHIPPED | OUTPATIENT
Start: 2018-09-08 | End: 2018-11-08 | Stop reason: SDUPTHER

## 2018-09-08 RX ORDER — PANTOPRAZOLE SODIUM 40 MG/1
TABLET, DELAYED RELEASE ORAL
Qty: 90 TABLET | Refills: 0 | Status: SHIPPED | OUTPATIENT
Start: 2018-09-08 | End: 2018-11-08 | Stop reason: SDUPTHER

## 2018-09-12 ENCOUNTER — OFFICE VISIT (OUTPATIENT)
Dept: GASTROENTEROLOGY | Facility: HOSPITAL | Age: 83
End: 2018-09-12
Payer: COMMERCIAL

## 2018-09-12 VITALS
HEART RATE: 59 BPM | WEIGHT: 132.8 LBS | BODY MASS INDEX: 24.44 KG/M2 | DIASTOLIC BLOOD PRESSURE: 67 MMHG | HEIGHT: 62 IN | TEMPERATURE: 97 F | SYSTOLIC BLOOD PRESSURE: 129 MMHG

## 2018-09-12 DIAGNOSIS — K59.09 OTHER CONSTIPATION: ICD-10-CM

## 2018-09-12 DIAGNOSIS — R13.19 ESOPHAGEAL DYSPHAGIA: Primary | ICD-10-CM

## 2018-09-12 PROCEDURE — 99214 OFFICE O/P EST MOD 30 MIN: CPT | Performed by: INTERNAL MEDICINE

## 2018-09-12 NOTE — PROGRESS NOTES
[facility] - Outpatient Follow-up Note  Yeny Ivan 80 y o  female MRN: [id number]  Encounter: [id number]          ASSESSMENT AND PLAN:        Esophageal dysphagia  This is likely due to esophageal dysmotility  She also has a large hiatal hernia, with one third of the stomach in the hernial sac  We discussed further treatment options including conservative medical management and surgical intervention  Given her comorbidities and advanced age I feel conservative management is in her best interest  I also feel conservative management is in her best interest considering these episodes of dysphagia only occur when she is eating outside of her home and she has no episodes while eating at home  It's very likely that this dysphagia is exacerbated by her being anxious while out as well  I recommend she eat small meals when she is out and take small bites of food and chew small pieces very well  Chronic Constipation recommend she begin taking metamucil, one scoop every morning  If no improvement after one week, she is to begin using Miralax as well  She can continue Senna  High fiber diet encouraged  Follow up as needed  ______________________________________________________________________      SUBJECTIVE:  Yeny Ivan is a 80 y o  female with GERD and episodes of dysphagia in the past who is here today for follow up  She had upper GI in February of 2017 which showed presbyesophagus and hiatal hernia  Per patient, she has been experiencing dysphagia  She mostly experiences this when going out to eat  When she eats at home she typically has TV dinners and is able to tolerate these well  Per sister, the patient has been experiencing some constipation  For constipation she typically uses Senna, Miralax, prune juice, as needed  Sister notes she does not take these regularly  She is unable to recall when her last EGD was  Her last EGD was performed a couple of years ago   She also has large hiatal hernia with one third of the stomach in the hernial sac  REVIEW OF SYSTEMS IS OTHERWISE NEGATIVE        Historical Information   Past Medical History:   Diagnosis Date    Bradycardia     Coronary artery disease     Depression     Depression     Diabetes mellitus (HCC)     Type 2 DM    Disease of thyroid gland     Facial droop     started 10 years ago    GERD (gastroesophageal reflux disease)     Heart murmur     Hyperlipidemia     Osteoarthritis     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HCC)     Vitamin D deficiency      Past Surgical History:   Procedure Laterality Date    ANKLE SURGERY Left 2005   [address] [facility]    CATARACT EXTRACTION Bilateral 09/1995    HEMORRHOID SURGERY  1978    HERNIA REPAIR  1990    INSERT / REPLACE / REMOVE PACEMAKER  05/14/2018    dual lead pacer st yony model 2272    JOINT REPLACEMENT      b/l hip replacements, right 2006    NEUROPLASTY / TRANSPOSITION MEDIAN NERVE AT CARPAL TUNNEL  1977    PARTIAL HYSTERECTOMY  1971    TOTAL HIP ARTHROPLASTY      TRANSPHENOIDAL / TRANSNASAL HYPOPHYSECTOMY / RESECTION PITUITARY TUMOR  1989    excision of pituitary gland     Social History   History   Alcohol Use No     History   Drug Use No     History   Smoking Status    Never Smoker   Smokeless Tobacco    Never Used     Family History   Problem Relation Age of Onset    Other Father         coal worker's pneumoconiosis    Heart disease Father     Diabetes Brother     Heart disease Brother     Prostate cancer Brother     Stomach cancer Maternal Grandmother     Diabetes Paternal Aunt        Meds/Allergies       Current Outpatient Prescriptions:     aspirin 81 MG tablet    b complex vitamins tablet    cyanocobalamin ([employer] VITAMIN B-12) 1000 MCG tablet    dextromethorphan-guaiFENesin (ROBITUSSIN DM)  mg/5 mL syrup    docusate sodium (COLACE) 100 mg capsule    ergocalciferol (VITAMIN D2) 50,000 units    ferrous sulfate 325 (65 Fe) mg tablet   JANUVIA 50 MG tablet    levothyroxine 75 mcg tablet    metFORMIN (FORTAMET) 500 MG (OSM) 24 hr tablet    metoprolol tartrate (LOPRESSOR) 25 mg tablet    oxybutynin (DITROPAN-XL) 10 MG 24 hr tablet    pantoprazole (PROTONIX) 40 mg tablet    polyethylene glycol (MIRALAX) powder    pravastatin (PRAVACHOL) 20 mg tablet    predniSONE 5 mg tablet    repaglinide (PRANDIN) 2 mg tablet    Allergies   Allergen Reactions    Acetaminophen     Cephalexin GI Intolerance    Cephalosporins GI Intolerance    Clindamycin      Other reaction(s): Unspecified    Demeclocycline     Hydrocodone      Other reaction(s): Unspecified    Hydrocodone-Acetaminophen Nausea Only    Oxycodone-Acetaminophen     Oxycodone-Acetaminophen     Penicillins Hives     Other reaction(s): Other (See Comments)  ITCHING/RASH    Simvastatin      Other reaction(s): GI upset    Tetrabenazine     Tetracycline Nausea Only    Tetracyclines & Related            Objective     Blood pressure 129/67, pulse 59, temperature (!) 97 °F (36 1 °C), temperature source Tympanic, height 5' 2" (1 575 m), weight 60 2 kg (132 lb 12 8 oz)  Body mass index is 24 29 kg/m²  PHYSICAL EXAM:      General Appearance:   Alert, cooperative, no distress   HEENT:   Normocephalic, atraumatic, anicteric      Neck:  Supple, symmetrical, trachea midline   Lungs:   Clear to auscultation bilaterally; no rales, rhonchi or wheezing; respirations unlabored    Heart[de-identified]   Regular rate and rhythm; no murmur, rub, or gallop  Abdomen:   Soft, non-tender, non-distended; normal bowel sounds; no masses, no organomegaly    Genitalia:   Deferred    Rectal:   Deferred    Extremities:  No cyanosis, clubbing or edema    Pulses:  2+ and symmetric    Skin:  No jaundice, rashes, or lesions    Lymph nodes:  No palpable cervical lymphadenopathy        Lab Results:   No visits with results within 1 Day(s) from this visit     Latest known visit with results is:   Admission on 05/14/2018, Discharged on 05/17/2018   Component Date Value    Sodium 05/15/2018 138     Potassium 05/15/2018 4 3     Chloride 05/15/2018 107     CO2 05/15/2018 26     ANION GAP 05/15/2018 5     BUN 05/15/2018 11     Creatinine 05/15/2018 0 81     Glucose 05/15/2018 104     Calcium 05/15/2018 8 4     eGFR 05/15/2018 67     WBC 05/15/2018 10 98*    RBC 05/15/2018 4 69     Hemoglobin 05/15/2018 14 2     Hematocrit 05/15/2018 42 8     MCV 05/15/2018 91     MCH 05/15/2018 30 3     MCHC 05/15/2018 33 2     RDW 05/15/2018 14 0     Platelets 10/35/4734 215     MPV 05/15/2018 10 3     Calcium, Ionized 05/15/2018 1 13     Magnesium 05/15/2018 1 3*    Phosphorus 05/15/2018 2 4     POC Glucose 05/15/2018 163*    POC Glucose 05/15/2018 157*    Ventricular Rate 05/15/2018 66     Atrial Rate 05/15/2018 66     ME Interval 05/15/2018 158     QRSD Interval 05/15/2018 96     QT Interval 05/15/2018 396     QTC Interval 05/15/2018 415     P Axis 05/15/2018 21     QRS Axis 05/15/2018 -65     T Wave Axis 05/15/2018 31     POC Glucose 05/15/2018 408*    POC Glucose 05/15/2018 405*    POC Glucose 05/16/2018 287*    Sodium 05/16/2018 139     Potassium 05/16/2018 3 9     Chloride 05/16/2018 106     CO2 05/16/2018 28     ANION GAP 05/16/2018 5     BUN 05/16/2018 19     Creatinine 05/16/2018 0 69     Glucose 05/16/2018 208*    Calcium 05/16/2018 8 5     eGFR 05/16/2018 80     WBC 05/16/2018 14 88*    RBC 05/16/2018 3 67*    Hemoglobin 05/16/2018 10 9*    Hematocrit 05/16/2018 34 0*    MCV 05/16/2018 93     MCH 05/16/2018 29 7     MCHC 05/16/2018 32 1     RDW 05/16/2018 14 3     MPV 05/16/2018 10 7     Platelets 40/83/2508 193     nRBC 05/16/2018 0     Neutrophils Relative 05/16/2018 86*    Immat GRANS % 05/16/2018 0     Lymphocytes Relative 05/16/2018 7*    Monocytes Relative 05/16/2018 6     Eosinophils Relative 05/16/2018 0     Basophils Relative 05/16/2018 0     Neutrophils Absolute 05/16/2018 12 86*    Immature Grans Absolute 05/16/2018 0 06     Lymphocytes Absolute 05/16/2018 1 04     Monocytes Absolute 05/16/2018 0 90     Eosinophils Absolute 05/16/2018 0 01     Basophils Absolute 05/16/2018 0 01     Magnesium 05/16/2018 2 4     Phosphorus 05/16/2018 2 2*    Vitamin B-12 05/16/2018 580     Folate 05/16/2018 11 0     Hemoglobin A1C 05/16/2018 9 7*    EAG 05/16/2018 232     POC Glucose 05/16/2018 245*    POC Glucose 05/16/2018 356*    POC Glucose 05/16/2018 357*    POC Glucose 05/16/2018 368*    POC Glucose 05/17/2018 282*    POC Glucose 05/17/2018 303*         Radiology Results:   No results found  Attestation:   By signing my name below, I, [facility], attest that this documentation has been prepared under the direction and in the presence of Lakesha Lopez MD  Electronically Signed: [facility], Scribe  9/12/2018    I, Lakesha Lopez, personally performed the services described in this documentation  All medical record entries made by the scribe were at my direction and in my presence  I have reviewed the chart and discharge instructions and agree that the record reflects my personal performance and is accurate and complete   Lakesha Lopez MD   9/12/2018

## 2018-09-17 PROBLEM — I80.9 SUPERFICIAL THROMBOPHLEBITIS: Status: ACTIVE | Noted: 2018-09-17

## 2018-09-17 PROBLEM — I25.10 CAD (CORONARY ARTERY DISEASE): Status: ACTIVE | Noted: 2018-09-17

## 2018-09-17 RX ORDER — HYDROCHLOROTHIAZIDE 12.5 MG/1
12.5 CAPSULE, GELATIN COATED ORAL DAILY
COMMUNITY
End: 2019-11-22 | Stop reason: ALTCHOICE

## 2018-09-17 RX ORDER — MULTIVIT-MIN/IRON FUM/FOLIC AC 7.5 MG-4
TABLET ORAL DAILY
Status: ON HOLD | COMMUNITY
End: 2018-12-11 | Stop reason: ALTCHOICE

## 2018-10-15 ENCOUNTER — REMOTE DEVICE CLINIC VISIT (OUTPATIENT)
Dept: CARDIOLOGY CLINIC | Facility: CLINIC | Age: 83
End: 2018-10-15
Payer: COMMERCIAL

## 2018-10-15 DIAGNOSIS — Z45.010 ENCOUNTER FOR CHECKING AND TESTING OF CARDIAC PACEMAKER PULSE GENERATOR (BATTERY): ICD-10-CM

## 2018-10-15 DIAGNOSIS — I49.5 SICK SINUS SYNDROME (HCC): Primary | ICD-10-CM

## 2018-10-15 PROCEDURE — 93294 REM INTERROG EVL PM/LDLS PM: CPT | Performed by: INTERNAL MEDICINE

## 2018-10-15 PROCEDURE — 93296 REM INTERROG EVL PM/IDS: CPT | Performed by: INTERNAL MEDICINE

## 2018-10-16 NOTE — PROGRESS NOTES
merlin remote pacer one high vent rate  Current Outpatient Prescriptions:     aspirin 81 MG tablet, Take 1 tablet by mouth daily, Disp: , Rfl:     b complex vitamins tablet, Take 1 tablet by mouth daily, Disp: , Rfl:     cyanocobalamin ([employer]S VITAMIN B-12) 1000 MCG tablet, Take 1,000 mcg by mouth, Disp: , Rfl:     dextromethorphan-guaiFENesin (ROBITUSSIN DM)  mg/5 mL syrup, Take 10 mL by mouth every 4 (four) hours as needed for cough, Disp: 118 mL, Rfl: 0    docusate sodium (COLACE) 100 mg capsule, Take 1 capsule by mouth 2 (two) times a day, Disp: 60 capsule, Rfl: 0    ergocalciferol (VITAMIN D2) 50,000 units, Take 50,000 Units by mouth once a week, Disp: , Rfl:     ferrous sulfate 325 (65 Fe) mg tablet, Take 325 mg by mouth daily with breakfast, Disp: , Rfl:     hydrochlorothiazide (MICROZIDE) 12 5 mg capsule, Take 12 5 mg by mouth daily, Disp: , Rfl:     JANUVIA 50 MG tablet, TAKE 1 TABLET DAILY  , Disp: 90 tablet, Rfl: 0    levothyroxine 75 mcg tablet, Take 75 mcg by mouth daily in the early morning  , Disp: , Rfl:     metFORMIN (FORTAMET) 500 MG (OSM) 24 hr tablet, Take 500 mg by mouth 2 (two) times a day with meals, Disp: , Rfl:     metoprolol tartrate (LOPRESSOR) 25 mg tablet, Take 1 tablet (25 mg total) by mouth every 12 (twelve) hours for 90 days, Disp: 180 tablet, Rfl: 3    Multiple Vitamins-Minerals (MULTIVITAMIN WITH MINERALS) tablet, Take by mouth daily, Disp: , Rfl:     oxybutynin (DITROPAN-XL) 10 MG 24 hr tablet, TAKE ONE TABLET BY MOUTH TWICE A DAY, Disp: 180 tablet, Rfl: 0    pantoprazole (PROTONIX) 40 mg tablet, TAKE (1) TABLET BY MOUTH DAILY  , Disp: 90 tablet, Rfl: 0    polyethylene glycol (MIRALAX) powder, Take by mouth as needed  , Disp: , Rfl:     pravastatin (PRAVACHOL) 20 mg tablet, Take 1 tablet (20 mg total) by mouth daily, Disp: 90 tablet, Rfl: 3    predniSONE 5 mg tablet, TAKE 1 TABLET DAILY, Disp: 90 tablet, Rfl: 0    repaglinide (PRANDIN) 2 mg tablet, TAKE (2) TABLETS THREE TIMES DAILY  , Disp: 540 tablet, Rfl: 0

## 2018-11-08 ENCOUNTER — OFFICE VISIT (OUTPATIENT)
Dept: INTERNAL MEDICINE CLINIC | Facility: CLINIC | Age: 83
End: 2018-11-08
Payer: COMMERCIAL

## 2018-11-08 VITALS
HEART RATE: 72 BPM | TEMPERATURE: 97.1 F | SYSTOLIC BLOOD PRESSURE: 124 MMHG | HEIGHT: 62 IN | WEIGHT: 131.25 LBS | OXYGEN SATURATION: 93 % | BODY MASS INDEX: 24.15 KG/M2 | DIASTOLIC BLOOD PRESSURE: 72 MMHG

## 2018-11-08 DIAGNOSIS — E13.9 DIABETES 1.5, MANAGED AS TYPE 2 (HCC): ICD-10-CM

## 2018-11-08 DIAGNOSIS — E03.9 HYPOTHYROIDISM, UNSPECIFIED TYPE: Primary | ICD-10-CM

## 2018-11-08 DIAGNOSIS — E11.9 TYPE 2 DIABETES MELLITUS WITHOUT COMPLICATION, WITHOUT LONG-TERM CURRENT USE OF INSULIN (HCC): ICD-10-CM

## 2018-11-08 DIAGNOSIS — E78.2 MIXED HYPERLIPIDEMIA: ICD-10-CM

## 2018-11-08 DIAGNOSIS — E55.9 VITAMIN D DEFICIENCY: ICD-10-CM

## 2018-11-08 DIAGNOSIS — Z79.4 TYPE 2 DIABETES MELLITUS WITH HYPERGLYCEMIA, WITH LONG-TERM CURRENT USE OF INSULIN (HCC): ICD-10-CM

## 2018-11-08 DIAGNOSIS — E27.1 ADRENAL INSUFFICIENCY (ADDISON'S DISEASE) (HCC): ICD-10-CM

## 2018-11-08 DIAGNOSIS — Z23 NEED FOR INFLUENZA VACCINATION: ICD-10-CM

## 2018-11-08 DIAGNOSIS — K21.9 GASTROESOPHAGEAL REFLUX DISEASE, ESOPHAGITIS PRESENCE NOT SPECIFIED: ICD-10-CM

## 2018-11-08 DIAGNOSIS — E11.65 TYPE 2 DIABETES MELLITUS WITH HYPERGLYCEMIA, WITH LONG-TERM CURRENT USE OF INSULIN (HCC): ICD-10-CM

## 2018-11-08 PROCEDURE — 1160F RVW MEDS BY RX/DR IN RCRD: CPT | Performed by: INTERNAL MEDICINE

## 2018-11-08 PROCEDURE — G0008 ADMIN INFLUENZA VIRUS VAC: HCPCS

## 2018-11-08 PROCEDURE — 1101F PT FALLS ASSESS-DOCD LE1/YR: CPT | Performed by: INTERNAL MEDICINE

## 2018-11-08 PROCEDURE — 1036F TOBACCO NON-USER: CPT | Performed by: INTERNAL MEDICINE

## 2018-11-08 PROCEDURE — 90662 IIV NO PRSV INCREASED AG IM: CPT

## 2018-11-08 PROCEDURE — 4040F PNEUMOC VAC/ADMIN/RCVD: CPT

## 2018-11-08 PROCEDURE — 99214 OFFICE O/P EST MOD 30 MIN: CPT | Performed by: INTERNAL MEDICINE

## 2018-11-08 PROCEDURE — 1160F RVW MEDS BY RX/DR IN RCRD: CPT

## 2018-11-08 RX ORDER — REPAGLINIDE 2 MG/1
4 TABLET ORAL
Qty: 540 TABLET | Refills: 3 | Status: SHIPPED | OUTPATIENT
Start: 2018-11-08 | End: 2020-02-26

## 2018-11-08 RX ORDER — METFORMIN HYDROCHLORIDE EXTENDED-RELEASE TABLETS 500 MG/1
500 TABLET, FILM COATED, EXTENDED RELEASE ORAL 2 TIMES DAILY WITH MEALS
Qty: 90 TABLET | Refills: 3 | Status: SHIPPED | OUTPATIENT
Start: 2018-11-08 | End: 2018-11-19 | Stop reason: SDUPTHER

## 2018-11-08 RX ORDER — PRAVASTATIN SODIUM 20 MG
20 TABLET ORAL DAILY
Qty: 90 TABLET | Refills: 3 | Status: SHIPPED | OUTPATIENT
Start: 2018-11-08 | End: 2019-11-15 | Stop reason: SDUPTHER

## 2018-11-08 RX ORDER — ERGOCALCIFEROL 1.25 MG/1
50000 CAPSULE ORAL WEEKLY
Qty: 12 CAPSULE | Refills: 3 | Status: SHIPPED | OUTPATIENT
Start: 2018-11-08 | End: 2019-11-15 | Stop reason: SDUPTHER

## 2018-11-08 RX ORDER — PANTOPRAZOLE SODIUM 40 MG/1
40 TABLET, DELAYED RELEASE ORAL DAILY
Qty: 90 TABLET | Refills: 3 | Status: SHIPPED | OUTPATIENT
Start: 2018-11-08 | End: 2019-07-31 | Stop reason: ALTCHOICE

## 2018-11-08 RX ORDER — LEVOTHYROXINE SODIUM 0.07 MG/1
75 TABLET ORAL
Qty: 90 TABLET | Refills: 3 | Status: SHIPPED | OUTPATIENT
Start: 2018-11-08 | End: 2019-10-31 | Stop reason: SDUPTHER

## 2018-11-08 RX ORDER — PREDNISONE 1 MG/1
5 TABLET ORAL DAILY
Qty: 90 TABLET | Refills: 3 | Status: SHIPPED | OUTPATIENT
Start: 2018-11-08 | End: 2019-10-31 | Stop reason: SDUPTHER

## 2018-11-08 NOTE — PROGRESS NOTES
Diabetic Foot Exam    Patient's shoes and socks removed  Right Foot/Ankle   Right Foot Inspection  Skin Exam: skin normal and skin intact no dry skin, no warmth, no callus, no erythema, no maceration, no abnormal color, no pre-ulcer, no ulcer and no callus                          Toe Exam: ROM and strength within normal limits  Sensory       Monofilament testing: intact      Left Foot/Ankle  Left Foot Inspection  Skin Exam: skin normal and skin intactno dry skin, no warmth, no erythema, no maceration, normal color, no pre-ulcer, no ulcer and no callus                         Toe Exam: ROM and strength within normal limits                   Sensory       Monofilament: intact    Assign Risk Category:  Deformity present;  No loss of protective sensation; Weak pulses       Risk: 1

## 2018-11-08 NOTE — PROGRESS NOTES
Assessment/Plan:         Diagnoses and all orders for this visit:    Type 2 diabetes mellitus without complication, without long-term current use of insulin ([facility])  -     HEMOGLOBIN A1C W/ EAG ESTIMATION; Future  -     metFORMIN (FORTAMET) 500 MG ([facility]) 24 hr tablet; Take 1 tablet (500 mg total) by mouth 2 (two) times a day with meals  Reviewed recent blood sugars and discussed some modifications to improve  She agrees needs to do home exercises more regularly    Diabetes 1 5, managed as type 2 ([facility])  -     sitaGLIPtin (JANUVIA) 50 mg tablet; Take 1 tablet (50 mg total) by mouth daily    Type 2 diabetes mellitus with hyperglycemia, with long-term current use of insulin ([facility])  -     repaglinide (PRANDIN) 2 mg tablet; Take 2 tablets (4 mg total) by mouth 3 (three) times a day before meals for 90 days    Mixed hyperlipidemia  -     pravastatin (PRAVACHOL) 20 mg tablet; Take 1 tablet (20 mg total) by mouth daily  Re check ldl    Gastroesophageal reflux disease, esophagitis presence not specified  -     pantoprazole (PROTONIX) 40 mg tablet; Take 1 tablet (40 mg total) by mouth daily    Adrenal insufficiency (Rafal's disease) ([facility])  -     predniSONE 5 mg tablet; Take 1 tablet (5 mg total) by mouth daily    Vitamin D deficiency  -     ergocalciferol (VITAMIN D2) 50,000 units; Take 1 capsule (50,000 Units total) by mouth once a week for 90 days    Hypothyroidism, unspecified type  -     levothyroxine 75 mcg tablet; Take 1 tablet (75 mcg total) by mouth daily in the early morning    Need for influenza vaccination  -     influenza vaccine, 2517-7670, high-dose, PF 0 5 mL, for patients 65 yr+ (FLUZONE HIGH-DOSE)  Rto 4 months         Patient ID: Ermelinda Aschoff is a 80 y o  female  HPI   Pt has been doing ok in general   She is home and her sister visits often  Her sugar average looks about 180 range  No chest pain or sob  She returned cpap since she did not feel it helped  No falls  but not doing her home exercises  Wants flu shot  Due for follow up with cardiology  No dizziness since pacer placed  Has had constipation and tried several things  Review of Systems   Constitutional: Negative  HENT: Negative  Eyes: Negative  Respiratory: Negative  Cardiovascular: Negative  Gastrointestinal: Positive for constipation  Endocrine: Negative  Genitourinary: Negative  Musculoskeletal: Positive for arthralgias and gait problem  Skin: Negative  Allergic/Immunologic: Negative  Hematological: Negative  Psychiatric/Behavioral: Negative  Past Medical History:   Diagnosis Date    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[facility] )     Bradycardia     Coronary artery disease     Depression     Depression     Diabetes mellitus ([facility] )     Type 2 DM    Disease of thyroid gland     Essential (primary) hypertension     Facial droop     started 10 years ago    GERD (gastroesophageal reflux disease)     Heart murmur     History of echocardiogram 08/28/2017    2-D w/CFD:  EF 0 60 (60%), LVSF is normal  No regional wall abnormalities  Mild mitral valve regurg  AV was trileaflet  Moderate tricuspid regurg  Trace pulmonic valve  No pericardial effusion   History of echocardiogram 03/28/2018    2-D w/CFD:  EF 0 60 (60%), Mild regurg in the MV  AV was trileaflet, severe stenosis, trace regurg  Mild regurg in the TV  No pericardial effusion  Aortic root exhibited normal size   History of echocardiogram 01/29/2016    2-D:  EF 0 55 (55%), Normal LVSF  Mild concentric LVH  Mild MR  Mild AS with mild regurg      History of Holter monitoring     2/24/16, 9/27/17, 2/6/18    History of nuclear stress test 08/16/2017    Lexiscan MPI:  EF 0 76 (76%), no prior MI or ischemia,    Hyperlipidemia     Hypothyroidism     Nonrheumatic aortic (valve) stenosis     Obstructive sleep apnea (adult) (pediatric)     10/17/17 - NPSG with nasal CPAP/Bi-level titration     Osteoarthritis     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HCC)     SSS (sick sinus syndrome) (HCC)     SVT (supraventricular tachycardia) ([address] )     Vitamin D deficiency      Past Surgical History:   Procedure Laterality Date    ANKLE SURGERY Left 2005   [address] [facility] [address]    CATARACT EXTRACTION Bilateral 09/1995    HEMORRHOID SURGERY  1978    HERNIA REPAIR  1990    INSERT / REPLACE / REMOVE PACEMAKER  05/14/2018    dual lead pacer [employer] model 2272    JOINT REPLACEMENT      b/l hip replacements, right 2006    NEUROPLASTY / TRANSPOSITION MEDIAN NERVE AT CARPAL TUNNEL  1977    PARTIAL HYSTERECTOMY  1971    TOTAL HIP ARTHROPLASTY      TRANSPHENOIDAL / TRANSNASAL HYPOPHYSECTOMY / RESECTION PITUITARY TUMOR  1989    excision of pituitary gland     Social History     Social History    Marital status:      Spouse name: N/A    Number of children: N/A    Years of education: N/A     Occupational History    Not on file  Social History Main Topics    Smoking status: Never Smoker    Smokeless tobacco: Never Used    Alcohol use No    Drug use: No    Sexual activity: No     Other Topics Concern    Not on file     Social History Narrative    Caffeine use    Dental care regularly    Good dental hygiene    Uses safety equipment: seatbelts         Allergies   Allergen Reactions    Acetaminophen     Cephalexin GI Intolerance    Cephalosporins GI Intolerance    Clindamycin      Other reaction(s): Unspecified    Demeclocycline     Hydrocodone      Bitartrate  Other reaction(s): Unspecified    Hydrocodone-Acetaminophen Nausea Only    Oxycodone-Acetaminophen      HCL    Oxycodone-Acetaminophen     Penicillins Hives     Other reaction(s):  Other (See Comments)  ITCHING/RASH    Simvastatin      Other reaction(s): GI upset    Tetrabenazine     Tetracycline Nausea Only    Tetracyclines & Related              /72   Pulse 72   Temp (!) 97 1 °F (36 2 °C) (Tympanic)   Ht 5' 2" (1 575 m)   Wt 59 5 kg (131 lb 4 oz)   SpO2 93%   BMI 24 01 kg/m²          Physical Exam   Constitutional: She is oriented to person, place, and time  She appears well-developed and well-nourished  No distress  HENT:   Head: Normocephalic and atraumatic  Right Ear: External ear normal    Left Ear: External ear normal    Nose: Nose normal    Mouth/Throat: Oropharynx is clear and moist  No oropharyngeal exudate  Eyes: Pupils are equal, round, and reactive to light  Conjunctivae and EOM are normal  No scleral icterus  Neck: Normal range of motion  Neck supple  No JVD present  Cardiovascular: Normal rate, regular rhythm and intact distal pulses  Murmur heard  Pulmonary/Chest: Effort normal and breath sounds normal    Abdominal: Soft  Bowel sounds are normal  She exhibits no distension  There is no tenderness  Musculoskeletal: Normal range of motion  She exhibits no edema  Lymphadenopathy:     She has no cervical adenopathy  Neurological: She is alert and oriented to person, place, and time  She has normal reflexes  She displays normal reflexes  No cranial nerve deficit  She exhibits abnormal muscle tone  Coordination abnormal    Uses cane for safety   Skin: Skin is warm and dry  She is not diaphoretic  Psychiatric: She has a normal mood and affect  Her behavior is normal  Judgment and thought content normal    Nursing note and vitals reviewed

## 2018-11-19 DIAGNOSIS — E11.9 TYPE 2 DIABETES MELLITUS WITHOUT COMPLICATION, WITHOUT LONG-TERM CURRENT USE OF INSULIN (HCC): ICD-10-CM

## 2018-11-19 RX ORDER — METFORMIN HYDROCHLORIDE EXTENDED-RELEASE TABLETS 500 MG/1
500 TABLET, FILM COATED, EXTENDED RELEASE ORAL 2 TIMES DAILY WITH MEALS
Qty: 180 TABLET | Refills: 3 | Status: SHIPPED | OUTPATIENT
Start: 2018-11-19 | End: 2019-12-03

## 2018-11-27 ENCOUNTER — APPOINTMENT (OUTPATIENT)
Dept: LAB | Facility: MEDICAL CENTER | Age: 83
End: 2018-11-27
Payer: COMMERCIAL

## 2018-11-27 DIAGNOSIS — E78.2 MIXED HYPERLIPIDEMIA: ICD-10-CM

## 2018-11-27 DIAGNOSIS — E11.9 TYPE 2 DIABETES MELLITUS WITHOUT COMPLICATION, WITHOUT LONG-TERM CURRENT USE OF INSULIN (HCC): ICD-10-CM

## 2018-11-27 DIAGNOSIS — E03.9 HYPOTHYROIDISM, UNSPECIFIED TYPE: ICD-10-CM

## 2018-11-27 DIAGNOSIS — E11.65 TYPE 2 DIABETES MELLITUS WITH HYPERGLYCEMIA, WITH LONG-TERM CURRENT USE OF INSULIN (HCC): ICD-10-CM

## 2018-11-27 DIAGNOSIS — Z79.4 TYPE 2 DIABETES MELLITUS WITH HYPERGLYCEMIA, WITH LONG-TERM CURRENT USE OF INSULIN (HCC): ICD-10-CM

## 2018-11-27 LAB
ALBUMIN SERPL BCP-MCNC: 3.1 G/DL (ref 3.5–5)
ALP SERPL-CCNC: 50 U/L (ref 46–116)
ALT SERPL W P-5'-P-CCNC: 15 U/L (ref 12–78)
ANION GAP SERPL CALCULATED.3IONS-SCNC: 4 MMOL/L (ref 4–13)
AST SERPL W P-5'-P-CCNC: 12 U/L (ref 5–45)
BILIRUB SERPL-MCNC: 0.38 MG/DL (ref 0.2–1)
BUN SERPL-MCNC: 13 MG/DL (ref 5–25)
CALCIUM SERPL-MCNC: 9.3 MG/DL (ref 8.3–10.1)
CHLORIDE SERPL-SCNC: 102 MMOL/L (ref 100–108)
CO2 SERPL-SCNC: 29 MMOL/L (ref 21–32)
CREAT SERPL-MCNC: 0.75 MG/DL (ref 0.6–1.3)
GFR SERPL CREATININE-BSD FRML MDRD: 73 ML/MIN/1.73SQ M
GLUCOSE P FAST SERPL-MCNC: 172 MG/DL (ref 65–99)
LDLC SERPL DIRECT ASSAY-MCNC: 100 MG/DL (ref 0–100)
POTASSIUM SERPL-SCNC: 4.2 MMOL/L (ref 3.5–5.3)
PROT SERPL-MCNC: 6 G/DL (ref 6.4–8.2)
SODIUM SERPL-SCNC: 135 MMOL/L (ref 136–145)
TSH SERPL DL<=0.05 MIU/L-ACNC: 0.02 UIU/ML

## 2018-11-27 PROCEDURE — 80053 COMPREHEN METABOLIC PANEL: CPT

## 2018-11-27 PROCEDURE — 36415 COLL VENOUS BLD VENIPUNCTURE: CPT

## 2018-11-27 PROCEDURE — 84443 ASSAY THYROID STIM HORMONE: CPT

## 2018-11-27 PROCEDURE — 83036 HEMOGLOBIN GLYCOSYLATED A1C: CPT

## 2018-11-27 PROCEDURE — 83721 ASSAY OF BLOOD LIPOPROTEIN: CPT

## 2018-11-28 LAB
EST. AVERAGE GLUCOSE BLD GHB EST-MCNC: 255 MG/DL
HBA1C MFR BLD: 10.5 % (ref 4.2–6.3)

## 2018-11-30 LAB
LEFT EYE DIABETIC RETINOPATHY: NORMAL
RIGHT EYE DIABETIC RETINOPATHY: NORMAL
SEVERITY (EYE EXAM): NORMAL

## 2018-12-06 DIAGNOSIS — Z79.4 TYPE 2 DIABETES MELLITUS WITHOUT COMPLICATION, WITH LONG-TERM CURRENT USE OF INSULIN (HCC): Primary | ICD-10-CM

## 2018-12-06 DIAGNOSIS — E11.9 TYPE 2 DIABETES MELLITUS WITHOUT COMPLICATION, WITH LONG-TERM CURRENT USE OF INSULIN (HCC): Primary | ICD-10-CM

## 2018-12-10 ENCOUNTER — TELEPHONE (OUTPATIENT)
Dept: INTERNAL MEDICINE CLINIC | Facility: CLINIC | Age: 83
End: 2018-12-10

## 2018-12-10 DIAGNOSIS — B35.1 NAIL FUNGUS: Primary | ICD-10-CM

## 2018-12-10 RX ORDER — TERBINAFINE HYDROCHLORIDE 250 MG/1
250 TABLET ORAL DAILY
Qty: 30 TABLET | Refills: 0 | Status: ON HOLD | OUTPATIENT
Start: 2018-12-10 | End: 2018-12-11 | Stop reason: ALTCHOICE

## 2018-12-10 NOTE — TELEPHONE ENCOUNTER
Elsie aware of instructions  Lab ordered for recheck LFT in one month and one month supply of Lamisil sent to pharmacy

## 2018-12-10 NOTE — TELEPHONE ENCOUNTER
Nela Brooks called regarding [employer] work  Waiting to hear if she is able to continue on nail fungus medication and how her lab results were she stated?

## 2018-12-11 ENCOUNTER — APPOINTMENT (EMERGENCY)
Dept: RADIOLOGY | Facility: HOSPITAL | Age: 83
DRG: 281 | End: 2018-12-11
Payer: COMMERCIAL

## 2018-12-11 ENCOUNTER — APPOINTMENT (EMERGENCY)
Dept: CT IMAGING | Facility: HOSPITAL | Age: 83
DRG: 281 | End: 2018-12-11
Payer: COMMERCIAL

## 2018-12-11 ENCOUNTER — HOSPITAL ENCOUNTER (INPATIENT)
Facility: HOSPITAL | Age: 83
LOS: 1 days | Discharge: HOME | DRG: 281 | End: 2018-12-13
Attending: EMERGENCY MEDICINE | Admitting: INTERNAL MEDICINE
Payer: COMMERCIAL

## 2018-12-11 DIAGNOSIS — I21.4 NSTEMI (NON-ST ELEVATED MYOCARDIAL INFARCTION) (HCC): ICD-10-CM

## 2018-12-11 DIAGNOSIS — R06.00 DYSPNEA: ICD-10-CM

## 2018-12-11 DIAGNOSIS — Z86.73 HISTORY OF CVA (CEREBROVASCULAR ACCIDENT): ICD-10-CM

## 2018-12-11 DIAGNOSIS — I35.0 AORTIC VALVE STENOSIS, ETIOLOGY OF CARDIAC VALVE DISEASE UNSPECIFIED: ICD-10-CM

## 2018-12-11 DIAGNOSIS — R00.0 TACHYARRHYTHMIA: Primary | ICD-10-CM

## 2018-12-11 DIAGNOSIS — R40.20 LOC (LOSS OF CONSCIOUSNESS) (HCC): ICD-10-CM

## 2018-12-11 DIAGNOSIS — E13.9 DIABETES 1.5, MANAGED AS TYPE 2 (HCC): ICD-10-CM

## 2018-12-11 PROBLEM — E87.1 HYPONATREMIA: Status: ACTIVE | Noted: 2018-12-11

## 2018-12-11 PROBLEM — E83.42 HYPOMAGNESEMIA: Status: ACTIVE | Noted: 2018-12-11

## 2018-12-11 LAB
ALBUMIN SERPL BCP-MCNC: 2.8 G/DL (ref 3.5–5)
ALP SERPL-CCNC: 59 U/L (ref 46–116)
ALT SERPL W P-5'-P-CCNC: 22 U/L (ref 12–78)
ANION GAP SERPL CALCULATED.3IONS-SCNC: 8 MMOL/L (ref 4–13)
APTT PPP: 26 SECONDS (ref 26–38)
AST SERPL W P-5'-P-CCNC: 18 U/L (ref 5–45)
ATRIAL RATE: 78 BPM
BACTERIA UR QL AUTO: ABNORMAL /HPF
BASOPHILS # BLD AUTO: 0.02 THOUSANDS/ΜL (ref 0–0.1)
BASOPHILS NFR BLD AUTO: 0 % (ref 0–1)
BILIRUB SERPL-MCNC: 0.6 MG/DL (ref 0.2–1)
BILIRUB UR QL STRIP: NEGATIVE
BUN SERPL-MCNC: 15 MG/DL (ref 5–25)
CALCIUM SERPL-MCNC: 8 MG/DL (ref 8.3–10.1)
CHLORIDE SERPL-SCNC: 100 MMOL/L (ref 100–108)
CLARITY UR: CLEAR
CO2 SERPL-SCNC: 27 MMOL/L (ref 21–32)
COLOR UR: YELLOW
CREAT SERPL-MCNC: 1.04 MG/DL (ref 0.6–1.3)
DEPRECATED D DIMER PPP: 2427 NG/ML (FEU)
EOSINOPHIL # BLD AUTO: 0.17 THOUSAND/ΜL (ref 0–0.61)
EOSINOPHIL NFR BLD AUTO: 2 % (ref 0–6)
ERYTHROCYTE [DISTWIDTH] IN BLOOD BY AUTOMATED COUNT: 13.2 % (ref 11.6–15.1)
GFR SERPL CREATININE-BSD FRML MDRD: 49 ML/MIN/1.73SQ M
GLUCOSE SERPL-MCNC: 269 MG/DL (ref 65–140)
GLUCOSE SERPL-MCNC: 274 MG/DL (ref 65–140)
GLUCOSE SERPL-MCNC: 457 MG/DL (ref 65–140)
GLUCOSE SERPL-MCNC: 479 MG/DL (ref 65–140)
GLUCOSE UR STRIP-MCNC: ABNORMAL MG/DL
HCT VFR BLD AUTO: 38.8 % (ref 34.8–46.1)
HGB BLD-MCNC: 12.6 G/DL (ref 11.5–15.4)
HGB UR QL STRIP.AUTO: NEGATIVE
IMM GRANULOCYTES # BLD AUTO: 0.04 THOUSAND/UL (ref 0–0.2)
IMM GRANULOCYTES NFR BLD AUTO: 1 % (ref 0–2)
INR PPP: 1.02 (ref 0.86–1.17)
KETONES UR STRIP-MCNC: NEGATIVE MG/DL
LEUKOCYTE ESTERASE UR QL STRIP: ABNORMAL
LYMPHOCYTES # BLD AUTO: 0.78 THOUSANDS/ΜL (ref 0.6–4.47)
LYMPHOCYTES NFR BLD AUTO: 10 % (ref 14–44)
MAGNESIUM SERPL-MCNC: 1.5 MG/DL (ref 1.6–2.6)
MCH RBC QN AUTO: 30.3 PG (ref 26.8–34.3)
MCHC RBC AUTO-ENTMCNC: 32.5 G/DL (ref 31.4–37.4)
MCV RBC AUTO: 93 FL (ref 82–98)
MONOCYTES # BLD AUTO: 0.48 THOUSAND/ΜL (ref 0.17–1.22)
MONOCYTES NFR BLD AUTO: 6 % (ref 4–12)
NEUTROPHILS # BLD AUTO: 6.39 THOUSANDS/ΜL (ref 1.85–7.62)
NEUTS SEG NFR BLD AUTO: 81 % (ref 43–75)
NITRITE UR QL STRIP: NEGATIVE
NON-SQ EPI CELLS URNS QL MICRO: ABNORMAL /HPF
NRBC BLD AUTO-RTO: 0 /100 WBCS
NT-PROBNP SERPL-MCNC: 1011 PG/ML
P AXIS: 38 DEGREES
PH UR STRIP.AUTO: 6.5 [PH] (ref 4.5–8)
PHOSPHATE SERPL-MCNC: 3.5 MG/DL (ref 2.3–4.1)
PLATELET # BLD AUTO: 208 THOUSANDS/UL (ref 149–390)
PMV BLD AUTO: 10.1 FL (ref 8.9–12.7)
POTASSIUM SERPL-SCNC: 3.9 MMOL/L (ref 3.5–5.3)
PR INTERVAL: 178 MS
PROT SERPL-MCNC: 5.9 G/DL (ref 6.4–8.2)
PROT UR STRIP-MCNC: NEGATIVE MG/DL
PROTHROMBIN TIME: 12.9 SECONDS (ref 11.8–14.2)
QRS AXIS: -54 DEGREES
QRSD INTERVAL: 106 MS
QT INTERVAL: 398 MS
QTC INTERVAL: 453 MS
RBC # BLD AUTO: 4.16 MILLION/UL (ref 3.81–5.12)
RBC #/AREA URNS AUTO: ABNORMAL /HPF
SODIUM SERPL-SCNC: 135 MMOL/L (ref 136–145)
SP GR UR STRIP.AUTO: 1.01 (ref 1–1.03)
T WAVE AXIS: 81 DEGREES
TROPONIN I SERPL-MCNC: 0.08 NG/ML
TROPONIN I SERPL-MCNC: 0.1 NG/ML
TROPONIN I SERPL-MCNC: 0.1 NG/ML
TSH SERPL DL<=0.05 MIU/L-ACNC: 0.03 UIU/ML (ref 0.36–3.74)
UROBILINOGEN UR QL STRIP.AUTO: 0.2 E.U./DL
VENTRICULAR RATE: 78 BPM
WBC # BLD AUTO: 7.88 THOUSAND/UL (ref 4.31–10.16)
WBC #/AREA URNS AUTO: ABNORMAL /HPF

## 2018-12-11 PROCEDURE — 82948 REAGENT STRIP/BLOOD GLUCOSE: CPT

## 2018-12-11 PROCEDURE — 85730 THROMBOPLASTIN TIME PARTIAL: CPT | Performed by: EMERGENCY MEDICINE

## 2018-12-11 PROCEDURE — 96372 THER/PROPH/DIAG INJ SC/IM: CPT

## 2018-12-11 PROCEDURE — 84100 ASSAY OF PHOSPHORUS: CPT | Performed by: EMERGENCY MEDICINE

## 2018-12-11 PROCEDURE — 93005 ELECTROCARDIOGRAM TRACING: CPT

## 2018-12-11 PROCEDURE — 83036 HEMOGLOBIN GLYCOSYLATED A1C: CPT | Performed by: INTERNAL MEDICINE

## 2018-12-11 PROCEDURE — 71275 CT ANGIOGRAPHY CHEST: CPT

## 2018-12-11 PROCEDURE — 81001 URINALYSIS AUTO W/SCOPE: CPT | Performed by: EMERGENCY MEDICINE

## 2018-12-11 PROCEDURE — 71046 X-RAY EXAM CHEST 2 VIEWS: CPT

## 2018-12-11 PROCEDURE — 84484 ASSAY OF TROPONIN QUANT: CPT | Performed by: INTERNAL MEDICINE

## 2018-12-11 PROCEDURE — 85379 FIBRIN DEGRADATION QUANT: CPT | Performed by: EMERGENCY MEDICINE

## 2018-12-11 PROCEDURE — 99285 EMERGENCY DEPT VISIT HI MDM: CPT

## 2018-12-11 PROCEDURE — 85025 COMPLETE CBC W/AUTO DIFF WBC: CPT | Performed by: EMERGENCY MEDICINE

## 2018-12-11 PROCEDURE — 96360 HYDRATION IV INFUSION INIT: CPT

## 2018-12-11 PROCEDURE — 83880 ASSAY OF NATRIURETIC PEPTIDE: CPT | Performed by: EMERGENCY MEDICINE

## 2018-12-11 PROCEDURE — 84484 ASSAY OF TROPONIN QUANT: CPT | Performed by: EMERGENCY MEDICINE

## 2018-12-11 PROCEDURE — 85610 PROTHROMBIN TIME: CPT | Performed by: EMERGENCY MEDICINE

## 2018-12-11 PROCEDURE — 93010 ELECTROCARDIOGRAM REPORT: CPT | Performed by: INTERNAL MEDICINE

## 2018-12-11 PROCEDURE — 83735 ASSAY OF MAGNESIUM: CPT | Performed by: EMERGENCY MEDICINE

## 2018-12-11 PROCEDURE — 80053 COMPREHEN METABOLIC PANEL: CPT | Performed by: EMERGENCY MEDICINE

## 2018-12-11 PROCEDURE — 84443 ASSAY THYROID STIM HORMONE: CPT | Performed by: EMERGENCY MEDICINE

## 2018-12-11 PROCEDURE — 99220 PR INITIAL OBSERVATION CARE/DAY 70 MINUTES: CPT | Performed by: INTERNAL MEDICINE

## 2018-12-11 RX ORDER — OXYBUTYNIN CHLORIDE 5 MG/1
10 TABLET, EXTENDED RELEASE ORAL
Status: DISCONTINUED | OUTPATIENT
Start: 2018-12-11 | End: 2018-12-13 | Stop reason: HOSPADM

## 2018-12-11 RX ORDER — PANTOPRAZOLE SODIUM 40 MG/1
40 TABLET, DELAYED RELEASE ORAL
Status: DISCONTINUED | OUTPATIENT
Start: 2018-12-12 | End: 2018-12-13 | Stop reason: HOSPADM

## 2018-12-11 RX ORDER — FERROUS SULFATE 325(65) MG
325 TABLET ORAL
Status: DISCONTINUED | OUTPATIENT
Start: 2018-12-12 | End: 2018-12-13 | Stop reason: HOSPADM

## 2018-12-11 RX ORDER — REPAGLINIDE 1 MG/1
4 TABLET ORAL
Status: DISCONTINUED | OUTPATIENT
Start: 2018-12-11 | End: 2018-12-12

## 2018-12-11 RX ORDER — LEVOTHYROXINE SODIUM 0.07 MG/1
75 TABLET ORAL
Status: DISCONTINUED | OUTPATIENT
Start: 2018-12-12 | End: 2018-12-13 | Stop reason: HOSPADM

## 2018-12-11 RX ORDER — PRAVASTATIN SODIUM 20 MG
20 TABLET ORAL EVERY EVENING
Status: DISCONTINUED | OUTPATIENT
Start: 2018-12-11 | End: 2018-12-13 | Stop reason: HOSPADM

## 2018-12-11 RX ORDER — VITAMIN B COMPLEX
1 CAPSULE ORAL DAILY
Status: DISCONTINUED | OUTPATIENT
Start: 2018-12-12 | End: 2018-12-13 | Stop reason: HOSPADM

## 2018-12-11 RX ORDER — ASPIRIN 325 MG
325 TABLET ORAL ONCE
Status: DISCONTINUED | OUTPATIENT
Start: 2018-12-11 | End: 2018-12-11

## 2018-12-11 RX ORDER — ASPIRIN 81 MG/1
81 TABLET, CHEWABLE ORAL DAILY
Status: DISCONTINUED | OUTPATIENT
Start: 2018-12-12 | End: 2018-12-13 | Stop reason: HOSPADM

## 2018-12-11 RX ORDER — ASPIRIN 325 MG
325 TABLET ORAL ONCE
Status: COMPLETED | OUTPATIENT
Start: 2018-12-11 | End: 2018-12-11

## 2018-12-11 RX ORDER — MAGNESIUM SULFATE HEPTAHYDRATE 40 MG/ML
2 INJECTION, SOLUTION INTRAVENOUS ONCE
Status: COMPLETED | OUTPATIENT
Start: 2018-12-11 | End: 2018-12-11

## 2018-12-11 RX ORDER — METFORMIN HYDROCHLORIDE 500 MG/1
500 TABLET, EXTENDED RELEASE ORAL 2 TIMES DAILY WITH MEALS
Status: DISCONTINUED | OUTPATIENT
Start: 2018-12-11 | End: 2018-12-12

## 2018-12-11 RX ORDER — PREDNISONE 1 MG/1
5 TABLET ORAL DAILY
Status: DISCONTINUED | OUTPATIENT
Start: 2018-12-12 | End: 2018-12-13 | Stop reason: HOSPADM

## 2018-12-11 RX ADMIN — SODIUM CHLORIDE 1000 ML: 0.9 INJECTION, SOLUTION INTRAVENOUS at 14:03

## 2018-12-11 RX ADMIN — METOPROLOL TARTRATE 25 MG: 25 TABLET, FILM COATED ORAL at 21:23

## 2018-12-11 RX ADMIN — METFORMIN HYDROCHLORIDE 500 MG: 500 TABLET, EXTENDED RELEASE ORAL at 18:58

## 2018-12-11 RX ADMIN — INSULIN HUMAN 8 UNITS: 100 INJECTION, SOLUTION PARENTERAL at 14:57

## 2018-12-11 RX ADMIN — IOHEXOL 100 ML: 350 INJECTION, SOLUTION INTRAVENOUS at 16:22

## 2018-12-11 RX ADMIN — INSULIN DETEMIR 15 UNITS: 100 INJECTION, SOLUTION SUBCUTANEOUS at 21:23

## 2018-12-11 RX ADMIN — MAGNESIUM SULFATE IN WATER 2 G: 40 INJECTION, SOLUTION INTRAVENOUS at 19:06

## 2018-12-11 RX ADMIN — ASPIRIN 325 MG ORAL TABLET 325 MG: 325 PILL ORAL at 14:57

## 2018-12-11 RX ADMIN — REPAGLINIDE 4 MG: 1 TABLET ORAL at 18:58

## 2018-12-11 RX ADMIN — OXYBUTYNIN CHLORIDE 10 MG: 5 TABLET, EXTENDED RELEASE ORAL at 21:23

## 2018-12-11 RX ADMIN — PRAVASTATIN SODIUM 20 MG: 20 TABLET ORAL at 18:58

## 2018-12-11 NOTE — ASSESSMENT & PLAN NOTE
Suspected component of mild volume depletion,/hypovolemic hyponatremia, patient received 1 L of normal saline in the emergency room, hold further fluid, hold diuretic

## 2018-12-11 NOTE — ED NOTES
Patient transported to X-ray via quin Alvarez, [facility]  12/11/18 1857

## 2018-12-11 NOTE — ED NOTES
Spoke with Stefania Cueto from paging services with [facility] regarding pacemaker  They will be paging Terence Albrecht to come interrogate pacemaker       Monique Sneed RN  12/11/18 [id number]

## 2018-12-11 NOTE — ASSESSMENT & PLAN NOTE
The patient presented with reports of tachyarrhythmia at home  Patient was symptomatic, mild troponin elevation, will interrogate pacemaker, trend cardiac enzymes and obtain Cardiology consultation

## 2018-12-11 NOTE — ASSESSMENT & PLAN NOTE
SUMMARY:  Limited study  LEFT VENTRICLE:  Systolic function was normal  Ejection fraction was estimated in the range of 60 % to 65 %  Although no diagnostic regional wall motion abnormality was identified, this possibility cannot be completely excluded on the basis of this study  Wall thickness was mildly increased  AORTIC VALVE:  The valve was probably trileaflet  Leaflets exhibited markedly increased thickness and markedly reduced cuspal separation  There was severe stenosis  There was trace regurgitation  Valve mean gradient was 47 mmHg  Aortic valve obstructive index (by Vmax) was 0 25  Estimated aortic valve area (by Vmax) was 0 64 cm squared  TRICUSPID VALVE:  There was mild regurgitation

## 2018-12-11 NOTE — ASSESSMENT & PLAN NOTE
Patient has severe aortic valve stenosis, is not clear to me in talking to the patient if she has been evaluated for possible for TAVR

## 2018-12-11 NOTE — ED PROVIDER NOTES
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  She has a history of adrenal insufficiency and diabetes  She takes steroid replacement  She has had problems with tachycardia in the past   She reports that today she just did not feel well  She has a hard time describing exactly what was happening  She denies any near syncope or syncope  No dizziness  She did report she was feeling short of breath  No chest pain  Guarded around 10:00 a m   It appeared to get worse  She took her heart rate and was in the 140s  By the time the ambulance arrived she was feeling a little better and she was brought to the emergency room for evaluation  There has been no fever  No vomiting or diarrhea  No hematemesis, hematochezia or melena  She does have problems with constipation  She is complaining of some suprapubic pain  No urinary complaints  Patient feels better since arrival of the ambulance  She is currently without complaints  History is somewhat limited by dementia  Prior to Admission Medications   Prescriptions Last Dose Informant Patient Reported? Taking?    Multiple Vitamins-Minerals (MULTIVITAMIN WITH MINERALS) tablet   Yes No   Sig: Take by mouth daily   aspirin 81 MG tablet 12/11/2018 at Unknown time Self Yes Yes   Sig: Take 1 tablet by mouth daily   b complex vitamins tablet  Self Yes No   Sig: Take 1 tablet by mouth daily   cyanocobalamin ([employer] VITAMIN B-12) 1000 MCG tablet  Self Yes No   Sig: Take 1,000 mcg by mouth   ergocalciferol (VITAMIN D2) 50,000 units Past Week at Unknown time  No Yes   Sig: Take 1 capsule (50,000 Units total) by mouth once a week for 90 days   ferrous sulfate 325 (65 Fe) mg tablet  Self Yes No   Sig: Take 325 mg by mouth daily with breakfast   hydrochlorothiazide (MICROZIDE) 12 5 mg capsule   Yes No   Sig: Take 12 5 mg by mouth daily   levothyroxine 75 mcg tablet 12/11/2018 at Unknown time  No Yes   Sig: Take 1 tablet (75 mcg total) by mouth daily in the early morning   metFORMIN (FORTAMET) 500 MG (OSM) 24 hr tablet 12/11/2018 at Unknown time  No Yes   Sig: Take 1 tablet (500 mg total) by mouth 2 (two) times a day with meals for 90 days   metoprolol tartrate (LOPRESSOR) 25 mg tablet  Self No No   Sig: Take 1 tablet (25 mg total) by mouth every 12 (twelve) hours for 90 days   oxybutynin (DITROPAN-XL) 10 MG 24 hr tablet  Self No No   Sig: TAKE ONE TABLET BY MOUTH TWICE A DAY   pantoprazole (PROTONIX) 40 mg tablet   No No   Sig: Take 1 tablet (40 mg total) by mouth daily   polyethylene glycol (MIRALAX) powder  Self Yes No   Sig: Take by mouth as needed     pravastatin (PRAVACHOL) 20 mg tablet 12/10/2018 at Unknown time  No Yes   Sig: Take 1 tablet (20 mg total) by mouth daily   predniSONE 5 mg tablet 12/11/2018 at Unknown time  No Yes   Sig: Take 1 tablet (5 mg total) by mouth daily   repaglinide (PRANDIN) 2 mg tablet 12/10/2018 at Unknown time  No Yes   Sig: Take 2 tablets (4 mg total) by mouth 3 (three) times a day before meals for 90 days   sitaGLIPtin (JANUVIA) 50 mg tablet 12/11/2018 at Unknown time  No Yes   Sig: Take 1 tablet (50 mg total) by mouth daily   terbinafine (LamISIL) 250 mg tablet   No No   Sig: Take 1 tablet (250 mg total) by mouth daily for 30 days      Facility-Administered Medications: None       Past Medical History:   Diagnosis Date    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HCC)     Bradycardia     Coronary artery disease     Depression     Depression     Diabetes mellitus ([facility] [address] )     Type 2 DM    Disease of thyroid gland     Essential (primary) hypertension     Facial droop     started 10 years ago    GERD (gastroesophageal reflux disease)     Heart murmur     History of echocardiogram 08/28/2017    2-D w/CFD:  EF 0 60 (60%), LVSF is normal  No regional wall abnormalities  Mild mitral valve regurg  AV was trileaflet  Moderate tricuspid regurg  Trace pulmonic valve  No pericardial effusion   History of echocardiogram 03/28/2018    2-D w/CFD:  EF 0 60 (60%), Mild regurg in the MV  AV was trileaflet, severe stenosis, trace regurg  Mild regurg in the TV  No pericardial effusion  Aortic root exhibited normal size   History of echocardiogram 01/29/2016    2-D:  EF 0 55 (55%), Normal LVSF  Mild concentric LVH  Mild MR  Mild AS with mild regurg      History of Holter monitoring     2/24/16, 9/27/17, 2/6/18    History of nuclear stress test 08/16/2017    Lexiscan MPI:  EF 0 76 (76%), no prior MI or ischemia,    Hyperlipidemia     Hypothyroidism     Nonrheumatic aortic (valve) stenosis     Obstructive sleep apnea (adult) (pediatric)     10/17/17 - NPSG with nasal CPAP/Bi-level titration     Osteoarthritis     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HCC)     [facility] syndrome) ([facility])     SVT (supraventricular tachycardia) ([facility] [address] )     Vitamin D deficiency        Past Surgical History:   Procedure Laterality Date    ANKLE SURGERY Left 2005    APPENDECTOMY  1955    CATARACT EXTRACTION Bilateral 09/1995    HEMORRHOID SURGERY  1978    HERNIA REPAIR  1990    INSERT / REPLACE / REMOVE PACEMAKER  05/14/2018    dual lead pacer st [employer]ony model 2272    JOINT REPLACEMENT      b/l hip replacements, right 2006    NEUROPLASTY / TRANSPOSITION MEDIAN NERVE AT CARPAL TUNNEL  1977    PARTIAL HYSTERECTOMY  1971    TOTAL HIP ARTHROPLASTY      TRANSPHENOIDAL / TRANSNASAL HYPOPHYSECTOMY / RESECTION PITUITARY TUMOR  1989    excision of pituitary gland       Family History   Problem Relation Age of Onset    Other Father         coal worker's pneumoconiosis    Heart disease Father     Diabetes Brother     Heart disease Brother     Prostate cancer Brother     Stomach cancer Maternal Grandmother     Diabetes Paternal Aunt      I have reviewed and agree with the history as documented  Social History   Substance Use Topics    Smoking status: Never Smoker    Smokeless tobacco: Never Used    Alcohol use No        Review of Systems   Constitutional: Negative for chills and fever  HENT: Negative for rhinorrhea and sore throat  Eyes: Negative for pain, redness and visual disturbance  Respiratory: Positive for shortness of breath  Negative for cough  Cardiovascular: Positive for palpitations  Negative for chest pain and leg swelling  Gastrointestinal: Positive for abdominal pain and constipation  Negative for diarrhea and vomiting  Endocrine: Negative for polydipsia and polyuria  Genitourinary: Negative for dysuria, frequency, hematuria, vaginal bleeding and vaginal discharge  Musculoskeletal: Negative for back pain and neck pain  Skin: Negative for rash and wound  Allergic/Immunologic: Negative for immunocompromised state  Neurological: Negative for weakness, numbness and headaches  Hematological: Does not bruise/bleed easily  Psychiatric/Behavioral: Negative for hallucinations and suicidal ideas  Physical Exam  Physical Exam   Constitutional: She is oriented to person, place, and time  She appears well-developed and well-nourished  HENT:   Head: Normocephalic and atraumatic  Mouth/Throat: Oropharynx is clear and moist    Eyes: Conjunctivae are normal  Right eye exhibits no discharge  Left eye exhibits no discharge  No scleral icterus  Neck: Normal range of motion  Neck supple  Cardiovascular: Normal rate, regular rhythm and intact distal pulses  Exam reveals no gallop and no friction rub  Murmur heard  Pulmonary/Chest: Effort normal and breath sounds normal  No stridor  No respiratory distress  She has no wheezes  She has no rales  Abdominal: Soft   Bowel sounds are normal  She exhibits no distension  There is no tenderness  There is no rebound and no guarding  Musculoskeletal: Normal range of motion  She exhibits no edema, tenderness or deformity  No CVA tenderness  No calf tenderness  Neurological: She is alert and oriented to person, place, and time  She has normal strength  No sensory deficit  GCS eye subscore is 4  GCS verbal subscore is 5  GCS motor subscore is 6  Skin: Skin is warm and dry  No rash noted  Psychiatric: She has a normal mood and affect  Her behavior is normal    Vitals reviewed        Vital Signs  ED Triage Vitals [12/11/18 1327]   Temperature Pulse Respirations Blood Pressure SpO2   98 °F (36 7 °C) 80 22 141/73 95 %      Temp Source Heart Rate Source Patient Position - Orthostatic VS BP Location FiO2 (%)   Temporal Monitor Lying Left arm --      Pain Score       No Pain           Vitals:    12/11/18 1327 12/11/18 1430 12/11/18 1445 12/11/18 1530   BP: 141/73 126/62  124/67   Pulse: 80 70 68 62   Patient Position - Orthostatic VS: Lying  Lying        Visual Acuity  Visual Acuity      Most Recent Value   L Pupil Size (mm)  3   R Pupil Size (mm)  3          ED Medications  Medications   sodium chloride 0 9 % bolus 1,000 mL (1,000 mL Intravenous New Bag 12/11/18 1403)   insulin regular (HumuLIN R,NovoLIN R) injection 8 Units (8 Units Subcutaneous Given 12/11/18 1457)   aspirin tablet 325 mg (325 mg Oral Given 12/11/18 1457)   iohexol (OMNIPAQUE) 350 MG/ML injection (MULTI-DOSE) 100 mL (100 mL Intravenous Given 12/11/18 1622)       Diagnostic Studies  Results Reviewed     Procedure Component Value Units Date/Time    Troponin I [[id number]]  (Abnormal) Collected:  12/11/18 1342    Lab Status:  Final result Specimen:  Blood from Arm, Right Updated:  12/11/18 1426     Troponin I 0 08 (HH) ng/mL     Comprehensive metabolic panel [[id number]]  (Abnormal) Collected:  12/11/18 1342    Lab Status:  Final result Specimen:  Blood from Arm, Right Updated:  12/11/18 1414     Sodium 135 (L) mmol/L      Potassium 3 9 mmol/L      Chloride 100 mmol/L      CO2 27 mmol/L      ANION GAP 8 mmol/L      BUN 15 mg/dL      Creatinine 1 04 mg/dL      Glucose 479 (H) mg/dL      Calcium 8 0 (L) mg/dL      AST 18 U/L      ALT 22 U/L      Alkaline Phosphatase 59 U/L      Total Protein 5 9 (L) g/dL      Albumin 2 8 (L) g/dL      Total Bilirubin 0 60 mg/dL      eGFR 49 ml/min/1 73sq m     Narrative:         National Kidney Disease Education Program recommendations are as follows:  GFR calculation is accurate only with a steady state creatinine  Chronic Kidney disease less than 60 ml/min/1 73 sq  meters  Kidney failure less than 15 ml/min/1 73 sq  meters  TSH [[id number]]  (Abnormal) Collected:  12/11/18 1342    Lab Status:  Final result Specimen:  Blood from Arm, Right Updated:  12/11/18 1414     TSH 3RD GENERATON 0 027 (L) uIU/mL     Narrative:         Patients undergoing fluorescein dye angiography may retain small amounts of fluorescein in the body for 48-72 hours post procedure  Samples containing fluorescein can produce falsely depressed TSH values  If the patient had this procedure,a specimen should be resubmitted post fluorescein clearance            The recommended reference ranges for TSH during pregnancy are as follows:  First trimester 0 1 to 2 5 uIU/mL  Second trimester  0 2 to 3 0 uIU/mL  Third trimester 0 3 to 3 0 uIU/m      B-type natriuretic peptide [[id number]]  (Abnormal) Collected:  12/11/18 1342    Lab Status:  Final result Specimen:  Blood from Arm, Right Updated:  12/11/18 1414     NT-proBNP 1,011 (H) pg/mL     Phosphorus [[id number]]  (Normal) Collected:  12/11/18 1342    Lab Status:  Final result Specimen:  Blood from Arm, Right Updated:  12/11/18 1414     Phosphorus 3 5 mg/dL     Magnesium [[id number]]  (Abnormal) Collected:  12/11/18 1342    Lab Status:  Final result Specimen:  Blood from Arm, Right Updated:  12/11/18 1414     Magnesium 1 5 (L) mg/dL     D-Dimer [[id number]]  (Abnormal) Collected:  12/11/18 1342    Lab Status:  Final result Specimen:  Blood from Arm, Right Updated:  12/11/18 1407     D-Dimer, Quant 2,427 (H) ng/ml (FEU)     Protime-INR [[id number]]  (Normal) Collected:  12/11/18 1342    Lab Status:  Final result Specimen:  Blood from Arm, Right Updated:  12/11/18 1400     Protime 12 9 seconds      INR 1 02    APTT [[id number]]  (Normal) Collected:  12/11/18 1342    Lab Status:  Final result Specimen:  Blood from Arm, Right Updated:  12/11/18 1400     PTT 26 seconds     CBC and differential [[id number]]  (Abnormal) Collected:  12/11/18 1342    Lab Status:  Final result Specimen:  Blood from Arm, Right Updated:  12/11/18 1349     WBC 7 88 Thousand/uL      RBC 4 16 Million/uL      Hemoglobin 12 6 g/dL      Hematocrit 38 8 %      MCV 93 fL      MCH 30 3 pg      MCHC 32 5 g/dL      RDW 13 2 %      MPV 10 1 fL      Platelets 612 Thousands/uL      nRBC 0 /100 WBCs      Neutrophils Relative 81 (H) %      Immat GRANS % 1 %      Lymphocytes Relative 10 (L) %      Monocytes Relative 6 %      Eosinophils Relative 2 %      Basophils Relative 0 %      Neutrophils Absolute 6 39 Thousands/µL      Immature Grans Absolute 0 04 Thousand/uL      Lymphocytes Absolute 0 78 Thousands/µL      Monocytes Absolute 0 48 Thousand/µL      Eosinophils Absolute 0 17 Thousand/µL      Basophils Absolute 0 02 Thousands/µL     UA w Reflex to Microscopic w Reflex to Culture [[id number]]     Lab Status:  No result Specimen:  Urine     Fingerstick Glucose (POCT) [[id number]]  (Abnormal) Collected:  12/11/18 1328    Lab Status:  Final result Updated:  12/11/18 1330     POC Glucose 457 (H) mg/dl                  CTA ED chest PE study   Final Result by AMINAH Doss MD (12/11 1649)      Negative for pulmonary embolism  Hiatal hernia  Minimal bibasilar dependent atelectatic changes        Workstation performed: [id number]         XR chest 2 views   ED Interpretation by Juan C Barron MD (12/11 8118)   Hiatal hernia  Pacemaker in place  No wire fracture  No infiltrates or congestive heart failure  Final Result by AMINAH Winters MD (12/11 1516)      No acute cardiopulmonary disease  Cardiac pacemaker  Hiatal hernia  Workstation performed: [id number]                    Procedures  ECG 12 Lead Documentation  Date/Time: 12/11/2018 1:48 PM  Performed by: Freddie Baires  Authorized by: Freddie Baires     ECG reviewed by me, the ED Provider: yes    Patient location:  ED  Interpretation:     Interpretation: abnormal    Rate:     ECG rate assessment: normal    Rhythm:     Rhythm: sinus rhythm    Comments:      Left anterior fascicular block  Poor R-wave progression  Nonspecific ST waves  No elevations  Phone Contacts  ED Phone Contact    ED Course         HEART Risk Score      Most Recent Value   History  1 Filed at: 12/11/2018 1711   ECG  1 Filed at: 12/11/2018 1711   Age  2 Filed at: 12/11/2018 1711   Risk Factors  2 Filed at: 12/11/2018 1711   Troponin  1 Filed at: 12/11/2018 1711   Heart Score Risk Calculator   History  1 Filed at: 12/11/2018 1711   ECG  1 Filed at: 12/11/2018 1711   Age  2 Filed at: 12/11/2018 1711   Risk Factors  2 Filed at: 12/11/2018 1711   Troponin  1 Filed at: 12/11/2018 1711   HEART Score  7 Filed at: 12/11/2018 1711   HEART Score  7 Filed at: 12/11/2018 1711                            MDM  Number of Diagnoses or Management Options  Diagnosis management comments: Patient was found to be hyperglycemic  Not in DKA  IV fluids were ordered  Insulin was ordered  She did have a mildly elevated troponin  I suspect she did have a tachy dysrhythmia earlier  Chest x-ray was negative for infiltrates or pulmonary edema  D-dimer was elevated  CTA of the chest for pulmonary embolism was negative for pulmonary embolism  Her dyspnea was likely related to a tachy dysrhythmia, possible NSTEMI  Consulted with hospitalist for admission         Amount and/or Complexity of Data Reviewed  Clinical lab tests: ordered and reviewed  Tests in the radiology section of CPT®: ordered and reviewed  Discuss the patient with other providers: yes  Independent visualization of images, tracings, or specimens: yes      CritCare Time    Disposition  Final diagnoses:   Tachyarrhythmia   Dyspnea     Time reflects when diagnosis was documented in both MDM as applicable and the Disposition within this note     Time User Action Codes Description Comment    12/11/2018  5:09 PM Clista Ezekiel Add [R00 0] [facility]     12/11/2018  5:09 PM Clista Dallas Add [R06 00] Dyspnea       ED Disposition     ED Disposition Condition Comment    Admit        Follow-up Information    None         Patient's Medications   Discharge Prescriptions    No medications on file     No discharge procedures on file      ED Provider  Electronically Signed by           Deangelo Hciks MD  12/11/18 9997

## 2018-12-11 NOTE — ASSESSMENT & PLAN NOTE
Lab Results   Component Value Date    HGBA1C 10 5 (H) 11/27/2018       Recent Labs      12/11/18   1328  12/11/18   1827   POCGLU  457*  274*       Blood Sugar Average: Last 72 hrs:  (P) 365 5     Blood sugars are not controlled, hemoglobin A1c is markedly elevated, start Lantus therapy 15 units at bedtime monitor Accu-Cheks    Patient needs diabetic education

## 2018-12-11 NOTE — H&P
H&P- German Pérez 1933, 80 y o  female MRN: [id number]    Unit/Bed#: 427-01 Encounter: [id number]    Primary Care Provider: Zunilda Alonso DO   Date and time admitted to hospital: 12/11/2018  1:23 PM        * Rapid heart rate   Assessment & Plan    The patient presented with reports of tachyarrhythmia at home  Patient was symptomatic, mild troponin elevation, will interrogate pacemaker, trend cardiac enzymes and obtain Cardiology consultation  NSTEMI (non-ST elevated myocardial infarction) [facility])   Assessment & Plan    Trend cardiac enzymes, medical management  Chronic diastolic congestive heart failure (HCC)   Assessment & Plan    SUMMARY:  Limited study  LEFT VENTRICLE:  Systolic function was normal  Ejection fraction was estimated in the range of 60 % to 65 %  Although no diagnostic regional wall motion abnormality was identified, this possibility cannot be completely excluded on the basis of this study  Wall thickness was mildly increased  AORTIC VALVE:  The valve was probably trileaflet  Leaflets exhibited markedly increased thickness and markedly reduced cuspal separation  There was severe stenosis  There was trace regurgitation  Valve mean gradient was 47 mmHg  Aortic valve obstructive index (by Vmax) was 0 25  Estimated aortic valve area (by Vmax) was 0 64 cm squared  TRICUSPID VALVE:  There was mild regurgitation  Hypomagnesemia   Assessment & Plan    2 g Mag rider ordered for tonight, follow up a m  Labs     Hyponatremia   Assessment & Plan    Suspected component of mild volume depletion,/hypovolemic hyponatremia, patient received 1 L of normal saline in the emergency room, hold further fluid, hold diuretic     CAD (coronary artery disease)   Assessment & Plan    Medical management       Aortic valve stenosis   Assessment & Plan    Patient has severe aortic valve stenosis, is not clear to me in talking to the patient if she has been evaluated for possible for TAVR     Diabetes 1 5, managed as type 2 [facility])   Assessment & Plan    Lab Results   Component Value Date    HGBA1C 10 5 (H) 11/27/2018       Recent Labs      12/11/18   1328  12/11/18   1827   POCGLU  457*  274*       Blood Sugar Average: Last 72 hrs:  (P) 365 5     Blood sugars are not controlled, hemoglobin A1c is markedly elevated, start Lantus therapy 15 units at bedtime monitor Accu-Cheks    Patient needs diabetic education  VTE Prophylaxis: Enoxaparin (Lovenox)  / sequential compression device   Code Status:  Full code  POLST: There is no POLST form on file for this patient (pre-hospital)    Anticipated Length of Stay:  Patient will be admitted on an Observation basis with an anticipated length of stay of  less than 2 midnights  Justification for Hospital Stay:  Monitor cardiac enzymes, monitor heart rate, interrogate pacemaker and obtain cardiology consultation  Chief Complaint:   Rapid heart rate    History of Present Illness:    Jacinta Hernandez is a 80 y o  female who presents with rapid heart rate, patient tells me that she did not feel right checked her heart rate at home and it was approximately 145 beats per minute  Patient had a "funny feeling" in her chest   Patient's blood sugars were markedly elevated today  Patient reports a large amount of carbohydrate intake  No fever no chills, no nausea vomiting or diarrhea  Review of Systems:    Review of Systems   Constitutional: Negative for appetite change, chills, diaphoresis, fatigue and fever  HENT: Negative  Eyes: Negative  Respiratory: Negative  Gastrointestinal: Negative  Endocrine: Negative  Genitourinary: Negative  Musculoskeletal: Negative  Neurological: Negative for dizziness, syncope, weakness and light-headedness  Hematological: Negative  Psychiatric/Behavioral: Negative  All other systems reviewed and are negative        Past Medical and Surgical History:     Past Medical History: Diagnosis Date    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[facility] )     Bradycardia     Coronary artery disease     Depression     Depression     Diabetes mellitus ([place] [address] )     Type 2 DM    Disease of thyroid gland     Essential (primary) hypertension     Facial droop     started 10 years ago    GERD (gastroesophageal reflux disease)     Heart murmur     History of echocardiogram 08/28/2017    2-D w/CFD:  EF 0 60 (60%), LVSF is normal  No regional wall abnormalities  Mild mitral valve regurg  AV was trileaflet  Moderate tricuspid regurg  Trace pulmonic valve  No pericardial effusion   History of echocardiogram 03/28/2018    2-D w/CFD:  EF 0 60 (60%), Mild regurg in the MV  AV was trileaflet, severe stenosis, trace regurg  Mild regurg in the TV  No pericardial effusion  Aortic root exhibited normal size   History of echocardiogram 01/29/2016    2-D:  EF 0 55 (55%), Normal LVSF  Mild concentric LVH  Mild MR  Mild AS with mild regurg      History of Holter monitoring     2/24/16, 9/27/17, 2/6/18    History of nuclear stress test 08/16/2017    Lexiscan MPI:  EF 0 76 (76%), no prior MI or ischemia,    Hyperlipidemia     Hypothyroidism     Nonrheumatic aortic (valve) stenosis     Obstructive sleep apnea (adult) (pediatric)     10/17/17 - NPSG with nasal CPAP/Bi-level titration     Osteoarthritis     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HCC)     SSS (sick sinus syndrome) ([facility])     SVT (supraventricular tachycardia) ([place] [address] )     Vitamin D deficiency        Past Surgical History:   Procedure Laterality Date    [address]KLE [address] Left 2005   [address]    CATARACT EXTRACTION Bilateral 09/1995    HEMORRHOID SURGERY  1978    HERNIA REPAIR  1990    INSERT / Merlin Fitch / REMOVE PACEMAKER  05/14/2018    dual lead pacer st yony model 2272    JOINT REPLACEMENT      b/l hip replacements, right 2006    NEUROPLASTY / TRANSPOSITION MEDIAN NERVE AT CARPAL TUNNEL  1977    PARTIAL HYSTERECTOMY  1971    TOTAL HIP ARTHROPLASTY      TRANSPHENOIDAL / TRANSNASAL HYPOPHYSECTOMY / RESECTION PITUITARY TUMOR  1989    excision of pituitary gland       Meds/Allergies:    Prior to Admission medications    Medication Sig Start Date End Date Taking?  Authorizing Provider   aspirin 81 MG tablet Take 1 tablet by mouth daily   Yes Historical Provider, MD   ergocalciferol (VITAMIN D2) 50,000 units Take 1 capsule (50,000 Units total) by mouth once a week for 90 days 11/8/18 2/6/19 Yes Julio César Coulter DO   levothyroxine 75 mcg tablet Take 1 tablet (75 mcg total) by mouth daily in the early morning 11/8/18  Yes Julio César Coulter DO   metFORMIN (FORTAMET) 500 MG (OSM) 24 hr tablet Take 1 tablet (500 mg total) by mouth 2 (two) times a day with meals for 90 days 11/19/18 2/17/19 Yes Julio César Coulter DO   pravastatin (PRAVACHOL) 20 mg tablet Take 1 tablet (20 mg total) by mouth daily 11/8/18  Yes Julio César Coulter DO   predniSONE 5 mg tablet Take 1 tablet (5 mg total) by mouth daily 11/8/18  Yes Julio César Coulter DO   repaglinide (PRANDIN) 2 mg tablet Take 2 tablets (4 mg total) by mouth 3 (three) times a day before meals for 90 days 11/8/18 2/6/19 Yes Julio César Coulter DO   sitaGLIPtin (JANUVIA) 50 mg tablet Take 1 tablet (50 mg total) by mouth daily 11/8/18  Yes Julio César Coulter DO   ferrous sulfate 325 (65 Fe) mg tablet Take 325 mg by mouth daily with breakfast    Historical Provider, MD   hydrochlorothiazide (MICROZIDE) 12 5 mg capsule Take 12 5 mg by mouth daily    Historical Provider, MD   metoprolol tartrate (LOPRESSOR) 25 mg tablet Take 1 tablet (25 mg total) by mouth every 12 (twelve) hours for 90 days 7/20/18 11/8/18  Julio César Coulter DO   oxybutynin (DITROPAN-XL) 10 MG 24 hr tablet TAKE ONE TABLET BY MOUTH TWICE A DAY 9/8/18   Julio César Coulter DO   pantoprazole (PROTONIX) 40 mg tablet Take 1 tablet (40 mg total) by mouth daily 11/8/18   Marlyce Maryiln, DO   polyethylene glycol (MIRALAX) powder Take by mouth as needed      Historical Provider, MD b complex vitamins tablet Take 1 tablet by mouth daily  12/11/18  Historical Provider, MD   cyanocobalamin ([employer] VITAMIN B-12) 1000 MCG tablet Take 1,000 mcg by mouth  12/11/18  Historical Provider, MD   docusate sodium (COLACE) 100 mg capsule Take 1 capsule by mouth 2 (two) times a day  Patient not taking: Reported on 11/8/2018 8/21/17 12/11/18  Angela Israel DO   Multiple Vitamins-Minerals (MULTIVITAMIN WITH MINERALS) tablet Take by mouth daily  12/11/18  Historical Provider, MD   terbinafine (LamISIL) 250 mg tablet Take 1 tablet (250 mg total) by mouth daily for 30 days 12/10/18 12/11/18  Darcie Christopher DO     I have reviewed home medications with patient personally  Allergies: Allergies   Allergen Reactions    Cephalosporins GI Intolerance    Acetaminophen     Cephalexin GI Intolerance    Clindamycin      Other reaction(s): Unspecified    Demeclocycline     Hydrocodone      Bitartrate  Other reaction(s): Unspecified    Hydrocodone-Acetaminophen Nausea Only    Oxycodone-Acetaminophen      HCL    Oxycodone-Acetaminophen     Penicillins Hives     Other reaction(s):  Other (See Comments)  ITCHING/RASH    Simvastatin      Other reaction(s): GI upset    Sulfamethoxazole-Trimethoprim     Tetrabenazine     Tetracycline Nausea Only    Tetracyclines & Related     Vicodin [Hydrocodone-Acetaminophen]        Social History:     Marital Status:      Substance Use History:   History   Alcohol Use No     History   Smoking Status    Never Smoker   Smokeless Tobacco    Never Used     History   Drug Use No       Family History:    non-contributory    Physical Exam:     Vitals:   Blood Pressure: 118/80 (12/11/18 1805)  Pulse: 61 (12/11/18 1802)  Temperature: 97 8 °F (36 6 °C) (12/11/18 1802)  Temp Source: Temporal (12/11/18 1802)  Respirations: 20 (12/11/18 1802)  Height: 5' 2" (157 5 cm) (12/11/18 1802)  Weight - Scale: 61 7 kg (136 lb 0 4 oz) (12/11/18 1802)  SpO2: 99 % (12/11/18 46)    Physical Exam   Constitutional: She is oriented to person, place, and time  She appears well-developed and well-nourished  No distress  Cardiovascular: Normal rate and regular rhythm  Murmur heard  Pulmonary/Chest: Effort normal and breath sounds normal  No respiratory distress  She has no wheezes  Musculoskeletal: Normal range of motion  She exhibits edema  She exhibits no tenderness or deformity  Neurological: She is alert and oriented to person, place, and time  No cranial nerve deficit  Coordination normal    Skin: She is not diaphoretic  Psychiatric: She has a normal mood and affect  Her behavior is normal  Judgment and thought content normal    Nursing note and vitals reviewed  Additional Data:     Lab Results: I have personally reviewed pertinent reports  Results from last 7 days  Lab Units 12/11/18  1342   WBC Thousand/uL 7 88   HEMOGLOBIN g/dL 12 6   HEMATOCRIT % 38 8   PLATELETS Thousands/uL 208   NEUTROS PCT % 81*   LYMPHS PCT % 10*   MONOS PCT % 6   EOS PCT % 2       Results from last 7 days  Lab Units 12/11/18  1342   POTASSIUM mmol/L 3 9   CHLORIDE mmol/L 100   CO2 mmol/L 27   BUN mg/dL 15   CREATININE mg/dL 1 04   CALCIUM mg/dL 8 0*   ALK PHOS U/L 59   ALT U/L 22   AST U/L 18       Results from last 7 days  Lab Units 12/11/18  1342   INR  1 02       Imaging: I have personally reviewed pertinent reports  Xr Chest 2 Views    Result Date: 12/11/2018  Narrative: CHEST INDICATION:   sob  COMPARISON:  May 17, 2018 EXAM PERFORMED/VIEWS:  XR CHEST PA & LATERAL Images: 2 FINDINGS: Cardiac silhouette unchanged in contour  Dual lead cardiac pacemaker overlies left chest wall, unchanged  Moderate to large retrocardiac hiatal hernia is present  The lungs are clear  No pneumothorax or pleural effusion  Osseous structures appear within normal limits for patient age  Impression: No acute cardiopulmonary disease  Cardiac pacemaker  Hiatal hernia   Workstation performed: [id number]     Cta Ed Chest Pe Study    Result Date: 12/11/2018  Narrative: CTA - CHEST WITH IV CONTRAST - PULMONARY ANGIOGRAM INDICATION:   sob  May 11, 2018 COMPARISON: None  TECHNIQUE: CTA examination of the chest was performed using angiographic technique according to a protocol specifically tailored to evaluate for pulmonary embolism  Axial, sagittal, and coronal 2D reformatted images were created from the source data and  submitted for interpretation  In addition, coronal 3D MIP postprocessing was performed on the acquisition scanner  Radiation dose length product (DLP) for this visit:  269 33 mGy-cm   This examination, like all CT scans performed in the [facility], was performed utilizing techniques to minimize radiation dose exposure, including the use of iterative  reconstruction and automated exposure control  IV Contrast:  100 mL of iohexol (OMNIPAQUE)  FINDINGS: PULMONARY ARTERIAL TREE:  No pulmonary embolus is seen  LUNGS:  Minimal densities are noted in the dependent portions of the basilar lower lobes  PLEURA:  Unremarkable  HEART/GREAT VESSELS:  Unremarkable for patient's age  MEDIASTINUM AND JO-ANN:  Moderate to large sized hiatal hernia present  CHEST WALL AND LOWER NECK:   1 9 cm calcified nodule in the lower pole the left thyroid lobe, unchanged from prior studies  This was evaluated with ultrasound and 2014  Dual lead cardiac pacemaker overlies left anterior chest wall  VISUALIZED STRUCTURES IN THE UPPER ABDOMEN:  Unremarkable  OSSEOUS STRUCTURES:  No acute fracture or destructive osseous lesion  Impression: Negative for pulmonary embolism  Hiatal hernia  Minimal bibasilar dependent atelectatic changes   Workstation performed: [id number]

## 2018-12-12 ENCOUNTER — APPOINTMENT (OUTPATIENT)
Dept: NON INVASIVE DIAGNOSTICS | Facility: HOSPITAL | Age: 83
DRG: 281 | End: 2018-12-12
Payer: COMMERCIAL

## 2018-12-12 ENCOUNTER — APPOINTMENT (OUTPATIENT)
Dept: CT IMAGING | Facility: HOSPITAL | Age: 83
DRG: 281 | End: 2018-12-12
Payer: COMMERCIAL

## 2018-12-12 PROBLEM — E16.0 HYPOGLYCEMIA DUE TO INSULIN: Status: ACTIVE | Noted: 2018-12-12

## 2018-12-12 PROBLEM — T38.3X5A HYPOGLYCEMIA DUE TO INSULIN: Status: ACTIVE | Noted: 2018-12-12

## 2018-12-12 PROBLEM — E87.1 HYPONATREMIA: Status: RESOLVED | Noted: 2018-12-11 | Resolved: 2018-12-12

## 2018-12-12 LAB
ALBUMIN SERPL BCP-MCNC: 2.6 G/DL (ref 3.5–5)
ALP SERPL-CCNC: 50 U/L (ref 46–116)
ALT SERPL W P-5'-P-CCNC: 21 U/L (ref 12–78)
ANION GAP SERPL CALCULATED.3IONS-SCNC: 8 MMOL/L (ref 4–13)
AST SERPL W P-5'-P-CCNC: 18 U/L (ref 5–45)
BASOPHILS # BLD AUTO: 0.02 THOUSANDS/ΜL (ref 0–0.1)
BASOPHILS NFR BLD AUTO: 0 % (ref 0–1)
BILIRUB SERPL-MCNC: 0.4 MG/DL (ref 0.2–1)
BUN SERPL-MCNC: 13 MG/DL (ref 5–25)
CALCIUM SERPL-MCNC: 7.9 MG/DL (ref 8.3–10.1)
CHLORIDE SERPL-SCNC: 102 MMOL/L (ref 100–108)
CHOLEST SERPL-MCNC: 169 MG/DL (ref 50–200)
CO2 SERPL-SCNC: 29 MMOL/L (ref 21–32)
CREAT SERPL-MCNC: 0.72 MG/DL (ref 0.6–1.3)
EOSINOPHIL # BLD AUTO: 0.24 THOUSAND/ΜL (ref 0–0.61)
EOSINOPHIL NFR BLD AUTO: 3 % (ref 0–6)
ERYTHROCYTE [DISTWIDTH] IN BLOOD BY AUTOMATED COUNT: 13.2 % (ref 11.6–15.1)
EST. AVERAGE GLUCOSE BLD GHB EST-MCNC: 269 MG/DL
GFR SERPL CREATININE-BSD FRML MDRD: 77 ML/MIN/1.73SQ M
GLUCOSE SERPL-MCNC: 124 MG/DL (ref 65–140)
GLUCOSE SERPL-MCNC: 172 MG/DL (ref 65–140)
GLUCOSE SERPL-MCNC: 196 MG/DL (ref 65–140)
GLUCOSE SERPL-MCNC: 205 MG/DL (ref 65–140)
GLUCOSE SERPL-MCNC: 267 MG/DL (ref 65–140)
GLUCOSE SERPL-MCNC: 292 MG/DL (ref 65–140)
GLUCOSE SERPL-MCNC: 307 MG/DL (ref 65–140)
GLUCOSE SERPL-MCNC: 315 MG/DL (ref 65–140)
GLUCOSE SERPL-MCNC: 51 MG/DL (ref 65–140)
GLUCOSE SERPL-MCNC: 87 MG/DL (ref 65–140)
GLUCOSE SERPL-MCNC: <20 MG/DL (ref 65–140)
HBA1C MFR BLD: 11 % (ref 4.2–6.3)
HCT VFR BLD AUTO: 36.3 % (ref 34.8–46.1)
HDLC SERPL-MCNC: 57 MG/DL (ref 40–60)
HGB BLD-MCNC: 12.1 G/DL (ref 11.5–15.4)
IMM GRANULOCYTES # BLD AUTO: 0.06 THOUSAND/UL (ref 0–0.2)
IMM GRANULOCYTES NFR BLD AUTO: 1 % (ref 0–2)
LDLC SERPL CALC-MCNC: 92 MG/DL (ref 0–100)
LYMPHOCYTES # BLD AUTO: 2.96 THOUSANDS/ΜL (ref 0.6–4.47)
LYMPHOCYTES NFR BLD AUTO: 31 % (ref 14–44)
MAGNESIUM SERPL-MCNC: 1.8 MG/DL (ref 1.6–2.6)
MCH RBC QN AUTO: 30.6 PG (ref 26.8–34.3)
MCHC RBC AUTO-ENTMCNC: 33.3 G/DL (ref 31.4–37.4)
MCV RBC AUTO: 92 FL (ref 82–98)
MONOCYTES # BLD AUTO: 0.98 THOUSAND/ΜL (ref 0.17–1.22)
MONOCYTES NFR BLD AUTO: 10 % (ref 4–12)
NEUTROPHILS # BLD AUTO: 5.35 THOUSANDS/ΜL (ref 1.85–7.62)
NEUTS SEG NFR BLD AUTO: 55 % (ref 43–75)
NONHDLC SERPL-MCNC: 112 MG/DL
NRBC BLD AUTO-RTO: 0 /100 WBCS
PHOSPHATE SERPL-MCNC: 2.9 MG/DL (ref 2.3–4.1)
PLATELET # BLD AUTO: 218 THOUSANDS/UL (ref 149–390)
PMV BLD AUTO: 10 FL (ref 8.9–12.7)
POTASSIUM SERPL-SCNC: 3.1 MMOL/L (ref 3.5–5.3)
PROT SERPL-MCNC: 5.4 G/DL (ref 6.4–8.2)
RBC # BLD AUTO: 3.95 MILLION/UL (ref 3.81–5.12)
SODIUM SERPL-SCNC: 139 MMOL/L (ref 136–145)
TRIGL SERPL-MCNC: 101 MG/DL
TROPONIN I SERPL-MCNC: 0.08 NG/ML
WBC # BLD AUTO: 9.61 THOUSAND/UL (ref 4.31–10.16)

## 2018-12-12 PROCEDURE — 83735 ASSAY OF MAGNESIUM: CPT | Performed by: INTERNAL MEDICINE

## 2018-12-12 PROCEDURE — 70450 CT HEAD/BRAIN W/O DYE: CPT

## 2018-12-12 PROCEDURE — 97163 PT EVAL HIGH COMPLEX 45 MIN: CPT | Performed by: PHYSICAL THERAPIST

## 2018-12-12 PROCEDURE — G8979 MOBILITY GOAL STATUS: HCPCS | Performed by: PHYSICAL THERAPIST

## 2018-12-12 PROCEDURE — 82948 REAGENT STRIP/BLOOD GLUCOSE: CPT

## 2018-12-12 PROCEDURE — 99222 1ST HOSP IP/OBS MODERATE 55: CPT | Performed by: INTERNAL MEDICINE

## 2018-12-12 PROCEDURE — 84100 ASSAY OF PHOSPHORUS: CPT | Performed by: INTERNAL MEDICINE

## 2018-12-12 PROCEDURE — 97116 GAIT TRAINING THERAPY: CPT | Performed by: PHYSICAL THERAPIST

## 2018-12-12 PROCEDURE — 80061 LIPID PANEL: CPT | Performed by: INTERNAL MEDICINE

## 2018-12-12 PROCEDURE — 93306 TTE W/DOPPLER COMPLETE: CPT | Performed by: INTERNAL MEDICINE

## 2018-12-12 PROCEDURE — 99233 SBSQ HOSP IP/OBS HIGH 50: CPT | Performed by: INTERNAL MEDICINE

## 2018-12-12 PROCEDURE — 80053 COMPREHEN METABOLIC PANEL: CPT | Performed by: INTERNAL MEDICINE

## 2018-12-12 PROCEDURE — G8978 MOBILITY CURRENT STATUS: HCPCS | Performed by: PHYSICAL THERAPIST

## 2018-12-12 PROCEDURE — 93306 TTE W/DOPPLER COMPLETE: CPT

## 2018-12-12 PROCEDURE — 84484 ASSAY OF TROPONIN QUANT: CPT | Performed by: INTERNAL MEDICINE

## 2018-12-12 PROCEDURE — 85025 COMPLETE CBC W/AUTO DIFF WBC: CPT | Performed by: INTERNAL MEDICINE

## 2018-12-12 RX ORDER — POTASSIUM CHLORIDE 20 MEQ/1
20 TABLET, EXTENDED RELEASE ORAL ONCE
Status: COMPLETED | OUTPATIENT
Start: 2018-12-12 | End: 2018-12-12

## 2018-12-12 RX ORDER — REPAGLINIDE 1 MG/1
4 TABLET ORAL
Status: DISCONTINUED | OUTPATIENT
Start: 2018-12-12 | End: 2018-12-13 | Stop reason: HOSPADM

## 2018-12-12 RX ORDER — DEXTROSE MONOHYDRATE 25 G/50ML
INJECTION, SOLUTION INTRAVENOUS
Status: DISPENSED
Start: 2018-12-12 | End: 2018-12-12

## 2018-12-12 RX ORDER — METFORMIN HYDROCHLORIDE 500 MG/1
500 TABLET, EXTENDED RELEASE ORAL 2 TIMES DAILY WITH MEALS
Status: DISCONTINUED | OUTPATIENT
Start: 2018-12-12 | End: 2018-12-13 | Stop reason: HOSPADM

## 2018-12-12 RX ORDER — DEXTROSE MONOHYDRATE 25 G/50ML
25 INJECTION, SOLUTION INTRAVENOUS ONCE
Status: DISCONTINUED | OUTPATIENT
Start: 2018-12-12 | End: 2018-12-12

## 2018-12-12 RX ADMIN — OXYBUTYNIN CHLORIDE 10 MG: 5 TABLET, EXTENDED RELEASE ORAL at 22:02

## 2018-12-12 RX ADMIN — LEVOTHYROXINE SODIUM 75 MCG: 0.07 TABLET ORAL at 09:28

## 2018-12-12 RX ADMIN — REPAGLINIDE 4 MG: 1 TABLET ORAL at 16:30

## 2018-12-12 RX ADMIN — PRAVASTATIN SODIUM 20 MG: 20 TABLET ORAL at 20:21

## 2018-12-12 RX ADMIN — PREDNISONE 5 MG: 5 TABLET ORAL at 09:29

## 2018-12-12 RX ADMIN — POTASSIUM CHLORIDE 20 MEQ: 1500 TABLET, EXTENDED RELEASE ORAL at 18:30

## 2018-12-12 RX ADMIN — ENOXAPARIN SODIUM 30 MG: 30 INJECTION SUBCUTANEOUS at 09:30

## 2018-12-12 RX ADMIN — FERROUS SULFATE TAB 325 MG (65 MG ELEMENTAL FE) 325 MG: 325 (65 FE) TAB at 09:27

## 2018-12-12 RX ADMIN — METOPROLOL TARTRATE 25 MG: 25 TABLET, FILM COATED ORAL at 22:00

## 2018-12-12 RX ADMIN — METFORMIN HYDROCHLORIDE 500 MG: 500 TABLET, EXTENDED RELEASE ORAL at 17:30

## 2018-12-12 RX ADMIN — Medication 1 EACH: at 09:37

## 2018-12-12 RX ADMIN — PANTOPRAZOLE SODIUM 40 MG: 40 TABLET, DELAYED RELEASE ORAL at 09:27

## 2018-12-12 RX ADMIN — ASPIRIN 81 MG 81 MG: 81 TABLET ORAL at 09:35

## 2018-12-12 RX ADMIN — SITAGLIPTIN 50 MG: 25 TABLET, FILM COATED ORAL at 09:29

## 2018-12-12 RX ADMIN — METOPROLOL TARTRATE 25 MG: 25 TABLET, FILM COATED ORAL at 09:29

## 2018-12-12 NOTE — PLAN OF CARE

## 2018-12-12 NOTE — NURSING NOTE
When attempting to give AM meds as ordered, pt's son refused stating that his mom is too tired and that she needs to eat a meal first   Explained to patient and son that levothyroxine and protonix are to be taken on an empty stomach  Pt's son continued to refuse AM meds  Pt's son also refused to have me do repeat AM bloodwork  Cameron notified

## 2018-12-12 NOTE — ASSESSMENT & PLAN NOTE
Lab Results   Component Value Date    HGBA1C 11 0 (H) 12/11/2018       Recent Labs      12/12/18   0744  12/12/18   0823  12/12/18   0920  12/12/18   1242   POCGLU  51*  87  196*  205*       Blood Sugar Average: Last 72 hrs:  (P) 202     Blood sugars are not controlled, hemoglobin A1c is markedly elevated, patient became hypoglycemic with Lantus 15 units at bedtime  Diabetic education, follow-up with endocrinology, given her significant hypoglycemia would not titrate any of her medications

## 2018-12-12 NOTE — OCCUPATIONAL THERAPY NOTE
Occupational Therapy         Patient Name: Raquel CARRILLO Date: 12/12/2018      Order received and chart review performed; attempted to evaluate pt this date, son at bedside stating "if you think you are going to run her around these halls, you can just leave now"; pt reports "I am tired honey"; pt with rapid response this AM; will evaluate at later time for OT evaluation

## 2018-12-12 NOTE — PROGRESS NOTES
Progress Note - Rapid Response   Rosita Emanuel 80 y o  female MRN: [id number]    Time Called ( Time): 4196  Date Called: 12/12/18  Level of Care: MS  Room#: 225  [id number] Time ( Time): 2745  Event End Time ( Time): 5862  Primary reason for call: Acute change in mental status  Interventions:  Airway/Breathing:  No Intervention  Circulation: N/A  Other Treatments: N/A       Assessment:   1  LOC non responsive      Plan:   · Blood glucose 22  · This given an amp of D 50  · CT head  · Stroke pathway initiated  · Seizure precautions   · Transfer to step-down  · Frequent neurological checks         HPI/Chief Complaint (Background/Situation):   Rosita Emanuel is a 80y o  year old female patient was admitted to the hospital for rapid heart rate and non STEMI, congestive heart failure, hypo magnesium and hyponatremia became unresponsive  RT was called patient's blood glucose was found to be 22 , 1 amp of dextrose was given  Patient is still unresponsive pupils were sluggish  Patient was javier limbs, not following commands was withdrawing to pain GCS of 10  CT head stat  Patient has started become arousable in radiology  Transferred to the ICU patient is following commands and knows her name  Still unclear of time of place  Stroke pathway initiated seizure precautions in place neurology consult  Impression Seizures status post low lied glucose versus TIA/ CVA    Neurology will be consulted    Historical Information   Past Medical History:   Diagnosis Date    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[address] )     Bradycardia     Coronary artery disease     Depression     Depression     Diabetes mellitus (HCC)     Type 2 DM    Disease of thyroid gland     Essential (primary) hypertension     Facial droop     started 10 years ago    GERD (gastroesophageal reflux disease)     Heart murmur     History of echocardiogram 08/28/2017    2-D w/CFD:  EF 0 60 (60%), LVSF is normal  No regional wall abnormalities  Mild mitral valve regurg  AV was trileaflet  Moderate tricuspid regurg  Trace pulmonic valve  No pericardial effusion   History of echocardiogram 03/28/2018    2-D w/CFD:  EF 0 60 (60%), Mild regurg in the MV  AV was trileaflet, severe stenosis, trace regurg  Mild regurg in the TV  No pericardial effusion  Aortic root exhibited normal size   History of echocardiogram 01/29/2016    2-D:  EF 0 55 (55%), Normal LVSF  Mild concentric LVH  Mild MR  Mild AS with mild regurg      History of Holter monitoring     2/24/16, 9/27/17, 2/6/18    History of nuclear stress test 08/16/2017    Lexiscan MPI:  EF 0 76 (76%), no prior MI or ischemia,    Hyperlipidemia     Hypothyroidism     Nonrheumatic aortic (valve) stenosis     Obstructive sleep apnea (adult) (pediatric)     10/17/17 - NPSG with nasal CPAP/Bi-level titration     Osteoarthritis     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HCC)     SSS (sick sinus syndrome) (HCC)     SVT (supraventricular tachycardia) (HCC)     Vitamin D deficiency      Past Surgical History:   Procedure Laterality Date    ANKLE SURGERY Left 2005    APPENDECTOMY  1955    CATARACT EXTRACTION Bilateral 09/1995    HEMORRHOID SURGERY  1978    HERNIA REPAIR  1990    INSERT / REPLACE / REMOVE PACEMAKER  05/14/2018    dual lead pacer st yony model 2272    JOINT REPLACEMENT      b/l hip replacements, right 2006    NEUROPLASTY / TRANSPOSITION MEDIAN NERVE AT CARPAL TUNNEL  1977    PARTIAL HYSTERECTOMY  1971    TOTAL HIP ARTHROPLASTY      TRANSPHENOIDAL / TRANSNASAL HYPOPHYSECTOMY / RESECTION PITUITARY TUMOR  1989    excision of pituitary gland     Social History   History   Alcohol Use No     History   Drug Use No     History   Smoking Status    Never Smoker   Smokeless Tobacco    Never Used     Family History:   Family History   Problem Relation Age of Onset    Other Father         coal worker's pneumoconiosis    Heart disease Father     Diabetes Brother     Heart disease Brother  Prostate cancer Brother     Stomach cancer Maternal Grandmother     Diabetes Paternal Aunt        Meds/Allergies     Current Facility-Administered Medications:  [MAR Hold] aspirin 81 mg [facility]] B Complex 1 capsule Oral Daily [facility], [facility] Hold] enoxaparin 30 mg Subcutaneous Daily [facility], [facility] Hold] ferrous sulfate 325 mg Oral Daily With Breakfast [facility], [facility] Hold] insulin detemir 15 Units Subcutaneous HS [facility], [facility] Hold] levothyroxine 75 mcg Oral Early Morning [facility], [facility] Hold] metFORMIN 500 mg Oral BID With Meals [facility], [facility] Hold] metoprolol tartrate 25 mg Oral Q12H [address] , DO   [facility] Hold] oxybutynin 10 mg Oral HS [facility], [facility] Hold] pantoprazole 40 mg Oral Daily Before Breakfast [facility], [facility] Hold] pravastatin 20 mg Oral QPM [facility] Hold] predniSONE 5 mg Oral Daily [facility], [facility] Hold] repaglinide 4 mg Oral TID AC [facility], [facility] Hold] sitaGLIPtin 50 mg Oral Daily [facility],             Allergies   Allergen Reactions    Cephalosporins GI Intolerance    Acetaminophen     Cephalexin GI Intolerance    Clindamycin      Other reaction(s): Unspecified    Demeclocycline     Hydrocodone      Bitartrate  Other reaction(s): Unspecified    Hydrocodone-Acetaminophen Nausea Only    Oxycodone-Acetaminophen      HCL    Oxycodone-Acetaminophen     Penicillins Hives     Other reaction(s):  Other (See Comments)  ITCHING/RASH    Simvastatin      Other reaction(s): GI upset    Sulfamethoxazole-Trimethoprim     Tetrabenazine     Tetracycline Nausea Only    Tetracyclines & Related     Vicodin [Hydrocodone-Acetaminophen]        ROS: General ROS: negative  negative for - chills, fatigue or fever  Psychological ROS: positive for - disorientation and loss of consciousness unresponsive  Respiratory ROS: no cough, shortness of breath, or wheezing  Cardiovascular ROS: no chest pain or dyspnea on exertion  Gastrointestinal ROS: no abdominal pain, change in bowel habits, or black or bloody stools  Neurological ROS: positive for - GCS of 10, withdrawal to pain, nonverbal,    Vitals: see nursing flow sheet    Physical Exam:  Gen:  Unresponsive,  HEENT:  Pupils 3 and sluggish  Ext:  Contracted extremities  Neuro:  Decreased level of consciousness, not following commands, pupils 3-4 sluggish  Skin:  Warm and dry      Intake/Output Summary (Last 24 hours) at 12/12/18 0616  Last data filed at 12/12/18 0234   Gross per 24 hour   Intake             1180 ml   Output             1350 ml   Net             -170 ml       Respiratory    Lab Data (Last 4 hours)    None         O2/Vent Data (Last 4 hours)    None              Invasive Devices     Peripheral Intravenous Line            Peripheral IV 12/11/18 Right Antecubital less than 1 day    Peripheral IV 12/12/18 Left Antecubital less than 1 day                DIAGNOSTIC DATA:    Lab: I have personally reviewed pertinent lab results  CBC:     Results from last 7 days  Lab Units 12/12/18  0520   WBC Thousand/uL 9 61   HEMOGLOBIN g/dL 12 1   HEMATOCRIT % 36 3   PLATELETS Thousands/uL 218     CMP:     Results from last 7 days  Lab Units 12/12/18  0520 12/11/18  1342   POTASSIUM mmol/L 3 1* 3 9   CHLORIDE mmol/L 102 100   CO2 mmol/L 29 27   BUN mg/dL 13 15   CREATININE mg/dL 0 72 1 04   CALCIUM mg/dL 7 9* 8 0*   ALK PHOS U/L 50 59   ALT U/L 21 22   AST U/L 18 18     PT/INR:   Lab Results   Component Value Date    INR 1 02 12/11/2018   ,   Magnesium: No components found for: MAG,   Phosphorous:   Lab Results   Component Value Date    PHOS 2 9 12/12/2018       Microbiology:  No results found for: TALON HernandezCULTDEWAYNE      OUTCOME:   Transferred to Critical Care Unit  Family notified of transfer: yes  Family member contacted:   Son at bedside  Code Status: Level 1 - Full Code  Critical Care Time: Total Critical Care time spent 60 minutes excluding procedures, teaching and family updates

## 2018-12-12 NOTE — CONSULTS
Consultation - Cardiology   Rosita Emanuel 80 y o  female MRN: [id number]  Unit/Bed#:  Encounter: [id number]    Inpatient consult to Cardiology  Consult performed by: SAUL ALTAMIRANO  Consult ordered by: Nisha Silva          Assessment:  1  NSTEMI Type II in setting of tachyarrhythmia   2  Severe aortic stenosis  3  Chronic diastolic CHF  4  Dyslipidemia     Plan:    1  Troponin elevated but decreasing  EKG showed NSR and was unchanged from previous  Heart rate is now stable on regimen of toprol  Will have her PPM interrogated to determine what arrhythmia the patient was in when her HR was 144 at home  2  As per her cardiologist's records, there was only mild AS; therefore, surgical discussion did not take place  The patient denies symptoms of SOB, angina, or syncope  ECHO was obtained today which will be reviewed and further intervention will be decided based on results  3  Patient shows no signs of volume overload currently and is asymptomatic  4  Patient is on pravastatin 20 mg  LDL is 92 as of yesterday  Physician Requesting Consult: Mishel Ta DO  Reason for Consult / Principal Problem: elevated troponin, severe AS, history of tachy/sana syndrome with PPM    History of Present Illness   HPI: Rosita Emanuel is a 80y o  year old female who has a history of tachy/sana syndrome s/p PPM, CAD, DM, HTN, and dyslipidemia who follows with cardiologist Dr Berto Narvaez  The patient is somewhat of a poor historian  She tells me that she has been having issues with her memory lately  Patient presented to the emergency department yesterday after she reports "feeling funny" at home  She states that she wishes sitting on her couch watching TV when she started feeling this way  She then checked her heart rate and found it to be 144  She denies being lightheaded, dizzy, or passing out during this time period  She states that all happened so fast that she just cannot describe the way she was feeling  However her thinking was impaired because she states instead of calling the ambulance she called the public service transportation  Arrival to the emergency department patient's heart rate had normalized  She was found to have a mildly elevated troponin x3  Patient had a permanent pacemaker placed in May of 2018  An echo at this time revealed severe aortic stenosis  The patient states she was not aware of this and therefore does not know if any intervention was being sought  She denies ever having a bypass surgery or stenting  She lives alone but her sister comes almost every day to check on her and to do some light housework  Patient states that she was a very sedentary lifestyle  However when she does get up and walk around the house she typically does not experience any shortness of breath or chest discomfort  Review of Systems   Constitution: Negative for diaphoresis and fever  Cardiovascular: Positive for irregular heartbeat and palpitations  Negative for chest pain, dyspnea on exertion, near-syncope, orthopnea and syncope  Respiratory: Negative for cough, shortness of breath, sputum production and wheezing  Musculoskeletal: Negative  Gastrointestinal: Positive for constipation and diarrhea  Negative for nausea and vomiting  Genitourinary: Negative  Neurological: Negative for dizziness, headaches and light-headedness  Psychiatric/Behavioral: Positive for memory loss  Patient states she has been having trouble with her memory recently       Historical Information   Past Medical History:   Diagnosis Date    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[facility] [address] )     Bradycardia     Coronary artery disease     Depression     Depression     Diabetes mellitus ([facility] [address] )     Type 2 DM    Disease of thyroid gland     Essential (primary) hypertension     Facial droop     started 10 years ago    GERD (gastroesophageal reflux disease)     Heart murmur     History of echocardiogram 08/28/2017    2-D w/CFD:  EF 0 60 (60%), LVSF is normal  No regional wall abnormalities  Mild mitral valve regurg  AV was trileaflet  Moderate tricuspid regurg  Trace pulmonic valve  No pericardial effusion   History of echocardiogram 03/28/2018    2-D w/CFD:  EF 0 60 (60%), Mild regurg in the MV  AV was trileaflet, severe stenosis, trace regurg  Mild regurg in the TV  No pericardial effusion  Aortic root exhibited normal size   History of echocardiogram 01/29/2016    2-D:  EF 0 55 (55%), Normal LVSF  Mild concentric LVH  Mild MR  Mild AS with mild regurg      History of Holter monitoring     2/24/16, 9/27/17, 2/6/18    History of nuclear stress test 08/16/2017    Lexiscan MPI:  EF 0 76 (76%), no prior MI or ischemia,    Hyperlipidemia     Hypothyroidism     Nonrheumatic aortic (valve) stenosis     Obstructive sleep apnea (adult) (pediatric)     10/17/17 - NPSG with nasal CPAP/Bi-level titration     Osteoarthritis     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HCC)     [facility]SS [facility]) ([facility]     SVT (supraventricular tachycardia) ([facility] [address] )     Vitamin D deficiency      Past Surgical History:   Procedure Laterality Date    ANKLE SURGERY Left 2005    APPENDECTOMY  1955    CATARACT EXTRACTION Bilateral 09/1995    HEMORRHOID SURGERY  1978    HERNIA REPAIR  1990    INSERT / REPLACE / REMOVE PACEMAKER  05/14/2018    dual lead pacer st yony model 2272    JOINT REPLACEMENT      b/l hip replacements, right 2006    NEUROPLASTY / TRANSPOSITION MEDIAN NERVE AT CARPAL TUNNEL  1977    PARTIAL HYSTERECTOMY  1971    TOTAL HIP ARTHROPLASTY      TRANSPHENOIDAL / TRANSNASAL HYPOPHYSECTOMY / RESECTION PITUITARY TUMOR  1989    excision of pituitary gland     History   Alcohol Use No     History   Drug Use No     History   Smoking Status    Never Smoker   Smokeless Tobacco    Never Used     Family History: non-contributory    Meds/Allergies   all current active meds have been reviewed       Allergies   Allergen Reactions    Cephalosporins GI Intolerance    Acetaminophen     Cephalexin GI Intolerance    Clindamycin      Other reaction(s): Unspecified    Demeclocycline     Hydrocodone      Bitartrate  Other reaction(s): Unspecified    Hydrocodone-Acetaminophen Nausea Only    Oxycodone-Acetaminophen      HCL    Oxycodone-Acetaminophen     Penicillins Hives     Other reaction(s): Other (See Comments)  ITCHING/RASH    Simvastatin      Other reaction(s): GI upset    Sulfamethoxazole-Trimethoprim     Tetrabenazine     Tetracycline Nausea Only    Tetracyclines & Related     Vicodin [Hydrocodone-Acetaminophen]        Objective   Vitals: Blood pressure 137/67, pulse 70, temperature 97 5 °F (36 4 °C), temperature source Temporal, resp  rate 14, height 5' 2" (1 575 m), weight 61 7 kg (136 lb 0 4 oz), SpO2 100 %  , Body mass index is 24 88 kg/m² , Orthostatic Blood Pressures      Most Recent Value   Blood Pressure  137/67 filed at 12/12/2018 4234   Patient Position - Orthostatic VS  Lying filed at 12/12/2018 3700        Systolic (96VSK), ZJK:845 , Min:113 , AKK:804     Diastolic (90TDP), BKU:51, Min:62, Max:81      Intake/Output Summary (Last 24 hours) at 12/12/18 1059  Last data filed at 12/12/18 1877   Gross per 24 hour   Intake             1900 ml   Output             1350 ml   Net              550 ml       Weight (last 2 days)     Date/Time   Weight    12/11/18 1802  61 7 (136 02)              Invasive Devices     Peripheral Intravenous Line            Peripheral IV 12/12/18 Left Antecubital less than 1 day                  Physical Exam   Constitutional: She is oriented to person, place, and time  She appears well-developed  No distress  HENT:   Head: Normocephalic and atraumatic  Eyes: Pupils are equal, round, and reactive to light  Neck: Normal range of motion  Carotid bruit is not present  Cardiovascular: Normal rate and regular rhythm  Murmur heard     Systolic murmur is present with a grade of 3/6   Pulmonary/Chest: Effort normal and breath sounds normal  No respiratory distress  She has no wheezes  She has no rales  Musculoskeletal: She exhibits no edema  Neurological: She is alert and oriented to person, place, and time  Skin: Skin is warm and dry  She is not diaphoretic  No erythema  Psychiatric: She has a normal mood and affect  Her behavior is normal    Vitals reviewed  Laboratory Results:    Results from last 7 days  Lab Units 12/12/18  0030 12/11/18  2152 12/11/18  1841   TROPONIN I ng/mL 0 08* 0 10* 0 10*       CBC with diff:   Results from last 7 days  Lab Units 12/12/18  0520 12/11/18  1342   WBC Thousand/uL 9 61 7 88   HEMOGLOBIN g/dL 12 1 12 6   HEMATOCRIT % 36 3 38 8   MCV fL 92 93   PLATELETS Thousands/uL 218 208   MCH pg 30 6 30 3   MCHC g/dL 33 3 32 5   RDW % 13 2 13 2   MPV fL 10 0 10 1   NRBC AUTO /100 WBCs 0 0         CMP:  Results from last 7 days  Lab Units 12/12/18  0520 12/11/18  1342   POTASSIUM mmol/L 3 1* 3 9   CHLORIDE mmol/L 102 100   CO2 mmol/L 29 27   BUN mg/dL 13 15   CREATININE mg/dL 0 72 1 04   CALCIUM mg/dL 7 9* 8 0*   AST U/L 18 18   ALT U/L 21 22   ALK PHOS U/L 50 59   EGFR ml/min/1 73sq m 77 49         BMP:  Results from last 7 days  Lab Units 12/12/18  0520 12/11/18  1342   POTASSIUM mmol/L 3 1* 3 9   CHLORIDE mmol/L 102 100   CO2 mmol/L 29 27   BUN mg/dL 13 15   CREATININE mg/dL 0 72 1 04   CALCIUM mg/dL 7 9* 8 0*       BNP:  No results for input(s): BNP in the last 72 hours      Magnesium:   Results from last 7 days  Lab Units 12/12/18  0520 12/11/18  1342   MAGNESIUM mg/dL 1 8 1 5*       Coags:   Results from last 7 days  Lab Units 12/11/18  1342   PTT seconds 26   INR  1 02       TSH:       Hemoglobin A1C   Results from last 7 days  Lab Units 12/11/18  2152   HEMOGLOBIN A1C % 11 0*       Lipid Profile:     Results from last 7 days  Lab Units 12/12/18  0520   TRIGLYCERIDES mg/dL 101   HDL mg/dL 57       Cardiac testing:   Results for orders placed during the hospital encounter of 18   Echo complete with contrast if indicated    Narrative [address], [place] [address]  [phone]    Transthoracic Echocardiogram  2D, M-mode, Doppler, and Color Doppler    Study date:  23-Mar-2018    Patient: Kasey Leung  MR number: [id number]  Account number: [de-identified]  : 1933  Age: 80 years  Gender: Female  Status: Inpatient  Location: Bedside  Height: 62 in  Weight: 132 lb  BP: 136/ 72 mmHg    Indications: tachycardia    Diagnoses: 427 9 - CARDIAC DYSRHYTHMIA NOS    Sonographer:  GUME Monahan  Primary Physician:  Natasha Cummins DO  Referring Physician:  Precious Lane DO  Group:  [facility]  Interpreting Physician:  Jelena Durand DO    SUMMARY    LEFT VENTRICLE:  Systolic function was normal  Ejection fraction was estimated in the range of 60 % to 65 %  There were no regional wall motion abnormalities  Wall thickness was mildly increased  Doppler parameters were consistent with abnormal left ventricular relaxation (grade 1 diastolic dysfunction)  MITRAL VALVE:  There was mild regurgitation  AORTIC VALVE:  The valve was trileaflet  Leaflets exhibited moderately to markedly increased thickness and markedly reduced cuspal separation  There was severe stenosis  There was trace regurgitation  Valve mean gradient was 34 mmHg  The aortic valve obstructive index (by VTI) was 0 24  Estimated aortic valve area (by VTI) was 0 61 cm squared  TRICUSPID VALVE:  There was mild regurgitation  Estimated peak PA pressure was 33 mmHg  IVC, HEPATIC VEINS:  Respirophasic changes were blunted (less than 50% variation)  HISTORY: PRIOR HISTORY: juliette, murmur, supraventricular tachycardia    PROCEDURE: The procedure was performed at the bedside  This was a routine study  The transthoracic approach was used   The study included complete 2D imaging, M-mode, complete spectral Doppler, and color Doppler  The heart rate was 75 bpm,  at the start of the study  Image quality was adequate  LEFT VENTRICLE: Size was normal  Systolic function was normal  Ejection fraction was estimated in the range of 60 % to 65 %  There were no regional wall motion abnormalities  Wall thickness was mildly increased  DOPPLER: Doppler parameters  were consistent with abnormal left ventricular relaxation (grade 1 diastolic dysfunction)  RIGHT VENTRICLE: The size was normal  Systolic function was normal  Wall thickness was normal     LEFT ATRIUM: Size was normal     RIGHT ATRIUM: Size was normal     MITRAL VALVE: Valve structure was normal  There was normal leaflet separation  DOPPLER: The transmitral velocity was within the normal range  There was no evidence for stenosis  There was mild regurgitation  AORTIC VALVE: The valve was trileaflet  Leaflets exhibited moderately to markedly increased thickness and markedly reduced cuspal separation  DOPPLER: Transaortic velocity was within the normal range  There was severe stenosis  There was  trace regurgitation  TRICUSPID VALVE: The valve structure was normal  There was normal leaflet separation  DOPPLER: The transtricuspid velocity was within the normal range  There was no evidence for stenosis  There was mild regurgitation  Estimated peak PA  pressure was 33 mmHg  PULMONIC VALVE: Leaflets exhibited normal thickness, no calcification, and normal cuspal separation  DOPPLER: The transpulmonic velocity was within the normal range  There was no regurgitation  PERICARDIUM: There was no pericardial effusion  The pericardium was normal in appearance  AORTA: The root exhibited normal size  SYSTEMIC VEINS: IVC: Respirophasic changes were blunted (less than 50% variation)      MEASUREMENT TABLES    2D MEASUREMENTS  LVOT   (Reference normals)  Diam   18 mm   (--)    DOPPLER MEASUREMENTS  LVOT   (Reference normals)  Peak jaskaran   100 cm/s   (--)  VTI   25 cm   (--)  Stroke vol 63 62 ml   (--)  Aortic valve   (Reference normals)  Peak josh   350 cm/s   (--)  VTI   105 cm   (--)  Mean gradient   34 mmHg   (--)  Obstr index, VTI   0 24    (--)  Valve area, VTI   0 61 cm squared   (--)  Obstr index, Vmax   0 29    (--)  Valve area, Vmax   0 74 cm squared   (--)    SYSTEM MEASUREMENT TABLES    2D  %FS: 36 9 %  Ao Diam: 2 49 cm  EDV(Teich): 63 51 ml  EF(Teich): 67 49 %  ESV(Teich): 20 64 ml  IVSd: 1 24 cm  LA Area: 12 73 cm2  LA Diam: 2 63 cm  LVEDV MOD A4C: 41 15 ml  LVEF MOD A4C: 47 01 %  LVESV MOD A4C: 21 81 ml  LVIDd: 3 84 cm  LVIDs: 2 42 cm  LVLd A4C: 6 51 cm  LVLs A4C: 5 47 cm  LVOT Diam: 1 79 cm  LVPWd: 0 93 cm  RA Area: 16 06 cm2  RV DIAM: 2 66 cm  SV MOD A4C: 19 35 ml  SV(Teich): 42 87 ml    CW  AV Env  Ti: 342 56 ms  AV VTI: 95 02 cm  AV Vmax: 3 48 m/s  AV Vmax: 3 51 m/s  AV Vmean: 2 77 m/s  AV maxP 54 mmHg  AV maxP 28 mmHg  AV meanP 76 mmHg  TR Vmax: 2 49 m/s  TR maxP 72 mmHg    MM  TAPSE: 3 11 cm    PW  KASEY (VTI): 0 55 cm2  KASEY Vmax: 0 6 cm2  KASEY Vmax: 0 61 cm2  KASEY Vmax, Pt: 0 6 cm2  KASEY Vmax, Pt: 0 61 cm2  E' Sept: 0 06 m/s  E/E' Sept: 11 15  LVOT Env  Ti: 403 11 ms  LVOT VTI: 20 8 cm  LVOT Vmax: 0 84 m/s  LVOT Vmax: 0 84 m/s  LVOT Vmean: 0 53 m/s  LVOT maxP 9 mmHg  LVOT maxP 92 mmHg  LVOT meanP 48 mmHg  LVSV Dopp: 52 47 ml  MV A Josh: 0 79 m/s  MV Dec Edgar: 1 73 m/s2  MV DecT: 358 39 ms  MV E Josh: 0 62 m/s  MV E/A Ratio: 0 79    [employer] Accredited Echocardiography [facility]    Prepared and electronically signed by    Mao Andujar DO  Signed 23-Mar-2018 16:32:35       No results found for this or any previous visit  No results found for this or any previous visit  No results found for this or any previous visit  Imaging: I have personally reviewed pertinent reports  Xr Chest 2 Views    Result Date: 2018  Narrative: CHEST INDICATION:   sob   COMPARISON:  May 17, 2018 EXAM PERFORMED/VIEWS:  XR CHEST PA & LATERAL Images: 2 FINDINGS: Cardiac silhouette unchanged in contour  Dual lead cardiac pacemaker overlies left chest wall, unchanged  Moderate to large retrocardiac hiatal hernia is present  The lungs are clear  No pneumothorax or pleural effusion  Osseous structures appear within normal limits for patient age  Impression: No acute cardiopulmonary disease  Cardiac pacemaker  Hiatal hernia  Workstation performed: [id number]     Ct Head Wo Contrast    Result Date: 12/12/2018  Narrative: CT BRAIN - WITHOUT CONTRAST INDICATION:   Confusion/delirium, altered LOC, unexplained  COMPARISON:  CT brain January 14, 2017  TECHNIQUE:  CT examination of the brain was performed  In addition to axial images, coronal 2D reformatted images were created and submitted for interpretation  Radiation dose length product (DLP) for this visit:  926 28 mGy/cm  This examination, like all CT scans performed in the [facility], was performed utilizing techniques to minimize radiation dose exposure, including the use of iterative reconstruction and automated exposure control  IMAGE QUALITY:  Somewhat limited due to patient motion  FINDINGS: PARENCHYMA:  No acute intraparenchymal hemorrhage or extra-axial fluid collections  No acute infarctions  Areas of decreased attenuation in the periventricular regions and centrum semiovale bilaterally, consistent with chronic small vessel ischemic change  Chronic lacunar infarctions in the basal ganglia bilaterally  Bilateral internal carotid artery and vertebral artery calcifications  VENTRICLES AND EXTRA-AXIAL SPACES:  Ventricles and cerebral sulci moderately dilated  VISUALIZED ORBITS AND PARANASAL SINUSES:  Prior bilateral lens surgery  Globes otherwise symmetric and intact  Mild mucosal thickening in the ethmoid air cells bilaterally and sphenoid sinuses bilaterally  Remaining paranasal sinuses and mastoid air cells are clear   CALVARIUM AND EXTRACRANIAL SOFT TISSUES:  Normal  Impression: Cerebral atrophy with chronic small vessel ischemic white matter disease and chronic lacunar infarctions  No acute intracranial abnormality  No significant change from prior study  Workstation performed: [id number]     Cta Ed Chest Pe Study    Result Date: 12/11/2018  Narrative: CTA - CHEST WITH IV CONTRAST - PULMONARY ANGIOGRAM INDICATION:   sob  May 11, 2018 COMPARISON: None  TECHNIQUE: CTA examination of the chest was performed using angiographic technique according to a protocol specifically tailored to evaluate for pulmonary embolism  Axial, sagittal, and coronal 2D reformatted images were created from the source data and  submitted for interpretation  In addition, coronal 3D MIP postprocessing was performed on the acquisition scanner  Radiation dose length product (DLP) for this visit:  269 33 mGy-cm   This examination, like all CT scans performed in the [facility], was performed utilizing techniques to minimize radiation dose exposure, including the use of iterative  reconstruction and automated exposure control  IV Contrast:  100 mL of iohexol (OMNIPAQUE)  FINDINGS: PULMONARY ARTERIAL TREE:  No pulmonary embolus is seen  LUNGS:  Minimal densities are noted in the dependent portions of the basilar lower lobes  PLEURA:  Unremarkable  HEART/GREAT VESSELS:  Unremarkable for patient's age  MEDIASTINUM AND JO-ANN:  Moderate to large sized hiatal hernia present  CHEST WALL AND LOWER NECK:   1 9 cm calcified nodule in the lower pole the left thyroid lobe, unchanged from prior studies  This was evaluated with ultrasound and 2014  Dual lead cardiac pacemaker overlies left anterior chest wall  VISUALIZED STRUCTURES IN THE UPPER ABDOMEN:  Unremarkable  OSSEOUS STRUCTURES:  No acute fracture or destructive osseous lesion  Impression: Negative for pulmonary embolism  Hiatal hernia  Minimal bibasilar dependent atelectatic changes   Workstation performed: [id number]       EKG reviewed personally: yes  Telemetry reviewed personally: yes    Assessment:  Principal Problem:    Rapid heart rate  Active Problems:    Diabetes 1 5, managed as type 2 (HCC)    Chronic diastolic congestive heart failure (HCC)    NSTEMI (non-ST elevated myocardial infarction) ([facility] [address] )    Aortic valve stenosis    CAD (coronary artery disease)    Hyponatremia    Hypomagnesemia        Code Status: Level 1 - Full Code

## 2018-12-12 NOTE — UTILIZATION REVIEW
[address]  Utilization Review Department  Phone: [phone]; Fax [phone]  [email or web address]  ATTENTION: Please call with any questions or concerns to [phone]  and carefully listen to the prompts so that you are directed to the right person  Send all requests for admission clinical reviews, approved or denied determinations and any other requests to fax [phone]  All voicemails are confidential     Initial Clinical Review    Admission: Date/Time/Statement: WAS OBSERVATION 12/11/18 @1710 CONVERTED TO INPATIENT ADMISSION 12/12/18 @0800 DUE TO BECOMING UNRESPONSIVE AFTER ADMISSION, REQUIRING RAPID RESPONSE TEAM INTERVENTION AND  TRANSFER TO ICU    Orders Placed This Encounter   Procedures    Inpatient Admission     Standing Status:   Standing     Number of Occurrences:   1     Order Specific Question:   Admitting Physician     Answer:   Tricia Ring     Order Specific Question:   Level of Care     Answer:   Med Surg [16]     Order Specific Question:   Estimated length of stay     Answer:   More than 2 Midnights     Order Specific Question:   Certification     Answer:   I certify that inpatient services are medically necessary for this patient for a duration of greater than two midnights  See H&P and MD Progress Notes for additional information about the patient's course of treatment  ED Arrival Information     Expected Arrival Acuity Means of Arrival Escorted By Service Admission Type    - 12/11/2018 13:23 Urgent Ambulance [address] [facility]  Srinivasa Soto) General Medicine Urgent    Arrival Complaint    Weakness        Chief Complaint   Patient presents with    Weakness - Generalized     ate breakfast this morning and she began to feel "funny"  pt thought it was her pacemaker  pt checked sugar and it was 200  pt also checked HR and it was 144         History of Illness: 79 yo fem to ED from home by EMS due to rapid heart rate and feeling yue  Was initially admitted under OBS due to NSTEMI, CHF, hypomag and hyponatremia  She then became unresponsive with glucose 22 with sluggish pupils, contracted limbs, withdrawing to pain, and not following commands  Stat head CT ordered, became arousable while in CT scan  Transferred to ICU, was then following commands and aware of her name, unclear of time/place  Stroke pathway and seizure precautions started with neuro consult  Concern for seizure after low glucose vs  Tia/cva  ED Vital Signs:   ED Triage Vitals [12/11/18 1327]   Temperature Pulse Respirations Blood Pressure SpO2   98 °F (36 7 °C) 80 22 141/73 95 %      Temp Source Heart Rate Source Patient Position - Orthostatic VS BP Location FiO2 (%)   Temporal Monitor Lying Left arm --      Pain Score       No Pain        Wt Readings from Last 1 Encounters:   12/11/18 61 7 kg (136 lb 0 4 oz)       Vital Signs (abnormal): 175/81        Abnormal Labs/Diagnostic Test Results:   Gluc 457, 479, 274, 269, 315, 292, 267  Na 135    k 3 9, 3 1   Ca 8, 7 9   t prot 5 9, 5 4   Alb 2 8, 2 6   Mg 1 5    Trop  08,  10,  10,  08    probnp 1011  D dimer 2427    a1c 11     tsh  027      UA: gluc>=100   elev leuks   0-1 rbc  occ bacteria   EKG: Normal sinus rhythm  Left anterior fascicular block  Septal infarct (cited on or before 15-MAY-2018)  CT head: Cerebral atrophy with chronic small vessel ischemic white matter disease and chronic lacunar infarctions  No acute intracranial abnormality   No significant change from prior study  PE study: Negative for pulmonary embolism  Hiatal hernia  Minimal bibasilar dependent atelectatic changes    CXR: nothing acute, hiatal hernia    ED Treatment:   Medication Administration from 12/11/2018 1323 to 12/11/2018 1756       Date/Time Order Dose Route Action Action by Comments     12/11/2018 1403 sodium chloride 0 9 % bolus 1,000 mL 1,000 mL Intravenous New Bag Madi Kellogg RN      12/11/2018 1455 insulin regular (HumuLIN R,NovoLIN R) injection 8 Units 8 Units Subcutaneous Given Hina López RN      12/11/2018 1457 aspirin tablet 325 mg 325 mg Oral Given Hina López RN           Past Medical/Surgical History:    Active Ambulatory Problems     Diagnosis Date Noted    Uncontrolled type 2 diabetes mellitus without complication, without long-term current use of insulin ([place] [address] ) 01/13/2017    Hyperlipidemia 01/13/2017    Hypertension 01/13/2017    Low TSH level 08/17/2017    Hypothyroidism 08/17/2017    History of CVA (cerebrovascular accident) 08/18/2017    Rapid heart rate 08/18/2017    CRISTAL (obstructive sleep apnea) 08/20/2017    Diabetes 1 5, managed as type 2 ([place] [address] ) 03/01/2018    Sick sinus syndrome ([place] [address] ) 03/01/2018    Supraventricular tachycardia ([place] [address] ) 03/22/2018    Chronic diastolic congestive heart failure ([place] [address] ) 03/23/2018    NSTEMI (non-ST elevated myocardial infarction) ([place] [address] ) 03/23/2018    Aortic valve stenosis 03/23/2018    Atelectasis 03/23/2018    Hiatal hernia 03/24/2018    Thyroid nodule 03/24/2018    Anemia 08/28/2017    Bilateral hearing loss 04/07/2017    Chronic bilateral low back pain without sciatica 08/25/2016    Depression 03/07/2014    Diabetic hypoglycemia ([place] [address] ) 09/09/2014    Diabetic neuropathy ([place] [address] ) 07/06/2017    Esophageal dysmotility 04/26/2017    Esophageal reflux 02/19/2013    Esophageal spasm 03/07/2017    Generalized osteoarthritis of multiple sites 10/23/2015    Hiatal hernia with gastroesophageal reflux 04/26/2017    Hypopituitarism ([place] [address] ) 05/15/2015    Onychomycosis 05/16/2017    Pruritus 04/07/2017    Vitamin D deficiency 12/30/2013    Hypotension 05/15/2018    H/O tachycardia-bradycardia syndrome s/p PPM 05/15/2018    S/P placement of cardiac pacemaker 05/15/2018    Delirium 05/15/2018    Forgetfulness 05/15/2018    Echolalia 05/15/2018    Hx of fall 05/15/2018    Ambulatory dysfunction 05/15/2018    Physical deconditioning 05/15/2018    Toxic metabolic encephalopathy 16/28/4944    Hypersomnia 07/19/2018    Deviated nasal septum 07/19/2018    Dry nose 07/19/2018    Pacemaker 06/24/2018    CAD (coronary artery disease) 09/17/2018    Superficial thrombophlebitis 09/17/2018    Adrenal insufficiency (Orcas's disease) ([place] [address] ) 11/08/2018       Past Medical History:   Diagnosis Date    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[place] [address] )     Bradycardia     Coronary artery disease     Depression     Depression     Diabetes mellitus ([place] [address] )     Disease of thyroid gland     Essential (primary) hypertension     Facial droop     GERD (gastroesophageal reflux disease)     Heart murmur     History of echocardiogram 08/28/2017    History of echocardiogram 03/28/2018    History of echocardiogram 01/29/2016    History of Holter monitoring     History of nuclear stress test 08/16/2017    Hyperlipidemia     Hypothyroidism     Nonrheumatic aortic (valve) stenosis     Obstructive sleep apnea (adult) (pediatric)     Osteoarthritis     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HCC)     [facility]     SVT (supraventricular tachycardia) [facility]     Vitamin D deficiency        Admitting Diagnosis: Weakness [R53 1]  Dyspnea [R06 00]  Tachyarrhythmia [R00 0]  NSTEMI (non-ST elevated myocardial infarction) ([place] [address] ) [I21 4]    Age/Sex: 80 y o  female    Assessment/Plan: 81 yo fem to ED from home by EMS admitted under OBS due to rapid heart rate, NSTEMI, CHF, hypomagnesemia, hyponatremia  Became unresponsive with glucose of 20, converted to INPATIENT admission, transferred to ICU due to concern of seizure vs  TIA / CVA   Interrogate pacer, cons cardio, cons neuro, serial trops, replete mg/na, hold diuretics, 1li NS    Admission Orders:  Scheduled Meds:   Current Facility-Administered Medications:  aspirin 81 mg Oral Daily   B Complex 1 capsule Oral Daily   enoxaparin 30 mg Subcutaneous Daily   ferrous sulfate 325 mg Oral Daily With Breakfast   levothyroxine 75 mcg Oral Early Morning metoprolol tartrate 25 mg Oral Q12H MARILYN   oxybutynin 10 mg Oral HS   pantoprazole 40 mg Oral Daily Before Breakfast   pravastatin 20 mg Oral QPM   predniSONE 5 mg Oral Daily   sitaGLIPtin 50 mg Oral Daily     Tele  Neuro checks q4h  NIH stroke scale  Seizure prec  Gluc checks ac / hs  ICD interrogation  EKG prn chest pain  OOB as kwame  SCD's  Diabetic diet  Cons neuro  Cons cardio  Echo  Cons nutrition  Cons PT/OT

## 2018-12-12 NOTE — ASSESSMENT & PLAN NOTE
Patient became severely hypoglycemic, was unresponsive this a m , long-acting Lantus will be discontinued and not restarted, patient was monitored throughout the day today with frequent Accu-Cheks, blood sugars are markedly improved, patient now asymptomatic, will monitor Accu-Cheks now every 4 hr, continue to monitor overnight

## 2018-12-12 NOTE — ASSESSMENT & PLAN NOTE
The patient presented with reports of tachyarrhythmia at home  Patient was symptomatic, mild troponin elevation, will interrogate pacemaker, trend cardiac enzymes and obtain Cardiology consultation  Awaiting pacemaker interrogation, cardiology input appreciated, possible SVT

## 2018-12-12 NOTE — PLAN OF CARE
Problem: PHYSICAL THERAPY ADULT  Goal: Performs mobility at highest level of function for planned discharge setting  See evaluation for individualized goals  Outcome: Progressing  Prognosis: Good  Problem List: Decreased strength, Decreased endurance, Impaired balance, Decreased mobility  Assessment: Pt is an 80year old female presenting to [facility] with elevated HR and mildly elevated troponin  Pt overnight had a drop in blood sugar with rapid response called and unresponsive episode with transfer to CCU  Pt seen for high complexity PT evaluation presenting with decreased LE strength balance endurance and functional mobility  Pt requiring use of SPC for ambulation due to mild unsteadiness and fatigue bilateral LE's  Pt with no complaint of SOB lightheadedness or dizziness during ambulation  Pt with LE fatigue limiting ambulation to 200ft with no LOB  Pt would benefit from continued activity in PT to improve LE strength balance endurance mobility transfers ambulation and stair negotiation with return to (I) LOF to ensure safe return home when medically stable for discharge  Recommendation: Home PT, Home with family support     PT - OK to Discharge: Yes (when medically stable for discharge)    See flowsheet documentation for full assessment

## 2018-12-12 NOTE — SOCIAL WORK
Met with pt to discuss role as  in helping pt to develop discharge plan and to help pt carry out their plan  Pt lives in a house alone  Pt has 2 MARCIA with a railing  Pt has 13 steps to the basement to laundry but her sister does the laundry  Pt has a cane , bedside commode and a rolling walker at home   Pt uses the walker to ambulate at home  Pt is independent with ADL's  Pt does her own cooking  Pt's sister or grand daughter will give the patient a ride to doctors office  Pt has [address] home care in the past  Pt's PCP is Dr Vonnie Duong  Pt uses [facility] in [place]  Pt's Lace Score is 71  Pt is a HRR   Will continue to follow for any Case Management needs

## 2018-12-12 NOTE — PHYSICAL THERAPY NOTE
Physical Therapy Evaluation    Patient Name: Melody Verma    KGZPV'[id number] Date: 12/12/2018     Problem List  Patient Active Problem List   Diagnosis    Uncontrolled type 2 diabetes mellitus without complication, without long-term current use of insulin ([facility] )    Hyperlipidemia    Hypertension    Low TSH level    Hypothyroidism    History of CVA (cerebrovascular accident)    Rapid heart rate    CRISTAL (obstructive sleep apnea)    Diabetes 1 5, managed as type 2 ([facility] )    Sick sinus syndrome ([facility] )    [facility]    Chronic diastolic congestive heart failure ([facility] )    NSTEMI (non-ST elevated myocardial infarction) ([facility] )    Aortic valve stenosis    Atelectasis    Hiatal hernia    Thyroid nodule    Anemia    Bilateral hearing loss    Chronic bilateral low back pain without sciatica    Depression    Diabetic hypoglycemia ([facility])    Diabetic neuropathy ([facility])    Esophageal dysmotility    Esophageal reflux    Esophageal spasm    Generalized osteoarthritis of multiple sites    Hiatal hernia with gastroesophageal reflux    [facility]    Onychomycosis    Pruritus    Vitamin D deficiency    Hypotension    H/O tachycardia-bradycardia syndrome s/p PPM    S/P placement of cardiac pacemaker    Delirium    Forgetfulness    Echolalia    Hx of fall    Ambulatory dysfunction    Physical deconditioning    Toxic metabolic encephalopathy    Hypersomnia    Deviated nasal septum    Dry nose    Pacemaker    CAD (coronary artery disease)    Superficial thrombophlebitis    Adrenal insufficiency ([place]'s disease) ([facility])    Hypomagnesemia    Hypoglycemia due to insulin        Past Medical History  Past Medical History:   Diagnosis Date    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HCC)     Bradycardia     Coronary artery disease     Depression     Depression     Diabetes mellitus (HCC)     Type 2 DM    Disease of thyroid gland     Essential (primary) hypertension     Facial droop     started 10 years ago    GERD (gastroesophageal reflux disease)     Heart murmur     History of echocardiogram 08/28/2017    2-D w/CFD:  EF 0 60 (60%), LVSF is normal  No regional wall abnormalities  Mild mitral valve regurg  AV was trileaflet  Moderate tricuspid regurg  Trace pulmonic valve  No pericardial effusion   History of echocardiogram 03/28/2018    2-D w/CFD:  EF 0 60 (60%), Mild regurg in the MV  AV was trileaflet, severe stenosis, trace regurg  Mild regurg in the TV  No pericardial effusion  Aortic root exhibited normal size   History of echocardiogram 01/29/2016    2-D:  EF 0 55 (55%), Normal LVSF  Mild concentric LVH  Mild MR  Mild AS with mild regurg      History of Holter monitoring     2/24/16, 9/27/17, 2/6/18    History of nuclear stress test 08/16/2017    Lexiscan MPI:  EF 0 76 (76%), no prior MI or ischemia,    Hyperlipidemia     Hypothyroidism     Nonrheumatic aortic (valve) stenosis     Obstructive sleep apnea (adult) (pediatric)     10/17/17 - NPSG with nasal CPAP/Bi-level titration     Osteoarthritis     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HCC)     [facility]SS [facility]ick [facility]) [facility])     [facility]) [facility]     Vitamin D deficiency         Past Surgical History  Past Surgical History:   Procedure Laterality Date    ANKLE SURGERY Left 2005   [facility]    CATARACT EXTRACTION Bilateral 09/1995    [facility]    Sil [place] / Maggy Presley / Melisas Serrano  05/14/2018    dual lead pacer st yony model 2272    JOINT REPLACEMENT      b/l hip replacements, right 2006    NEUROPLASTY / TRANSPOSITION MEDIAN NERVE AT CARPAL TUNNEL  1977    PARTIAL HYSTERECTOMY  1971    TOTAL HIP ARTHROPLASTY      TRANSPHENOIDAL / TRANSNASAL HYPOPHYSECTOMY / RESECTION PITUITARY TUMOR  1989    excision of pituitary gland           12/12/18 1316   Note Type   Note type Eval/Treat   Pain Assessment   Pain Assessment No/denies pain   Pain Score No Pain   Home Living   Type of Home House   Home Layout Able to live on main level with bedroom/bathroom; Performs ADLs on one level; Laundry in basement; One level;Stairs to enter with rails  (2 MARCIA with HR)   Bathroom Shower/Tub Tub/shower unit   Bathroom Toilet Standard   Bathroom Accessibility Accessible   Home Equipment Cane;Walker   Prior Function   Level of [place] Independent with ADLs and functional mobility  ((I) ambulation with SPC)   Lives With Alone   Receives Help From Family  (sister son and granddaughter)   ADL Assistance Independent   IADLs Independent  (sister does laundry family assists as needed)   Comments pt does not drive, family drives   Restrictions/Precautions   Other Precautions Telemetry; Fall Risk; Chair Alarm   General   Family/Caregiver Present Yes   Cognition   Arousal/Participation Alert   Orientation Level Oriented X4   Following Commands Follows all commands and directions without difficulty   RLE Assessment   RLE Assessment WFL   LLE Assessment   LLE Assessment WFL   Coordination   Sensation WFL   Light Touch   RLE Light Touch Grossly intact   LLE Light Touch Grossly intact   Bed Mobility   Additional Comments pt seated at EOB when PT entered room  HR at rest 60 SpO2 98%  pt ambulated on room air with HR after ambulation HR 60 SpO2 100%  Pt with no dizziness or lightheadedness during transfers and ambulation  Transfers   Sit to Stand 5  Supervision   Additional items Verbal cues; Bedrails  (with SPC)   Stand to Sit 5  Supervision   Additional items Verbal cues  (with SPC)   Stand pivot 5  Supervision   Additional items Verbal cues  (with SPC)   Additional Comments Pt with mild unsteadiness but no LOB requiring use of SPC  No gait deviation or LOB with ambulation with SPC  No drop in SpO2 with ambulation  Pt seated in chair at bedside with alarm on and bell in reach      Ambulation/Elevation   Gait pattern Short stride;Narrow KIANA   Gait Assistance 5  Supervision  (close CGA )   Assistive Device Straight cane   Distance 200ft with SPC close supervision no LOB with mild unsteadiness   Balance   Static Sitting Good   Dynamic Sitting Good   Static Standing Fair  (with SPC)   Dynamic Standing Fair  (with SPC)   Ambulatory Fair  (with SPC)   Endurance Deficit   Endurance Deficit Yes   Endurance Deficit Description limited ambulation tolerance due to LE fatigue   Activity Tolerance   Activity Tolerance Patient tolerated treatment well;Patient limited by fatigue   Assessment   Prognosis Good   Problem List Decreased strength;Decreased endurance; Impaired balance;Decreased mobility   Assessment Pt is an 80year old female presenting to [facility] with elevated HR and mildly elevated troponin  Pt overnight had a drop in blood sugar with rapid response called and unresponsive episode with transfer to CCU  Pt seen for high complexity PT evaluation presenting with decreased LE strength balance endurance and functional mobility  Pt requiring use of SPC for ambulation due to mild unsteadiness and fatigue bilateral LE's  Pt with no complaint of SOB lightheadedness or dizziness during ambulation  Pt with LE fatigue limiting ambulation to 200ft with no LOB  Pt would benefit from continued activity in PT to improve LE strength balance endurance mobility transfers ambulation and stair negotiation with return to (I) LOF to ensure safe return home when medically stable for discharge     Goals   Patient Goals To feel better and return home   LTG Expiration Date 12/26/18   Long Term Goal #1 Improve bilateral LE strength by 1/2 muscle grade to improve all bed mobility and transfers to (I)   Long Term Goal #2 Improve standing and ambulatory balance to fair+ to good to improve ambulation with SPC to 400ft modified Independent and to improve stair negotiation to 4 steps witth HR (S)    Plan   Treatment/Interventions Functional transfer training;LE strengthening/ROM; Therapeutic exercise;Elevations; Endurance training;Patient/family training;Bed mobility;Gait training   PT Frequency (3-5x/wk)   Recommendation   Recommendation Home PT; Home with family support   PT - OK to Discharge Yes  (when medically stable for discharge)   no SCD's  Pt seated in chair at bedside with call bell in reach and family member in room

## 2018-12-13 ENCOUNTER — TRANSITIONAL CARE MANAGEMENT (OUTPATIENT)
Dept: INTERNAL MEDICINE CLINIC | Facility: CLINIC | Age: 83
End: 2018-12-13

## 2018-12-13 VITALS
SYSTOLIC BLOOD PRESSURE: 127 MMHG | RESPIRATION RATE: 18 BRPM | HEIGHT: 62 IN | WEIGHT: 136 LBS | OXYGEN SATURATION: 98 % | HEART RATE: 62 BPM | BODY MASS INDEX: 25.03 KG/M2 | DIASTOLIC BLOOD PRESSURE: 59 MMHG | TEMPERATURE: 98.2 F

## 2018-12-13 PROBLEM — T38.3X5A HYPOGLYCEMIA DUE TO INSULIN: Status: RESOLVED | Noted: 2018-12-12 | Resolved: 2018-12-13

## 2018-12-13 PROBLEM — I21.4 NSTEMI (NON-ST ELEVATED MYOCARDIAL INFARCTION) (HCC): Status: RESOLVED | Noted: 2018-03-23 | Resolved: 2018-12-13

## 2018-12-13 PROBLEM — E83.42 HYPOMAGNESEMIA: Status: RESOLVED | Noted: 2018-12-11 | Resolved: 2018-12-13

## 2018-12-13 PROBLEM — E16.0 HYPOGLYCEMIA DUE TO INSULIN: Status: RESOLVED | Noted: 2018-12-12 | Resolved: 2018-12-13

## 2018-12-13 LAB
ALBUMIN SERPL BCP-MCNC: 2.3 G/DL (ref 3.5–5)
ALP SERPL-CCNC: 44 U/L (ref 46–116)
ALT SERPL W P-5'-P-CCNC: 17 U/L (ref 12–78)
ANION GAP SERPL CALCULATED.3IONS-SCNC: 7 MMOL/L (ref 4–13)
AST SERPL W P-5'-P-CCNC: 17 U/L (ref 5–45)
BASOPHILS # BLD AUTO: 0.02 THOUSANDS/ΜL (ref 0–0.1)
BASOPHILS NFR BLD AUTO: 0 % (ref 0–1)
BILIRUB SERPL-MCNC: 0.4 MG/DL (ref 0.2–1)
BUN SERPL-MCNC: 8 MG/DL (ref 5–25)
CALCIUM SERPL-MCNC: 8.4 MG/DL (ref 8.3–10.1)
CHLORIDE SERPL-SCNC: 104 MMOL/L (ref 100–108)
CO2 SERPL-SCNC: 29 MMOL/L (ref 21–32)
CREAT SERPL-MCNC: 0.67 MG/DL (ref 0.6–1.3)
EOSINOPHIL # BLD AUTO: 0.25 THOUSAND/ΜL (ref 0–0.61)
EOSINOPHIL NFR BLD AUTO: 4 % (ref 0–6)
ERYTHROCYTE [DISTWIDTH] IN BLOOD BY AUTOMATED COUNT: 13.5 % (ref 11.6–15.1)
GFR SERPL CREATININE-BSD FRML MDRD: 80 ML/MIN/1.73SQ M
GLUCOSE SERPL-MCNC: 216 MG/DL (ref 65–140)
GLUCOSE SERPL-MCNC: 275 MG/DL (ref 65–140)
GLUCOSE SERPL-MCNC: 86 MG/DL (ref 65–140)
GLUCOSE SERPL-MCNC: 91 MG/DL (ref 65–140)
HCT VFR BLD AUTO: 35.8 % (ref 34.8–46.1)
HGB BLD-MCNC: 11.9 G/DL (ref 11.5–15.4)
IMM GRANULOCYTES # BLD AUTO: 0.03 THOUSAND/UL (ref 0–0.2)
IMM GRANULOCYTES NFR BLD AUTO: 0 % (ref 0–2)
INR PPP: 0.95 (ref 0.86–1.17)
LYMPHOCYTES # BLD AUTO: 1.91 THOUSANDS/ΜL (ref 0.6–4.47)
LYMPHOCYTES NFR BLD AUTO: 28 % (ref 14–44)
MAGNESIUM SERPL-MCNC: 1.8 MG/DL (ref 1.6–2.6)
MCH RBC QN AUTO: 30.7 PG (ref 26.8–34.3)
MCHC RBC AUTO-ENTMCNC: 33.2 G/DL (ref 31.4–37.4)
MCV RBC AUTO: 92 FL (ref 82–98)
MONOCYTES # BLD AUTO: 0.54 THOUSAND/ΜL (ref 0.17–1.22)
MONOCYTES NFR BLD AUTO: 8 % (ref 4–12)
NEUTROPHILS # BLD AUTO: 4.1 THOUSANDS/ΜL (ref 1.85–7.62)
NEUTS SEG NFR BLD AUTO: 60 % (ref 43–75)
NRBC BLD AUTO-RTO: 0 /100 WBCS
PHOSPHATE SERPL-MCNC: 3.1 MG/DL (ref 2.3–4.1)
PLATELET # BLD AUTO: 197 THOUSANDS/UL (ref 149–390)
PMV BLD AUTO: 10.2 FL (ref 8.9–12.7)
POTASSIUM SERPL-SCNC: 4 MMOL/L (ref 3.5–5.3)
PROT SERPL-MCNC: 5.2 G/DL (ref 6.4–8.2)
PROTHROMBIN TIME: 12.2 SECONDS (ref 11.8–14.2)
RBC # BLD AUTO: 3.88 MILLION/UL (ref 3.81–5.12)
SODIUM SERPL-SCNC: 140 MMOL/L (ref 136–145)
WBC # BLD AUTO: 6.85 THOUSAND/UL (ref 4.31–10.16)

## 2018-12-13 PROCEDURE — 97167 OT EVAL HIGH COMPLEX 60 MIN: CPT

## 2018-12-13 PROCEDURE — 85025 COMPLETE CBC W/AUTO DIFF WBC: CPT | Performed by: INTERNAL MEDICINE

## 2018-12-13 PROCEDURE — 82948 REAGENT STRIP/BLOOD GLUCOSE: CPT

## 2018-12-13 PROCEDURE — 85610 PROTHROMBIN TIME: CPT | Performed by: INTERNAL MEDICINE

## 2018-12-13 PROCEDURE — G8987 SELF CARE CURRENT STATUS: HCPCS

## 2018-12-13 PROCEDURE — 84100 ASSAY OF PHOSPHORUS: CPT | Performed by: INTERNAL MEDICINE

## 2018-12-13 PROCEDURE — 99239 HOSP IP/OBS DSCHRG MGMT >30: CPT | Performed by: INTERNAL MEDICINE

## 2018-12-13 PROCEDURE — 83735 ASSAY OF MAGNESIUM: CPT | Performed by: INTERNAL MEDICINE

## 2018-12-13 PROCEDURE — 99232 SBSQ HOSP IP/OBS MODERATE 35: CPT | Performed by: INTERNAL MEDICINE

## 2018-12-13 PROCEDURE — 97116 GAIT TRAINING THERAPY: CPT

## 2018-12-13 PROCEDURE — G8988 SELF CARE GOAL STATUS: HCPCS

## 2018-12-13 PROCEDURE — 80053 COMPREHEN METABOLIC PANEL: CPT | Performed by: INTERNAL MEDICINE

## 2018-12-13 RX ORDER — METOPROLOL TARTRATE 37.5 MG/1
37.5 TABLET, FILM COATED ORAL EVERY 12 HOURS SCHEDULED
Qty: 180 TABLET | Refills: 0 | Status: SHIPPED | OUTPATIENT
Start: 2018-12-13 | End: 2019-04-24 | Stop reason: SDUPTHER

## 2018-12-13 RX ADMIN — ENOXAPARIN SODIUM 40 MG: 40 INJECTION SUBCUTANEOUS at 10:00

## 2018-12-13 RX ADMIN — ASPIRIN 81 MG 81 MG: 81 TABLET ORAL at 10:00

## 2018-12-13 RX ADMIN — LEVOTHYROXINE SODIUM 75 MCG: 0.07 TABLET ORAL at 08:30

## 2018-12-13 RX ADMIN — REPAGLINIDE 4 MG: 1 TABLET ORAL at 09:30

## 2018-12-13 RX ADMIN — METFORMIN HYDROCHLORIDE 500 MG: 500 TABLET, EXTENDED RELEASE ORAL at 09:00

## 2018-12-13 RX ADMIN — SITAGLIPTIN 50 MG: 25 TABLET, FILM COATED ORAL at 10:00

## 2018-12-13 RX ADMIN — FERROUS SULFATE TAB 325 MG (65 MG ELEMENTAL FE) 325 MG: 325 (65 FE) TAB at 09:58

## 2018-12-13 RX ADMIN — PANTOPRAZOLE SODIUM 40 MG: 40 TABLET, DELAYED RELEASE ORAL at 09:58

## 2018-12-13 RX ADMIN — METOPROLOL TARTRATE 25 MG: 25 TABLET, FILM COATED ORAL at 10:00

## 2018-12-13 RX ADMIN — Medication 1 EACH: at 10:00

## 2018-12-13 RX ADMIN — PREDNISONE 5 MG: 5 TABLET ORAL at 10:00

## 2018-12-13 NOTE — ASSESSMENT & PLAN NOTE
Device interrogation was completed today 12/13/2018, showed episode of SVT    Will increase beta-blocker/Lopressor to 37 5 mg twice daily

## 2018-12-13 NOTE — OCCUPATIONAL THERAPY NOTE
Occupational Therapy Evaluation     Patient Name: Ermelinda Aschoff  ZAMLR'O Date: 12/13/2018  Problem List  Patient Active Problem List   Diagnosis    Uncontrolled type 2 diabetes mellitus without complication, without long-term current use of insulin ([facility] )    Hyperlipidemia    Hypertension    Low TSH level    Hypothyroidism    History of CVA (cerebrovascular accident)    Rapid heart rate    CRISTAL (obstructive sleep apnea)    Diabetes 1 5, managed as type 2 ([facility] )    Sick sinus syndrome ([facility] )    Supraventricular tachycardia (HCC)    Chronic diastolic congestive heart failure ([facility] )    NSTEMI (non-ST elevated myocardial infarction) ([facility] )    Aortic valve stenosis    Atelectasis    Hiatal hernia    Thyroid nodule    Anemia    Bilateral hearing loss    Chronic bilateral low back pain without sciatica    Depression    Diabetic hypoglycemia (HCC)    Diabetic neuropathy ([facility])    Esophageal dysmotility    Esophageal reflux    Esophageal spasm    Generalized osteoarthritis of multiple sites    Hiatal hernia with gastroesophageal reflux    Hypopituitarism ([facility] )    Onychomycosis    Pruritus    Vitamin D deficiency    Hypotension    H/O tachycardia-bradycardia syndrome s/p PPM    S/P placement of cardiac pacemaker    Delirium    Forgetfulness    Echolalia    Hx of fall    Ambulatory dysfunction    Physical deconditioning    Toxic metabolic encephalopathy    Hypersomnia    Deviated nasal septum    Dry nose    Pacemaker    CAD (coronary artery disease)    Superficial thrombophlebitis    Adrenal insufficiency (Barrett's disease) ([facility])    Hypomagnesemia    Hypoglycemia due to insulin     Past Medical History  Past Medical History:   Diagnosis Date    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HCC)     Bradycardia     Coronary artery disease     Depression     Depression     Diabetes mellitus ([facility]     Type 2 DM    Disease of thyroid gland     Essential (primary) hypertension     Facial droop started 10 years ago    GERD (gastroesophageal reflux disease)     Heart murmur     History of echocardiogram 08/28/2017    2-D w/CFD:  EF 0 60 (60%), LVSF is normal  No regional wall abnormalities  Mild mitral valve regurg  AV was trileaflet  Moderate tricuspid regurg  Trace pulmonic valve  No pericardial effusion   History of echocardiogram 03/28/2018    2-D w/CFD:  EF 0 60 (60%), Mild regurg in the MV  AV was trileaflet, severe stenosis, trace regurg  Mild regurg in the TV  No pericardial effusion  Aortic root exhibited normal size   History of echocardiogram 01/29/2016    2-D:  EF 0 55 (55%), Normal LVSF  Mild concentric LVH  Mild MR  Mild AS with mild regurg      History of Holter monitoring     2/24/16, 9/27/17, 2/6/18    History of nuclear stress test 08/16/2017    Lexiscan MPI:  EF 0 76 (76%), no prior MI or ischemia,    Hyperlipidemia     Hypothyroidism     Nonrheumatic aortic (valve) stenosis     Obstructive sleep apnea (adult) (pediatric)     10/17/17 - NPSG with nasal CPAP/Bi-level titration     Osteoarthritis     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HCC)     SSS [facility]sick sinus syndrome) ([facility]     SVT (supraventricular tachycardia) ([facility]     Vitamin D deficiency      Past Surgical History  Past Surgical History:   Procedure Laterality Date    ANKLE SURGERY Left 2005   [address] [facility] [address]    CATARACT EXTRACTION Bilateral 09/1995    HEMORRHOID SURGERY  1978    HERNIA REPAIR  1990    Pawan Frazier / Juan J Gutierrez / Stefan Aguayo  05/14/2018    dual lead pacer st yony model 2272    JOINT REPLACEMENT      b/l hip replacements, right 2006    NEUROPLASTY / TRANSPOSITION MEDIAN NERVE AT CARPAL TUNNEL  1977    PARTIAL HYSTERECTOMY  1971    TOTAL HIP ARTHROPLASTY      TRANSPHENOIDAL / TRANSNASAL HYPOPHYSECTOMY / RESECTION PITUITARY TUMOR  1989    excision of pituitary gland           12/13/18 0946   Note Type   Note type Eval/Treat   Restrictions/Precautions   Weight Bearing Precautions Per Order No   Other Precautions Chair Alarm;Multiple lines;Telemetry; Fall Risk   Pain Assessment   Pain Assessment No/denies pain   Home Living   Additional Comments see PT evaluation for details   Prior Function   Comments see PT evaluation for details   Psychosocial   Psychosocial (WDL) WDL   Subjective   Subjective "I think I want to go to the bathroom, honey"   ADL   Where Assessed Other (Comment)  (standing at bedside)   UB Dressing Assistance 5  Supervision/Setup   Additional Comments donned house coat while in stance   Bed Mobility   Additional Comments seated EOB at start of session and in chair at end of session; pt on RA throughout session   Transfers   Sit to Stand 5  Supervision   Additional items Verbal cues   Stand to Sit 5  Supervision   Additional items Verbal cues   Stand pivot 5  Supervision   Additional items Verbal cues   Additional Comments pt with no LOB on initial stance    Functional Mobility   Functional Mobility 5  Supervision   Additional Comments pt performed functional mobliity initially with SPC;; utilized RW due to pt report of unsteadiness during task; utilized RW during 150ft of functional mobility with no complaints   Additional items Rolling walker;SPC   Balance   Static Sitting Good   Dynamic Sitting Good   Static Standing Fair +   Dynamic Standing Fair +   Ambulatory Fair   Activity Tolerance   Activity Tolerance Patient limited by fatigue   RUE Assessment   RUE Assessment WFL   LUE Assessment   LUE Assessment WFL   Hand Function   Gross Motor Coordination Functional   Fine Motor Coordination Functional   Sensation   Light Touch No apparent deficits   Sharp/Dull No apparent deficits   Cognition   Overall Cognitive Status WFL   Arousal/Participation Alert   Attention Within functional limits   Orientation Level Oriented X4   Memory Within functional limits   Following Commands Follows all commands and directions without difficulty   Assessment   Limitation Decreased ADL status; Decreased UE strength;Decreased Safe judgement during ADL;Decreased endurance;Decreased self-care trans;Decreased high-level ADLs   Assessment Pt is a 80 y o  female seen for OT evaluation s/p admit to [facility] on 12/11/2018 w/ Rapid heart rate  Comorbidities affecting pt's functional performance at time of assessment include: DM and HTN  Personal factors affecting pt at time of IE include:steps to enter environment, limited home support, difficulty performing ADLS, difficulty performing IADLS , limited insight into deficits and decreased initiation and engagement   Prior to admission, pt was (I) with ADL and IADL performance with use of SPC during functional mobility  Upon evaluation: Pt requires (S) level with use of RW during functional mobility 2* the following deficits impacting occupational performance: weakness, decreased strength, decreased tolerance, impaired initiation and impulsivity  Pt to benefit from continued skilled OT tx while in the hospital to address deficits as defined above and maximize level of functional independence w ADL's and functional mobility  Occupational Performance areas to address include: grooming, bathing/shower, toilet hygiene, dressing, functional mobility, community mobility and clothing management  From OT standpoint, recommendation at time of d/c would be home health services  Goals   Patient Goals to go home    Short Term Goal  pt will perform UE strengthening and ROM exercises while seated EOB or in chair    Long Term Goal #1 pt will perform UB/LB bathing and grooming tasks while seated EOB or in chair with (S) level   Long Term Goal #2 pt will demonstrate toilet transfers and toilet hygiene tasks at (I) level   Long Term Goal pt will increase independence with functional mobility with use of RW    Plan   Treatment Interventions ADL retraining;Functional transfer training; Endurance training;UE strengthening/ROM; Patient/family training; Activityengagement   Goal Expiration Date 12/27/18 OT Frequency 3-5x/wk   Recommendation   OT Discharge Recommendation Home Health Agency   Barthel Index   Feeding 10   Bathing 0   Grooming Score 0   Dressing Score 5   Bladder Score 10   Bowels Score 10   Toilet Use Score 5   Transfers (Bed/Chair) Score 10   Mobility (Level Surface) Score 10   Stairs Score 0   Barthel Index Score 60     Pt will benefit from continued OT services in order to maximize (I) c ADL performance, FM c RW, and improve overall endurance/strength required to complete functional tasks in preparation for d/c  Pt left seated in chair at end of session; all needs within reach; all lines intact

## 2018-12-13 NOTE — SOCIAL WORK
Pt is being discharged today  Pt's family will give the patient a ride home  Case Management reviewed discharge planning process including the following: identifying help that is needed at home, pt's preference for discharge needs and Meds at [facility]  Reviewed with Pt that any member of the healthcare team can answer questions regarding : medications, jmportance of recognizing  Signs and symptoms of any  medical problems  Case Management also encouraged pt to follow up with all recommended appointments after discharge

## 2018-12-13 NOTE — DISCHARGE SUMMARY
Discharge- Mariela a 1933, 80 y o  female MRN: [id number]    Unit/Bed#:  Encounter: [id number]    Primary Care Provider: Otilio Dawn DO   Date and time admitted to hospital: 12/11/2018  1:23 PM        * Rapid heart rate   Assessment & Plan    Device interrogation was completed today 12/13/2018, showed episode of SVT    Will increase beta-blocker/Lopressor to 37 5 mg twice daily     Chronic diastolic congestive heart failure [facility])   Assessment & Plan    Patient appears euvolemic, resume previous home regimen, HCTZ       CAD (coronary artery disease)   Assessment & Plan    Medical management  Aortic valve stenosis   Assessment & Plan    Outpatient follow-up with Dr Vladimir Fortune     Diabetes 1 5, managed as type 2 [facility])   Assessment & Plan    Lab Results   Component Value Date    HGBA1C 11 0 (H) 12/11/2018       Recent Labs      12/12/18   2019  12/13/18   0003  12/13/18   0430  12/13/18   1206   POCGLU  307*  216*  91  275*       Blood Sugar Average: Last 72 hrs:  (P) 205 1875     Blood sugars are not controlled, hemoglobin A1c is markedly elevated, patient became hypoglycemic with Lantus 15 units at bedtime  Diabetic education, follow-up with endocrinology, given her significant hypoglycemia would not titrate any of her medications             Discharging Physician / Practitioner: Rosi Palacio DO  PCP: Otilio Dawn DO  Admission Date:   Admission Orders     Ordered        12/12/18 0800  Inpatient Admission  Once         12/11/18 1710  Place in Observation (expected length of stay for this patient is less than two midnights)  Once             Discharge Date: 12/13/18    Resolved Problems  Date Reviewed: 12/13/2018          Resolved    NSTEMI (non-ST elevated myocardial infarction) ([facility] ) 12/13/2018     Resolved by  Rosi Palacio DO    Hyponatremia 12/12/2018     Resolved by  Rosi Palacio DO    Hypomagnesemia 12/13/2018     Resolved by  Rosi Palacio DO    Hypoglycemia due to insulin 12/13/2018     Resolved by  Kev Welsh DO          Consultations During Hospital Stay:  · Cardiology    Procedures Performed:     · Pacemaker interrogation    Significant Findings / Test Results:     · SVT    Incidental Findings:   · None       Reason for Admission:  Please refer to admitting history and physical    Hospital Course:     Annie Glez is a 80 y o  female patient who originally presented to the hospital on 12/11/2018 due to rapid heart rate and funny feeling in her chest  Please see above list of diagnoses and related plan for additional information  Condition at Discharge: good     Discharge Day Visit / Exam:     Subjective:  No pain  Vitals: Blood Pressure: 127/59 (12/13/18 1000)  Pulse: 62 (12/13/18 1000)  Temperature: 98 2 °F (36 8 °C) (12/13/18 0700)  Temp Source: Tympanic (12/13/18 0700)  Respirations: 19 (12/13/18 0700)  Height: 5' 2" (157 5 cm) (12/12/18 1603)  Weight - Scale: 61 7 kg (136 lb) (12/12/18 1602)  SpO2: 99 % (12/13/18 0700)  Exam:   Physical Exam   Constitutional: She is oriented to person, place, and time  She appears well-developed and well-nourished  No distress  Cardiovascular: Normal rate, regular rhythm and intact distal pulses  No murmur heard  Pulmonary/Chest: Effort normal and breath sounds normal  No respiratory distress  She has no wheezes  Musculoskeletal: She exhibits edema (Plus one bilateral lower extremity edema)  Neurological: She is alert and oriented to person, place, and time  No cranial nerve deficit  Coordination normal    Skin: She is not diaphoretic  Psychiatric: She has a normal mood and affect  Her behavior is normal  Judgment and thought content normal    Nursing note and vitals reviewed  Discharge instructions/Information to patient and family:   See after visit summary for information provided to patient and family        Provisions for Follow-Up Care:  See after visit summary for information related to follow-up care and any pertinent home health orders  Disposition:     Home    For Discharges to [facility]:   · Not Applicable to this Patient - Not Applicable to this Patient    Planned Readmission:  No     Discharge Statement:  I spent 35 minutes discharging the patient  This time was spent on the day of discharge  I had direct contact with the patient on the day of discharge  Greater than 50% of the total time was spent examining patient, answering all patient questions, arranging and discussing plan of care with patient as well as directly providing post-discharge instructions  Additional time then spent on discharge activities  Discharge Medications:  See after visit summary for reconciled discharge medications provided to patient and family        ** Please Note: This note has been constructed using a voice recognition system **

## 2018-12-13 NOTE — PLAN OF CARE
Problem: PHYSICAL THERAPY ADULT  Goal: Performs mobility at highest level of function for planned discharge setting  See evaluation for individualized goals  Outcome: Progressing  Prognosis: Good  Problem List: Decreased strength, Decreased endurance, Impaired balance, Decreased mobility  Assessment: Pt    currently performing tx/ambulation with suoervison  Trialed RW with improved balance and stability  No LOB  HR stable mid 60's with ambulation  Pt would benefit from continued activity in PT to improve LE strength balance endurance mobility transfers ambulation and stair negotiation with return to (I) LO to ensure safe return home when medically stable for discharge  Recommendation: Home PT, Home with family support     PT - OK to Discharge: Yes (when medically stable for discharge)    See flowsheet documentation for full assessment

## 2018-12-13 NOTE — OCCUPATIONAL THERAPY NOTE
Occupational Therapy         Patient Name: Bella Hennessy  YPRCQ'H Date: 12/13/2018      Attempted OT evaluation this AM; pt states "I am tired"; will attempt at later time this date

## 2018-12-13 NOTE — PROGRESS NOTES
Cardiology Progress Note - Em Barragan 80 y o  female MRN: [id number]    Unit/Bed#:  Encounter: [id number]    Assessment:  1  NSTEMI type II due to tachyarrhythmia   2  Severe AS  3  HTN  4  Hypoglycemia       Plan:   1  Pacemaker interrogation scheduled for today  Patient does have a history of SVT  Will evaluate to see if this was the rhythm the patient was in yesterday when her HR was noted to be 144 at home  If this is the case, will consider increasing toprol to 50 BID  2  Severe AS noted on echocardiogram yesterday  Patient would be a surgical candidate for TAVR but this discussion will be left to her primary cardiologist, Dr Yi Amos and Dr Karime Barillas  She is currently stable without any symptoms or change in activity level  3  Well controlled on current regimen  4  Sugars have been better controlled  Primary team to manage  From a cardiology standpoint, the patient is stable and will be able to go home today after pacemaker interrogation and medication adjustment if necessary  Subjective: The patient offers no complaints  She states she feels ready to be discharged today  Denies any chest pain, shortness of breath, palpitations, edema, lightheadedness and dizziness  PT had patient out of bed yesterday and she states she did well without any difficulty  Review of Systems   Constitution: Negative for chills and fever  HENT: Negative  Eyes: Negative  Cardiovascular: Negative for chest pain, dyspnea on exertion, irregular heartbeat, leg swelling, near-syncope, orthopnea, palpitations and syncope  Respiratory: Negative for cough, shortness of breath and wheezing  Gastrointestinal: Negative for nausea and vomiting  Genitourinary: Negative  Neurological: Negative for focal weakness, light-headedness and loss of balance  Psychiatric/Behavioral: Positive for memory loss         Objective:   Vitals: Blood pressure 113/62, pulse 60, temperature 98 2 °F (36 8 °C), temperature source Tympanic, resp  rate 19, height 5' 2" (1 575 m), weight 61 7 kg (136 lb), SpO2 99 %  , Body mass index is 24 87 kg/m² , Orthostatic Blood Pressures      Most Recent Value   Blood Pressure  113/62 filed at 12/13/2018 0700   Patient Position - Orthostatic VS  Lying filed at 12/13/2018 1222         Systolic (82XYE), SOI:366 , Min:99 , HSD:870     Diastolic (77FRO), GJX:30, Min:59, Max:69      Intake/Output Summary (Last 24 hours) at 12/13/18 0952  Last data filed at 12/12/18 1752   Gross per 24 hour   Intake              480 ml   Output                0 ml   Net              480 ml     Weight (last 2 days)     Date/Time   Weight    12/12/18 1602  61 7 (136)    12/11/18 1802  61 7 (136 02)                Telemetry Review: No significant arrhythmias seen on telemetry review  EKG personally reviewed by Chana Trejo PA-C  Physical Exam   Constitutional: She is oriented to person, place, and time  She appears well-developed  No distress  HENT:   Head: Normocephalic and atraumatic  Mouth/Throat: Oropharynx is clear and moist    Eyes: Pupils are equal, round, and reactive to light  Neck: Normal range of motion  Carotid bruit is not present  Cardiovascular: Normal rate and regular rhythm  Murmur heard  Systolic murmur is present with a grade of 3/6   Pulmonary/Chest: Effort normal and breath sounds normal  No respiratory distress  She has no wheezes  She has no rales  Musculoskeletal: She exhibits no edema  Neurological: She is alert and oriented to person, place, and time  Skin: Skin is warm and dry  She is not diaphoretic  No erythema  Psychiatric: She has a normal mood and affect  Her behavior is normal    Vitals reviewed          Laboratory Results:    Results from last 7 days  Lab Units 12/12/18  0030 12/11/18  2152 12/11/18  1841   TROPONIN I ng/mL 0 08* 0 10* 0 10*       CBC with diff:   Results from last 7 days  Lab Units 12/13/18  0445 12/12/18  0520 12/11/18  1342   WBC Thousand/uL 6 85 9 61 7 88   HEMOGLOBIN g/dL 11 9 12 1 12 6   HEMATOCRIT % 35 8 36 3 38 8   MCV fL 92 92 93   PLATELETS Thousands/uL 197 218 208   MCH pg 30 7 30 6 30 3   MCHC g/dL 33 2 33 3 32 5   RDW % 13 5 13 2 13 2   MPV fL 10 2 10 0 10 1   NRBC AUTO /100 WBCs 0 0 0         CMP:  Results from last 7 days  Lab Units 18  0445 18  0520 18  1342   POTASSIUM mmol/L 4 0 3 1* 3 9   CHLORIDE mmol/L 104 102 100   CO2 mmol/L 29 29 27   BUN mg/dL 8 13 15   CREATININE mg/dL 0 67 0 72 1 04   CALCIUM mg/dL 8 4 7 9* 8 0*   AST U/L 17 18 18   ALT U/L 17 21 22   ALK PHOS U/L 44* 50 59   EGFR ml/min/1 73sq m 80 77 49         BMP:  Results from last 7 days  Lab Units 18  0445 18  0520 18  1342   POTASSIUM mmol/L 4 0 3 1* 3 9   CHLORIDE mmol/L 104 102 100   CO2 mmol/L 29 29 27   BUN mg/dL 8 13 15   CREATININE mg/dL 0 67 0 72 1 04   CALCIUM mg/dL 8 4 7 9* 8 0*       BNP: No results for input(s): BNP in the last 72 hours      Magnesium:   Results from last 7 days  Lab Units 18  0445 18  0520 18  1342   MAGNESIUM mg/dL 1 8 1 8 1 5*       Coags:   Results from last 7 days  Lab Units 18  0445 18  1342   PTT seconds  --  26   INR  0 95 1 02       TSH:        Hemoglobin A1C   Results from last 7 days  Lab Units 18  2152   HEMOGLOBIN A1C % 11 0*       Lipid Profile:   Results from last 7 days  Lab Units 18  0520   TRIGLYCERIDES mg/dL 101   HDL mg/dL 57       Cardiac testing:   Results for orders placed during the hospital encounter of 18   Echo complete with contrast if indicated    Narrative [address] [facility] [address] [facility]  [phone]    Transthoracic Echocardiogram  2D, M-mode, Doppler, and Color Doppler    Study date:  12-Dec-2018    Patient: Nuno Kuo  MR number: [id number]  Account number: [de-identified]  : 1933  Age: 80 years  Gender: Female  Status: Inpatient  Location: Bedside  Height: 62 in  Weight: 136 lb  BP: 125/ 65 mmHg    Indications: weakness    Diagnoses: R53 1 - Weakness    Sonographer:  [facility], [facility]  Primary Physician:  Lissa Black DO  Referring Physician:  Faustino Calle DO  Group:  [id number]  Interpreting Physician:  Eddie Polanco MD    SUMMARY    LEFT VENTRICLE:  Systolic function was normal by visual assessment  Ejection fraction was estimated to be 65 %  There were no regional wall motion abnormalities  Doppler parameters were consistent with abnormal left ventricular relaxation (grade 1 diastolic dysfunction)  AORTIC VALVE:  The valve was trileaflet  Leaflets exhibited moderately increased thickness, moderate calcification, and moderately reduced cuspal separation  Transaortic velocity was increased due to valvular stenosis  There was severe stenosis  There was mild regurgitation  Valve peak gradient was 63 mmHg  Valve mean gradient was 37 mmHg  Aortic valve area was 0 7 cm squared by the continuity equation  The aortic valve obstructive index (by VTI) was 0 23  TRICUSPID VALVE:  There was mild regurgitation  HISTORY: PRIOR HISTORY: aortic stenosis    PROCEDURE: The procedure was performed at the bedside  This was a routine study  The transthoracic approach was used  The study included complete 2D imaging, M-mode, complete spectral Doppler, and color Doppler  The heart rate was 80 bpm,  at the start of the study  This was a technically difficult study  LEFT VENTRICLE: Size was normal  Systolic function was normal by visual assessment  Ejection fraction was estimated to be 65 %  There were no regional wall motion abnormalities  Wall thickness was normal  DOPPLER: Doppler parameters were  consistent with abnormal left ventricular relaxation (grade 1 diastolic dysfunction)      RIGHT VENTRICLE: The size was normal  Systolic function was normal  Wall thickness was normal     LEFT ATRIUM: Size was normal     RIGHT ATRIUM: Size was normal     MITRAL VALVE: Valve structure was normal  There was normal leaflet separation  DOPPLER: The transmitral velocity was within the normal range  There was no evidence for stenosis  There was no significant regurgitation  AORTIC VALVE: The valve was trileaflet  Leaflets exhibited moderately increased thickness, moderate calcification, and moderately reduced cuspal separation  DOPPLER: Transaortic velocity was increased due to valvular stenosis  There was  severe stenosis  There was mild regurgitation  TRICUSPID VALVE: The valve structure was normal  There was normal leaflet separation  DOPPLER: The transtricuspid velocity was within the normal range  There was no evidence for stenosis  There was mild regurgitation  Estimated peak PA  pressure was 30 mmHg  PULMONIC VALVE: Leaflets exhibited normal thickness, no calcification, and normal cuspal separation  DOPPLER: The transpulmonic velocity was within the normal range  There was no significant regurgitation  PERICARDIUM: There was no pericardial effusion  The pericardium was normal in appearance  AORTA: The root exhibited normal size  SYSTEMIC VEINS: IVC: The inferior vena cava was normal in size   Respirophasic changes were normal     MEASUREMENT TABLES    DOPPLER MEASUREMENTS  Aortic valve   (Reference normals)  Peak jaskaran   399 cm/s   (--)  Peak gradient   63 mmHg   (--)  Mean gradient   37 mmHg   (--)  Valve area, cont   0 7 cm squared   (--)  Obstr index, VTI   0 23    (--)    OTHER ECHO MEASUREMENTS  (Reference normals)  Estimated CVP   5 mmHg   (--)    SYSTEM MEASUREMENT TABLES    2D  %FS: 32 4 %  Ao Diam: 2 31 cm  EDV(Teich): 43 19 ml  EF(Teich): 62 %  ESV(Teich): 16 41 ml  IVSd: 1 13 cm  LA Area: 9 83 cm2  LA Diam: 2 88 cm  LVEDV MOD A4C: 37 43 ml  LVEF MOD A4C: 62 43 %  LVESV MOD A4C: 14 06 ml  LVIDd: 3 27 cm  LVIDs: 2 21 cm  LVLd A4C: 5 88 cm  LVLs A4C: 4 87 cm  LVOT Diam: 1 78 cm  LVPWd: 0 74 cm  RA Area: 9 64 cm2  RV DIAM: 2 51 cm  SV MOD A4C: 23 37 ml  SV(Teich): 26 78 ml    CW  AV Env  Ti: 313 15 ms  AV VTI: 95 49 cm  AV Vmax: 3 87 m/s  AV Vmax: 3 93 m/s  AV Vmean: 3 05 m/s  AV maxP 87 mmHg  AV maxP 82 mmHg  AV meanP 29 mmHg  TR Vmax: 2 59 m/s  TR maxP 91 mmHg    PW  KASEY (VTI): 0 63 cm2  KASEY Vmax: 0 57 cm2  KASEY Vmax: 0 58 cm2  KASEY Vmax, Pt: 0 57 cm2  KASEY Vmax, Pt: 0 58 cm2  E' Sept: 0 05 m/s  E/E' Sept: 12 11  LVOT Env  Ti: 418 69 ms  LVOT VTI: 24 07 cm  LVOT Vmax: 0 89 m/s  LVOT Vmax: 0 9 m/s  LVOT Vmean: 0 58 m/s  LVOT maxPG: 3 19 mmHg  LVOT maxPG: 3 24 mmHg  LVOT meanP 59 mmHg  LVSV Dopp: 60 21 ml  MV A Josh: 0 78 m/s  MV Dec Baraga: 1 79 m/s2  MV DecT: 353 61 ms  MV E Josh: 0 63 m/s  MV E/A Ratio: 0 81    [facility] Accredited Echocardiography Laboratory    Prepared and electronically signed by    Jocy Rose MD  Signed 12-Dec-2018 14:25:17       No results found for this or any previous visit  No results found for this or any previous visit  No results found for this or any previous visit      Meds/Allergies   all current active meds have been reviewed  Prescriptions Prior to Admission   Medication    aspirin 81 MG tablet    ergocalciferol (VITAMIN D2) 50,000 units    levothyroxine 75 mcg tablet    metFORMIN (FORTAMET) 500 MG (OSM) 24 hr tablet    pravastatin (PRAVACHOL) 20 mg tablet    predniSONE 5 mg tablet    repaglinide (PRANDIN) 2 mg tablet    sitaGLIPtin (JANUVIA) 50 mg tablet    ferrous sulfate 325 (65 Fe) mg tablet    hydrochlorothiazide (MICROZIDE) 12 5 mg capsule    metoprolol tartrate (LOPRESSOR) 25 mg tablet    oxybutynin (DITROPAN-XL) 10 MG 24 hr tablet    pantoprazole (PROTONIX) 40 mg tablet    polyethylene glycol (MIRALAX) powder          Assessment:  Principal Problem:    Rapid heart rate  Active Problems:    Diabetes 1 5, managed as type 2 (HCC)    Chronic diastolic congestive heart failure (HCC)    NSTEMI (non-ST elevated myocardial infarction) ([facility] [address] )    Aortic valve stenosis    CAD (coronary artery disease)    Hypomagnesemia    Hypoglycemia due to insulin

## 2018-12-13 NOTE — NURSING NOTE
Patient had her pacer interrogated, results seen by Dr Cynthia Martell, JOANN YOUNG [facility]

## 2018-12-13 NOTE — PROGRESS NOTES
Patient stable overnight, sister remained at bedside, POC Glucose results for this shift as follows:    20:19  307  00:03  216  04:30  91    Per patient's sister, Jenn Velasco, the patient becomes symptomatic at less than 100  Patient was given 4 oz apple juice, maryuri crackers with peanut butter, and milk  Patient's son brought her E[place] toast from cafeteria despite being informed she would have a tray for breakfast and that medications needed to be given prior to meal  He said he would be getting her food anyway and she needed to eat first and that while we were helping her the best we could, the staff has been "messing her up" by not sticking to what her normal schedule and routine is at home   Medications due will be given after meal per family request

## 2018-12-13 NOTE — PLAN OF CARE
Problem: OCCUPATIONAL THERAPY ADULT  Goal: Performs self-care activities at highest level of function for planned discharge setting  See evaluation for individualized goals  Treatment Interventions: ADL retraining, Functional transfer training, Endurance training, UE strengthening/ROM, Patient/family training, Activityengagement          See flowsheet documentation for full assessment, interventions and recommendations  Limitation: Decreased ADL status, Decreased UE strength, Decreased Safe judgement during ADL, Decreased endurance, Decreased self-care trans, Decreased high-level ADLs     Assessment: Pt is a 80 y o  female seen for OT evaluation s/p admit to [facility] 12/11/2018 w/ Rapid heart rate  Comorbidities affecting pt's functional performance at time of assessment include: DM and HTN  Personal factors affecting pt at time of IE include:steps to enter environment, limited home support, difficulty performing ADLS, difficulty performing IADLS , limited insight into deficits and decreased initiation and engagement   Prior to admission, pt was (I) with ADL and IADL performance with use of SPC during functional mobility  Upon evaluation: Pt requires (S) level with use of RW during functional mobility 2* the following deficits impacting occupational performance: weakness, decreased strength, decreased tolerance, impaired initiation and impulsivity  Pt to benefit from continued skilled OT tx while in the hospital to address deficits as defined above and maximize level of functional independence w ADL's and functional mobility  Occupational Performance areas to address include: grooming, bathing/shower, toilet hygiene, dressing, functional mobility, community mobility and clothing management  From OT standpoint, recommendation at time of d/c would be home health services       OT Discharge Recommendation: [address]

## 2018-12-13 NOTE — PHYSICAL THERAPY NOTE
PT Treatment note      12/13/18 1013   Subjective   Subjective Reports her legs feel a little weak and would like to try ambulating with a walker  Bed Mobility   Additional Comments seated EOB at start of PT session   Transfers   Sit to Stand 5  Supervision   Additional items Verbal cues   Stand to Sit 5  Supervision   Additional items Armrests   Stand pivot 5  Supervision   Ambulation/Elevation   Gait pattern Short stride;Narrow KIANA   Gait Assistance 5  Supervision   Assistive Device Rolling walker   Distance 200' no LOB   Balance   Static Sitting Good   Dynamic Sitting Good   Static Standing Fair +   Dynamic Standing Fair +   Ambulatory Fair +  (with RW)   Endurance Deficit   Endurance Deficit Yes   Activity Tolerance   Activity Tolerance Patient limited by fatigue   Assessment   Prognosis Good   Problem List Decreased strength;Decreased endurance; Impaired balance;Decreased mobility   Assessment Pt    currently performing tx/ambulation with suoervison  Trialed RW with improved balance and stability  No LOB  HR stable mid 60's with ambulation  Pt would benefit from continued activity in PT to improve LE strength balance endurance mobility transfers ambulation and stair negotiation with return to (I) LOF to ensure safe return home when medically stable for discharge  Plan   Treatment/Interventions Functional transfer training;LE strengthening/ROM; Elevations; Therapeutic exercise; Endurance training;Bed mobility;Gait training   Progress Progressing toward goals   Recommendation   Recommendation Home PT; Home with family support   Equipment Recommended Walker   Pt  In bed  with call bell within reach, scd's connected and turned on  at end of PT session  Discussed with Alison Horner, PT today's treatment and patient's current level of function for care coordination

## 2018-12-13 NOTE — ASSESSMENT & PLAN NOTE
Lab Results   Component Value Date    HGBA1C 11 0 (H) 12/11/2018       Recent Labs      12/12/18   2019  12/13/18   0003  12/13/18   0430  12/13/18   1206   POCGLU  307*  216*  91  275*       Blood Sugar Average: Last 72 hrs:  (P) 205 7715     Blood sugars are not controlled, hemoglobin A1c is markedly elevated, patient became hypoglycemic with Lantus 15 units at bedtime  Diabetic education, follow-up with endocrinology, given her significant hypoglycemia would not titrate any of her medications

## 2018-12-13 NOTE — PROGRESS NOTES
Spoke with patient's sister and provided 2019 DM support group dates  She is interested in bringing [place] to [employer]  RD contact information provided

## 2018-12-17 LAB
ATRIAL RATE: 60 BPM
ATRIAL RATE: 60 BPM
ATRIAL RATE: 61 BPM
PR INTERVAL: 216 MS
PR INTERVAL: 220 MS
PR INTERVAL: 220 MS
QRS AXIS: -43 DEGREES
QRS AXIS: -44 DEGREES
QRS AXIS: -44 DEGREES
QRSD INTERVAL: 100 MS
QRSD INTERVAL: 94 MS
QRSD INTERVAL: 96 MS
QT INTERVAL: 452 MS
QT INTERVAL: 466 MS
QT INTERVAL: 468 MS
QTC INTERVAL: 452 MS
QTC INTERVAL: 466 MS
QTC INTERVAL: 471 MS
T WAVE AXIS: -6 DEGREES
T WAVE AXIS: 28 DEGREES
T WAVE AXIS: 62 DEGREES
VENTRICULAR RATE: 60 BPM
VENTRICULAR RATE: 60 BPM
VENTRICULAR RATE: 61 BPM

## 2018-12-17 PROCEDURE — 93010 ELECTROCARDIOGRAM REPORT: CPT | Performed by: INTERNAL MEDICINE

## 2018-12-21 RX ORDER — BLOOD-GLUCOSE METER
KIT MISCELLANEOUS
Refills: 0 | COMMUNITY
Start: 2018-12-07

## 2018-12-26 ENCOUNTER — OFFICE VISIT (OUTPATIENT)
Dept: INTERNAL MEDICINE CLINIC | Facility: CLINIC | Age: 83
End: 2018-12-26
Payer: COMMERCIAL

## 2018-12-26 VITALS
WEIGHT: 132.6 LBS | HEART RATE: 59 BPM | BODY MASS INDEX: 24.4 KG/M2 | SYSTOLIC BLOOD PRESSURE: 122 MMHG | TEMPERATURE: 97.6 F | OXYGEN SATURATION: 99 % | DIASTOLIC BLOOD PRESSURE: 72 MMHG | HEIGHT: 62 IN

## 2018-12-26 DIAGNOSIS — Z95.0 S/P PLACEMENT OF CARDIAC PACEMAKER: ICD-10-CM

## 2018-12-26 DIAGNOSIS — R00.0 RAPID HEART RATE: Primary | ICD-10-CM

## 2018-12-26 DIAGNOSIS — IMO0001 UNCONTROLLED TYPE 2 DIABETES MELLITUS WITHOUT COMPLICATION, WITHOUT LONG-TERM CURRENT USE OF INSULIN: ICD-10-CM

## 2018-12-26 PROCEDURE — 1160F RVW MEDS BY RX/DR IN RCRD: CPT | Performed by: INTERNAL MEDICINE

## 2018-12-26 PROCEDURE — 1111F DSCHRG MED/CURRENT MED MERGE: CPT | Performed by: INTERNAL MEDICINE

## 2018-12-26 PROCEDURE — 99495 TRANSJ CARE MGMT MOD F2F 14D: CPT | Performed by: INTERNAL MEDICINE

## 2018-12-26 NOTE — PROGRESS NOTES
Assessment/Plan:         Diagnoses and all orders for this visit:    Rapid heart rate  Pt is taking the total amount of increased metoprolol but due to ease of taking she is doing 3 25mg tablets throughout the day  She has cardio followup in early january  Encouraged increase water intake    Uncontrolled type 2 diabetes mellitus without complication, without long-term current use of insulin ([address] )  She has endocrine follopwup  She had a symptomatic low in the hospital with insulin dosing and family is concerned  We did discuss better eating options and consistent meals along with improved scheduled sleep gerson her family was supportive of as well She will bring her blood sugars to next visit  S/P placement of cardiac pacemaker  She has cardio followup but family states she interrogation before DC and was functioning well  Other orders  -     Blood Glucose Monitoring Suppl (ONE TOUCH ULTRA MINI) w/Device KIT; USE TO TEST 4 TIMES DAILY  -     ONE TOUCH ULTRA TEST test strip; 4 (four) times a day Test  -     ONETOUCH DELICA LANCETS FINE MISC; 4 (four) times a day Test      RTO 1 months       Patient ID: Darline Roman is a 80 y o  female  HPI   Pt recently hospitalized for tachycardia - she has pacemaker in place but had wekaness and did not feel well  Her sugar was also very elevated at time of admission Patient does see endocrine and it has been a struggle to improve glycemic control  She has been hesitant to take insulin do to fear of the use of the needle, her sleep schedule and eating habits and sensitivty  Patient was given insulin in the hospital due to higher Blood sugars and after lantus several hours later her sugar was 20  Her family is thus concerned for a few reasons with the use of unsulin She has been taking increased dose of metorpolol and has followupwith cardio in early January  She does see Dr Cara Espinal January 8   Her sugars since home have been ebtter - usually mid 200 range her family states her eating habits are  Not great and she does not sleep normally - she usually gets up for the day close to lunchtime so her med schedule is also skewed No n/v  She still feels tired  No chest pain  She feels her pacer site is more noticeable since the hospital - no pain or redness and it was interrogated at [facility] per family  TCM Call (since 11/25/2018)     Date and time call was made  12/13/2018  4:10 PM    Hospital care reviewed  Records reviewed    Patient was hospitialized at  [address] [place] [address]    Date of Admission  12/11/18    Date of discharge  12/13/18    Diagnosis  rapid heart rate, diastolic CHF, CAD, aortic stenosis    Disposition  Home    Were the patients medications reviewed and updated  No    Current Symptoms  None      TCM Call (since 11/25/2018)     Post hospital issues  None    Should patient be enrolled in anticoag monitoring? No    Scheduled for follow up? Yes    Did you obtain your prescribed medications  Yes    Do you need help managing your prescriptions or medications  No    Is transportation to your appointment needed  No    I have advised the patient to call PCP with any new or worsening symptoms  [address] [place] [address] members    Support System  Family    The type of support provided  Physical; Emotional    Do you have social support  Yes, quite a bit    Are you recieving any outpatient services  No    Are you recieving home care services  No    Are you using any community resources  No    Current waiver services  No    Interperter language line needed  No    Counseling  Patient; Family          Review of Systems   Constitutional: Positive for fatigue  HENT: Negative  Eyes: Negative  Respiratory: Positive for shortness of breath  Cardiovascular: Positive for leg swelling  Wearing support stockings   Gastrointestinal: Negative  Genitourinary: Negative  Musculoskeletal: Positive for arthralgias  Skin: Negative      Neurological: Negative for dizziness and headaches  Hematological: Negative  Psychiatric/Behavioral: Negative  Past Medical History:   Diagnosis Date    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     Bradycardia     Coronary artery disease     Depression     Depression     Diabetes mellitus ([facility] )     Type 2 DM    Disease of thyroid gland     Essential (primary) hypertension     Facial droop     started 10 years ago    GERD (gastroesophageal reflux disease)     Heart murmur     History of echocardiogram 08/28/2017    2-D w/CFD:  EF 0 60 (60%), LVSF is normal  No regional wall abnormalities  Mild mitral valve regurg  AV was trileaflet  Moderate tricuspid regurg  Trace pulmonic valve  No pericardial effusion   History of echocardiogram 03/28/2018    2-D w/CFD:  EF 0 60 (60%), Mild regurg in the MV  AV was trileaflet, severe stenosis, trace regurg  Mild regurg in the TV  No pericardial effusion  Aortic root exhibited normal size   History of echocardiogram 01/29/2016    2-D:  EF 0 55 (55%), Normal LVSF  Mild concentric LVH  Mild MR  Mild AS with mild regurg      History of Holter monitoring     2/24/16, 9/27/17, 2/6/18    History of nuclear stress test 08/16/2017    Lexiscan MPI:  EF 0 76 (76%), no prior MI or ischemia,    Hyperlipidemia     Hypothyroidism     Nonrheumatic aortic (valve) stenosis     Obstructive sleep apnea (adult) (pediatric)     10/17/17 - NPSG with nasal CPAP/Bi-level titration     Osteoarthritis     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HCC)     SSS [facility]sick sinus syndrome) ([facility])     SVT [facility]supraventricular tachycardia) ([facility]     Vitamin D deficiency      Past Surgical History:   Procedure Laterality Date    ANKLE SURGERY Left 2005   [address] [facility] [address]    CATARACT EXTRACTION Bilateral 09/1995    HEMORRHOID SURGERY  1978    HERNIA REPAIR  1990    INSERT / Gerard Graciela / Ez Thad  05/14/2018    dual lead pacer st thda model 2272    JOINT REPLACEMENT      b/l hip replacements, right 2006    NEUROPLASTY / TRANSPOSITION MEDIAN NERVE AT CARPAL TUNNEL  1977    PARTIAL HYSTERECTOMY  1971    TOTAL HIP ARTHROPLASTY      TRANSPHENOIDAL / TRANSNASAL HYPOPHYSECTOMY / RESECTION PITUITARY TUMOR  1989    excision of pituitary gland     Social History     Social History    Marital status:      Spouse name: N/A    Number of children: N/A    Years of education: N/A     Occupational History    Not on file  Social History Main Topics    Smoking status: Never Smoker    Smokeless tobacco: Never Used    Alcohol use No    Drug use: No    Sexual activity: No     Other Topics Concern    Not on file     Social History Narrative    Caffeine use    Dental care regularly    Good dental hygiene    Uses safety equipment: seatbelts         Allergies   Allergen Reactions    Cephalosporins GI Intolerance    Acetaminophen     Cephalexin GI Intolerance    Clindamycin      Other reaction(s): Unspecified    Demeclocycline     Hydrocodone      Bitartrate  Other reaction(s): Unspecified    Hydrocodone-Acetaminophen Nausea Only    Oxycodone-Acetaminophen      HCL    Oxycodone-Acetaminophen     Penicillins Hives     Other reaction(s): Other (See Comments)  ITCHING/RASH    Simvastatin      Other reaction(s): GI upset    Sulfamethoxazole-Trimethoprim     Tetrabenazine     Tetracycline Nausea Only    Tetracyclines & Related     Vicodin [Hydrocodone-Acetaminophen]            /72   Pulse 59   Temp 97 6 °F (36 4 °C) (Tympanic)   Ht 5' 2" (1 575 m)   Wt 60 1 kg (132 lb 9 6 oz)   SpO2 99%   BMI 24 25 kg/m²          Physical Exam   Constitutional: She is oriented to person, place, and time  She appears well-developed and well-nourished  No distress  HENT:   Head: Normocephalic and atraumatic  Mouth/Throat: No oropharyngeal exudate  Eyes: Pupils are equal, round, and reactive to light  Conjunctivae and EOM are normal  No scleral icterus  Neck: Normal range of motion  Neck supple  No JVD present  Cardiovascular: Normal rate, regular rhythm, normal heart sounds and intact distal pulses  Pulmonary/Chest: Effort normal and breath sounds normal    Abdominal: Soft  Bowel sounds are normal    Musculoskeletal: Normal range of motion  She exhibits no edema, tenderness or deformity  Lymphadenopathy:     She has no cervical adenopathy  Neurological: She is alert and oriented to person, place, and time  She displays abnormal reflex  No cranial nerve deficit  She exhibits abnormal muscle tone  Coordination abnormal    Skin: Skin is warm and dry  She is not diaphoretic  Pacer site nontender wnl appearance   Psychiatric: She has a normal mood and affect  Her behavior is normal  Judgment and thought content normal    Nursing note and vitals reviewed

## 2019-01-07 ENCOUNTER — OFFICE VISIT (OUTPATIENT)
Dept: CARDIOLOGY CLINIC | Facility: CLINIC | Age: 84
End: 2019-01-07
Payer: COMMERCIAL

## 2019-01-07 VITALS
SYSTOLIC BLOOD PRESSURE: 124 MMHG | BODY MASS INDEX: 24.29 KG/M2 | HEART RATE: 60 BPM | WEIGHT: 132 LBS | DIASTOLIC BLOOD PRESSURE: 80 MMHG | HEIGHT: 62 IN

## 2019-01-07 DIAGNOSIS — I35.0 AORTIC VALVE STENOSIS, ETIOLOGY OF CARDIAC VALVE DISEASE UNSPECIFIED: Primary | ICD-10-CM

## 2019-01-07 DIAGNOSIS — I47.1 SVT (SUPRAVENTRICULAR TACHYCARDIA) (HCC): ICD-10-CM

## 2019-01-07 DIAGNOSIS — I10 ESSENTIAL HYPERTENSION: ICD-10-CM

## 2019-01-07 DIAGNOSIS — Z95.0 S/P PLACEMENT OF CARDIAC PACEMAKER: ICD-10-CM

## 2019-01-07 DIAGNOSIS — I49.5 SICK SINUS SYNDROME (HCC): ICD-10-CM

## 2019-01-07 PROCEDURE — 99214 OFFICE O/P EST MOD 30 MIN: CPT | Performed by: INTERNAL MEDICINE

## 2019-01-07 NOTE — PATIENT INSTRUCTIONS
Transcatheter Aortic Valve Replacement   AMBULATORY CARE:   What you need to know about a TAVR:   · A TAVR is a procedure to replace your aortic valve  It is done without removing your old valve  The aortic valve is between the left ventricle and the aorta  The left ventricle is the lower left chamber of your heart  The aorta is a blood vessel that pumps blood to your brain and body  The aortic valve opens and closes to let blood flow from your heart to the rest of your body  · TAVR may be used to replace your aortic valve if open heart surgery is not safe for you  Your valve will be replaced with a tissue valve  The tissue may be taken from an animal, such as a pig or cow  What will happen before a TAVR:   · You may need an echocardiogram, angiogram, EKG, or CT scan before your procedure  These tests will help your healthcare provider plan your procedure  They will also make sure a TAVR is safe for you  You may need to stop taking blood thinner medicine several days before your procedure  This will prevent heavy bleeding during your procedure  · Your healthcare provider will talk to you about how to prepare for your procedure  He may tell you not to eat or drink anything after midnight on the day of your procedure  He will tell you what medicines to take or not take on the day of your procedure  You may stay in the hospital 2 to 5 days after your procedure  Arrange for someone to drive you home and stay with you  This person can call 911 if you have problems at home  What will happen during a TAVR:   · You may be given general anesthesia to keep you asleep and free from pain during your procedure  You may instead be given IV sedation and local anesthesia  IV sedation will make you feel calm and relaxed during the procedure  With local anesthesia, you may still feel pressure or pushing during your procedure, but you should not feel any pain   You may be given an antibiotic through your IV to prevent a bacterial infection  Tell healthcare providers if you have ever had an allergic reaction to an antibiotic  · Your healthcare provider will make an incision in your neck, groin, or chest  He will guide a catheter with a balloon on the end through a blood vessel and into your heart  He will inflate the balloon in the opening of your valve  This balloon make space for your new valve to fit inside the old one  The balloon will be deflated and the catheter will be removed  · Your healthcare provider will insert another catheter into your heart  This catheter will hold a balloon, a stent, and the new valve  The new valve will be inside of the stent  When the catheter reaches your valve, your healthcare provider will expand the balloon  The balloon will push your old valve against the walls of your heart  The stent and new valve will be put in the old valve's position  The balloon will be deflated  The stent will continue to hold your old valve out of the way  It will also keep your new valve in the correct place  · Your healthcare provider will remove the catheter and wire  He may use clamps, stitches, or other devices to close the incision  Pressure will be applied to the incision for several minutes to stop any bleeding  A pressure bandage or other pressure device may be placed over the incision to help prevent more bleeding  What will happen after a TAVR:   · Healthcare providers will monitor your vital signs and pulses in your arm or leg, closely  They will check your pressure bandage for bleeding or swelling  You will need to lie flat with your leg or arm straight for 2 to 4 hours  Do not  get out of bed until healthcare providers says it is okay  Arm or leg movements can cause serious bleeding  · You may need blood tests, a chest x-ray, an EKG, or an echocardiogram before you leave the hospital  These tests will make sure your valve is working correctly   You will need to take blood thinner medicine for at least 6 months after your procedure  You may need to take aspirin for the rest of your life  These medicines will prevent blood clots from forming near your valve  Risks of a TAVR:  You may have bruising or pain where the catheter was  You may get an infection or bleed more than expected  The catheter may cause a hole in your heart or blood vessels  A blood clot may form around your valve  The clot may travel to your brain and cause a stroke  Your heartbeat may become irregular during or after a TAVR  You may need medicines or procedures to treat this  Your new valve may not work correctly  You may need another procedure to replace the valve  Follow up with your healthcare provider as directed:  Write down your questions so you remember to ask them during your visits  © 2017 [facility] [facility] Information is for End User's use only and may not be sold, redistributed or otherwise used for commercial purposes  All illustrations and images included in CareNotes® are the copyrighted property of A D A M , Inc  or Javon Covarrubias  The above information is an  only  It is not intended as medical advice for individual conditions or treatments  Talk to your doctor, nurse or pharmacist before following any medical regimen to see if it is safe and effective for you

## 2019-01-07 NOTE — PROGRESS NOTES
Subjective:        Patient ID: Carson Root is a 80 y o  female  Chief Complaint:  Recent records reviewed  Echo X 2 in 2018 reviewed, AS has now become severe  Chest pains when sleeping  "It's all lumpy, and it was smooth before"  "Since they banged on my chest its lumpy"  Feeling lousy, but she doesn't know why  She cant sleep right  Diabetes is keeping her us she says  She hears thumping in her chest at night, is afraid she is going to die  Severe fatigue all the time  Breathing is ok, uses [facility]icks since a 1989 surgery she tells me  Sister convinced "they banged on her chest and caused her to go into a coma", wonders if this caused her valve problems  Importantly, I do not get the impression that she is having any typical exertionally mediated chest pains or dyspnea, nor has she described what I would consider farida syncope at  Home  The following portions of the patient's history were reviewed and updated as appropriate: allergies, current medications, past family history, past medical history, past social history, past surgical history and problem list   Review of Systems   Constitution: Positive for malaise/fatigue  Negative for chills, diaphoresis and weight gain  HENT: Negative for nosebleeds and stridor  Eyes: Negative for double vision, vision loss in left eye, vision loss in right eye and visual disturbance  Cardiovascular: Positive for chest pain (  Quite reproducible, no palpable deformity) and palpitations  Negative for claudication, cyanosis, dyspnea on exertion, irregular heartbeat, leg swelling, near-syncope, orthopnea, paroxysmal nocturnal dyspnea and syncope  Respiratory: Negative for cough, shortness of breath, snoring and wheezing  Endocrine: Negative for polydipsia, polyphagia and polyuria  Hematologic/Lymphatic: Negative for bleeding problem  Does not bruise/bleed easily  Skin: Negative for flushing and rash     Musculoskeletal: Positive for arthritis and stiffness  Negative for falls and myalgias  Gastrointestinal: Negative for abdominal pain, heartburn, hematemesis, hematochezia, melena and nausea  Genitourinary: Negative for hematuria  Neurological: Positive for light-headedness and loss of balance  Negative for brief paralysis, dizziness, focal weakness, headaches and vertigo  Psychiatric/Behavioral: Positive for depression  Negative for altered mental status and substance abuse  The patient is nervous/anxious  Allergic/Immunologic: Negative for hives  Objective:      /80   Pulse 60   Ht 5' 2" (1 575 m)   Wt 59 9 kg (132 lb)   BMI 24 14 kg/m²   Physical Exam   Constitutional: She is oriented to person, place, and time  She appears well-developed and well-nourished  HENT:   Head: Normocephalic and atraumatic  Eyes: Pupils are equal, round, and reactive to light  EOM are normal    Neck: Normal range of motion  Neck supple  No JVD present  No thyromegaly present  Cardiovascular: Normal rate, regular rhythm and intact distal pulses  Exam reveals no gallop and no friction rub  Murmur (Grade 2/6 outflow) heard  Pulmonary/Chest: Effort normal and breath sounds normal  No stridor  No respiratory distress  She has no wheezes  She has no rales  Abdominal: Soft  Bowel sounds are normal  She exhibits no mass  There is no tenderness  Musculoskeletal: Normal range of motion  She exhibits no edema  Lymphadenopathy:     She has no cervical adenopathy  Neurological: She is alert and oriented to person, place, and time  Skin: Skin is warm and dry  No rash noted  No pallor  Psychiatric: She has a normal mood and affect  Her behavior is normal  Judgment and thought content normal    Nursing note and vitals reviewed  Lab Review:   Admission on 12/11/2018, Discharged on 12/13/2018   No results displayed because visit has over 200 results        Orders Only on 11/30/2018   Component Date Value    Severity 11/30/2018 Alert  Right Eye Diabetic Retin* 11/30/2018 Mild     Left Eye Diabetic Retino* 11/30/2018 Mild    Appointment on 11/27/2018   Component Date Value    Hemoglobin A1C 11/27/2018 10 5*    EAG 11/27/2018 255     TSH Baseline 11/27/2018 0 019     Sodium 11/27/2018 135*    Potassium 11/27/2018 4 2     Chloride 11/27/2018 102     CO2 11/27/2018 29     ANION GAP 11/27/2018 4     BUN 11/27/2018 13     Creatinine 11/27/2018 0 75     Glucose, Fasting 11/27/2018 172*    Calcium 11/27/2018 9 3     AST 11/27/2018 12     ALT 11/27/2018 15     Alkaline Phosphatase 11/27/2018 50     Total Protein 11/27/2018 6 0*    Albumin 11/27/2018 3 1*    Total Bilirubin 11/27/2018 0 38     eGFR 11/27/2018 73     LDL Direct 11/27/2018 100      Xr Chest 2 Views    Result Date: 12/11/2018  Narrative: CHEST INDICATION:   sob  COMPARISON:  May 17, 2018 EXAM PERFORMED/VIEWS:  XR CHEST PA & LATERAL Images: 2 FINDINGS: Cardiac silhouette unchanged in contour  Dual lead cardiac pacemaker overlies left chest wall, unchanged  Moderate to large retrocardiac hiatal hernia is present  The lungs are clear  No pneumothorax or pleural effusion  Osseous structures appear within normal limits for patient age  Impression: No acute cardiopulmonary disease  Cardiac pacemaker  Hiatal hernia  Workstation performed: [id number]     Ct Head Wo Contrast    Result Date: 12/12/2018  Narrative: CT BRAIN - WITHOUT CONTRAST INDICATION:   Confusion/delirium, altered LOC, unexplained  COMPARISON:  CT brain January 14, 2017  TECHNIQUE:  CT examination of the brain was performed  In addition to axial images, coronal 2D reformatted images were created and submitted for interpretation  Radiation dose length product (DLP) for this visit:  926 28 mGy/cm    This examination, like all CT scans performed in the [facility], was performed utilizing techniques to minimize radiation dose exposure, including the use of iterative reconstruction and automated exposure control  IMAGE QUALITY:  Somewhat limited due to patient motion  FINDINGS: PARENCHYMA:  No acute intraparenchymal hemorrhage or extra-axial fluid collections  No acute infarctions  Areas of decreased attenuation in the periventricular regions and centrum semiovale bilaterally, consistent with chronic small vessel ischemic change  Chronic lacunar infarctions in the basal ganglia bilaterally  Bilateral internal carotid artery and vertebral artery calcifications  VENTRICLES AND EXTRA-AXIAL SPACES:  Ventricles and cerebral sulci moderately dilated  VISUALIZED ORBITS AND PARANASAL SINUSES:  Prior bilateral lens surgery  Globes otherwise symmetric and intact  Mild mucosal thickening in the ethmoid air cells bilaterally and sphenoid sinuses bilaterally  Remaining paranasal sinuses and mastoid air cells are clear  CALVARIUM AND EXTRACRANIAL SOFT TISSUES:  Normal      Impression: Cerebral atrophy with chronic small vessel ischemic white matter disease and chronic lacunar infarctions  No acute intracranial abnormality  No significant change from prior study  Workstation performed: [id number]     Cta Ed Chest Pe Study    Result Date: 12/11/2018  Narrative: CTA - CHEST WITH IV CONTRAST - PULMONARY ANGIOGRAM INDICATION:   sob  May 11, 2018 COMPARISON: None  TECHNIQUE: CTA examination of the chest was performed using angiographic technique according to a protocol specifically tailored to evaluate for pulmonary embolism  Axial, sagittal, and coronal 2D reformatted images were created from the source data and  submitted for interpretation  In addition, coronal 3D MIP postprocessing was performed on the acquisition scanner  Radiation dose length product (DLP) for this visit:  269 33 mGy-cm     This examination, like all CT scans performed in the [facility], was performed utilizing techniques to minimize radiation dose exposure, including the use of iterative  reconstruction and automated exposure control  IV Contrast:  100 mL of iohexol (OMNIPAQUE)  FINDINGS: PULMONARY ARTERIAL TREE:  No pulmonary embolus is seen  LUNGS:  Minimal densities are noted in the dependent portions of the basilar lower lobes  PLEURA:  Unremarkable  HEART/GREAT VESSELS:  Unremarkable for patient's age  MEDIASTINUM AND JO-ANN:  Moderate to large sized hiatal hernia present  CHEST WALL AND LOWER NECK:   1 9 cm calcified nodule in the lower pole the left thyroid lobe, unchanged from prior studies  This was evaluated with ultrasound and 2014  Dual lead cardiac pacemaker overlies left anterior chest wall  VISUALIZED STRUCTURES IN THE UPPER ABDOMEN:  Unremarkable  OSSEOUS STRUCTURES:  No acute fracture or destructive osseous lesion  Impression: Negative for pulmonary embolism  Hiatal hernia  Minimal bibasilar dependent atelectatic changes  Workstation performed: [id number]         Assessment:       1  Aortic valve stenosis, etiology of cardiac valve disease unspecified     2  S/P placement of cardiac pacemaker     3  SVT (supraventricular tachycardia) ([address] )     4  Sick sinus syndrome ([address] )     5  Essential hypertension          Plan:       Complex case  Challenging review of systems answers, I question the validity of answers  See HPI  Lengthy conversation had with patient and sister  Limited medical therapy options for progressive aortic stenosis which I explained is present discussed at length  Poor open heart surgery candidate in my opinion  TAVR explained at length  Info on AVS provided  Patient is clear she is not ready to consider TAVR at this time  She has will would happen  I told her aortic stenosis would definitely get progressively worse over time though at the present time I do not think it is critical nor do I expect any imminent morbidity or mortality from it in her as she is quite inactive  She opted to follow this clinically    If she would get exertional angina, presyncope/syncope, progressive exertional dyspnea she may reconsider  I will see her back in about 3 months time she will call me sooner if she reconsiders TAVR

## 2019-01-14 ENCOUNTER — TELEPHONE (OUTPATIENT)
Dept: INTERNAL MEDICINE CLINIC | Facility: CLINIC | Age: 84
End: 2019-01-14

## 2019-01-14 DIAGNOSIS — K62.89 RECTAL PAIN: Primary | ICD-10-CM

## 2019-01-14 NOTE — TELEPHONE ENCOUNTER
Vashti called regarding Sammy Riding  She was at ER on 1/11 for fecal impaction and constipation, ER report in chart viewer  Sent home with bottle of nulytley prep, drank the bottle that night and did move her bowels the next day  Asking if normal that she still has the diarrhea? She also has c/o rectal pain and abd cramping  Asking what she can take for this, very uncomfortable and having difficulty walking  She is eating a light diet and drinking fluids        All-Numerous allergies in chart

## 2019-01-14 NOTE — TELEPHONE ENCOUNTER
She may have loose stools after the disimpaction   If she is having pain and cramping would at least get reimaged to make sure not obstructed or return to ER as I would not give her anything that might block her at this time

## 2019-01-14 NOTE — TELEPHONE ENCOUNTER
Spoke with Azra Holland, she would rather have some type of cream for her rectal pain at this time  She isn't sure if Marielena Stephens would go back to the ER or go for imaging with her condition

## 2019-02-04 DIAGNOSIS — R19.7 DIARRHEA, UNSPECIFIED TYPE: Primary | ICD-10-CM

## 2019-02-04 RX ORDER — CHOLESTYRAMINE 4 G/9G
1 POWDER, FOR SUSPENSION ORAL
Qty: 60 PACKET | Refills: 1 | Status: SHIPPED | OUTPATIENT
Start: 2019-02-04 | End: 2019-11-22 | Stop reason: ALTCHOICE

## 2019-02-04 NOTE — TELEPHONE ENCOUNTER
Julieta Lindquist states that Carvel Ing now has diarrhea, she wants to give her Kaopectate, is this ok

## 2019-02-14 ENCOUNTER — IN-CLINIC DEVICE VISIT (OUTPATIENT)
Dept: CARDIOLOGY CLINIC | Facility: CLINIC | Age: 84
End: 2019-02-14
Payer: COMMERCIAL

## 2019-02-14 DIAGNOSIS — I49.5 SICK SINUS SYNDROME (HCC): Primary | ICD-10-CM

## 2019-02-14 DIAGNOSIS — Z45.010 ENCOUNTER FOR CHECKING AND TESTING OF CARDIAC PACEMAKER PULSE GENERATOR (BATTERY): ICD-10-CM

## 2019-02-14 PROCEDURE — 93280 PM DEVICE PROGR EVAL DUAL: CPT | Performed by: INTERNAL MEDICINE

## 2019-02-15 NOTE — PROGRESS NOTES
device check pacer no alerts  Normal battery function      Current Outpatient Medications:     aspirin 81 MG tablet, Take 1 tablet by mouth daily, Disp: , Rfl:     Blood Glucose Monitoring Suppl (ONE TOUCH ULTRA MINI) w/Device KIT, USE TO TEST 4 TIMES DAILY, Disp: , Rfl: 0    cholestyramine (QUESTRAN) 4 g packet, Take 1 packet (4 g total) by mouth 3 (three) times a day with meals, Disp: 60 packet, Rfl: 1    ergocalciferol (VITAMIN D2) 50,000 units, Take 1 capsule (50,000 Units total) by mouth once a week for 90 days, Disp: 12 capsule, Rfl: 3    ferrous sulfate 325 (65 Fe) mg tablet, Take 325 mg by mouth daily with breakfast, Disp: , Rfl:     hydrochlorothiazide (MICROZIDE) 12 5 mg capsule, Take 12 5 mg by mouth daily, Disp: , Rfl:     hydrocortisone (ANUSOL-HC, PROCTOSOL HC,) 2 5 % rectal cream, Insert into the rectum 2 (two) times a day, Disp: 30 g, Rfl: 3    levothyroxine 75 mcg tablet, Take 1 tablet (75 mcg total) by mouth daily in the early morning, Disp: 90 tablet, Rfl: 3    metFORMIN (FORTAMET) 500 MG (OSM) 24 hr tablet, Take 1 tablet (500 mg total) by mouth 2 (two) times a day with meals for 90 days, Disp: 180 tablet, Rfl: 3    metoprolol tartrate (LOPRESSOR) 25 mg tablet, Take 25 mg by mouth 3 (three) times a day, Disp: , Rfl:     metoprolol tartrate 37 5 MG TABS, Take 1 tablet (37 5 mg total) by mouth every 12 (twelve) hours for 90 days (Patient not taking: Reported on 1/7/2019 ), Disp: 180 tablet, Rfl: 0    ONE TOUCH ULTRA TEST test strip, 4 (four) times a day Test, Disp: , Rfl: 0    ONETOUCH DELICA LANCETS FINE MISC, 4 (four) times a day Test, Disp: , Rfl: 0    oxybutynin (DITROPAN-XL) 10 MG 24 hr tablet, TAKE ONE TABLET BY MOUTH TWICE A DAY, Disp: 180 tablet, Rfl: 0    pantoprazole (PROTONIX) 40 mg tablet, Take 1 tablet (40 mg total) by mouth daily, Disp: 90 tablet, Rfl: 3    polyethylene glycol (MIRALAX) powder, Take by mouth as needed  , Disp: , Rfl:     pravastatin (PRAVACHOL) 20 mg tablet, Take 1 tablet (20 mg total) by mouth daily, Disp: 90 tablet, Rfl: 3    predniSONE 5 mg tablet, Take 1 tablet (5 mg total) by mouth daily, Disp: 90 tablet, Rfl: 3    repaglinide (PRANDIN) 2 mg tablet, Take 2 tablets (4 mg total) by mouth 3 (three) times a day before meals for 90 days, Disp: 540 tablet, Rfl: 3    sitaGLIPtin (JANUVIA) 50 mg tablet, Take 1 tablet (50 mg total) by mouth daily, Disp: 90 tablet, Rfl: 3

## 2019-02-19 ENCOUNTER — REMOTE DEVICE CLINIC VISIT (OUTPATIENT)
Dept: CARDIOLOGY CLINIC | Facility: CLINIC | Age: 84
End: 2019-02-19
Payer: COMMERCIAL

## 2019-02-19 DIAGNOSIS — I49.5 SICK SINUS SYNDROME (HCC): Primary | ICD-10-CM

## 2019-02-19 DIAGNOSIS — Z45.010 ENCOUNTER FOR CHECKING AND TESTING OF CARDIAC PACEMAKER PULSE GENERATOR (BATTERY): ICD-10-CM

## 2019-02-19 PROCEDURE — 93294 REM INTERROG EVL PM/LDLS PM: CPT | Performed by: INTERNAL MEDICINE

## 2019-02-19 PROCEDURE — 93296 REM INTERROG EVL PM/IDS: CPT | Performed by: INTERNAL MEDICINE

## 2019-02-19 NOTE — PROGRESS NOTES
merlin remote pacer no events  Normal battery function        Current Outpatient Medications:     aspirin 81 MG tablet, Take 1 tablet by mouth daily, Disp: , Rfl:     Blood Glucose Monitoring Suppl (ONE TOUCH ULTRA MINI) w/Device KIT, USE TO TEST 4 TIMES DAILY, Disp: , Rfl: 0    cholestyramine (QUESTRAN) 4 g packet, Take 1 packet (4 g total) by mouth 3 (three) times a day with meals, Disp: 60 packet, Rfl: 1    ergocalciferol (VITAMIN D2) 50,000 units, Take 1 capsule (50,000 Units total) by mouth once a week for 90 days, Disp: 12 capsule, Rfl: 3    ferrous sulfate 325 (65 Fe) mg tablet, Take 325 mg by mouth daily with breakfast, Disp: , Rfl:     hydrochlorothiazide (MICROZIDE) 12 5 mg capsule, Take 12 5 mg by mouth daily, Disp: , Rfl:     hydrocortisone (ANUSOL-HC, PROCTOSOL HC,) 2 5 % rectal cream, Insert into the rectum 2 (two) times a day, Disp: 30 g, Rfl: 3    levothyroxine 75 mcg tablet, Take 1 tablet (75 mcg total) by mouth daily in the early morning, Disp: 90 tablet, Rfl: 3    metFORMIN (FORTAMET) 500 MG (OSM) 24 hr tablet, Take 1 tablet (500 mg total) by mouth 2 (two) times a day with meals for 90 days, Disp: 180 tablet, Rfl: 3    metoprolol tartrate (LOPRESSOR) 25 mg tablet, Take 25 mg by mouth 3 (three) times a day, Disp: , Rfl:     metoprolol tartrate 37 5 MG TABS, Take 1 tablet (37 5 mg total) by mouth every 12 (twelve) hours for 90 days (Patient not taking: Reported on 1/7/2019 ), Disp: 180 tablet, Rfl: 0    ONE TOUCH ULTRA TEST test strip, 4 (four) times a day Test, Disp: , Rfl: 0    ONETOUCH DELICA LANCETS FINE MISC, 4 (four) times a day Test, Disp: , Rfl: 0    oxybutynin (DITROPAN-XL) 10 MG 24 hr tablet, TAKE ONE TABLET BY MOUTH TWICE A DAY, Disp: 180 tablet, Rfl: 0    pantoprazole (PROTONIX) 40 mg tablet, Take 1 tablet (40 mg total) by mouth daily, Disp: 90 tablet, Rfl: 3    polyethylene glycol (MIRALAX) powder, Take by mouth as needed  , Disp: , Rfl:     pravastatin (PRAVACHOL) 20 mg tablet, Take 1 tablet (20 mg total) by mouth daily, Disp: 90 tablet, Rfl: 3    predniSONE 5 mg tablet, Take 1 tablet (5 mg total) by mouth daily, Disp: 90 tablet, Rfl: 3    repaglinide (PRANDIN) 2 mg tablet, Take 2 tablets (4 mg total) by mouth 3 (three) times a day before meals for 90 days, Disp: 540 tablet, Rfl: 3    sitaGLIPtin (JANUVIA) 50 mg tablet, Take 1 tablet (50 mg total) by mouth daily, Disp: 90 tablet, Rfl: 3

## 2019-02-25 ENCOUNTER — OFFICE VISIT (OUTPATIENT)
Dept: INTERNAL MEDICINE CLINIC | Facility: CLINIC | Age: 84
End: 2019-02-25
Payer: COMMERCIAL

## 2019-02-25 VITALS
SYSTOLIC BLOOD PRESSURE: 126 MMHG | OXYGEN SATURATION: 93 % | TEMPERATURE: 97.3 F | BODY MASS INDEX: 24.56 KG/M2 | WEIGHT: 133.5 LBS | DIASTOLIC BLOOD PRESSURE: 72 MMHG | HEIGHT: 62 IN | HEART RATE: 61 BPM

## 2019-02-25 DIAGNOSIS — E27.1 ADRENAL INSUFFICIENCY (ADDISON'S DISEASE) (HCC): ICD-10-CM

## 2019-02-25 DIAGNOSIS — K59.01 SLOW TRANSIT CONSTIPATION: ICD-10-CM

## 2019-02-25 DIAGNOSIS — E11.42 DIABETIC POLYNEUROPATHY ASSOCIATED WITH TYPE 2 DIABETES MELLITUS (HCC): ICD-10-CM

## 2019-02-25 DIAGNOSIS — R26.2 AMBULATORY DYSFUNCTION: ICD-10-CM

## 2019-02-25 DIAGNOSIS — E13.9 DIABETES 1.5, MANAGED AS TYPE 2 (HCC): Primary | ICD-10-CM

## 2019-02-25 PROBLEM — G47.10 HYPERSOMNIA: Status: RESOLVED | Noted: 2018-07-19 | Resolved: 2019-02-25

## 2019-02-25 PROBLEM — J98.11 ATELECTASIS: Status: RESOLVED | Noted: 2018-03-23 | Resolved: 2019-02-25

## 2019-02-25 PROBLEM — I47.1 SVT (SUPRAVENTRICULAR TACHYCARDIA) (HCC): Status: RESOLVED | Noted: 2018-03-22 | Resolved: 2019-02-25

## 2019-02-25 PROBLEM — Z91.81 HX OF FALL: Status: RESOLVED | Noted: 2018-05-15 | Resolved: 2019-02-25

## 2019-02-25 PROBLEM — L29.9 PRURITUS: Status: RESOLVED | Noted: 2017-04-07 | Resolved: 2019-02-25

## 2019-02-25 PROBLEM — R68.89 FORGETFULNESS: Status: RESOLVED | Noted: 2018-05-15 | Resolved: 2019-02-25

## 2019-02-25 PROBLEM — K44.9 HIATAL HERNIA WITH GASTROESOPHAGEAL REFLUX: Status: RESOLVED | Noted: 2017-04-26 | Resolved: 2019-02-25

## 2019-02-25 PROBLEM — Z95.0 PACEMAKER: Status: RESOLVED | Noted: 2018-06-24 | Resolved: 2019-02-25

## 2019-02-25 PROBLEM — R00.0 RAPID HEART RATE: Status: RESOLVED | Noted: 2017-08-18 | Resolved: 2019-02-25

## 2019-02-25 PROBLEM — G92.8 TOXIC METABOLIC ENCEPHALOPATHY: Status: RESOLVED | Noted: 2018-05-17 | Resolved: 2019-02-25

## 2019-02-25 PROBLEM — I49.5 SICK SINUS SYNDROME (HCC): Status: RESOLVED | Noted: 2018-03-01 | Resolved: 2019-02-25

## 2019-02-25 PROBLEM — R79.89 LOW TSH LEVEL: Status: RESOLVED | Noted: 2017-08-17 | Resolved: 2019-02-25

## 2019-02-25 PROBLEM — B35.1 ONYCHOMYCOSIS: Status: RESOLVED | Noted: 2017-05-16 | Resolved: 2019-02-25

## 2019-02-25 PROBLEM — K21.9 HIATAL HERNIA WITH GASTROESOPHAGEAL REFLUX: Status: RESOLVED | Noted: 2017-04-26 | Resolved: 2019-02-25

## 2019-02-25 PROBLEM — R41.0 DELIRIUM: Status: RESOLVED | Noted: 2018-05-15 | Resolved: 2019-02-25

## 2019-02-25 PROBLEM — J34.2 DEVIATED NASAL SEPTUM: Status: RESOLVED | Noted: 2018-07-19 | Resolved: 2019-02-25

## 2019-02-25 PROBLEM — J34.89 DRY NOSE: Status: RESOLVED | Noted: 2018-07-19 | Resolved: 2019-02-25

## 2019-02-25 PROBLEM — Z95.0 S/P PLACEMENT OF CARDIAC PACEMAKER: Status: RESOLVED | Noted: 2018-05-15 | Resolved: 2019-02-25

## 2019-02-25 PROBLEM — I35.0 AORTIC VALVE STENOSIS: Chronic | Status: RESOLVED | Noted: 2018-03-23 | Resolved: 2019-02-25

## 2019-02-25 PROBLEM — R48.8 ECHOLALIA: Status: RESOLVED | Noted: 2018-05-15 | Resolved: 2019-02-25

## 2019-02-25 PROCEDURE — 99214 OFFICE O/P EST MOD 30 MIN: CPT | Performed by: INTERNAL MEDICINE

## 2019-02-25 PROCEDURE — 1160F RVW MEDS BY RX/DR IN RCRD: CPT | Performed by: INTERNAL MEDICINE

## 2019-02-25 PROCEDURE — 1036F TOBACCO NON-USER: CPT | Performed by: INTERNAL MEDICINE

## 2019-02-25 NOTE — PROGRESS NOTES
Assessment/Plan:         Diagnoses and all orders for this visit:    Diabetes 1 5, managed as type 2 ([facility] )  Pt is off Januvia and Trulicity per endocrine and monitoring  She is to schedule nutrition counselling       Diabetic polyneuropathy associated with type 2 diabetes mellitus ([facility] )  Fall precautions  She is monitoring at home and now below 300    Adrenal insufficiency (Fentress's disease) ([facility] )  Continues on Prednisone  She is followed by endocrine and stable thus far on 5 mg dose    Ambulatory dysfunction  Use cane for safety  No reported falls, fall preacutions    Slow transit constipation  Discussed GI reeval and they will consider but want to see the doctor she saw other than Janet - cannot see that in system so will check with the office if she saw another doctor  Fiber daily, miralax and nutrition eval     Rto 2 months       Patient ID: Annie Glez is a 80 y o  female  HPI   Pt continues to struggle with constipation - did see GI in September and now has been in the ER at least twice for constipation Struggling with blood sugars Now are below 300      She was tried on trulicity but did not tolerate so she called endocrine and they said to monitor sugars off rx her sister is asking for PT eval  Schedule of eating still off and that does effect sugars She has had some vomiting recently Supposed to setup nutrition eval to disccuss eating schedule      The following portions of the patient's history were reviewed and updated as appropriate:   Past Medical History:   Diagnosis Date    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[facility] )     Bradycardia     Coronary artery disease     Depression     Depression     Diabetes mellitus ([facility] )     Type 2 DM    Disease of thyroid gland     Essential (primary) hypertension     Facial droop     started 10 years ago    GERD (gastroesophageal reflux disease)     Heart murmur     History of echocardiogram 08/28/2017    2-D w/CFD:  EF 0 60 (60%), LVSF is normal  No regional wall abnormalities  Mild mitral valve regurg  AV was trileaflet  Moderate tricuspid regurg  Trace pulmonic valve  No pericardial effusion   History of echocardiogram 03/28/2018    2-D w/CFD:  EF 0 60 (60%), Mild regurg in the MV  AV was trileaflet, severe stenosis, trace regurg  Mild regurg in the TV  No pericardial effusion  Aortic root exhibited normal size   History of echocardiogram 01/29/2016    2-D:  EF 0 55 (55%), Normal LVSF  Mild concentric LVH  Mild MR  Mild AS with mild regurg      History of Holter monitoring     2/24/16, 9/27/17, 2/6/18    History of nuclear stress test 08/16/2017    Lexiscan MPI:  EF 0 76 (76%), no prior MI or ischemia,    Hyperlipidemia     Hypothyroidism     Nonrheumatic aortic (valve) stenosis     Obstructive sleep apnea (adult) (pediatric)     10/17/17 - NPSG with nasal CPAP/Bi-level titration     Osteoarthritis     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HCC)     SSS (sick sinus syndrome) (HCC)     SVT (supraventricular tachycardia) (HCC)     Vitamin D deficiency      Past Surgical History:   Procedure Laterality Date    ANKLE SURGERY Left 2005    APPENDECTOMY  1955    CATARACT EXTRACTION Bilateral 09/1995    HEMORRHOID SURGERY  1978    HERNIA REPAIR  1990    INSERT / REPLACE / REMOVE PACEMAKER  05/14/2018    dual lead pacer st yony model 2272    JOINT REPLACEMENT      b/l hip replacements, right 2006    NEUROPLASTY / TRANSPOSITION MEDIAN NERVE AT CARPAL TUNNEL  1977    PARTIAL HYSTERECTOMY  1971    TOTAL HIP ARTHROPLASTY      TRANSPHENOIDAL / TRANSNASAL HYPOPHYSECTOMY / RESECTION PITUITARY TUMOR  1989    excision of pituitary gland     Social History     Socioeconomic History    Marital status:      Spouse name: Not on file    Number of children: Not on file    Years of education: Not on file    Highest education level: Not on file   Occupational History    Not on file   Social Needs    Financial resource strain: Not on file    Food insecurity:     Worry: Not on file     Inability: Not on file    Transportation needs:     Medical: Not on file     Non-medical: Not on file   Tobacco Use    Smoking status: Never Smoker    Smokeless tobacco: Never Used   Substance and Sexual Activity    Alcohol use: No    Drug use: No    Sexual activity: Never   Lifestyle    Physical activity:     Days per week: Not on file     Minutes per session: Not on file    Stress: Not on file   Relationships    Social connections:     Talks on phone: Not on file     Gets together: Not on file     Attends Alevism service: Not on file     Active member of club or organization: Not on file     Attends meetings of clubs or organizations: Not on file     Relationship status: Not on file    Intimate partner violence:     Fear of current or ex partner: Not on file     Emotionally abused: Not on file     Physically abused: Not on file     Forced sexual activity: Not on file   Other Topics Concern    Not on file   Social History Narrative    Caffeine use    Dental care regularly    Good dental hygiene    Uses safety equipment: seatbelts     Allergies   Allergen Reactions    Cephalosporins GI Intolerance    Levemir [Insulin Detemir]      Brittle diabetic  Hypersensitive to insulin  Severe hypoglycemia results if given insulin  Please speak with son, Melissa Gee, before giving ANY type of insulin   Acetaminophen     Cephalexin GI Intolerance    Clindamycin      Other reaction(s): Unspecified    Demeclocycline     Hydrocodone      Bitartrate  Other reaction(s): Unspecified    Hydrocodone-Acetaminophen Nausea Only    Oxycodone-Acetaminophen      HCL    Oxycodone-Acetaminophen     Penicillins Hives     Other reaction(s):  Other (See Comments)  ITCHING/RASH    Simvastatin      Other reaction(s): GI upset    Sulfamethoxazole-Trimethoprim     Tetrabenazine     Tetracycline Nausea Only    Tetracyclines & Related     Vicodin [Hydrocodone-Acetaminophen]          Review of Systems   Constitutional: Negative  HENT: Negative  Eyes: Negative  Respiratory: Negative  Cardiovascular: Negative  Gastrointestinal: Positive for abdominal distention and abdominal pain  Genitourinary: Negative  Musculoskeletal: Positive for arthralgias and gait problem  Skin: Negative  Neurological: Positive for dizziness and light-headedness  Hematological: Negative  Psychiatric/Behavioral: Negative                  /72   Pulse 61   Temp (!) 97 3 °F (36 3 °C) (Tympanic)   Ht 5' 2" (1 575 m)   Wt 60 6 kg (133 lb 8 oz)   SpO2 93%   BMI 24 42 kg/m²          Physical Exam

## 2019-03-07 DIAGNOSIS — R09.81 HEAD CONGESTION: Primary | ICD-10-CM

## 2019-03-07 DIAGNOSIS — R09.81 HEAD CONGESTION: ICD-10-CM

## 2019-03-07 RX ORDER — LEVOFLOXACIN 250 MG/1
250 TABLET ORAL DAILY
Qty: 7 TABLET | Refills: 0 | Status: SHIPPED | OUTPATIENT
Start: 2019-03-07 | End: 2019-03-07 | Stop reason: SDUPTHER

## 2019-03-07 RX ORDER — LEVOFLOXACIN 250 MG/1
250 TABLET ORAL DAILY
Qty: 7 TABLET | Refills: 0 | Status: SHIPPED | OUTPATIENT
Start: 2019-03-07 | End: 2019-03-14

## 2019-03-07 NOTE — TELEPHONE ENCOUNTER
Runny nose, congestion  Son had similar last week    He is asking if you could order something for Odalysvenus Alli to take       Allergies: Levimir,  Cephalosporins,  Clindamysin, Sulfa , Tetracycline

## 2019-03-30 DIAGNOSIS — N39.3 STRESS INCONTINENCE OF URINE: ICD-10-CM

## 2019-03-30 RX ORDER — OXYBUTYNIN CHLORIDE 10 MG/1
TABLET, EXTENDED RELEASE ORAL
Qty: 180 TABLET | Refills: 0 | Status: SHIPPED | OUTPATIENT
Start: 2019-03-30 | End: 2019-10-21 | Stop reason: SDUPTHER

## 2019-04-01 DIAGNOSIS — M54.30 SCIATICA, UNSPECIFIED LATERALITY: Primary | ICD-10-CM

## 2019-04-01 DIAGNOSIS — M15.9 GENERALIZED OSTEOARTHRITIS OF MULTIPLE SITES: ICD-10-CM

## 2019-04-01 DIAGNOSIS — M54.5 CHRONIC LOW BACK PAIN, UNSPECIFIED BACK PAIN LATERALITY, WITH SCIATICA PRESENCE UNSPECIFIED: ICD-10-CM

## 2019-04-01 DIAGNOSIS — M54.50 CHRONIC BILATERAL LOW BACK PAIN WITHOUT SCIATICA: ICD-10-CM

## 2019-04-01 DIAGNOSIS — R26.2 AMBULATORY DYSFUNCTION: ICD-10-CM

## 2019-04-01 DIAGNOSIS — G89.29 CHRONIC LOW BACK PAIN, UNSPECIFIED BACK PAIN LATERALITY, WITH SCIATICA PRESENCE UNSPECIFIED: ICD-10-CM

## 2019-04-01 DIAGNOSIS — G89.29 CHRONIC BILATERAL LOW BACK PAIN WITHOUT SCIATICA: ICD-10-CM

## 2019-04-11 ENCOUNTER — OFFICE VISIT (OUTPATIENT)
Dept: CARDIOLOGY CLINIC | Facility: CLINIC | Age: 84
End: 2019-04-11
Payer: COMMERCIAL

## 2019-04-11 VITALS
DIASTOLIC BLOOD PRESSURE: 62 MMHG | HEART RATE: 60 BPM | HEIGHT: 62 IN | SYSTOLIC BLOOD PRESSURE: 118 MMHG | WEIGHT: 130 LBS | BODY MASS INDEX: 23.92 KG/M2

## 2019-04-11 DIAGNOSIS — Z86.79 H/O TACHYCARDIA-BRADYCARDIA SYNDROME: ICD-10-CM

## 2019-04-11 DIAGNOSIS — I95.89 OTHER SPECIFIED HYPOTENSION: ICD-10-CM

## 2019-04-11 DIAGNOSIS — I35.0 AORTIC VALVE STENOSIS, ETIOLOGY OF CARDIAC VALVE DISEASE UNSPECIFIED: Primary | ICD-10-CM

## 2019-04-11 DIAGNOSIS — I10 ESSENTIAL HYPERTENSION: ICD-10-CM

## 2019-04-11 DIAGNOSIS — G47.33 OSA (OBSTRUCTIVE SLEEP APNEA): Chronic | ICD-10-CM

## 2019-04-11 PROCEDURE — 99214 OFFICE O/P EST MOD 30 MIN: CPT | Performed by: INTERNAL MEDICINE

## 2019-04-23 ENCOUNTER — OFFICE VISIT (OUTPATIENT)
Dept: INTERNAL MEDICINE CLINIC | Facility: CLINIC | Age: 84
End: 2019-04-23
Payer: COMMERCIAL

## 2019-04-23 VITALS
WEIGHT: 133.5 LBS | HEIGHT: 62 IN | TEMPERATURE: 98.4 F | BODY MASS INDEX: 24.56 KG/M2 | HEART RATE: 68 BPM | OXYGEN SATURATION: 94 %

## 2019-04-23 DIAGNOSIS — I50.32 CHRONIC DIASTOLIC CONGESTIVE HEART FAILURE (HCC): ICD-10-CM

## 2019-04-23 DIAGNOSIS — Z00.00 MEDICARE ANNUAL WELLNESS VISIT, SUBSEQUENT: Primary | ICD-10-CM

## 2019-04-23 DIAGNOSIS — Z86.2 HX OF IRON DEFICIENCY ANEMIA: ICD-10-CM

## 2019-04-23 DIAGNOSIS — IMO0001 UNCONTROLLED TYPE 2 DIABETES MELLITUS WITHOUT COMPLICATION, WITHOUT LONG-TERM CURRENT USE OF INSULIN: ICD-10-CM

## 2019-04-23 PROBLEM — E13.9 DIABETES 1.5, MANAGED AS TYPE 2 (HCC): Status: RESOLVED | Noted: 2018-03-01 | Resolved: 2019-04-23

## 2019-04-23 PROBLEM — I80.9 SUPERFICIAL THROMBOPHLEBITIS: Status: RESOLVED | Noted: 2018-09-17 | Resolved: 2019-04-23

## 2019-04-23 PROBLEM — R53.81 PHYSICAL DECONDITIONING: Status: RESOLVED | Noted: 2018-05-15 | Resolved: 2019-04-23

## 2019-04-23 PROCEDURE — 1125F AMNT PAIN NOTED PAIN PRSNT: CPT | Performed by: INTERNAL MEDICINE

## 2019-04-23 PROCEDURE — G0439 PPPS, SUBSEQ VISIT: HCPCS | Performed by: INTERNAL MEDICINE

## 2019-04-23 PROCEDURE — 1170F FXNL STATUS ASSESSED: CPT | Performed by: INTERNAL MEDICINE

## 2019-04-24 DIAGNOSIS — I35.0 AORTIC VALVE STENOSIS, ETIOLOGY OF CARDIAC VALVE DISEASE UNSPECIFIED: ICD-10-CM

## 2019-06-04 ENCOUNTER — REMOTE DEVICE CLINIC VISIT (OUTPATIENT)
Dept: CARDIOLOGY CLINIC | Facility: CLINIC | Age: 84
End: 2019-06-04
Payer: COMMERCIAL

## 2019-06-04 DIAGNOSIS — I49.5 SICK SINUS SYNDROME (HCC): Primary | ICD-10-CM

## 2019-06-04 DIAGNOSIS — Z45.010 ENCOUNTER FOR CHECKING AND TESTING OF CARDIAC PACEMAKER PULSE GENERATOR (BATTERY): ICD-10-CM

## 2019-06-04 PROCEDURE — 93296 REM INTERROG EVL PM/IDS: CPT | Performed by: INTERNAL MEDICINE

## 2019-06-04 PROCEDURE — 93294 REM INTERROG EVL PM/LDLS PM: CPT | Performed by: INTERNAL MEDICINE

## 2019-06-13 ENCOUNTER — OFFICE VISIT (OUTPATIENT)
Dept: CARDIOLOGY CLINIC | Facility: CLINIC | Age: 84
End: 2019-06-13
Payer: COMMERCIAL

## 2019-06-13 ENCOUNTER — HOSPITAL ENCOUNTER (OUTPATIENT)
Dept: NON INVASIVE DIAGNOSTICS | Facility: CLINIC | Age: 84
Discharge: HOME | End: 2019-06-13
Payer: COMMERCIAL

## 2019-06-13 VITALS
WEIGHT: 131 LBS | HEIGHT: 62 IN | DIASTOLIC BLOOD PRESSURE: 72 MMHG | SYSTOLIC BLOOD PRESSURE: 120 MMHG | HEART RATE: 64 BPM | BODY MASS INDEX: 24.11 KG/M2

## 2019-06-13 DIAGNOSIS — E78.2 MIXED HYPERLIPIDEMIA: ICD-10-CM

## 2019-06-13 DIAGNOSIS — G47.33 OSA (OBSTRUCTIVE SLEEP APNEA): Chronic | ICD-10-CM

## 2019-06-13 DIAGNOSIS — I35.0 AORTIC VALVE STENOSIS, ETIOLOGY OF CARDIAC VALVE DISEASE UNSPECIFIED: ICD-10-CM

## 2019-06-13 DIAGNOSIS — Z86.79 H/O TACHYCARDIA-BRADYCARDIA SYNDROME: Primary | ICD-10-CM

## 2019-06-13 DIAGNOSIS — I10 ESSENTIAL HYPERTENSION: ICD-10-CM

## 2019-06-13 PROCEDURE — 93306 TTE W/DOPPLER COMPLETE: CPT

## 2019-06-13 PROCEDURE — 93306 TTE W/DOPPLER COMPLETE: CPT | Performed by: INTERNAL MEDICINE

## 2019-06-13 PROCEDURE — 99214 OFFICE O/P EST MOD 30 MIN: CPT | Performed by: INTERNAL MEDICINE

## 2019-07-24 DIAGNOSIS — I35.0 AORTIC VALVE STENOSIS, ETIOLOGY OF CARDIAC VALVE DISEASE UNSPECIFIED: ICD-10-CM

## 2019-07-24 DIAGNOSIS — I50.32 CHRONIC DIASTOLIC CONGESTIVE HEART FAILURE (HCC): Primary | ICD-10-CM

## 2019-07-25 ENCOUNTER — APPOINTMENT (OUTPATIENT)
Dept: LAB | Facility: HOSPITAL | Age: 84
End: 2019-07-25
Attending: INTERNAL MEDICINE
Payer: COMMERCIAL

## 2019-07-25 ENCOUNTER — TELEPHONE (OUTPATIENT)
Dept: INTERNAL MEDICINE CLINIC | Facility: CLINIC | Age: 84
End: 2019-07-25

## 2019-07-25 ENCOUNTER — OFFICE VISIT (OUTPATIENT)
Dept: CARDIOLOGY CLINIC | Facility: CLINIC | Age: 84
End: 2019-07-25
Payer: COMMERCIAL

## 2019-07-25 VITALS
DIASTOLIC BLOOD PRESSURE: 74 MMHG | SYSTOLIC BLOOD PRESSURE: 118 MMHG | WEIGHT: 135 LBS | HEIGHT: 62 IN | HEART RATE: 62 BPM | BODY MASS INDEX: 24.84 KG/M2

## 2019-07-25 DIAGNOSIS — E78.2 MIXED HYPERLIPIDEMIA: ICD-10-CM

## 2019-07-25 DIAGNOSIS — I35.0 AORTIC VALVE STENOSIS, ETIOLOGY OF CARDIAC VALVE DISEASE UNSPECIFIED: Primary | ICD-10-CM

## 2019-07-25 DIAGNOSIS — G47.33 OSA (OBSTRUCTIVE SLEEP APNEA): Chronic | ICD-10-CM

## 2019-07-25 DIAGNOSIS — I50.32 CHRONIC DIASTOLIC CONGESTIVE HEART FAILURE (HCC): ICD-10-CM

## 2019-07-25 DIAGNOSIS — F32.A DEPRESSION, UNSPECIFIED DEPRESSION TYPE: ICD-10-CM

## 2019-07-25 DIAGNOSIS — B35.1 NAIL FUNGUS: ICD-10-CM

## 2019-07-25 DIAGNOSIS — Z01.818 PRE-OP TESTING: ICD-10-CM

## 2019-07-25 DIAGNOSIS — Z86.2 HX OF IRON DEFICIENCY ANEMIA: ICD-10-CM

## 2019-07-25 DIAGNOSIS — I10 ESSENTIAL HYPERTENSION: Primary | ICD-10-CM

## 2019-07-25 DIAGNOSIS — Z86.79 H/O TACHYCARDIA-BRADYCARDIA SYNDROME: ICD-10-CM

## 2019-07-25 DIAGNOSIS — IMO0001 UNCONTROLLED TYPE 2 DIABETES MELLITUS WITHOUT COMPLICATION, WITHOUT LONG-TERM CURRENT USE OF INSULIN: ICD-10-CM

## 2019-07-25 LAB
ALBUMIN SERPL BCP-MCNC: 3.1 G/DL (ref 3.5–5)
ALP SERPL-CCNC: 55 U/L (ref 46–116)
ALT SERPL W P-5'-P-CCNC: 20 U/L (ref 12–78)
ANION GAP SERPL CALCULATED.3IONS-SCNC: 6 MMOL/L (ref 4–13)
AST SERPL W P-5'-P-CCNC: 17 U/L (ref 5–45)
BASOPHILS # BLD AUTO: 0.03 THOUSANDS/ΜL (ref 0–0.1)
BASOPHILS NFR BLD AUTO: 0 % (ref 0–1)
BILIRUB DIRECT SERPL-MCNC: 0.14 MG/DL (ref 0–0.2)
BILIRUB SERPL-MCNC: 0.4 MG/DL (ref 0.2–1)
BUN SERPL-MCNC: 15 MG/DL (ref 5–25)
CALCIUM SERPL-MCNC: 8.7 MG/DL (ref 8.3–10.1)
CHLORIDE SERPL-SCNC: 99 MMOL/L (ref 100–108)
CHOLEST SERPL-MCNC: 179 MG/DL (ref 50–200)
CO2 SERPL-SCNC: 29 MMOL/L (ref 21–32)
CREAT SERPL-MCNC: 0.85 MG/DL (ref 0.6–1.3)
EOSINOPHIL # BLD AUTO: 0.08 THOUSAND/ΜL (ref 0–0.61)
EOSINOPHIL NFR BLD AUTO: 1 % (ref 0–6)
ERYTHROCYTE [DISTWIDTH] IN BLOOD BY AUTOMATED COUNT: 13.7 % (ref 11.6–15.1)
GFR SERPL CREATININE-BSD FRML MDRD: 63 ML/MIN/1.73SQ M
GLUCOSE SERPL-MCNC: 332 MG/DL (ref 65–140)
HCT VFR BLD AUTO: 35.2 % (ref 34.8–46.1)
HDLC SERPL-MCNC: 63 MG/DL (ref 40–60)
HGB BLD-MCNC: 10.8 G/DL (ref 11.5–15.4)
IMM GRANULOCYTES # BLD AUTO: 0.02 THOUSAND/UL (ref 0–0.2)
IMM GRANULOCYTES NFR BLD AUTO: 0 % (ref 0–2)
LDLC SERPL CALC-MCNC: 84 MG/DL (ref 0–100)
LYMPHOCYTES # BLD AUTO: 1.02 THOUSANDS/ΜL (ref 0.6–4.47)
LYMPHOCYTES NFR BLD AUTO: 14 % (ref 14–44)
MCH RBC QN AUTO: 28.5 PG (ref 26.8–34.3)
MCHC RBC AUTO-ENTMCNC: 30.7 G/DL (ref 31.4–37.4)
MCV RBC AUTO: 93 FL (ref 82–98)
MONOCYTES # BLD AUTO: 0.38 THOUSAND/ΜL (ref 0.17–1.22)
MONOCYTES NFR BLD AUTO: 5 % (ref 4–12)
NEUTROPHILS # BLD AUTO: 5.69 THOUSANDS/ΜL (ref 1.85–7.62)
NEUTS SEG NFR BLD AUTO: 80 % (ref 43–75)
NONHDLC SERPL-MCNC: 116 MG/DL
NRBC BLD AUTO-RTO: 0 /100 WBCS
PLATELET # BLD AUTO: 223 THOUSANDS/UL (ref 149–390)
PMV BLD AUTO: 10.2 FL (ref 8.9–12.7)
POTASSIUM SERPL-SCNC: 4.8 MMOL/L (ref 3.5–5.3)
PROT SERPL-MCNC: 6 G/DL (ref 6.4–8.2)
RBC # BLD AUTO: 3.79 MILLION/UL (ref 3.81–5.12)
SODIUM SERPL-SCNC: 134 MMOL/L (ref 136–145)
TRIGL SERPL-MCNC: 161 MG/DL
WBC # BLD AUTO: 7.22 THOUSAND/UL (ref 4.31–10.16)

## 2019-07-25 PROCEDURE — 80053 COMPREHEN METABOLIC PANEL: CPT

## 2019-07-25 PROCEDURE — 99214 OFFICE O/P EST MOD 30 MIN: CPT | Performed by: INTERNAL MEDICINE

## 2019-07-25 PROCEDURE — 83540 ASSAY OF IRON: CPT

## 2019-07-25 PROCEDURE — 83036 HEMOGLOBIN GLYCOSYLATED A1C: CPT

## 2019-07-25 PROCEDURE — 80061 LIPID PANEL: CPT

## 2019-07-25 PROCEDURE — 36415 COLL VENOUS BLD VENIPUNCTURE: CPT

## 2019-07-25 PROCEDURE — 85025 COMPLETE CBC W/AUTO DIFF WBC: CPT

## 2019-07-25 PROCEDURE — 82248 BILIRUBIN DIRECT: CPT

## 2019-07-25 RX ORDER — FUROSEMIDE 20 MG/1
TABLET ORAL
Qty: 10 TABLET | Refills: 5 | Status: SHIPPED | OUTPATIENT
Start: 2019-07-25 | End: 2020-03-16 | Stop reason: SDUPTHER

## 2019-07-25 NOTE — PROGRESS NOTES
Subjective:        Patient ID: Fadi Christianson is a 80 y o  female  Chief Complaint:  Melanie Pitt complains of shortness of breath, edema, extreme fatigue, vague intermittent chest pains  She blames her head skin cancer on the sleep testing electrodes being pulled off  She thinks people or banging on her chest at her last hospitalization that cause a problem with her pacemaker, reviewed her last interrogation, unremarkable  Lengthy conversation had today with her sister, I cannot assure her that TAVR will alleviate all of her symptoms  She continues to have 80years of age on her, untreated sleep apnea, and some psychosocial issues likely impacting her overall status  The following portions of the patient's history were reviewed and updated as appropriate: allergies, current medications, past family history, past medical history, past social history, past surgical history and problem list   Review of Systems   Constitution: Positive for malaise/fatigue  Negative for chills, diaphoresis and weight gain  HENT: Negative for nosebleeds and stridor  Eyes: Negative for double vision, vision loss in left eye, vision loss in right eye and visual disturbance  Cardiovascular: Positive for chest pain, dyspnea on exertion and leg swelling  Negative for claudication, cyanosis, irregular heartbeat, near-syncope, orthopnea, palpitations, paroxysmal nocturnal dyspnea and syncope  Respiratory: Positive for shortness of breath  Negative for cough, snoring and wheezing  Endocrine: Negative for polydipsia, polyphagia and polyuria  Hematologic/Lymphatic: Negative for bleeding problem  Does not bruise/bleed easily  Skin: Negative for flushing and rash  Musculoskeletal: Positive for joint pain  Negative for falls and myalgias  Gastrointestinal: Negative for abdominal pain, heartburn, hematemesis, hematochezia, melena and nausea  Genitourinary: Negative for hematuria     Neurological: Negative for brief paralysis, dizziness, focal weakness, headaches, light-headedness, loss of balance and vertigo  Psychiatric/Behavioral: Negative for altered mental status and substance abuse  Allergic/Immunologic: Negative for hives  Objective:      /74   Pulse 62   Ht 5' 2" (1 575 m)   Wt 61 2 kg (135 lb)   BMI 24 69 kg/m²   Physical Exam   Constitutional: She is oriented to person, place, and time  She appears well-developed and well-nourished  HENT:   Head: Normocephalic and atraumatic  Eyes: Pupils are equal, round, and reactive to light  EOM are normal    Neck: Normal range of motion  Neck supple  No JVD present  No thyromegaly present  Cardiovascular: Normal rate, regular rhythm and intact distal pulses  Exam reveals no gallop and no friction rub  Murmur (Grade 3/6 AS quality murmur absent S2) heard  Pulmonary/Chest: Effort normal and breath sounds normal  No stridor  No respiratory distress  She has no wheezes  She has no rales  Abdominal: Soft  Bowel sounds are normal  She exhibits no mass  There is no tenderness  Musculoskeletal: Normal range of motion  She exhibits edema (Plus two pitting edema bilaterally, Nate stockings in place)  Lymphadenopathy:     She has no cervical adenopathy  Neurological: She is alert and oriented to person, place, and time  Skin: Skin is warm and dry  No rash noted  No pallor  Psychiatric: She has a normal mood and affect  Her behavior is normal  Judgment and thought content normal    Nursing note and vitals reviewed  Lab Review:   No visits with results within 2 Month(s) from this visit  Latest known visit with results is:   No results displayed because visit has over 200 results  No results found  Assessment:       1  Aortic valve stenosis, etiology of cardiac valve disease unspecified  Cardiac catheterization    furosemide (LASIX) 20 mg tablet   2   Chronic diastolic congestive heart failure (HCC)  furosemide (LASIX) 20 mg tablet 3  H/O tachycardia-bradycardia syndrome s/p PPM     4  CRISTAL (obstructive sleep apnea)     5  Mixed hyperlipidemia     6  Depression, unspecified depression type          Plan:       Gulshan Bill says she is now ready to get this over with  Risk benefits alternatives explained in detail  Her sister I willing to go through with left and right heart catheterization, she is hoping this will make her feel better as do I  Her aortic stenosis is indeed severe approaching critical and I believe symptomatic  Recent creatinine 0 7  No history of contrast/dye allergy  She is on chronic steroids  History of adrenal insufficiency  Recent CBC near normal     She is a poor open AVR candidate, deemed this by me and and another cardiologist previously  Explain TAVR in detail, depending on catheterization results hopefully she can be tucked into the TAVR Clinic directly down at [address]  I am going to add Lasix to her regimen gently, 2 days a week p r n  Edema  The remainder of her cardiac regimen is ideal and I made no changes

## 2019-07-25 NOTE — PATIENT INSTRUCTIONS
Severe AS    Transcatheter Aortic Valve Replacement   AMBULATORY CARE:   What you need to know about a TAVR:   · A TAVR is a procedure to replace your aortic valve  It is done without removing your old valve  The aortic valve is between the left ventricle and the aorta  The left ventricle is the lower left chamber of your heart  The aorta is a blood vessel that pumps blood to your brain and body  The aortic valve opens and closes to let blood flow from your heart to the rest of your body  · TAVR may be used to replace your aortic valve if open heart surgery is not safe for you  Your valve will be replaced with a tissue valve  The tissue may be taken from an animal, such as a pig or cow  What will happen before a TAVR:   · You may need an echocardiogram, angiogram, EKG, or CT scan before your procedure  These tests will help your healthcare provider plan your procedure  They will also make sure a TAVR is safe for you  You may need to stop taking blood thinner medicine several days before your procedure  This will prevent heavy bleeding during your procedure  · Your healthcare provider will talk to you about how to prepare for your procedure  He may tell you not to eat or drink anything after midnight on the day of your procedure  He will tell you what medicines to take or not take on the day of your procedure  You may stay in the hospital 2 to 5 days after your procedure  Arrange for someone to drive you home and stay with you  This person can call 911 if you have problems at home  What will happen during a TAVR:   · You may be given general anesthesia to keep you asleep and free from pain during your procedure  You may instead be given IV sedation and local anesthesia  IV sedation will make you feel calm and relaxed during the procedure  With local anesthesia, you may still feel pressure or pushing during your procedure, but you should not feel any pain   You may be given an antibiotic through your IV to prevent a bacterial infection  Tell healthcare providers if you have ever had an allergic reaction to an antibiotic  · Your healthcare provider will make an incision in your neck, groin, or chest  He will guide a catheter with a balloon on the end through a blood vessel and into your heart  He will inflate the balloon in the opening of your valve  This balloon make space for your new valve to fit inside the old one  The balloon will be deflated and the catheter will be removed  · Your healthcare provider will insert another catheter into your heart  This catheter will hold a balloon, a stent, and the new valve  The new valve will be inside of the stent  When the catheter reaches your valve, your healthcare provider will expand the balloon  The balloon will push your old valve against the walls of your heart  The stent and new valve will be put in the old valve's position  The balloon will be deflated  The stent will continue to hold your old valve out of the way  It will also keep your new valve in the correct place  · Your healthcare provider will remove the catheter and wire  He may use clamps, stitches, or other devices to close the incision  Pressure will be applied to the incision for several minutes to stop any bleeding  A pressure bandage or other pressure device may be placed over the incision to help prevent more bleeding  What will happen after a TAVR:   · Healthcare providers will monitor your vital signs and pulses in your arm or leg, closely  They will check your pressure bandage for bleeding or swelling  You will need to lie flat with your leg or arm straight for 2 to 4 hours  Do not  get out of bed until healthcare providers says it is okay  Arm or leg movements can cause serious bleeding  · You may need blood tests, a chest x-ray, an EKG, or an echocardiogram before you leave the hospital  These tests will make sure your valve is working correctly   You will need to take blood thinner medicine for at least 6 months after your procedure  You may need to take aspirin for the rest of your life  These medicines will prevent blood clots from forming near your valve  Risks of a TAVR:  You may have bruising or pain where the catheter was  You may get an infection or bleed more than expected  The catheter may cause a hole in your heart or blood vessels  A blood clot may form around your valve  The clot may travel to your brain and cause a stroke  Your heartbeat may become irregular during or after a TAVR  You may need medicines or procedures to treat this  Your new valve may not work correctly  You may need another procedure to replace the valve  Follow up with your healthcare provider as directed:  Write down your questions so you remember to ask them during your visits  © 2017 [facility] Information is for End User's use only and may not be sold, redistributed or otherwise used for commercial purposes  All illustrations and images included in CareNotes® are the copyrighted property of A D [employer] M [employer]  or Javon Covarrub[employer]  The above information is an  only  It is not intended as medical advice for individual conditions or treatments  Talk to your doctor, nurse or pharmacist before following any medical regimen to see if it is safe and effective for you

## 2019-07-26 ENCOUNTER — TELEPHONE (OUTPATIENT)
Dept: CARDIOLOGY CLINIC | Facility: CLINIC | Age: 84
End: 2019-07-26

## 2019-07-26 LAB
EST. AVERAGE GLUCOSE BLD GHB EST-MCNC: 278 MG/DL
HBA1C MFR BLD: 11.3 % (ref 4.2–6.3)
IRON SERPL-MCNC: 46 UG/DL (ref 50–170)

## 2019-07-31 ENCOUNTER — OFFICE VISIT (OUTPATIENT)
Dept: INTERNAL MEDICINE CLINIC | Facility: CLINIC | Age: 84
End: 2019-07-31
Payer: COMMERCIAL

## 2019-07-31 VITALS
HEART RATE: 90 BPM | OXYGEN SATURATION: 91 % | WEIGHT: 133.13 LBS | SYSTOLIC BLOOD PRESSURE: 124 MMHG | BODY MASS INDEX: 24.5 KG/M2 | DIASTOLIC BLOOD PRESSURE: 70 MMHG | TEMPERATURE: 99 F | HEIGHT: 62 IN

## 2019-07-31 DIAGNOSIS — I35.0 AORTIC VALVE STENOSIS, ETIOLOGY OF CARDIAC VALVE DISEASE UNSPECIFIED: ICD-10-CM

## 2019-07-31 DIAGNOSIS — I50.32 CHRONIC DIASTOLIC CONGESTIVE HEART FAILURE (HCC): ICD-10-CM

## 2019-07-31 DIAGNOSIS — Z86.79 H/O TACHYCARDIA-BRADYCARDIA SYNDROME: ICD-10-CM

## 2019-07-31 DIAGNOSIS — IMO0001 UNCONTROLLED TYPE 2 DIABETES MELLITUS WITHOUT COMPLICATION, WITHOUT LONG-TERM CURRENT USE OF INSULIN: Primary | ICD-10-CM

## 2019-07-31 DIAGNOSIS — E11.42 DIABETIC POLYNEUROPATHY ASSOCIATED WITH TYPE 2 DIABETES MELLITUS (HCC): ICD-10-CM

## 2019-07-31 PROBLEM — I95.9 HYPOTENSION: Status: RESOLVED | Noted: 2018-05-15 | Resolved: 2019-07-31

## 2019-07-31 PROCEDURE — 1036F TOBACCO NON-USER: CPT | Performed by: INTERNAL MEDICINE

## 2019-07-31 PROCEDURE — 99215 OFFICE O/P EST HI 40 MIN: CPT | Performed by: INTERNAL MEDICINE

## 2019-07-31 NOTE — PROGRESS NOTES
Assessment/Plan:         Diagnoses and all orders for this visit:    Uncontrolled type 2 diabetes mellitus without complication, without long-term current use of insulin (HCC)  -     Microalbumin / creatinine urine ratio  -     HEMOGLOBIN A1C W/ EAG ESTIMATION; Future  Pt has appt with cain in Sept  Her family is inagreement that she needs to make healthier food choices and eat more regularly as well as take her meds on a schedule  Pt did not seem to think changes were needed and is fearful of hypoglycemia but her son and granddaughter understand that small changes could help lower her A1c  Surprisingly her facial skin cancer wounds healed well despite her elevated A1c     Diabetic polyneuropathy associated with type 2 diabetes mellitus ([address] )  Use cane for safety  Fall precautions    Chronic diastolic congestive heart failure ([address] )  Pt has cardiology followup in September  She no longer wants to pursue TAVR and I sent a note to cardiology informing them of such    H/O tachycardia-bradycardia syndrome s/p PPM  Pt pacer seems stable and she is followed by Dr crouch for pacer checks    Aortic valve stenosis, etiology of cardiac valve disease unspecified  Pt denies cp,sob or syncope Her son said she went up and down the steps easily yesterday for laundry  She verbalized that she does not want to pursue TAVR and pt family is inagreement  I did tell all present that she could develop complications from the aortic stenosis and that once sxs develop she may not be a candidate for valve replacement They voiced understanding and did not feel patient was  Surgical candidate    Other orders  -     esomeprazole (NexIUM) 20 mg capsule; Take 20 mg by mouth every morning before breakfast    Rto 2 months/prn       Patient ID: Sergio Goldstein is a 80 y o  female      HPI   Pt here for routine followup but saw cardio last week and initially was interested in pursuing TAVR She is here today with her granddaughter and son and she now states she does not want procedure and they agree with her decision as they do not feel she would tolerate or needs it as they do not see sxs  She denies CP her sugars are still fluctuant and mostly over 200range  No chest pain or syncpe She dneies sob She was with her sister for 2 weeks and is now home  Her granddaughter states she eats out a lot with her sister and adits she does not think patient taking her diabetic meds regularly  No low blodd sugars She ahas Dr Marcos Valdivia appt in September  She tolerated skin cancer removal and wounds healed well She had last followup recently  Review of Systems   Constitutional: Negative  HENT: Negative  Respiratory: Negative  Negative for shortness of breath  Cardiovascular: Negative  Negative for chest pain, palpitations and leg swelling  Gastrointestinal: Negative  Genitourinary: Negative  Musculoskeletal: Positive for arthralgias and gait problem  Skin: Negative  Neurological: Negative for dizziness  Hematological: Negative  Psychiatric/Behavioral: Negative  Past Medical History:   Diagnosis Date    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[facility] )     Bradycardia     Coronary artery disease     Depression     Depression     Diabetes mellitus ([facility] )     Type 2 DM    Disease of thyroid gland     Essential (primary) hypertension     Facial droop     started 10 years ago    GERD (gastroesophageal reflux disease)     Heart murmur     History of echocardiogram 08/28/2017    2-D w/CFD:  EF 0 60 (60%), LVSF is normal  No regional wall abnormalities  Mild mitral valve regurg  AV was trileaflet  Moderate tricuspid regurg  Trace pulmonic valve  No pericardial effusion   History of echocardiogram 03/28/2018    2-D w/CFD:  EF 0 60 (60%), Mild regurg in the MV  AV was trileaflet, severe stenosis, trace regurg  Mild regurg in the TV  No pericardial effusion  Aortic root exhibited normal size      History of echocardiogram 01/29/2016    2-D:  EF 0 55 (55%), Normal LVSF  Mild concentric LVH  Mild MR  Mild AS with mild regurg      History of Holter monitoring     2/24/16, 9/27/17, 2/6/18    History of nuclear stress test 08/16/2017    Lexiscan MPI:  EF 0 76 (76%), no prior MI or ischemia,    Hyperlipidemia     Hypothyroidism     Nonrheumatic aortic (valve) stenosis     Obstructive sleep apnea (adult) (pediatric)     10/17/17 - NPSG with nasal CPAP/Bi-level titration     Osteoarthritis     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HCC)     SSS (sick sinus syndrome) (HCC)     SVT (supraventricular tachycardia) (HCC)     Vitamin D deficiency      Past Surgical History:   Procedure Laterality Date    ANKLE SURGERY Left 2005    APPENDECTOMY  1955    CATARACT EXTRACTION Bilateral 09/1995    HEMORRHOID SURGERY  1978    HERNIA REPAIR  1990    INSERT / REPLACE / REMOVE PACEMAKER  05/14/2018    dual lead pacer st yony model 2272    JOINT REPLACEMENT      b/l hip replacements, right 2006    NEUROPLASTY / TRANSPOSITION MEDIAN NERVE AT CARPAL TUNNEL  1977    PARTIAL HYSTERECTOMY  1971    TOTAL HIP ARTHROPLASTY      TRANSPHENOIDAL / TRANSNASAL HYPOPHYSECTOMY / RESECTION PITUITARY TUMOR  1989    excision of pituitary gland     Social History     Socioeconomic History    Marital status:      Spouse name: Not on file    Number of children: Not on file    Years of education: Not on file    Highest education level: Not on file   Occupational History    Not on file   Social Needs    Financial resource strain: Not on file    Food insecurity:     Worry: Not on file     Inability: Not on file    Transportation needs:     Medical: Not on file     Non-medical: Not on file   Tobacco Use    Smoking status: Never Smoker    Smokeless tobacco: Never Used   Substance and Sexual Activity    Alcohol use: No    Drug use: No    Sexual activity: Never   Lifestyle    Physical activity:     Days per week: Not on file     Minutes per session: Not on file    Stress: Not on file Relationships    Social connections:     Talks on phone: Not on file     Gets together: Not on file     Attends Sabianist service: Not on file     Active member of club or organization: Not on file     Attends meetings of clubs or organizations: Not on file     Relationship status: Not on file    Intimate partner violence:     Fear of current or ex partner: Not on file     Emotionally abused: Not on file     Physically abused: Not on file     Forced sexual activity: Not on file   Other Topics Concern    Not on file   Social History Narrative    Caffeine use    Dental care regularly    Good dental hygiene    Uses safety equipment: seatbelts     Allergies   Allergen Reactions    Cephalosporins GI Intolerance    Levemir [Insulin Detemir]      Brittle diabetic  Hypersensitive to insulin  Severe hypoglycemia results if given insulin  Please speak with son, Sabino [place], before giving ANY type of insulin   Acetaminophen     Cephalexin GI Intolerance    Clindamycin      Other reaction(s): Unspecified    Demeclocycline     Hydrocodone      Bitartrate  Other reaction(s): Unspecified    Hydrocodone-Acetaminophen Nausea Only    Oxycodone-Acetaminophen      HCL    Oxycodone-Acetaminophen     Penicillins Hives     Other reaction(s): Other (See Comments)  ITCHING/RASH    Simvastatin      Other reaction(s): GI upset    Sulfamethoxazole-Trimethoprim     Tetrabenazine     Tetracycline Nausea Only    Tetracyclines & Related     Vicodin [Hydrocodone-Acetaminophen]              /70   Pulse 90   Temp 99 °F (37 2 °C) (Tympanic)   Ht 5' 2" (1 575 m)   Wt 60 4 kg (133 lb 2 oz)   SpO2 91%   BMI 24 35 kg/m²          Physical Exam   Constitutional: She is oriented to person, place, and time  She appears well-developed and well-nourished  No distress  HENT:   Head: Normocephalic and atraumatic  Mouth/Throat: No oropharyngeal exudate  Eyes: Pupils are equal, round, and reactive to light   EOM are normal  No scleral icterus  Neck: Normal range of motion  Neck supple  No JVD present  Cardiovascular: Normal rate and intact distal pulses  Murmur heard  Pulmonary/Chest: Effort normal and breath sounds normal  No respiratory distress  She has no wheezes  Abdominal: Soft  Bowel sounds are normal    Musculoskeletal: Normal range of motion  She exhibits deformity  She exhibits no edema  djd knees and lower ext   Neurological: She is alert and oriented to person, place, and time  She displays abnormal reflex  No cranial nerve deficit  She exhibits abnormal muscle tone  Coordination abnormal    Skin: Skin is warm and dry  She is not diaphoretic  No erythema  Psychiatric: She has a normal mood and affect  Her behavior is normal  Judgment and thought content normal    Nursing note and vitals reviewed

## 2019-08-01 ENCOUNTER — TELEPHONE (OUTPATIENT)
Dept: CARDIOLOGY CLINIC | Facility: CLINIC | Age: 84
End: 2019-08-01

## 2019-08-01 NOTE — TELEPHONE ENCOUNTER
Spoke with someone from [facility] and they said they would not like to further continue the cath  Also left multiple messages before

## 2019-08-27 DIAGNOSIS — I10 ESSENTIAL HYPERTENSION: Primary | ICD-10-CM

## 2019-09-04 ENCOUNTER — REMOTE DEVICE CLINIC VISIT (OUTPATIENT)
Dept: CARDIOLOGY CLINIC | Facility: CLINIC | Age: 84
End: 2019-09-04
Payer: COMMERCIAL

## 2019-09-04 DIAGNOSIS — Z45.010 ENCOUNTER FOR CHECKING AND TESTING OF CARDIAC PACEMAKER PULSE GENERATOR (BATTERY): ICD-10-CM

## 2019-09-04 DIAGNOSIS — I49.5 SICK SINUS SYNDROME (HCC): Primary | ICD-10-CM

## 2019-09-04 PROCEDURE — 93294 REM INTERROG EVL PM/LDLS PM: CPT | Performed by: INTERNAL MEDICINE

## 2019-09-04 PROCEDURE — 93296 REM INTERROG EVL PM/IDS: CPT | Performed by: INTERNAL MEDICINE

## 2019-09-05 NOTE — PROGRESS NOTES
merlin remote pacer check  No alerts  Normal battery function      Current Outpatient Medications:     aspirin 81 MG tablet, Take 1 tablet by mouth daily, Disp: , Rfl:     Blood Glucose Monitoring Suppl (ONE TOUCH ULTRA MINI) w/Device KIT, USE TO TEST 4 TIMES DAILY, Disp: , Rfl: 0    cholestyramine (QUESTRAN) 4 g packet, Take 1 packet (4 g total) by mouth 3 (three) times a day with meals (Patient not taking: Reported on 7/25/2019), Disp: 60 packet, Rfl: 1    ergocalciferol (VITAMIN D2) 50,000 units, Take 1 capsule (50,000 Units total) by mouth once a week for 90 days, Disp: 12 capsule, Rfl: 3    esomeprazole (NexIUM) 20 mg capsule, Take 20 mg by mouth every morning before breakfast, Disp: , Rfl:     furosemide (LASIX) 20 mg tablet, Take twice a week as needed for edema/swelling, Disp: 10 tablet, Rfl: 5    hydrochlorothiazide (MICROZIDE) 12 5 mg capsule, Take 12 5 mg by mouth daily, Disp: , Rfl:     hydrocortisone (ANUSOL-HC, PROCTOSOL HC,) 2 5 % rectal cream, Insert into the rectum 2 (two) times a day (Patient not taking: Reported on 6/13/2019), Disp: 30 g, Rfl: 3    levothyroxine 75 mcg tablet, Take 1 tablet (75 mcg total) by mouth daily in the early morning, Disp: 90 tablet, Rfl: 3    metFORMIN (FORTAMET) 500 MG (OSM) 24 hr tablet, Take 1 tablet (500 mg total) by mouth 2 (two) times a day with meals for 90 days, Disp: 180 tablet, Rfl: 3    metoprolol tartrate (LOPRESSOR) 25 mg tablet, Take 25 mg by mouth 3 (three) times a day, Disp: , Rfl:     metoprolol tartrate (LOPRESSOR) 25 mg tablet, Take 1 5 tablets (37 5 mg total) by mouth every 12 (twelve) hours (Patient not taking: Reported on 7/31/2019), Disp: 270 tablet, Rfl: 3    metoprolol tartrate (LOPRESSOR) 25 mg tablet, TAKE 1 & 1/2 TABLETS (37 5MG) BY MOUTH EVERY 12 HOURS FOR 90 DAYS , Disp: 270 tablet, Rfl: 3    ONE TOUCH ULTRA TEST test strip, 4 (four) times a day Test, Disp: , Rfl: 0    ONETOUCH DELICA LANCETS FINE MISC, 4 (four) times a day Test, Disp: , Rfl: 0    oxybutynin (DITROPAN-XL) 10 MG 24 hr tablet, TAKE ONE TABLET BY MOUTH TWICE A DAY, Disp: 180 tablet, Rfl: 0    polyethylene glycol (MIRALAX) powder, Take by mouth as needed  , Disp: , Rfl:     pravastatin (PRAVACHOL) 20 mg tablet, Take 1 tablet (20 mg total) by mouth daily, Disp: 90 tablet, Rfl: 3    predniSONE 5 mg tablet, Take 1 tablet (5 mg total) by mouth daily, Disp: 90 tablet, Rfl: 3    repaglinide (PRANDIN) 2 mg tablet, Take 2 tablets (4 mg total) by mouth 3 (three) times a day before meals for 90 days, Disp: 540 tablet, Rfl: 3

## 2019-10-21 DIAGNOSIS — N39.3 STRESS INCONTINENCE OF URINE: ICD-10-CM

## 2019-10-21 RX ORDER — OXYBUTYNIN CHLORIDE 10 MG/1
TABLET, EXTENDED RELEASE ORAL
Qty: 180 TABLET | Refills: 1 | Status: ON HOLD | OUTPATIENT
Start: 2019-10-21 | End: 2019-11-23 | Stop reason: CLARIF

## 2019-10-31 DIAGNOSIS — E27.1 ADRENAL INSUFFICIENCY (ADDISON'S DISEASE) (HCC): ICD-10-CM

## 2019-10-31 DIAGNOSIS — E03.9 HYPOTHYROIDISM, UNSPECIFIED TYPE: ICD-10-CM

## 2019-10-31 RX ORDER — LEVOTHYROXINE SODIUM 0.07 MG/1
75 TABLET ORAL
Qty: 90 TABLET | Refills: 0 | Status: ON HOLD | OUTPATIENT
Start: 2019-10-31 | End: 2019-11-23 | Stop reason: SDUPTHER

## 2019-10-31 RX ORDER — PREDNISONE 1 MG/1
5 TABLET ORAL DAILY
Qty: 90 TABLET | Refills: 3 | Status: SHIPPED | OUTPATIENT
Start: 2019-10-31 | End: 2020-07-14

## 2019-11-15 DIAGNOSIS — E78.2 MIXED HYPERLIPIDEMIA: ICD-10-CM

## 2019-11-15 DIAGNOSIS — E55.9 VITAMIN D DEFICIENCY: ICD-10-CM

## 2019-11-15 LAB
LEFT EYE DIABETIC RETINOPATHY: NORMAL
RIGHT EYE DIABETIC RETINOPATHY: NORMAL

## 2019-11-15 RX ORDER — ERGOCALCIFEROL 1.25 MG/1
50000 CAPSULE ORAL WEEKLY
Qty: 12 CAPSULE | Refills: 0 | Status: SHIPPED | OUTPATIENT
Start: 2019-11-15 | End: 2020-01-21

## 2019-11-15 RX ORDER — PRAVASTATIN SODIUM 20 MG
20 TABLET ORAL DAILY
Qty: 90 TABLET | Refills: 0 | Status: SHIPPED | OUTPATIENT
Start: 2019-11-15 | End: 2020-01-21

## 2019-11-16 ENCOUNTER — APPOINTMENT (OUTPATIENT)
Dept: LAB | Facility: MEDICAL CENTER | Age: 84
End: 2019-11-16
Payer: COMMERCIAL

## 2019-11-16 DIAGNOSIS — IMO0001 UNCONTROLLED TYPE 2 DIABETES MELLITUS WITHOUT COMPLICATION, WITHOUT LONG-TERM CURRENT USE OF INSULIN: ICD-10-CM

## 2019-11-16 LAB
CREAT UR-MCNC: 116 MG/DL
EST. AVERAGE GLUCOSE BLD GHB EST-MCNC: 269 MG/DL
HBA1C MFR BLD: 11 % (ref 4.2–6.3)
MICROALBUMIN UR-MCNC: 11.3 MG/L (ref 0–20)
MICROALBUMIN/CREAT 24H UR: 10 MG/G CREATININE (ref 0–30)

## 2019-11-16 PROCEDURE — 36415 COLL VENOUS BLD VENIPUNCTURE: CPT

## 2019-11-16 PROCEDURE — 82043 UR ALBUMIN QUANTITATIVE: CPT | Performed by: INTERNAL MEDICINE

## 2019-11-16 PROCEDURE — 82570 ASSAY OF URINE CREATININE: CPT | Performed by: INTERNAL MEDICINE

## 2019-11-16 PROCEDURE — 83036 HEMOGLOBIN GLYCOSYLATED A1C: CPT

## 2019-11-22 ENCOUNTER — APPOINTMENT (EMERGENCY)
Dept: CT IMAGING | Facility: HOSPITAL | Age: 84
End: 2019-11-22
Payer: COMMERCIAL

## 2019-11-22 ENCOUNTER — HOSPITAL ENCOUNTER (INPATIENT)
Facility: HOSPITAL | Age: 84
LOS: 1 days | Discharge: HOME | DRG: 313 | End: 2019-11-23
Attending: INTERNAL MEDICINE | Admitting: INTERNAL MEDICINE
Payer: COMMERCIAL

## 2019-11-22 ENCOUNTER — HOSPITAL ENCOUNTER (EMERGENCY)
Facility: HOSPITAL | Age: 84
End: 2019-11-22
Attending: EMERGENCY MEDICINE | Admitting: EMERGENCY MEDICINE
Payer: COMMERCIAL

## 2019-11-22 ENCOUNTER — APPOINTMENT (EMERGENCY)
Dept: RADIOLOGY | Facility: HOSPITAL | Age: 84
End: 2019-11-22
Payer: COMMERCIAL

## 2019-11-22 VITALS
WEIGHT: 137.35 LBS | HEART RATE: 60 BPM | RESPIRATION RATE: 16 BRPM | SYSTOLIC BLOOD PRESSURE: 142 MMHG | TEMPERATURE: 97.6 F | BODY MASS INDEX: 25.12 KG/M2 | DIASTOLIC BLOOD PRESSURE: 65 MMHG | OXYGEN SATURATION: 97 %

## 2019-11-22 DIAGNOSIS — IMO0001 UNCONTROLLED TYPE 2 DIABETES MELLITUS WITHOUT COMPLICATION, WITHOUT LONG-TERM CURRENT USE OF INSULIN: Primary | ICD-10-CM

## 2019-11-22 DIAGNOSIS — R53.1 WEAKNESS: Primary | ICD-10-CM

## 2019-11-22 DIAGNOSIS — R07.9 CHEST PAIN: ICD-10-CM

## 2019-11-22 DIAGNOSIS — I24.9 ACS (ACUTE CORONARY SYNDROME) (HCC): ICD-10-CM

## 2019-11-22 DIAGNOSIS — E03.9 HYPOTHYROIDISM, UNSPECIFIED TYPE: ICD-10-CM

## 2019-11-22 DIAGNOSIS — E78.2 MIXED HYPERLIPIDEMIA: ICD-10-CM

## 2019-11-22 PROBLEM — N32.81 OAB (OVERACTIVE BLADDER): Chronic | Status: ACTIVE | Noted: 2019-11-22

## 2019-11-22 LAB
ALBUMIN SERPL BCP-MCNC: 3.1 G/DL (ref 3.5–5)
ALBUMIN SERPL BCP-MCNC: 3.2 G/DL (ref 3.5–5)
ALP SERPL-CCNC: 51 U/L (ref 46–116)
ALP SERPL-CCNC: 52 U/L (ref 46–116)
ALT SERPL W P-5'-P-CCNC: 14 U/L (ref 12–78)
ALT SERPL W P-5'-P-CCNC: 16 U/L (ref 12–78)
ANION GAP SERPL CALCULATED.3IONS-SCNC: 5 MMOL/L (ref 4–13)
ANION GAP SERPL CALCULATED.3IONS-SCNC: 9 MMOL/L (ref 4–13)
AST SERPL W P-5'-P-CCNC: 11 U/L (ref 5–45)
AST SERPL W P-5'-P-CCNC: 13 U/L (ref 5–45)
ATRIAL RATE: 60 BPM
BASOPHILS # BLD AUTO: 0.04 THOUSANDS/ΜL (ref 0–0.1)
BASOPHILS NFR BLD AUTO: 1 % (ref 0–1)
BILIRUB SERPL-MCNC: 0.55 MG/DL (ref 0.2–1)
BILIRUB SERPL-MCNC: 0.6 MG/DL (ref 0.2–1)
BUN SERPL-MCNC: 14 MG/DL (ref 5–25)
BUN SERPL-MCNC: 14 MG/DL (ref 5–25)
CALCIUM SERPL-MCNC: 8.4 MG/DL (ref 8.3–10.1)
CALCIUM SERPL-MCNC: 8.8 MG/DL (ref 8.3–10.1)
CHLORIDE SERPL-SCNC: 102 MMOL/L (ref 100–108)
CHLORIDE SERPL-SCNC: 103 MMOL/L (ref 100–108)
CO2 SERPL-SCNC: 27 MMOL/L (ref 21–32)
CO2 SERPL-SCNC: 28 MMOL/L (ref 21–32)
CREAT SERPL-MCNC: 0.83 MG/DL (ref 0.6–1.3)
CREAT SERPL-MCNC: 0.85 MG/DL (ref 0.6–1.3)
EOSINOPHIL # BLD AUTO: 0.14 THOUSAND/ΜL (ref 0–0.61)
EOSINOPHIL NFR BLD AUTO: 2 % (ref 0–6)
ERYTHROCYTE [DISTWIDTH] IN BLOOD BY AUTOMATED COUNT: 14.6 % (ref 11.6–15.1)
GFR SERPL CREATININE-BSD FRML MDRD: 63 ML/MIN/1.73SQ M
GFR SERPL CREATININE-BSD FRML MDRD: 65 ML/MIN/1.73SQ M
GLUCOSE SERPL-MCNC: 241 MG/DL (ref 65–140)
GLUCOSE SERPL-MCNC: 248 MG/DL (ref 65–140)
GLUCOSE SERPL-MCNC: 277 MG/DL (ref 65–140)
GLUCOSE SERPL-MCNC: 303 MG/DL (ref 65–140)
GLUCOSE SERPL-MCNC: 314 MG/DL (ref 65–140)
HCT VFR BLD AUTO: 36.7 % (ref 34.8–46.1)
HGB BLD-MCNC: 11.3 G/DL (ref 11.5–15.4)
HOLD SPECIMEN: NORMAL
IMM GRANULOCYTES # BLD AUTO: 0.03 THOUSAND/UL (ref 0–0.2)
IMM GRANULOCYTES NFR BLD AUTO: 0 % (ref 0–2)
LYMPHOCYTES # BLD AUTO: 1.73 THOUSANDS/ΜL (ref 0.6–4.47)
LYMPHOCYTES NFR BLD AUTO: 24 % (ref 14–44)
MCH RBC QN AUTO: 28.6 PG (ref 26.8–34.3)
MCHC RBC AUTO-ENTMCNC: 30.8 G/DL (ref 31.4–37.4)
MCV RBC AUTO: 93 FL (ref 82–98)
MONOCYTES # BLD AUTO: 0.6 THOUSAND/ΜL (ref 0.17–1.22)
MONOCYTES NFR BLD AUTO: 8 % (ref 4–12)
NEUTROPHILS # BLD AUTO: 4.82 THOUSANDS/ΜL (ref 1.85–7.62)
NEUTS SEG NFR BLD AUTO: 65 % (ref 43–75)
NRBC BLD AUTO-RTO: 0 /100 WBCS
NT-PROBNP SERPL-MCNC: 1565 PG/ML
PLATELET # BLD AUTO: 227 THOUSANDS/UL (ref 149–390)
PLATELET # BLD AUTO: 245 THOUSANDS/UL (ref 149–390)
PMV BLD AUTO: 10.3 FL (ref 8.9–12.7)
PMV BLD AUTO: 10.6 FL (ref 8.9–12.7)
POTASSIUM SERPL-SCNC: 4.2 MMOL/L (ref 3.5–5.3)
POTASSIUM SERPL-SCNC: 4.6 MMOL/L (ref 3.5–5.3)
PR INTERVAL: 194 MS
PROT SERPL-MCNC: 5.9 G/DL (ref 6.4–8.2)
PROT SERPL-MCNC: 6.1 G/DL (ref 6.4–8.2)
QRS AXIS: -49 DEGREES
QRSD INTERVAL: 96 MS
QT INTERVAL: 432 MS
QTC INTERVAL: 432 MS
RBC # BLD AUTO: 3.95 MILLION/UL (ref 3.81–5.12)
SODIUM SERPL-SCNC: 136 MMOL/L (ref 136–145)
SODIUM SERPL-SCNC: 138 MMOL/L (ref 136–145)
T WAVE AXIS: 17 DEGREES
TROPONIN I SERPL-MCNC: 0.05 NG/ML
TROPONIN I SERPL-MCNC: 0.06 NG/ML
TROPONIN I SERPL-MCNC: 0.06 NG/ML
TROPONIN I SERPL-MCNC: 0.08 NG/ML
TSH SERPL DL<=0.05 MIU/L-ACNC: 0.03 UIU/ML (ref 0.36–3.74)
VENTRICULAR RATE: 60 BPM
WBC # BLD AUTO: 7.36 THOUSAND/UL (ref 4.31–10.16)

## 2019-11-22 PROCEDURE — 90662 IIV NO PRSV INCREASED AG IM: CPT | Performed by: INTERNAL MEDICINE

## 2019-11-22 PROCEDURE — 71046 X-RAY EXAM CHEST 2 VIEWS: CPT

## 2019-11-22 PROCEDURE — 85025 COMPLETE CBC W/AUTO DIFF WBC: CPT | Performed by: EMERGENCY MEDICINE

## 2019-11-22 PROCEDURE — 99285 EMERGENCY DEPT VISIT HI MDM: CPT | Performed by: PHYSICIAN ASSISTANT

## 2019-11-22 PROCEDURE — 93010 ELECTROCARDIOGRAM REPORT: CPT | Performed by: INTERNAL MEDICINE

## 2019-11-22 PROCEDURE — 99285 EMERGENCY DEPT VISIT HI MDM: CPT

## 2019-11-22 PROCEDURE — 70450 CT HEAD/BRAIN W/O DYE: CPT

## 2019-11-22 PROCEDURE — 93005 ELECTROCARDIOGRAM TRACING: CPT

## 2019-11-22 PROCEDURE — 99223 1ST HOSP IP/OBS HIGH 75: CPT | Performed by: INTERNAL MEDICINE

## 2019-11-22 PROCEDURE — 84484 ASSAY OF TROPONIN QUANT: CPT | Performed by: EMERGENCY MEDICINE

## 2019-11-22 PROCEDURE — 82948 REAGENT STRIP/BLOOD GLUCOSE: CPT

## 2019-11-22 PROCEDURE — 36415 COLL VENOUS BLD VENIPUNCTURE: CPT

## 2019-11-22 PROCEDURE — 83880 ASSAY OF NATRIURETIC PEPTIDE: CPT | Performed by: INTERNAL MEDICINE

## 2019-11-22 PROCEDURE — 84443 ASSAY THYROID STIM HORMONE: CPT | Performed by: INTERNAL MEDICINE

## 2019-11-22 PROCEDURE — 80053 COMPREHEN METABOLIC PANEL: CPT | Performed by: EMERGENCY MEDICINE

## 2019-11-22 PROCEDURE — 85049 AUTOMATED PLATELET COUNT: CPT | Performed by: INTERNAL MEDICINE

## 2019-11-22 PROCEDURE — 84484 ASSAY OF TROPONIN QUANT: CPT | Performed by: INTERNAL MEDICINE

## 2019-11-22 PROCEDURE — 80053 COMPREHEN METABOLIC PANEL: CPT | Performed by: INTERNAL MEDICINE

## 2019-11-22 PROCEDURE — G0008 ADMIN INFLUENZA VIRUS VAC: HCPCS | Performed by: INTERNAL MEDICINE

## 2019-11-22 PROCEDURE — 84484 ASSAY OF TROPONIN QUANT: CPT | Performed by: PHYSICIAN ASSISTANT

## 2019-11-22 RX ORDER — SENNOSIDES 8.6 MG
1 TABLET ORAL DAILY
Status: DISCONTINUED | OUTPATIENT
Start: 2019-11-23 | End: 2019-11-23 | Stop reason: HOSPADM

## 2019-11-22 RX ORDER — OXYBUTYNIN CHLORIDE 10 MG/1
10 TABLET, EXTENDED RELEASE ORAL 2 TIMES DAILY
Status: DISCONTINUED | OUTPATIENT
Start: 2019-11-22 | End: 2019-11-23 | Stop reason: HOSPADM

## 2019-11-22 RX ORDER — PRAVASTATIN SODIUM 20 MG
20 TABLET ORAL DAILY
Status: DISCONTINUED | OUTPATIENT
Start: 2019-11-23 | End: 2019-11-23 | Stop reason: HOSPADM

## 2019-11-22 RX ORDER — PANTOPRAZOLE SODIUM 40 MG/1
40 TABLET, DELAYED RELEASE ORAL
Status: DISCONTINUED | OUTPATIENT
Start: 2019-11-23 | End: 2019-11-23 | Stop reason: HOSPADM

## 2019-11-22 RX ORDER — ASPIRIN 81 MG/1
81 TABLET, CHEWABLE ORAL DAILY
Status: DISCONTINUED | OUTPATIENT
Start: 2019-11-23 | End: 2019-11-23 | Stop reason: HOSPADM

## 2019-11-22 RX ORDER — ONDANSETRON 2 MG/ML
4 INJECTION INTRAMUSCULAR; INTRAVENOUS EVERY 6 HOURS PRN
Status: DISCONTINUED | OUTPATIENT
Start: 2019-11-22 | End: 2019-11-23 | Stop reason: HOSPADM

## 2019-11-22 RX ORDER — MAGNESIUM HYDROXIDE/ALUMINUM HYDROXICE/SIMETHICONE 120; 1200; 1200 MG/30ML; MG/30ML; MG/30ML
30 SUSPENSION ORAL EVERY 6 HOURS PRN
Status: DISCONTINUED | OUTPATIENT
Start: 2019-11-22 | End: 2019-11-23 | Stop reason: HOSPADM

## 2019-11-22 RX ORDER — DOCUSATE SODIUM 100 MG/1
100 CAPSULE, LIQUID FILLED ORAL 2 TIMES DAILY
Status: DISCONTINUED | OUTPATIENT
Start: 2019-11-22 | End: 2019-11-23 | Stop reason: HOSPADM

## 2019-11-22 RX ORDER — FUROSEMIDE 20 MG/1
20 TABLET ORAL DAILY
Status: DISCONTINUED | OUTPATIENT
Start: 2019-11-23 | End: 2019-11-23 | Stop reason: HOSPADM

## 2019-11-22 RX ORDER — ERGOCALCIFEROL 1.25 MG/1
50000 CAPSULE ORAL WEEKLY
Status: DISCONTINUED | OUTPATIENT
Start: 2019-11-24 | End: 2019-11-23 | Stop reason: HOSPADM

## 2019-11-22 RX ORDER — LEVOTHYROXINE SODIUM 0.07 MG/1
75 TABLET ORAL
Status: DISCONTINUED | OUTPATIENT
Start: 2019-11-23 | End: 2019-11-23

## 2019-11-22 RX ORDER — ASPIRIN 81 MG/1
324 TABLET, CHEWABLE ORAL ONCE
Status: COMPLETED | OUTPATIENT
Start: 2019-11-22 | End: 2019-11-22

## 2019-11-22 RX ORDER — PREDNISONE 1 MG/1
5 TABLET ORAL DAILY
Status: DISCONTINUED | OUTPATIENT
Start: 2019-11-23 | End: 2019-11-23 | Stop reason: HOSPADM

## 2019-11-22 RX ADMIN — OXYBUTYNIN 10 MG: 10 TABLET, FILM COATED, EXTENDED RELEASE ORAL at 18:08

## 2019-11-22 RX ADMIN — INFLUENZA A VIRUS A/MICHIGAN/45/2015 X-275 (H1N1) ANTIGEN (FORMALDEHYDE INACTIVATED), INFLUENZA A VIRUS A/SINGAPORE/INFIMH-16-0019/2016 IVR-186 (H3N2) ANTIGEN (FORMALDEHYDE INACTIVATED), AND INFLUENZA B VIRUS B/MARYLAND/15/2016 BX-69A (A B/COLORADO/6/2017-LIKE VIRUS) ANTIGEN (FORMALDEHYDE INACTIVATED) 0.5 ML: 60; 60; 60 INJECTION, SUSPENSION INTRAMUSCULAR at 21:13

## 2019-11-22 RX ADMIN — METOPROLOL TARTRATE 25 MG: 25 TABLET ORAL at 22:26

## 2019-11-22 RX ADMIN — ASPIRIN 81 MG 324 MG: 81 TABLET ORAL at 14:50

## 2019-11-22 RX ADMIN — METOPROLOL TARTRATE 25 MG: 25 TABLET ORAL at 18:12

## 2019-11-22 RX ADMIN — DOCUSATE SODIUM 100 MG: 100 CAPSULE, LIQUID FILLED ORAL at 18:08

## 2019-11-22 NOTE — ASSESSMENT & PLAN NOTE
Lab Results   Component Value Date    HGBA1C 11 0 (H) 11/16/2019       Recent Labs     11/22/19  1308   POCGLU 303*       Blood Sugar Average: Last 72 hrs:     Patient is on Prandin 2 mg - 2 tabs three times per day & Metformin 500 mg PO BID at home - sees an endocrinologist as outpatient  She is insulin sensitive and is asymptomatic when hypoglycemic; therefore, patient is refusing insulin  Hx of hypoglycemic coma, per pt      Pt is on steroids (5mg prednisone), which is mostly likely the main reason for the elevated sugars & A1C  Patient's diabetes is poorly controlled w/ A1C of 11%  Continue Accu-checks TIDAC to monitor blood glucose   Should be on diabetic/cardiac

## 2019-11-22 NOTE — ASSESSMENT & PLAN NOTE
Pt woke up in the morning with substernal chest pressure with radiation to the jaw, lasting for 20-30 mins  Patient also noted generalized weakness and headache  Denied SOB, diaphoresis, N/V/D  EMS was alerted and patient was transported to [facility]  Trops I were slightly elevated & are trending up, 0 5 & 0 8, respectively, CXR was normal, and ECG showed atrial pacing of 60 bpm w/ left axis deviation at the ED   Symptoms resolved upon arrival    Repeat ECG upon arrival   Suspicious for ACS w/ MALCOM score of 5 & HEART score of 6  Start on lovenox 40mg SubQ injection - daily  Consult cardiology to evaluate for ACS and cardiac cath   Cardiac/DM diet   Patient is agreeable to catherization

## 2019-11-22 NOTE — ED PROVIDER NOTES
History  Chief Complaint   Patient presents with    Weakness - Generalized     pt comes in EMS complaining of weakness and pain beginning at 0900 this morning     This is an 72-year-old female patient who states she woke up this morning and had substernal chest pressure radiate up into her jaw  It was not accompanied by shortness of breath or diaphoresis but also a sense of weakness  It lasts for about 20 minutes and resolved she states she feels fine  She also had a small headache with this she does not normally get headaches and does have a history of a stroke was left with a left-sided facial droop but was unclear when this occurred nor is her sister  She is not having any stroke-like symptoms  Patient did mention that over the last 2 nights she has been getting some intermittent jaw pain or left side that was not reproducible and was intermittent  No cough congestion sore throat She denies any blurred vision double vision or photophobia or vertigo or dizziness like symptoms  Nothing made it better or worse edges came resolved on its own no nausea vomiting diarrhea abdominal pain no urinary symptoms no fever no chills  She states she is now asymptomatic  Differential diagnosis includes not limited to ACS with her symptoms of chest and jaw pain, intercranial bleed less likely, urinary tract infection less likely          Prior to Admission Medications   Prescriptions Last Dose Informant Patient Reported? Taking?    Blood Glucose Monitoring Suppl (ONE TOUCH ULTRA MINI) w/Device KIT   Yes No   Sig: USE TO TEST 4 TIMES DAILY   ONE TOUCH ULTRA TEST test strip   Yes No   Si (four) times a day Test   ONETOUCH DELICA LANCETS FINE MISC   Yes No   Si (four) times a day Test   aspirin 81 MG tablet  Self Yes Yes   Sig: Take 1 tablet by mouth daily   ergocalciferol (VITAMIN D2) 50,000 units   No Yes   Sig: Take 1 capsule (50,000 Units total) by mouth once a week for 90 days   Patient taking differently: Take 50,000 Units by mouth once a week Sundays   esomeprazole (NexIUM) 20 mg capsule  Self Yes Yes   Sig: Take 20 mg by mouth every morning before breakfast   furosemide (LASIX) 20 mg tablet   No No   Sig: Take twice a week as needed for edema/swelling   levothyroxine 75 mcg tablet   No Yes   Sig: Take 1 tablet (75 mcg total) by mouth daily in the early morning   metFORMIN (FORTAMET) 500 MG (OSM) 24 hr tablet   No Yes   Sig: Take 1 tablet (500 mg total) by mouth 2 (two) times a day with meals for 90 days   metoprolol tartrate (LOPRESSOR) 25 mg tablet   Yes Yes   Sig: Take 25 mg by mouth 3 (three) times a day   oxybutynin (DITROPAN-XL) 10 MG 24 hr tablet   No Yes   Sig: TAKE ONE TABLET BY MOUTH TWICE A DAY   pravastatin (PRAVACHOL) 20 mg tablet   No Yes   Sig: Take 1 tablet (20 mg total) by mouth daily   predniSONE 5 mg tablet   No Yes   Sig: Take 1 tablet (5 mg total) by mouth daily   repaglinide (PRANDIN) 2 mg tablet   No Yes   Sig: Take 2 tablets (4 mg total) by mouth 3 (three) times a day before meals for 90 days      Facility-Administered Medications: None       Past Medical History:   Diagnosis Date    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HCC)     Bradycardia     Coronary artery disease     Depression     Depression     Diabetes mellitus (HCC)     Type 2 DM    Disease of thyroid gland     Essential (primary) hypertension     Facial droop     started 10 years ago    GERD (gastroesophageal reflux disease)     Heart murmur     History of echocardiogram 08/28/2017    2-D w/CFD:  EF 0 60 (60%), LVSF is normal  No regional wall abnormalities  Mild mitral valve regurg  AV was trileaflet  Moderate tricuspid regurg  Trace pulmonic valve  No pericardial effusion   History of echocardiogram 03/28/2018    2-D w/CFD:  EF 0 60 (60%), Mild regurg in the MV  AV was trileaflet, severe stenosis, trace regurg  Mild regurg in the TV  No pericardial effusion  Aortic root exhibited normal size      History of echocardiogram 01/29/2016 2-D:  EF 0 55 (55%), Normal LVSF  Mild concentric LVH  Mild MR  Mild AS with mild regurg   History of Holter monitoring     2/24/16, 9/27/17, 2/6/18    History of nuclear stress test 08/16/2017    Lexiscan MPI:  EF 0 76 (76%), no prior MI or ischemia,    Hyperlipidemia     Hypothyroidism     Nonrheumatic aortic (valve) stenosis     Obstructive sleep apnea (adult) (pediatric)     10/17/17 - NPSG with nasal CPAP/Bi-level titration     Osteoarthritis     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HCC)     [facility]     SVT [facility]supraventricular tachycardia) ([facility] [address] [facility]     Vitamin D deficiency        Past Surgical History:   Procedure Laterality Date    ANKLE SURGERY Left 2005    APPENDECTOMY  1955    CATARACT EXTRACTION Bilateral 09/1995    HEMORRHOID SURGERY  1978    HERNIA REPAIR  1990    INSERT / REPLACE / REMOVE PACEMAKER  05/14/2018    dual lead pacer st yony model 2272    JOINT REPLACEMENT      b/l hip replacements, right 2006    NEUROPLASTY / TRANSPOSITION MEDIAN NERVE AT CARPAL TUNNEL  1977    PARTIAL HYSTERECTOMY  1971    TOTAL HIP ARTHROPLASTY      TRANSPHENOIDAL / TRANSNASAL HYPOPHYSECTOMY / RESECTION PITUITARY TUMOR  1989    excision of pituitary gland       Family History   Problem Relation Age of Onset    Other Father         coal worker's pneumoconiosis    Heart disease Father     Diabetes Brother     Heart disease Brother     Prostate cancer Brother     Stomach cancer Maternal Grandmother     Diabetes Paternal Aunt      I have reviewed and agree with the history as documented  Social History     Tobacco Use    Smoking status: Never Smoker    Smokeless tobacco: Never Used   Substance Use Topics    Alcohol use: No    Drug use: No        Review of Systems   All other systems reviewed and are negative  Physical Exam  Physical Exam   Constitutional: She is oriented to person, place, and time  She appears well-developed and well-nourished     HENT:   Head: Normocephalic and atraumatic  Right Ear: External ear normal    Left Ear: External ear normal    Nose: Nose normal    Mouth/Throat: Oropharynx is clear and moist    Eyes: Pupils are equal, round, and reactive to light  Conjunctivae are normal    Neck: Normal range of motion  Neck supple  Cardiovascular: Normal rate and regular rhythm  Patient is paced    Initial EKG interpreted by me rate 60 beats per minute atrial paced rhythm left axis deviation no ectopics  no previous   Pulmonary/Chest: Effort normal and breath sounds normal    Abdominal: Soft  Bowel sounds are normal  There is no tenderness  Neurological: She is alert and oriented to person, place, and time  She has normal strength  No sensory deficit  She displays a negative Romberg sign  GCS eye subscore is 4  GCS verbal subscore is 5  GCS motor subscore is 6  Reflex Scores:       Tricep reflexes are 2+ on the right side and 2+ on the left side  Bicep reflexes are 2+ on the right side and 2+ on the left side  Brachioradialis reflexes are 2+ on the right side and 2+ on the left side  Patellar reflexes are 2+ on the right side and 2+ on the left side  Achilles reflexes are 2+ on the right side and 2+ on the left side  Patient has left facial droop on her left lower lip this is chronic  Skin: Skin is warm  Capillary refill takes less than 2 seconds  +2 pulses x4 extremities and carotid   Psychiatric: She has a normal mood and affect  Her behavior is normal    Nursing note and vitals reviewed        Vital Signs  ED Triage Vitals   Temperature Pulse Respirations Blood Pressure SpO2   11/22/19 1114 11/22/19 1114 11/22/19 1114 11/22/19 1117 11/22/19 1114   97 6 °F (36 4 °C) 62 18 127/58 97 %      Temp Source Heart Rate Source Patient Position - Orthostatic VS BP Location FiO2 (%)   11/22/19 1114 11/22/19 1114 11/22/19 1114 11/22/19 1114 --   Temporal Monitor Lying Left arm       Pain Score       11/22/19 1114       No Pain Vitals:    11/22/19 1117 11/22/19 1215 11/22/19 1230 11/22/19 1400   BP: 127/58 115/60 124/69 142/65   Pulse:  60 60 60   Patient Position - Orthostatic VS: Lying            Visual Acuity      ED Medications  Medications   aspirin chewable tablet 324 mg (has no administration in time range)       Diagnostic Studies  Results Reviewed     Procedure Component Value Units Date/Time    Troponin I [[id number]]     Lab Status:  No result Specimen:  Blood     Fingerstick Glucose (POCT) [[id number]]  (Abnormal) Collected:  11/22/19 1308    Lab Status:  Final result Updated:  11/22/19 1309     POC Glucose 303 mg/dl     Troponin I [[id number]]  (Abnormal) Collected:  11/22/19 1126    Lab Status:  Final result Specimen:  Blood from Arm, Right Updated:  11/22/19 1150     Troponin I 0 05 ng/mL     Comprehensive metabolic panel [[id number]]  (Abnormal) Collected:  11/22/19 1126    Lab Status:  Final result Specimen:  Blood from Arm, Right Updated:  11/22/19 1148     Sodium 138 mmol/L      Potassium 4 2 mmol/L      Chloride 102 mmol/L      CO2 27 mmol/L      ANION GAP 9 mmol/L      BUN 14 mg/dL      Creatinine 0 85 mg/dL      Glucose 277 mg/dL      Calcium 8 4 mg/dL      AST 13 U/L      ALT 16 U/L      Alkaline Phosphatase 52 U/L      Total Protein 5 9 g/dL      Albumin 3 1 g/dL      Total Bilirubin 0 60 mg/dL      eGFR 63 ml/min/1 73sq m     Narrative:       Charline guidelines for Chronic Kidney Disease (CKD):     Stage 1 with normal or high GFR (GFR > 90 mL/min/1 73 square meters)    Stage 2 Mild CKD (GFR = 60-89 mL/min/1 73 square meters)    Stage 3A Moderate CKD (GFR = 45-59 mL/min/1 73 square meters)    Stage 3B Moderate CKD (GFR = 30-44 mL/min/1 73 square meters)    Stage 4 Severe CKD (GFR = 15-29 mL/min/1 73 square meters)    Stage 5 End Stage CKD (GFR <15 mL/min/1 73 square meters)  Note: GFR calculation is accurate only with a steady state creatinine    UA (URINE) with reflex to Scope [[id number]]     Lab Status:  No result Specimen:  Urine     CBC and differential [[id number]]  (Abnormal) Collected:  11/22/19 1126    Lab Status:  Final result Specimen:  Blood from Arm, Right Updated:  11/22/19 1133     WBC 7 36 Thousand/uL      RBC 3 95 Million/uL      Hemoglobin 11 3 g/dL      Hematocrit 36 7 %      MCV 93 fL      MCH 28 6 pg      MCHC 30 8 g/dL      RDW 14 6 %      MPV 10 3 fL      Platelets 503 Thousands/uL      nRBC 0 /100 WBCs      Neutrophils Relative 65 %      Immat GRANS % 0 %      Lymphocytes Relative 24 %      Monocytes Relative 8 %      Eosinophils Relative 2 %      Basophils Relative 1 %      Neutrophils Absolute 4 82 Thousands/µL      Immature Grans Absolute 0 03 Thousand/uL      Lymphocytes Absolute 1 73 Thousands/µL      Monocytes Absolute 0 60 Thousand/µL      Eosinophils Absolute 0 14 Thousand/µL      Basophils Absolute 0 04 Thousands/µL                  XR chest 2 views   Final Result by Shelli Armenta MD (11/22 1241)      No acute cardiopulmonary disease  Large hiatal hernia  Workstation performed: [id number]         CT head without contrast   Final Result by Kun Mata MD (11/22 1221)      No acute intracranial hemorrhage seen   Mild periventricular and white matter hypodensity seen related to chronic small vessel ischemic change                  Workstation performed: [id number]                    Procedures  Procedures       ED Course  ED Course as of Nov 22 1443 Fri Nov 22, 2019   1252 Spoke with medicine reviewed case  Feels patient be better cared for at [facility] where she can have catheterization  Patient refused tavr procedure for valve, but coronary notes she required a catheterization as well  Patient believed that the catheterization was only for the valve  I explained to her that that was not the case    She elected to go to [place] for possible catheterization      1256 Spoke with [facility]iology at [facility] Dr Fareed Samuel who agreed with transfer  HEART Risk Score      Most Recent Value   History  1 Filed at: 11/22/2019 1157   ECG  0 Filed at: 11/22/2019 1157   Age  2 Filed at: 11/22/2019 1157   Risk Factors  2 Filed at: 11/22/2019 1157   Troponin  1 Filed at: 11/22/2019 1157   Heart Score Risk Calculator   History  1 Filed at: 11/22/2019 1157   ECG  0 Filed at: 11/22/2019 1157   Age  2 Filed at: 11/22/2019 1157   Risk Factors  2 Filed at: 11/22/2019 1157   Troponin  1 Filed at: 11/22/2019 1157   HEART Score  6 Filed at: 11/22/2019 1157   HEART Score  6 Filed at: 11/22/2019 1157                            MDM    Disposition  Final diagnoses:   Weakness   ACS (acute coronary syndrome) (HCC)   Chest pain     Time reflects when diagnosis was documented in both MDM as applicable and the Disposition within this note     Time User Action Codes Description Comment    11/22/2019  2:31 PM Oddis Even Add [R53 1] Weakness     11/22/2019  2:31 PM [place], [address] [place] Add [I24 9] ACS (acute coronary syndrome) ([facility] [address] )     11/22/2019  2:32 PM Oddis Even Add [R07 9] Chest pain       ED Disposition     ED Disposition Condition Date/Time Comment    Transfer to Another Facility-In Network  Fri Nov 22, 2019  2:31 PM Sherie Ganser should be transferred out to [facility]          MD Documentation      Most Recent Value   Patient Condition  The patient has been stabilized such that within reasonable medical probability, no material deterioration of the patient condition or the condition of the unborn child(lisa) is likely to result from the transfer   Reason for Transfer  Level of Care needed not available at this facility   Benefits of Transfer  Specialized equipment and/or services available at the receiving facility (Include comment)________________________ [Cardiac catheterization if necessary]   Risks of Transfer  Potential for delay in receiving treatment, Potential deterioration of medical condition, Loss of IV, Increased discomfort during transfer, Possible worsening of condition or death during transfer   Accepting Physician  dr Cyril Conde   Provider Certification  General risk, such as traffic hazards, adverse weather conditions, rough terrain or turbulence, possible failure of equipment (including vehicle or aircraft), or consequences of actions of persons outside the control of the transport personnel, Unanticipated needs of medical equipment and personnel during transport      RN Documentation      Most Recent Value   Level of Care  Advanced life support      Follow-up Information    None         Patient's Medications   Discharge Prescriptions    No medications on file     No discharge procedures on file      ED Provider  Electronically Signed by           Leidy Flood PA-C  11/22/19 6768

## 2019-11-22 NOTE — ASSESSMENT & PLAN NOTE
Wt Readings from Last 3 Encounters:   11/22/19 62 3 kg (137 lb 5 6 oz)   07/31/19 60 4 kg (133 lb 2 oz)   07/25/19 61 2 kg (135 lb)       Continue Lasix 20mg PO PRN if there is lower leg edema   Follow I/O's and daily weights

## 2019-11-22 NOTE — H&P
H&P- Veronika Garcia 1933, 80 y o  female MRN: [id number]    Unit/Bed#: -01 Encounter: [id number]    Primary Care Provider: Naern Mantilla DO   Date and time admitted to hospital: 11/22/2019  4:36 PM        OAB (overactive bladder)  Assessment & Plan  Continue oxybutynin 10 mg PO BID    Slow transit constipation  Assessment & Plan  Continue patient on Senna 8 6 mg PO once daily   Continue Mylanta 30ml Q6H PRN  Continue Colace 100mg capsule BID    CAD (coronary artery disease)  Assessment & Plan  Patient is noted have hx of CAD & CVA (10 years ago w/ left-sided facial drooping)    Ambulatory dysfunction  Assessment & Plan  Walks with assistance at home, denies any recent falls    H/O tachycardia-bradycardia syndrome s/p PPM  Assessment & Plan  Patient has hx of Sick Sinus Syndrome s/p pacemaker  Recent ECG shows atrial pacing     Hiatal hernia  Assessment & Plan  Continue Nexium 10mg one tablet in morning for acid reflux secondary to hiatal hernia     Chronic diastolic congestive heart failure (HCC)  Assessment & Plan  Wt Readings from Last 3 Encounters:   11/22/19 62 3 kg (137 lb 5 6 oz)   07/31/19 60 4 kg (133 lb 2 oz)   07/25/19 61 2 kg (135 lb)       Continue Lasix 20mg PO PRN if there is lower leg edema   Follow I/O's and daily weights    History of CVA (cerebrovascular accident)  Assessment & Plan  Pt is noted to have residual left-sided facial drooping ~ 10 years  Continue aspirin 81mg PO daily     Hypothyroidism  Assessment & Plan  Continue Levothyroxine 75 mg PO once every morning before breakfast    Hypertension  Assessment & Plan  Continue Metoprolol tartrate 25mg PO three times daily     Hyperlipidemia  Assessment & Plan  Continue pravastatin 20 mg PO daily    Uncontrolled type 2 diabetes mellitus without complication, without long-term current use of insulin [facility])  Assessment & Plan  Lab Results   Component Value Date    HGBA1C 11 0 (H) 11/16/2019       Recent Labs     11/22/19  1308 POCGLU 303*       Blood Sugar Average: Last 72 hrs:     Patient is on Prandin 2 mg - 2 tabs three times per day & Metformin 500 mg PO BID at home - sees an endocrinologist as outpatient  She is insulin sensitive and is asymptomatic when hypoglycemic; therefore, patient is refusing insulin  Hx of hypoglycemic coma, per pt  Pt is on steroids (5mg prednisone), which is mostly likely the main reason for the elevated sugars & A1C  Patient's diabetes is poorly controlled w/ A1C of 11%  Continue Accu-checks TIDAC to monitor blood glucose   Should be on diabetic/cardiac    * ACS (acute coronary syndrome) (HCC)  Assessment & Plan  Pt woke up in the morning with substernal chest pressure with radiation to the jaw, lasting for 20-30 mins  Patient also noted generalized weakness and headache  Denied SOB, diaphoresis, N/V/D  EMS was alerted and patient was transported to [facility]  Trops I were slightly elevated & are trending up, 0 5 & 0 8, respectively, CXR was normal, and ECG showed atrial pacing of 60 bpm w/ left axis deviation at the ED  Symptoms resolved upon arrival    Repeat ECG upon arrival   Suspicious for ACS w/ MALCOM score of 5 & HEART score of 6  Start on lovenox 40mg SubQ injection - daily  Consult cardiology to evaluate for ACS and cardiac cath   Cardiac/DM diet   Patient is agreeable to catherization      VTE Prophylaxis: Enoxaparin (Lovenox)  / sequential compression device   Code Status: Full code  POLST: POLST is not applicable to this patient  Discussion with family:  [address] daughter and son at bedside, they do not want much intervention, cath is okay  Anticipated Length of Stay:  Patient will be admitted on an Inpatient basis with an anticipated length of stay of  More than 2 midnights  Justification for Hospital Stay: for rule out chest pain    Total Time for Visit, including Counseling / Coordination of Care: 45 minutes    Greater than 50% of this total time spent on direct patient counseling and coordination of care  Chief Complaint:  Substernal Chest Pressure/Pain    History of Present Illness:    Emily Zhao is a 80 y o  female with a PMHx of CAD, SSS s/p pacemaker, HTN, HLD, T2DM, 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), CRISTAL, hypothyroidism who presented to [facility] with chest pain w/ radiation to the left jaw this morning  According to the patient, she went to bed last night with a headache & jaw pain associated w/ some numbness that developed suddenly to which she disregarded  When she woke up this morning around 9 am, she not only continued to experience a headache & jaw pain, but also substernal chest pressure & gradual, generalized weakness  The chest pressure lasted roughly 30 mins without aid, however, the generalized weakness persisted throughout the morning  Patient denies any exertional component to the chest pain as well as any SOB, diaphoresis, N/V/D, epigastric discomfort, visual changes, or localized weakness  Her sister called EMS and patient was transported to [facility] ED  There, her ECG showed NSR w/ a HR of 60, atrial pacing, and left-axis deviation, her CXR was normal, CT of head showed mild, chronic ischemic changes, and trop's were slightly elevated, 0 5 & 0 8, respectively  Vitals are stable  Patient was transferred to [facility] and is asymptomatic upon arrival & awaiting cardiology evaluation for possible catherization       Review of Systems:    Review of Systems   Constitutional: Negative  Negative for appetite change, chills, diaphoresis and fever  HENT: Negative for congestion, drooling, ear pain, hearing loss, rhinorrhea, sinus pain, sore throat, tinnitus and trouble swallowing  Eyes: Negative for pain and visual disturbance  Respiratory: Negative  Negative for cough, choking, chest tightness, shortness of breath, wheezing and stridor  Cardiovascular: Negative    Negative for chest pain, palpitations and leg swelling  Gastrointestinal: Positive for constipation  Negative for abdominal distention, abdominal pain, blood in stool, diarrhea, nausea and vomiting  Endocrine: Negative  Negative for cold intolerance, heat intolerance, polydipsia and polyphagia  Genitourinary: Negative  Negative for dysuria, flank pain, frequency and hematuria  Neurological: Negative for dizziness, speech difficulty, weakness, light-headedness, numbness and headaches  Past Medical and Surgical History:     Past Medical History:   Diagnosis Date    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[facility] [address] )     Bradycardia     Coronary artery disease     Depression     Depression     Diabetes mellitus (HCC)     Type 2 DM    Disease of thyroid gland     Essential (primary) hypertension     Facial droop     started 10 years ago    GERD (gastroesophageal reflux disease)     Heart murmur     History of echocardiogram 08/28/2017    2-D w/CFD:  EF 0 60 (60%), LVSF is normal  No regional wall abnormalities  Mild mitral valve regurg  AV was trileaflet  Moderate tricuspid regurg  Trace pulmonic valve  No pericardial effusion   History of echocardiogram 03/28/2018    2-D w/CFD:  EF 0 60 (60%), Mild regurg in the MV  AV was trileaflet, severe stenosis, trace regurg  Mild regurg in the TV  No pericardial effusion  Aortic root exhibited normal size   History of echocardiogram 01/29/2016    2-D:  EF 0 55 (55%), Normal LVSF  Mild concentric LVH  Mild MR  Mild AS with mild regurg      History of Holter monitoring     2/24/16, 9/27/17, 2/6/18    History of nuclear stress test 08/16/2017    Lexiscan MPI:  EF 0 76 (76%), no prior MI or ischemia,    Hyperlipidemia     Hypothyroidism     Nonrheumatic aortic (valve) stenosis     Obstructive sleep apnea (adult) (pediatric)     10/17/17 - NPSG with nasal CPAP/Bi-level titration     Osteoarthritis     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HCC)     SSS [facility] syndrome) ([facility])     SVT (supraventricular tachycardia) ([facility] [address] )     Vitamin D deficiency        Past Surgical History:   Procedure Laterality Date    ANKLE SURGERY Left 2005   [address]    CATARACT EXTRACTION Bilateral 09/1995    HEMORRHOID SURGERY  1978    HERNIA REPAIR  1990    INSERT / Nelly Mean / REMOVE PACEMAKER  05/14/2018    dual lead pacer st yony model 2272    JOINT REPLACEMENT      b/l hip replacements, right 2006    NEUROPLASTY / TRANSPOSITION MEDIAN NERVE AT CARPAL TUNNEL  1977    PARTIAL HYSTERECTOMY  1971    TOTAL HIP ARTHROPLASTY      TRANSPHENOIDAL / TRANSNASAL HYPOPHYSECTOMY / RESECTION PITUITARY TUMOR  1989    excision of pituitary gland       Meds/Allergies:    Prior to Admission medications    Medication Sig Start Date End Date Taking?  Authorizing Provider   aspirin 81 MG tablet Take 1 tablet by mouth daily    Historical Provider, MD   Blood Glucose Monitoring Suppl (ONE TOUCH ULTRA MINI) w/Device KIT USE TO TEST 4 TIMES DAILY 12/7/18   Historical Provider, MD   ergocalciferol (VITAMIN D2) 50,000 units Take 1 capsule (50,000 Units total) by mouth once a week for 90 days  Patient taking differently: Take 50,000 Units by mouth once a week Sundays 11/15/19 2/13/20  Colonel Martinez,    esomeprazole (NexIUM) 20 mg capsule Take 20 mg by mouth every morning before breakfast    Historical Provider, MD   furosemide (LASIX) 20 mg tablet Take twice a week as needed for edema/swelling 7/25/19   Kenney Squibb, DO   levothyroxine 75 mcg tablet Take 1 tablet (75 mcg total) by mouth daily in the early morning 10/31/19   Colonel Martinez DO   metFORMIN (FORTAMET) 500 MG (OSM) 24 hr tablet Take 1 tablet (500 mg total) by mouth 2 (two) times a day with meals for 90 days 11/19/18 11/22/19  Colonel Juan DO   metoprolol tartrate (LOPRESSOR) 25 mg tablet Take 25 mg by mouth 3 (three) times a day    Historical Provider, MD   ONE TOUCH ULTRA TEST test strip 4 (four) times a day Test 12/7/18   Historical Provider, MD Nancy Conte [facility] 4 (four) times a day Test 12/7/18   Historical Provider, MD   oxybutynin (DITROPAN-XL) 10 MG 24 hr tablet TAKE ONE TABLET BY MOUTH TWICE A DAY 10/21/19   Hudson Ruiz, DO   pravastatin (PRAVACHOL) 20 mg tablet Take 1 tablet (20 mg total) by mouth daily 11/15/19   Hudson Ruiz, DO   predniSONE 5 mg tablet Take 1 tablet (5 mg total) by mouth daily 10/31/19   Hudson Ruiz, DO   repaglinide (PRANDIN) 2 mg tablet Take 2 tablets (4 mg total) by mouth 3 (three) times a day before meals for 90 days 11/8/18 11/22/19  Hudson Ruiz, DO   cholestyramine [place]) 4 g packet Take 1 packet (4 g total) by mouth 3 (three) times a day with meals  Patient not taking: Reported on 7/25/2019 2/4/19 11/22/19  Hudson Ruiz, DO   hydrochlorothiazide (MICROZIDE) 12 5 mg capsule Take 12 5 mg by mouth daily  11/22/19  Historical Provider, MD   hydrocortisone (ANUSOL-HC, PROCTOSOL HC,) 2 5 % rectal cream Insert into the rectum 2 (two) times a day  Patient not taking: Reported on 6/13/2019 1/14/19 11/22/19  Hudson Ruiz, DO   metoprolol tartrate (LOPRESSOR) 25 mg tablet Take 1 5 tablets (37 5 mg total) by mouth every 12 (twelve) hours  Patient not taking: Reported on 7/31/2019 7/24/19 11/22/19  Hudson Ruiz, DO   metoprolol tartrate (LOPRESSOR) 25 mg tablet TAKE 1 & 1/2 TABLETS (37 5MG) BY MOUTH EVERY 12 HOURS FOR 90 DAYS  8/27/19 11/22/19  Hudson Ruiz, DO   polyethylene glycol [place]) powder Take by mouth as needed    11/22/19  Historical Provider, MD     I have reviewed home medications with patient personally  Allergies: Allergies   Allergen Reactions    Cephalosporins GI Intolerance    Levemir [Insulin Detemir]      Brittle diabetic  Hypersensitive to insulin  Severe hypoglycemia results if given insulin  Please speak with son, Vani Trent, before giving ANY type of insulin      Acetaminophen     Cephalexin GI Intolerance    Clindamycin      Other reaction(s): Unspecified    Demeclocycline     Hydrocodone Bitartrate  Other reaction(s): Unspecified    Hydrocodone-Acetaminophen Nausea Only    Oxycodone-Acetaminophen      HCL    Oxycodone-Acetaminophen     Penicillins Hives     Other reaction(s): Other (See Comments)  ITCHING/RASH    Simvastatin      Other reaction(s): GI upset    Sulfamethoxazole-Trimethoprim     Tetrabenazine     Tetracycline Nausea Only    Tetracyclines & Related     Vicodin [Hydrocodone-Acetaminophen]        Social History:     Marital Status:    Occupation: Retired   Patient Pre-hospital Living Situation: Lives at home alone  Patient Pre-hospital Level of Mobility: Walks with assistance, cane  Patient Pre-hospital Diet Restrictions: Diabetic diet  Substance Use History:   Social History     Substance and Sexual Activity   Alcohol Use No     Social History     Tobacco Use   Smoking Status Never Smoker   Smokeless Tobacco Never Used     Social History     Substance and Sexual Activity   Drug Use No       Family History:    Family History   Problem Relation Age of Onset    Other Father         coal worker's pneumoconiosis    Heart disease Father     Diabetes Brother     Heart disease Brother     Prostate cancer Brother     Stomach cancer Maternal Grandmother     Diabetes Paternal Aunt        Physical Exam:     Vitals:   Blood Pressure: 138/83 (11/22/19 1811)  Pulse: 60 (11/22/19 1811)  Temperature: 97 7 °F (36 5 °C) (11/22/19 1730)  SpO2: 98 % (11/22/19 1811)    Physical Exam   Constitutional: She is oriented to person, place, and time  She appears well-developed and well-nourished  No distress  HENT:   Head: Normocephalic and atraumatic  Nose: Nose normal    Eyes: Conjunctivae and EOM are normal  No scleral icterus  Neck: No JVD present  Cardiovascular: Normal rate and regular rhythm  Murmur heard  Pulmonary/Chest: Effort normal and breath sounds normal  No stridor  No respiratory distress  She has no wheezes  She has no rales   She exhibits no tenderness  Abdominal: Soft  Bowel sounds are normal  She exhibits no distension and no mass  There is no tenderness  There is no rebound and no guarding  Lymphadenopathy:     She has no cervical adenopathy  Neurological: She is alert and oriented to person, place, and time  Skin: Skin is warm and dry  Capillary refill takes less than 2 seconds  She is not diaphoretic  Additional Data:     Lab Results: I have personally reviewed pertinent reports  Results from last 7 days   Lab Units 11/22/19  1746 11/22/19  1126   WBC Thousand/uL  --  7 36   HEMOGLOBIN g/dL  --  11 3*   HEMATOCRIT %  --  36 7   PLATELETS Thousands/uL 245 227   NEUTROS PCT %  --  65   LYMPHS PCT %  --  24   MONOS PCT %  --  8   EOS PCT %  --  2     Results from last 7 days   Lab Units 11/22/19  1745   SODIUM mmol/L 136   POTASSIUM mmol/L 4 6   CHLORIDE mmol/L 103   CO2 mmol/L 28   BUN mg/dL 14   CREATININE mg/dL 0 83   ANION GAP mmol/L 5   CALCIUM mg/dL 8 8   ALBUMIN g/dL 3 2*   TOTAL BILIRUBIN mg/dL 0 55   ALK PHOS U/L 51   ALT U/L 14   AST U/L 11   GLUCOSE RANDOM mg/dL 248*         Results from last 7 days   Lab Units 11/22/19  1755 11/22/19  1308   POC GLUCOSE mg/dl 241* 303*     Results from last 7 days   Lab Units 11/16/19  1050   HEMOGLOBIN A1C % 11 0*           Imaging: I have personally reviewed pertinent reports  No orders to display       EKG, Pathology, and Other Studies Reviewed on Admission:   · EKG : LEft fascicular block, LVH    Allscripts / Epic Records Reviewed: Yes     ** Please Note: This note has been constructed using a voice recognition system   **

## 2019-11-22 NOTE — ASSESSMENT & PLAN NOTE
Continue patient on Senna 8 6 mg PO once daily   Continue Mylanta 30ml Q6H PRN  Continue Colace 100mg capsule BID

## 2019-11-22 NOTE — EMTALA/ACUTE CARE TRANSFER
[facility] EMERGENCY DEPARTMENT  [address] [place] [id number]  Dept: [phone]      EMTALA TRANSFER CONSENT    NAME Jose Elkins                                         1933                              MRN [id number]    I have been informed of my rights regarding examination, treatment, and transfer   by Dr Adal Anne DO    Benefits: Specialized equipment and/or services available at the receiving facility (Include comment)________________________(Cardiac catheterization if necessary)    Risks: Potential for delay in receiving treatment, Potential deterioration of medical condition, Loss of IV, Increased discomfort during transfer, Possible worsening of condition or death during transfer      Consent for Transfer:  I acknowledge that my medical condition has been evaluated and explained to me by the emergency department physician or other qualified medical person and/or my attending physician, who has recommended that I be transferred to the service of  Accepting Physician: dr Ivis Choi at    The above potential benefits of such transfer, the potential risks associated with such transfer, and the probable risks of not being transferred have been explained to me, and I fully understand them  The doctor has explained that, in my case, the benefits of transfer outweigh the risks  I agree to be transferred  I authorize the performance of emergency medical procedures and treatments upon me in both transit and upon arrival at the receiving facility  Additionally, I authorize the release of any and all medical records to the receiving facility and request they be transported with me, if possible  I understand that the safest mode of transportation during a medical emergency is an ambulance and that the Hospital advocates the use of this mode of transport   Risks of traveling to the receiving facility by car, including absence of medical control, life sustaining equipment, such as oxygen, and medical personnel has been explained to me and I fully understand them  (TERE CORRECT BOX BELOW)  [  ]  I consent to the stated transfer and to be transported by ambulance/helicopter  [  ]  I consent to the stated transfer, but refuse transportation by ambulance and accept full responsibility for my transportation by car  I understand the risks of non-ambulance transfers and I exonerate the Hospital and its staff from any deterioration in my condition that results from this refusal     X___________________________________________    DATE  19  TIME________  Signature of patient or legally responsible individual signing on patient behalf           RELATIONSHIP TO PATIENT_________________________          Provider Certification    NAME Veronika Garcia                                         1933                              MRN [id number]    A medical screening exam was performed on the above named patient  Based on the examination:    Condition Necessitating Transfer The primary encounter diagnosis was Weakness  Diagnoses of ACS (acute coronary syndrome) (HCC) and Chest pain were also pertinent to this visit      Patient Condition: The patient has been stabilized such that within reasonable medical probability, no material deterioration of the patient condition or the condition of the unborn child(lisa) is likely to result from the transfer    Reason for Transfer: Level of Care needed not available at this facility    Transfer Requirements: Facility     · Space available and qualified personnel available for treatment as acknowledged by    · Agreed to accept transfer and to provide appropriate medical treatment as acknowledged by       dr Isis Shah  · Appropriate medical records of the examination and treatment of the patient are provided at the time of transfer   [address] _______  · Transfer will be performed by qualified personnel from    and appropriate transfer equipment as required, including the use of necessary and appropriate life support measures  Provider Certification: I have examined the patient and explained the following risks and benefits of being transferred/refusing transfer to the patient/family:  General risk, such as traffic hazards, adverse weather conditions, rough terrain or turbulence, possible failure of equipment (including vehicle or aircraft), or consequences of actions of persons outside the control of the transport personnel, Unanticipated needs of medical equipment and personnel during transport      Based on these reasonable risks and benefits to the patient and/or the unborn child(lisa), and based upon the information available at the time of the patients examination, I certify that the medical benefits reasonably to be expected from the provision of appropriate medical treatments at another medical facility outweigh the increasing risks, if any, to the individuals medical condition, and in the case of labor to the unborn child, from effecting the transfer      X____________________________________________ DATE 11/22/19        TIME_______      ORIGINAL - SEND TO MEDICAL RECORDS   COPY - SEND WITH PATIENT DURING TRANSFER

## 2019-11-23 VITALS
RESPIRATION RATE: 17 BRPM | SYSTOLIC BLOOD PRESSURE: 123 MMHG | TEMPERATURE: 97.8 F | WEIGHT: 125.38 LBS | HEART RATE: 60 BPM | DIASTOLIC BLOOD PRESSURE: 68 MMHG | HEIGHT: 62 IN | BODY MASS INDEX: 23.07 KG/M2 | OXYGEN SATURATION: 97 %

## 2019-11-23 PROBLEM — R07.9 CHEST PAIN: Status: ACTIVE | Noted: 2019-11-22

## 2019-11-23 PROBLEM — R07.9 CHEST PAIN: Status: RESOLVED | Noted: 2019-11-22 | Resolved: 2019-11-23

## 2019-11-23 LAB
ALBUMIN SERPL BCP-MCNC: 3.1 G/DL (ref 3.5–5)
ALP SERPL-CCNC: 51 U/L (ref 46–116)
ALT SERPL W P-5'-P-CCNC: 15 U/L (ref 12–78)
ANION GAP SERPL CALCULATED.3IONS-SCNC: 5 MMOL/L (ref 4–13)
AST SERPL W P-5'-P-CCNC: 9 U/L (ref 5–45)
ATRIAL RATE: 60 BPM
BILIRUB SERPL-MCNC: 0.64 MG/DL (ref 0.2–1)
BUN SERPL-MCNC: 13 MG/DL (ref 5–25)
CALCIUM SERPL-MCNC: 9 MG/DL (ref 8.3–10.1)
CHLORIDE SERPL-SCNC: 105 MMOL/L (ref 100–108)
CHOLEST SERPL-MCNC: 186 MG/DL (ref 50–200)
CO2 SERPL-SCNC: 29 MMOL/L (ref 21–32)
CREAT SERPL-MCNC: 0.85 MG/DL (ref 0.6–1.3)
GFR SERPL CREATININE-BSD FRML MDRD: 63 ML/MIN/1.73SQ M
GLUCOSE SERPL-MCNC: 204 MG/DL (ref 65–140)
GLUCOSE SERPL-MCNC: 206 MG/DL (ref 65–140)
GLUCOSE SERPL-MCNC: 444 MG/DL (ref 65–140)
HDLC SERPL-MCNC: 73 MG/DL
LDLC SERPL CALC-MCNC: 87 MG/DL (ref 0–100)
NONHDLC SERPL-MCNC: 113 MG/DL
P AXIS: -27 DEGREES
PHOSPHATE SERPL-MCNC: 3.3 MG/DL (ref 2.3–4.1)
POTASSIUM SERPL-SCNC: 4.3 MMOL/L (ref 3.5–5.3)
PR INTERVAL: 212 MS
PR INTERVAL: 244 MS
PR INTERVAL: 246 MS
PROT SERPL-MCNC: 5.9 G/DL (ref 6.4–8.2)
QRS AXIS: -45 DEGREES
QRS AXIS: -46 DEGREES
QRS AXIS: -47 DEGREES
QRSD INTERVAL: 106 MS
QRSD INTERVAL: 98 MS
QRSD INTERVAL: 98 MS
QT INTERVAL: 456 MS
QT INTERVAL: 456 MS
QT INTERVAL: 462 MS
QTC INTERVAL: 456 MS
QTC INTERVAL: 456 MS
QTC INTERVAL: 462 MS
SODIUM SERPL-SCNC: 139 MMOL/L (ref 136–145)
T WAVE AXIS: -16 DEGREES
T WAVE AXIS: 12 DEGREES
T WAVE AXIS: 4 DEGREES
TRIGL SERPL-MCNC: 128 MG/DL
TROPONIN I SERPL-MCNC: 0.05 NG/ML
VENTRICULAR RATE: 60 BPM

## 2019-11-23 PROCEDURE — 99239 HOSP IP/OBS DSCHRG MGMT >30: CPT | Performed by: NURSE PRACTITIONER

## 2019-11-23 PROCEDURE — 82948 REAGENT STRIP/BLOOD GLUCOSE: CPT

## 2019-11-23 PROCEDURE — 93010 ELECTROCARDIOGRAM REPORT: CPT | Performed by: INTERNAL MEDICINE

## 2019-11-23 PROCEDURE — 84100 ASSAY OF PHOSPHORUS: CPT | Performed by: INTERNAL MEDICINE

## 2019-11-23 PROCEDURE — 80061 LIPID PANEL: CPT | Performed by: INTERNAL MEDICINE

## 2019-11-23 PROCEDURE — 99222 1ST HOSP IP/OBS MODERATE 55: CPT | Performed by: INTERNAL MEDICINE

## 2019-11-23 PROCEDURE — 80053 COMPREHEN METABOLIC PANEL: CPT | Performed by: INTERNAL MEDICINE

## 2019-11-23 PROCEDURE — 84484 ASSAY OF TROPONIN QUANT: CPT | Performed by: INTERNAL MEDICINE

## 2019-11-23 RX ORDER — LEVOTHYROXINE SODIUM 0.05 MG/1
50 TABLET ORAL
Status: DISCONTINUED | OUTPATIENT
Start: 2019-11-24 | End: 2019-11-23 | Stop reason: HOSPADM

## 2019-11-23 RX ORDER — LEVOTHYROXINE SODIUM 0.05 MG/1
50 TABLET ORAL
Qty: 30 TABLET | Refills: 0 | Status: ON HOLD | OUTPATIENT
Start: 2019-11-23 | End: 2021-06-13

## 2019-11-23 RX ORDER — REPAGLINIDE 1 MG/1
4 TABLET ORAL
Status: DISCONTINUED | OUTPATIENT
Start: 2019-11-23 | End: 2019-11-23 | Stop reason: HOSPADM

## 2019-11-23 RX ORDER — OXYBUTYNIN CHLORIDE 10 MG/1
10 TABLET, EXTENDED RELEASE ORAL
COMMUNITY
End: 2020-02-26

## 2019-11-23 RX ORDER — REPAGLINIDE 1 MG/1
4 TABLET ORAL
Status: DISCONTINUED | OUTPATIENT
Start: 2019-11-23 | End: 2019-11-23

## 2019-11-23 RX ADMIN — PRAVASTATIN SODIUM 20 MG: 20 TABLET ORAL at 09:07

## 2019-11-23 RX ADMIN — ENOXAPARIN SODIUM 40 MG: 40 INJECTION SUBCUTANEOUS at 09:17

## 2019-11-23 RX ADMIN — LEVOTHYROXINE SODIUM 75 MCG: 75 TABLET ORAL at 05:02

## 2019-11-23 RX ADMIN — OXYBUTYNIN 10 MG: 10 TABLET, FILM COATED, EXTENDED RELEASE ORAL at 09:06

## 2019-11-23 RX ADMIN — FUROSEMIDE 20 MG: 20 TABLET ORAL at 09:06

## 2019-11-23 RX ADMIN — METOPROLOL TARTRATE 25 MG: 25 TABLET ORAL at 09:06

## 2019-11-23 RX ADMIN — PANTOPRAZOLE SODIUM 40 MG: 40 TABLET, DELAYED RELEASE ORAL at 06:49

## 2019-11-23 RX ADMIN — DOCUSATE SODIUM 100 MG: 100 CAPSULE, LIQUID FILLED ORAL at 09:07

## 2019-11-23 RX ADMIN — PREDNISONE 5 MG: 5 TABLET ORAL at 09:07

## 2019-11-23 RX ADMIN — REPAGLINIDE 4 MG: 1 TABLET ORAL at 12:03

## 2019-11-23 RX ADMIN — SENNOSIDES 8.6 MG: 8.6 TABLET, FILM COATED ORAL at 09:07

## 2019-11-23 RX ADMIN — ASPIRIN 81 MG 81 MG: 81 TABLET ORAL at 09:07

## 2019-11-23 NOTE — ASSESSMENT & PLAN NOTE
Lab Results   Component Value Date    HGBA1C 11 0 (H) 11/16/2019       Recent Labs     11/22/19  1308 11/22/19  1755 11/22/19  2143 11/23/19  0528   POCGLU 303* 241* 314* 204*       · Uncontrolled as evidenced by A1c  Patient is on Prandin b i d  As well as metformin b i d  She is on chronic prednisone for [place]'s disease  · She does seen endocrinologist as an outpatient  She reports that she is insulin sensitive and asymptomatic when hypoglycemic and therefore has refused insulin  · Will hold off on endocrinology consult given the fact patient and her family wish to follow up with her outpatient provider  · Diabetic diet   · Will continue Prandin for now  Metformin on hold in light of plan for cardiac cath

## 2019-11-23 NOTE — UTILIZATION REVIEW
Initial Clinical Review    Admission: Date/Time/Statement: Inpatient Admission Orders (From admission, onward)     Ordered        11/22/19 1641  Inpatient Admission  Once                   Orders Placed This Encounter   Procedures    Inpatient Admission     Standing Status:   Standing     Number of Occurrences:   1     Order Specific Question:   Admitting Physician     Answer:   Christine Kennedy [[id number]]     Order Specific Question:   Level of Care     Answer:   Med Surg [16]     Order Specific Question:   Estimated length of stay     Answer:   More than 2 Midnights     Order Specific Question:   Certification     Answer:   I certify that inpatient services are medically necessary for this patient for a duration of greater than two midnights  See H&P and MD Progress Notes for additional information about the patient's course of treatment  ED Arrival Information     Patient not seen in ED -- transferred from [address] [place] [address]                     Chief complaint:  Substernal Chest Pressure/Pain    Assessment/Plan: 81 y/o female with PMHx of CAD, SSS s/p PPM, HTN, HLD, T2DM, 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), CRISTAL, hypothyroidism who presented to [facility] with chest pain w/ radiation to the left jaw this morning  Ems was called, transported to [facility], EKG done there showed NSR w/ a HR of 60, atrial pacing, and left-axis deviation, her CXR was normal, CT of head showed mild, chronic ischemic changes, and trop's were slightly elevated, 0 5 & 0 8, respectively  Patient was transferred to [facility] for cardiology evaluation for possible catherization  Pt currently chest pain free  Admit inpatient to M/S/Tele unit with chest pain suspicious for ACS w/ MALCOM score of 5 & HEART score of 6  Tele monitoring, repeat EKG, cardiology consulted -- plan for cardiac cath    Cardiology recommending cardiac cath to r/o CAD (would need for TAVR w/u anyway), pt currently refusing cath and not sure if wants to proceed with TAVR in the near future  Conservative management at present  Continue po meds  ED Triage Vitals   Temperature Pulse Respirations Blood Pressure SpO2   11/22/19 1730 11/22/19 1730 11/22/19 1720 11/22/19 1720 11/22/19 1730   97 7 °F (36 5 °C) 60 18 140/73 99 %      Temp Source Heart Rate Source Patient Position - Orthostatic VS BP Location FiO2 (%)   11/22/19 1730 11/22/19 1720 11/22/19 1720 11/22/19 1720 --   Oral Monitor Sitting Right arm       Pain Score       11/22/19 1740       No Pain        Wt Readings from Last 1 Encounters:   11/23/19 56 9 kg (125 lb 6 oz)     Additional Vital Signs:   Date/Time  Temp  Pulse  Resp  BP  MAP (mmHg)  SpO2  O2 Device   11/23/19 10:24:28  97 8 °F (36 6 °C)  60  17  123/68  86  97 %     11/23/19 08:06:51  97 5 °F (36 4 °C)  60  16  130/71  91  100 %     11/22/19 22:23:26  97 4 °F (36 3 °C)Abnormal   60  16  130/72  91  97 %     11/22/19 20:07:56  97 7 °F (36 5 °C)  60  17  113/66  82  96 %     11/22/19 18:11:05    60    138/83  101  98 %  None (Room air)   11/22/19 1805    60        98 %  None (Room air)   11/22/19 1800    60        98 %     11/22/19 1755    60        99 %     11/22/19 1750    60        99 %     11/22/19 1745    60        98 %     11/22/19 1740    60        99 %  None (Room air)   11/22/19 1735    60        99 %  None (Room air)   11/22/19 1730  97 7 °F (36 5 °C)  60        99 %     11/22/19 1720      18  140/73             Pertinent Labs/Diagnostic Test Results:   EKG 11/22 --Left fascicular block, LVH  CT head without contrast 11/22 --  No acute intracranial hemorrhage  Mild periventricular white matter hypodensity seen related to chronic small vessel ischemic changes  CXR 11/22 -- negative for acute cardiopulmonary disease  Large hiatal hernia      Results from last 7 days   Lab Units 11/22/19  1746 11/22/19  1126   WBC Thousand/uL  --  7 36   HEMOGLOBIN g/dL  --  11 3* HEMATOCRIT %  --  36 7   PLATELETS Thousands/uL 245 227   NEUTROS ABS Thousands/µL  --  4 82     Results from last 7 days   Lab Units 11/23/19  0458 11/22/19  1745 11/22/19  1126   SODIUM mmol/L 139 136 138   POTASSIUM mmol/L 4 3 4 6 4 2   CHLORIDE mmol/L 105 103 102   CO2 mmol/L 29 28 27   ANION GAP mmol/L 5 5 9   BUN mg/dL 13 14 14   CREATININE mg/dL 0 85 0 83 0 85   EGFR ml/min/1 73sq m 63 65 63   CALCIUM mg/dL 9 0 8 8 8 4   PHOSPHORUS mg/dL 3 3  --   --      Results from last 7 days   Lab Units 11/23/19  0458 11/22/19  1745 11/22/19  1126   AST U/L 9 11 13   ALT U/L 15 14 16   ALK PHOS U/L 51 51 52   TOTAL PROTEIN g/dL 5 9* 6 1* 5 9*   ALBUMIN g/dL 3 1* 3 2* 3 1*   TOTAL BILIRUBIN mg/dL 0 64 0 55 0 60     Results from last 7 days   Lab Units 11/23/19  1022 11/23/19  0528 11/22/19  2143 11/22/19  1755 11/22/19  1308   POC GLUCOSE mg/dl 444* 204* 314* 241* 303*     Results from last 7 days   Lab Units 11/23/19  0458 11/22/19  1745 11/22/19  1126   GLUCOSE RANDOM mg/dL 206* 248* 277*     Results from last 7 days   Lab Units 11/23/19  0038 11/22/19  2124 11/22/19  1745 11/22/19  1452 11/22/19  1126   TROPONIN I ng/mL 0 05* 0 06* 0 06* 0 08* 0 05*     Results from last 7 days   Lab Units 11/22/19  1745   TSH 3RD GENERATON uIU/mL 0 027*     Results from last 7 days   Lab Units 11/22/19  1745   NT-PRO BNP pg/mL 1,565*       Past Medical History:   Diagnosis Date    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HCC)     Bradycardia     Coronary artery disease     Depression     Depression     Diabetes mellitus ([address] )     Type 2 DM    Disease of thyroid gland     Essential (primary) hypertension     Facial droop     started 10 years ago    GERD (gastroesophageal reflux disease)     Heart murmur     History of echocardiogram 08/28/2017    2-D w/CFD:  EF 0 60 (60%), LVSF is normal  No regional wall abnormalities  Mild mitral valve regurg  AV was trileaflet  Moderate tricuspid regurg  Trace pulmonic valve  No pericardial effusion      History of echocardiogram 03/28/2018    2-D w/CFD:  EF 0 60 (60%), Mild regurg in the MV  AV was trileaflet, severe stenosis, trace regurg  Mild regurg in the TV  No pericardial effusion  Aortic root exhibited normal size   History of echocardiogram 01/29/2016    2-D:  EF 0 55 (55%), Normal LVSF  Mild concentric LVH  Mild MR  Mild AS with mild regurg      History of Holter monitoring     2/24/16, 9/27/17, 2/6/18    History of nuclear stress test 08/16/2017    Lexiscan MPI:  EF 0 76 (76%), no prior MI or ischemia,    Hyperlipidemia     Hypothyroidism     Nonrheumatic aortic (valve) stenosis     Obstructive sleep apnea (adult) (pediatric)     10/17/17 - NPSG with nasal CPAP/Bi-level titration     Osteoarthritis     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HCC)     SSS (sick sinus syndrome) ([facility])     SVT (supraventricular tachycardia) ([facility])     Vitamin D deficiency      Present on Admission:   Hiatal hernia   Hyperlipidemia   Uncontrolled type 2 diabetes mellitus without complication, without long-term current use of insulin ([facility])   Hypertension   Chronic diastolic congestive heart failure (HCC)   CAD (coronary artery disease)   Ambulatory dysfunction   Hypothyroidism   Slow transit constipation   Aortic valve stenosis   Adrenal insufficiency ([place]'s disease) ([facility] [address] )      Admitting Diagnosis: Chest pressure [R07 89]  Age/Sex: 80 y o  female  Admission Orders:  Scheduled Medications:  Medications:  aspirin 81 mg Oral Daily   docusate sodium 100 mg Oral BID   enoxaparin 40 mg Subcutaneous Daily   [START ON 11/24/2019] ergocalciferol 50,000 Units Oral Weekly   furosemide 20 mg Oral Daily   [START ON 11/24/2019] levothyroxine 50 mcg Oral Early Morning   metoprolol tartrate 25 mg Oral TID   oxybutynin 10 mg Oral BID   pantoprazole 40 mg Oral Daily Before Breakfast   pravastatin 20 mg Oral Daily   predniSONE 5 mg Oral Daily   repaglinide 4 mg Oral TID AC   senna 1 tablet Oral Daily      PRN Meds:  aluminum-magnesium hydroxide-simethicone 30 mL Oral Q6H PRN   ondansetron 4 mg Intravenous Q6H PRN       IP CONSULT TO CARDIOLOGY    Network Utilization Review Department  [email or web address]  ATTENTION: Please call with any questions or concerns to [phone] and carefully listen to the prompts so that you are directed to the right person  All voicemails are confidential   Gladys Terry all requests for admission clinical reviews, approved or denied determinations and any other requests to dedicated fax number below belonging to the campus where the patient is receiving treatment    FACILITY NAME UR FAX NUMBER   ADMISSION DENIALS (Administrative/Medical Necessity) 0[address] [facility] (Maternity/NICU/Pediatrics) [phone]   [facility] [address] [facility] 300 [facility] [phone]   145 [facility] 1525 [facility] [phone]   [address] [place] 2000 [facility] [address] [phone]

## 2019-11-23 NOTE — QUICK NOTE
Patient with hyperglycemia through day, glucose 314 tonight  Home PO medications held on admission  Not willing to take any insulin, including SSI with increased glucose checks at this time  Discussed with endocrinology, appreciated  Consult placed and patient will be evaluated during day

## 2019-11-23 NOTE — ASSESSMENT & PLAN NOTE
· Pt is noted to have residual left-sided facial drooping ~ 10 years  · Continue aspirin 81mg PO daily and statin

## 2019-11-23 NOTE — ASSESSMENT & PLAN NOTE
· Will decrease levothyroxine to 50 mcg daily  TSH over suppressed    · Repeat thyroid function testing in 4-6 weeks

## 2019-11-23 NOTE — ASSESSMENT & PLAN NOTE
Wt Readings from Last 3 Encounters:   11/23/19 56 9 kg (125 lb 6 oz)   11/22/19 62 3 kg (137 lb 5 6 oz)   07/31/19 60 4 kg (133 lb 2 oz)       · Appears euvolemic  Patient also with severe aortic stenosis  · Continue Lasix p r n  As she takes at home  Continue beta-blocker    · I and O, daily weights, low-sodium diet  · Monitor volume status

## 2019-11-23 NOTE — SOCIAL WORK
CM met with patient and explained cm role  Pt alert and oriented  Pt reports she lives in a 2 story home alone with 2 ricardo  Pt reports DME: cane, commode, and denies hx of [facility]  Pt reports being independent PTA, reports good support from her son Jenn Hlot [phone], pt does not drive, family transports to appointments, and will transport home for d/c  Pts pharmacy is [facility] in [place]  POA is pts son Janet Yoder  Pt denies hx/admission for drug/etoh and psych/mental health  CM reviewed d/c planning process including the following: identifying help at home, patient preference for d/c planning needs, Discharge Lounge, [facility] Meds to Bed program, availability of treatment team to discuss questions or concerns patient and/or family may have regarding understanding medications and recognizing signs and symptoms once discharged  CM also encouraged patient to follow up with all recommended appointments after discharge  Patient advised of importance for patient and family to participate in managing patients medical well being

## 2019-11-23 NOTE — ASSESSMENT & PLAN NOTE
· Patient is noted have hx of CAD    · Continue aspirin, statin, beta-blocker  · Additional plan as above

## 2019-11-23 NOTE — PLAN OF CARE
Problem: PAIN - ADULT  Goal: Verbalizes/displays adequate comfort level or baseline comfort level  Description  Interventions:  - Encourage patient to monitor pain and request assistance  - Assess pain using appropriate pain scale  - Administer analgesics based on type and severity of pain and evaluate response  - Implement non-pharmacological measures as appropriate and evaluate response  - Consider cultural and social influences on pain and pain management  - Notify physician/advanced practitioner if interventions unsuccessful or patient reports new pain  Outcome: Progressing     Problem: SAFETY ADULT  Goal: Patient will remain free of falls  Description  INTERVENTIONS:  - Assess patient frequently for physical needs  -  Identify cognitive and physical deficits and behaviors that affect risk of falls    -  Notus fall precautions as indicated by assessment   - Educate patient/family on patient safety including physical limitations  - Instruct patient to call for assistance with activity based on assessment  - Modify environment to reduce risk of injury  - Consider OT/PT consult to assist with strengthening/mobility  Outcome: Progressing  Goal: Maintain or return to baseline ADL function  Description  INTERVENTIONS:  -  Assess patient's ability to carry out ADLs; assess patient's baseline for ADL function and identify physical deficits which impact ability to perform ADLs (bathing, care of mouth/teeth, toileting, grooming, dressing, etc )  - Assess/evaluate cause of self-care deficits   - Assess range of motion  - Assess patient's mobility; develop plan if impaired  - Assess patient's need for assistive devices and provide as appropriate  - Encourage maximum independence but intervene and supervise when necessary  - Involve family in performance of ADLs  - Assess for home care needs following discharge   - Consider OT consult to assist with ADL evaluation and planning for discharge  - Provide patient education as appropriate  Outcome: Progressing  Goal: Maintain or return mobility status to optimal level  Description  INTERVENTIONS:  - Assess patient's baseline mobility status (ambulation, transfers, stairs, etc )    - Identify cognitive and physical deficits and behaviors that affect mobility  - Identify mobility aids required to assist with transfers and/or ambulation (gait belt, sit-to-stand, lift, walker, cane, etc )  - [place] fall precautions as indicated by assessment  - Record patient progress and toleration of activity level on Mobility SBAR; progress patient to next Phase/Stage  - Instruct patient to call for assistance with activity based on assessment  - Consider rehabilitation consult to assist with strengthening/weightbearing, etc   Outcome: Progressing     Problem: DISCHARGE PLANNING  Goal: Discharge to home or other facility with appropriate resources  Description  INTERVENTIONS:  - Identify barriers to discharge w/patient and caregiver  - Arrange for needed discharge resources and transportation as appropriate  - Identify discharge learning needs (meds, wound care, etc )  - Arrange for interpretive services to assist at discharge as needed  - Refer to Case Management Department for coordinating discharge planning if the patient needs post-hospital services based on physician/advanced practitioner order or complex needs related to functional status, cognitive ability, or social support system  Outcome: Progressing     Problem: Knowledge Deficit  Goal: Patient/family/caregiver demonstrates understanding of disease process, treatment plan, medications, and discharge instructions  Description  Complete learning assessment and assess knowledge base    Interventions:  - Provide teaching at level of understanding  - Provide teaching via preferred learning methods  Outcome: Progressing     Problem: CARDIOVASCULAR - ADULT  Goal: Maintains optimal cardiac output and hemodynamic stability  Description  INTERVENTIONS:  - Monitor I/O, vital signs and rhythm  - Monitor for S/S and trends of decreased cardiac output  - Administer and titrate ordered vasoactive medications to optimize hemodynamic stability  - Assess quality of pulses, skin color and temperature  - Assess for signs of decreased coronary artery perfusion  - Instruct patient to report change in severity of symptoms  Outcome: Progressing  Goal: Absence of cardiac dysrhythmias or at baseline rhythm  Description  INTERVENTIONS:  - Continuous cardiac monitoring, vital signs, obtain 12 lead EKG if ordered  - Administer antiarrhythmic and heart rate control medications as ordered  - Monitor electrolytes and administer replacement therapy as ordered  Outcome: Progressing

## 2019-11-23 NOTE — ASSESSMENT & PLAN NOTE
· Patient woke up morning of 11/22 with substernal chest pressure with radiation to jaw lasting approximately 20-30 minutes  Patient also with generalized weakness and headache  Patient was taken to [facility] where she was found to have minimally elevated troponins which peaked at 0 08   · EKG without ischemic changes  · Chest x-ray normal    · Patient transferred for cardiology evaluation and consideration for cardiac catheterization  · Continue aspirin, statin, beta-blocker  Consider changing statin to atorvastatin    · Cardiology consult pending

## 2019-11-23 NOTE — CONSULTS
Consultation - General-Cardiology   Jane Mariscal 80 y o  female MRN: [id number]  Unit/Bed#: -01 Encounter: [id number]            Inpatient consult to Cardiology     Performed by  Smith Rich MD     Authorized by Lauren Mart MD              Assessment/Plan     Assessment and Plan:  1  Chest discomfort/troponin elevation  -differential:  Likely symptomatic aortic stenosis, possibly underlying coronary artery disease as well  -outpatient workup for TAVR was recommended by Dr Anna Trent in the office, but patient has been reticent due to possible complications  As a result, she refused catheterization in 08/2019  -6/2019:  area 0 8 cm2, with mean gradient of 49 mm Hg, moderate concentric hypertrophy, normal biatrial sizes, mild AI/MR/TR  -troponin peaking at 0 08, creatinine 0 85, , HDL 73, LDL 87,   BNP 1500 which is slightly up from 1000 December 2018  -on aspirin 81 mg daily, furosemide 20 mg daily, pravastatin 20 mg daily, metoprolol tartrate 25 mg t i d  PLAN:  1  Would recommend cardiac catheterization to rule out coronary disease in continue workup of possible TAVR  However, after discussing with patient she still has reservations and would like to think about it further    2  If patient decides to proceed with further workup, contact Cardiology fellow on call in order to facilitate cardiac catheterization    3  If patient decides to proceed with conservative management, continue current medication and patient may be discharge with outpatient follow-up with Dr Anna Trent        Case was discussed and reviewed with Dr Law Santamaria who agrees with my assessment and plan  Thank you for involving us in the care of your patient        History of Present Illness   Physician Requesting Consult: Chitra Kaba MD  Reason for Consult / Principal Problem:  Troponin elevation    HPI: Jane Mariscal is a 80y o  year old female with a history of severe aortic stenosis (KASEY 0 8 cm2, mg 49 mmHg), HFpEF (moderate concentric hypertrophy) with PRN furosemide tables, 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, hypertension, hyperlipidemia, CVA with residual left facial droop, obstructive sleep apnea with CPAP, diabetes mellitus type 2, skin cancer, pituitary tumor status post resection, complicated by panhypopituitarism presented to [facility] with 1 day history of chest discomfort  Patient was in her usual state of health until yesterday morning when she woke up in the morning with midsternal chest discomfort  As per patient, the discomfort associated with numbness and tingling in the left mandibular area and radiated down her neck and [facility]  She endorse concurrent headache but denied any other symptoms such as lightheadedness, dizziness, syncope, vision changes, abdominal pain, diaphoresis, nausea, vomiting, fever, chills  As symptoms were not resolving after 20-30 minutes she contacted her sister who recommended bringing her to the hospital via EMS  Patient was initially transported to the [facility]  She was transferred over to [place] due to troponin elevation and cardiac catheterization evaluation  Of note, she was undergoing outpatient TAVR workup in the summer but decided to discontinue the process as she was afraid of complication from the procedure including cardiac catheterization  On presentation, patient was hemodynamically stable with normal blood pressure 140/73, heart rate 60, breathing is saturating well on room air  Intake blood work revealed normal kidney function, normal electrolytes, troponin elevation peaking at 0 08, hyperglycemia 314 and normocytic anemia  ECG revealed atrial paced rhythm with left anterior fascicular block and chronic ischemic changes  Review of Systems  ROS as noted above, otherwise 12 point review of systems was performed and is negative         Historical Information   Past Medical History:   Diagnosis Date    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     Bradycardia     Coronary artery disease     Depression     Depression     Diabetes mellitus (HCC)     Type 2 DM    Disease of thyroid gland     Essential (primary) hypertension     Facial droop     started 10 years ago    GERD (gastroesophageal reflux disease)     Heart murmur     History of echocardiogram 08/28/2017    2-D w/CFD:  EF 0 60 (60%), LVSF is normal  No regional wall abnormalities  Mild mitral valve regurg  AV was trileaflet  Moderate tricuspid regurg  Trace pulmonic valve  No pericardial effusion   History of echocardiogram 03/28/2018    2-D w/CFD:  EF 0 60 (60%), Mild regurg in the MV  AV was trileaflet, severe stenosis, trace regurg  Mild regurg in the TV  No pericardial effusion  Aortic root exhibited normal size   History of echocardiogram 01/29/2016    2-D:  EF 0 55 (55%), Normal LVSF  Mild concentric LVH  Mild MR  Mild AS with mild regurg      History of Holter monitoring     2/24/16, 9/27/17, 2/6/18    History of nuclear stress test 08/16/2017    Lexiscan MPI:  EF 0 76 (76%), no prior MI or ischemia,    Hyperlipidemia     Hypothyroidism     Nonrheumatic aortic (valve) stenosis     Obstructive sleep apnea (adult) (pediatric)     10/17/17 - NPSG with nasal CPAP/Bi-level titration     Osteoarthritis     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HCC)     SSS [facility]sick sinus syndrome) ([facility]     SVT [facility]supraventricular tachycardia) ([facility] )     Vitamin D deficiency      Past Surgical History:   Procedure Laterality Date    [address]KLE SURGERY Left 2005   [address]    CATARACT EXTRACTION Bilateral 09/1995    HEMORRHOID SURGERY  1978    HERNIA REPAIR  1990    Heath Kwok / Aris Godinez / REMOVE PACEMAKER  05/14/2018    dual lead pacer st yony model 2272    JOINT REPLACEMENT      b/l hip replacements, right 2006    NEUROPLASTY / TRANSPOSITION MEDIAN NERVE AT CARPAL TUNNEL  1977    PARTIAL HYSTERECTOMY  1971    TOTAL HIP ARTHROPLASTY  TRANSPHENOIDAL / TRANSNASAL HYPOPHYSECTOMY / RESECTION PITUITARY TUMOR  1989    excision of pituitary gland     Social History     Substance and Sexual Activity   Alcohol Use Not Currently     Social History     Substance and Sexual Activity   Drug Use No     Social History     Tobacco Use   Smoking Status Never Smoker   Smokeless Tobacco Never Used     Family History:   Family History   Problem Relation Age of Onset    Other Father         coal worker's pneumoconiosis    Heart disease Father     Diabetes Brother     Heart disease Brother     Prostate cancer Brother     Stomach cancer Maternal Grandmother     Diabetes Paternal Aunt        Meds/Allergies   Hospital Medications:   Current Facility-Administered Medications   Medication Dose Route Frequency    aluminum-magnesium hydroxide-simethicone (MYLANTA) 200-200-20 mg/5 mL oral suspension 30 mL  30 mL Oral Q6H PRN    aspirin chewable tablet 81 mg  81 mg Oral Daily    docusate sodium (COLACE) capsule 100 mg  100 mg Oral BID    enoxaparin (LOVENOX) subcutaneous injection 40 mg  40 mg Subcutaneous Daily    [START ON 11/24/2019] ergocalciferol (VITAMIN D2) capsule 50,000 Units  50,000 Units Oral Weekly    furosemide (LASIX) tablet 20 mg  20 mg Oral Daily    levothyroxine tablet 75 mcg  75 mcg Oral Early Morning    metoprolol tartrate (LOPRESSOR) tablet 25 mg  25 mg Oral TID    ondansetron (ZOFRAN) injection 4 mg  4 mg Intravenous Q6H PRN    oxybutynin (DITROPAN-XL) 24 hr tablet 10 mg  10 mg Oral BID    pantoprazole (PROTONIX) EC tablet 40 mg  40 mg Oral Daily Before Breakfast    pravastatin (PRAVACHOL) tablet 20 mg  20 mg Oral Daily    predniSONE tablet 5 mg  5 mg Oral Daily    senna (SENOKOT) tablet 8 6 mg  1 tablet Oral Daily     Home Medications:   Medications Prior to Admission   Medication    aspirin 81 MG tablet    ergocalciferol (VITAMIN D2) 50,000 units    esomeprazole (NexIUM) 20 mg capsule    furosemide (LASIX) 20 mg tablet    levothyroxine 75 mcg tablet    metFORMIN (FORTAMET) 500 MG (OSM) 24 hr tablet    metoprolol tartrate (LOPRESSOR) 25 mg tablet    predniSONE 5 mg tablet    repaglinide (PRANDIN) 2 mg tablet    Blood Glucose Monitoring Suppl (ONE TOUCH ULTRA MINI) w/Device KIT    ONE TOUCH ULTRA TEST test strip    ONETOUCH DELICA LANCETS FINE MISC    oxybutynin (DITROPAN-XL) 10 MG 24 hr tablet    pravastatin (PRAVACHOL) 20 mg tablet       Allergies   Allergen Reactions    Cephalosporins GI Intolerance    Levemir [Insulin Detemir]      Brittle diabetic  Hypersensitive to insulin  Severe hypoglycemia results if given insulin  Please speak with sonLaya, before giving ANY type of insulin   Acetaminophen     Cephalexin GI Intolerance    Clindamycin      Other reaction(s): Unspecified    Demeclocycline     Hydrocodone      Bitartrate  Other reaction(s): Unspecified    Hydrocodone-Acetaminophen Nausea Only    Oxycodone-Acetaminophen      HCL    Oxycodone-Acetaminophen     Penicillins Hives     Other reaction(s): Other (See Comments)  ITCHING/RASH    Simvastatin      Other reaction(s): GI upset    Sulfamethoxazole-Trimethoprim     Tetrabenazine     Tetracycline Nausea Only    Tetracyclines & Related     Vicodin [Hydrocodone-Acetaminophen]        Objective   Vitals: Blood pressure 130/71, pulse 60, temperature 97 5 °F (36 4 °C), resp  rate 16, height 5' 2" (1 575 m), weight 56 9 kg (125 lb 6 oz), SpO2 100 %    Orthostatic Blood Pressures      Most Recent Value   Blood Pressure  130/71 filed at 11/23/2019 1472   Patient Position - Orthostatic VS  Sitting filed at 11/22/2019 1811            Invasive Devices     Peripheral Intravenous Line            Peripheral IV 11/22/19 Left Forearm 1 day    Peripheral IV 11/22/19 Right Antecubital less than 1 day                Physical Exam  General Appearance:    Alert, cooperative, no distress, appears stated age   Head:    Normocephalic, atraumatic   Eyes:    PERRL, conjunctiva/corneas clear, EOM's intact   Neck:   Supple, No thyroid enlargement or JVD, + carotid bruit   Lungs:     Clear to auscultation bilaterally, respirations unlabored   Heart:   Normal Rate, Regular rhythm, S1 and S2 normal, no rub or gallop, II/VI systolic ejection murmur loudest in RUSB   Abdomen:     Soft, non-tender, bowel sounds active all four quadrants   Extremities:   Extremities normal, atraumatic, no edema, warm to touch   Pulses:   2+ and symmetric all extremities   Skin:   Warm, Dry, intact, lower extremity varicosities   Neurologic:   No gross focal deficit but for left-sided facial droop       Lab Results: I have personally reviewed pertinent lab results  Results from last 7 days   Lab Units 19  1746 19  1126   WBC Thousand/uL  --  7 36   HEMOGLOBIN g/dL  --  11 3*   HEMATOCRIT %  --  36 7   PLATELETS Thousands/uL 245 227     Results from last 7 days   Lab Units 19  0458 19  1745 19  1126   POTASSIUM mmol/L 4 3 4 6 4 2   CHLORIDE mmol/L 105 103 102   CO2 mmol/L 29 28 27   BUN mg/dL 13 14 14   CREATININE mg/dL 0 85 0 83 0 85   CALCIUM mg/dL 9 0 8 8 8 4               Imaging: I have personally reviewed pertinent films in PACS    Holter:  None documented    ECHO:   Results for orders placed during the hospital encounter of 19   Echo complete with contrast if indicated    Narrative [address] [place] [address]    Transthoracic Echocardiogram  2D, M-mode, Doppler, and Color Doppler    Study date:  2019    Patient: Jonn Barnhart  MR number: [id number]  Account number: [de-identified]  : 1933  Age: 80 years  Gender: Female  Status: Outpatient  Location: [facility]  Height: 62 in  Weight: 133 lb  BP: 140/ 94 mmHg    Indications:  Aortic Stenosis    Diagnoses: I35 0 - Nonrheumatic aortic (valve) stenosis    Sonographer:  Hay Livingston RDCS  Primary Physician:  Tanner Herrmann Agustín Lira DO  Referring Physician: THIERRY Ramires  Group:  [facility]  Interpreting Physician: THIERRY Ramires     SUMMARY    LEFT VENTRICLE:  Systolic function was normal  Ejection fraction was estimated to be 65 %  There were no regional wall motion abnormalities  There was moderate concentric hypertrophy  Doppler parameters were consistent with abnormal left ventricular relaxation (grade 1 diastolic dysfunction)  RIGHT VENTRICLE:  Systolic function was normal     MITRAL VALVE:  There was mild annular calcification  There was mild regurgitation  AORTIC VALVE:  The valve was trileaflet  Leaflets exhibited mildly increased thickness, mild calcification, moderately reduced cuspal separation, reduced mobility, and sclerosis  There was severe stenosis  KASEY 0 8 cm2, mean gradient 49 mm Hg  There was mild regurgitation  TRICUSPID VALVE:  There was mild regurgitation  PERICARDIUM:  There was no pericardial effusion  PROCEDURE: The study was performed in the [facility] and Vascular Center  This was a routine study  The transthoracic approach was used  The study included complete 2D imaging, M-mode, complete spectral Doppler, and color Doppler  The  heart rate was 86 bpm, at the start of the study  Image quality was adequate  LEFT VENTRICLE: Size was normal  Systolic function was normal  Ejection fraction was estimated to be 65 %  There were no regional wall motion abnormalities  There was moderate concentric hypertrophy  DOPPLER: Doppler parameters were  consistent with abnormal left ventricular relaxation (grade 1 diastolic dysfunction)  RIGHT VENTRICLE: The size was normal  Systolic function was normal  Wall thickness was normal     LEFT ATRIUM: Size was normal     RIGHT ATRIUM: Size was normal     MITRAL VALVE: There was mild annular calcification  Valve structure was normal  There was normal leaflet separation   DOPPLER: The transmitral velocity was within the normal range  There was no evidence for stenosis  There was mild  regurgitation  AORTIC VALVE: The valve was trileaflet  Leaflets exhibited mildly increased thickness, mild calcification, moderately reduced cuspal separation, reduced mobility, and sclerosis  DOPPLER: Transaortic velocity was within the normal range  There was severe stenosis  KASEY 0 8 cm2, mean gradient 49 mm Hg  There was mild regurgitation  TRICUSPID VALVE: The valve structure was normal  There was normal leaflet separation  DOPPLER: The transtricuspid velocity was within the normal range  There was no evidence for stenosis  There was mild regurgitation  PULMONIC VALVE: Leaflets exhibited normal thickness, no calcification, and normal cuspal separation  DOPPLER: The transpulmonic velocity was within the normal range  There was no significant regurgitation  PERICARDIUM: There was no pericardial effusion  The pericardium was normal in appearance  AORTA: The root exhibited normal size  SYSTEMIC VEINS: IVC: The inferior vena cava was normal in size  SYSTEM MEASUREMENT TABLES    2D  IVSd: 1 37 cm  LVIDd: 3 35 cm  LVPWd: 1 26 cm    CW  TR Vmax: 2 72 m/s  TR maxP 69 mmHg    [facility] Accredited Echocardiography Laboratory    Prepared and electronically signed by    THIERRY Wallace  Signed 2019 14:01:40         CATH/STRESS TEST:  Ordered and refused by patient in August    EK2019  Atrial paced, left anterior fascicular block and minimal voltage criteria for LVH x2    VTE Prophylaxis: Enoxaparin (Lovenox)      Counseling / Coordination of Care  Total floor / unit time spent today 30 minutes minutes  Greater than 50% of total time was spent with the patient and / or family counseling and / or coordination of care  A description of the counseling / coordination of care:  Consultation

## 2019-11-23 NOTE — DISCHARGE SUMMARY
Discharge Summary - [address] [facility]    Patient Information: Gonzalo Leung 80 y o  female MRN: [id number]  Unit/Bed#: -01 Encounter: [id number]    Discharging Physician / Practitioner: Candy Zavala  PCP: Marcelino Ashraf DO  Admission Date: 11/22/2019  Discharge Date: 11/23/19    Reason for Admission:  Chest pain    Discharge Diagnoses:     Principal Problem (Resolved):    Chest pain  Active Problems:    CAD (coronary artery disease)    Uncontrolled type 2 diabetes mellitus without complication, without long-term current use of insulin ([facility] )    H/O tachycardia-bradycardia syndrome s/p PPM    Chronic diastolic congestive heart failure ([place] [address] )    History of CVA (cerebrovascular accident)    Aortic valve stenosis    Hiatal hernia    Ambulatory dysfunction    Hyperlipidemia    Hypertension    Adrenal insufficiency (Rafal's disease) ([place] [address] )    Hypothyroidism    Slow transit constipation    OAB (overactive bladder)      Consultations During Hospital Stay:  · Cardiology    Procedures Performed:     · None    Significant Findings / Test Results:   · CT head without contrast:  No acute intracranial hemorrhage  Mild periventricular white matter hypodensity seen related to chronic small vessel ischemic changes  · Chest x-ray negative for acute cardiopulmonary disease  Large hiatal hernia  · Serial troponins 0 05/0 08/0 06/0 06/0 05  · Lipid panel total cholesterol 186, triglycerides 128, HDL 73, LDL 87  · TSH 0 027    Incidental Findings:   · None    Test Results Pending at Discharge (will require follow up):    · None     Outpatient Tests Requested:  · Outpatient follow-up with PCP, Cardiology, CT surgery if she wishes to discuss aortic valve replacement further as well as endocrinologist  · Repeat thyroid function testing in 4-6 weeks    Complications:  None    Hospital Course:     Gonzalo Leung is a 80 y o  female patient with a past medical history of severe aortic valve stenosis, diabetes type 2 which is uncontrolled, hypothyroidism, 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, history of CVA, diastolic congestive heart failure, hiatal hernia, tachy-sana syndrome status post pacemaker, CAD who originally presented to the hospital on 11/22/2019 due to chest pain  Patient was transported to [facility] and found to have minimally elevated troponins  Patient was transferred to [facility] for further cardiology evaluation and possibility of cardiac catheterization  Patient was seen by Cardiology and extensive discussion was had with patient and her family  Chest pain with minimally elevated troponin concerning for symptomatic aortic stenosis versus coexisting coronary artery disease  Patient was recommended to undergo left heart catheterization which would be needed for TAVR evaluation anyway  Patient very hesitant to undergo additional workup and unsure whether she would agree to valve replacement  It appears as though her primary cardiologist has also been taking with her regarding TAVR as well  At this point, she has chosen to forego any additional workup and follow-up as an outpatient with her cardiologist   She states that if she were to become repeatedly symptomatic that she would possibly consider this at a later time  I have provided her the information for the CT surgery office and encouraged her to at least make an appointment to gain additional information regarding the procedure  It seems as though a lot of her hesitation is coming from her son who feels as though she will have an adverse outcome  I encouraged her to bring her son to this appointment  Additionally, her diabetes is not well controlled  Her hemoglobin A1c most recently is 11  I discussed this extensively with the patient  We did ask for Endocrinology to evaluate the patient however patient is refusing at this time    Her blood sugar is quite elevated at this time and patient is insistent this is because she did not receive her Prandin or metformin as it was on hold for possibility of cardiac catheterization  She refused sliding scale insulin  She refuses insulin as she states that she is asymptomatic with hypoglycemia  and reports that she is sensitive to insulin  She states that she follows with an endocrinologist as outpatient and I have encouraged her to follow up with her endocrinologist as soon as possible  I spent a great deal of time discussing this with her and her family at bedside  At this point, the patient is medically stable for discharge  She was instructed to follow up with her PCP, cardiologist and endocrinologist as well as consider seeing CT surgery as above  Condition at Discharge: stable     Discharge Day Visit / Exam:     Subjective:  Patient offers no complaints  She denies any chest pain, shortness of breath, palpitations  Verbalized understanding of all recommendations  Vitals: Blood Pressure: 123/68 (11/23/19 1024)  Pulse: 60 (11/23/19 1024)  Temperature: 97 8 °F (36 6 °C) (11/23/19 1024)  Temp Source: Oral (11/22/19 1730)  Respirations: 17 (11/23/19 1024)  Height: 5' 2" (157 5 cm) (11/22/19 1740)  Weight - Scale: 56 9 kg (125 lb 6 oz) (11/23/19 0533)  SpO2: 97 % (11/23/19 1024)  Exam:   Physical Exam   Constitutional: She is oriented to person, place, and time  HENT:   Head: Normocephalic  Eyes: Conjunctivae are normal    Neck: Normal range of motion  Cardiovascular: Normal rate  Murmur heard  Pulmonary/Chest: Breath sounds normal    Abdominal: Bowel sounds are normal    Musculoskeletal: Normal range of motion  She exhibits no edema  Neurological: She is alert and oriented to person, place, and time  Skin: Skin is warm and dry  Psychiatric: She has a normal mood and affect  Nursing note and vitals reviewed        Discussion with Family:  Patient and family member at bedside    Discharge instructions/Information to patient and family:   See after visit summary for information provided to patient and family  Provisions for Follow-Up Care:  See after visit summary for information related to follow-up care and any pertinent home health orders  Disposition:     Home    For Discharges to [facility]:   · Not Applicable to this Patient - Not Applicable to this Patient    Planned Readmission: no     Discharge Statement:  I spent 90 minutes discharging the patient  This time was spent on the day of discharge  I had direct contact with the patient on the day of discharge  Greater than 50% of the total time was spent examining patient, answering all patient questions, arranging and discussing plan of care with patient as well as directly providing post-discharge instructions  Additional time then spent on discharge activities  Discharge Medications:  See after visit summary for reconciled discharge medications provided to patient and family        ** Please Note: This note has been constructed using a voice recognition system **

## 2019-11-25 ENCOUNTER — TRANSITIONAL CARE MANAGEMENT (OUTPATIENT)
Dept: INTERNAL MEDICINE CLINIC | Facility: CLINIC | Age: 84
End: 2019-11-25

## 2019-11-25 NOTE — UTILIZATION REVIEW
Initial Clinical Review         Admission: Date/Time/Statement: Inpatient Admission Orders (From admission, onward)              Ordered          11/22/19 1641   Inpatient Admission  Once                             Orders Placed This Encounter   Procedures    Inpatient Admission       Standing Status:   Standing       Number of Occurrences:   1       Order Specific Question:   Admitting Physician       Answer:   Keyla Jay [[id number]]       Order Specific Question:   Level of Care       Answer:   Med Surg [16]       Order Specific Question:   Estimated length of stay       Answer:   More than 2 Midnights       Order Specific Question:   Certification       Answer:   I certify that inpatient services are medically necessary for this patient for a duration of greater than two midnights  See H&P and MD Progress Notes for additional information about the patient's course of treatment           ED Arrival Information          Patient not seen in ED -- transferred from [address] [place] [address]                         Chief complaint:  Substernal Chest Pressure/Pain     Assessment/Plan: 79 y/o female with PMHx of CAD, SSS s/p PPM, HTN, HLD, T2DM, 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), CRISTAL, hypothyroidism who presented to [facility] with chest pain w/ radiation to the left jaw this morning  Ems was called, transported to [facility], EKG done there showed NSR w/ a HR of 60, atrial pacing, and left-axis deviation, her CXR was normal, CT of head showed mild, chronic ischemic changes, and trop's were slightly elevated, 0 5 & 0 8, respectively  Patient was transferred to [facility] for cardiology evaluation for possible catherization  Pt currently chest pain free  Admit inpatient to M/S/Tele unit with chest pain suspicious for ACS w/ MALCOM score of 5 & HEART score of 6  Tele monitoring, repeat EKG, cardiology consulted -- plan for cardiac cath    Cardiology recommending cardiac cath to r/o CAD (would need for TAVR w/u anyway), pt currently refusing cath and not sure if wants to proceed with TAVR in the near future  Conservative management at present  Continue po meds                  ED Triage Vitals   Temperature Pulse Respirations Blood Pressure SpO2   11/22/19 1730 11/22/19 1730 11/22/19 1720 11/22/19 1720 11/22/19 1730   97 7 °F (36 5 °C) 60 18 140/73 99 %       Temp Source Heart Rate Source Patient Position - Orthostatic VS BP Location FiO2 (%)   11/22/19 1730 11/22/19 1720 11/22/19 1720 11/22/19 1720 --   Oral Monitor Sitting Right arm         Pain Score           11/22/19 1740           No Pain                Wt Readings from Last 1 Encounters:   11/23/19 56 9 kg (125 lb 6 oz)      Additional Vital Signs:   Date/Time   Temp   Pulse   Resp   BP   MAP (mmHg)   SpO2   O2 Device   11/23/19 10:24:28   97 8 °F (36 6 °C)   60   17   123/68   86   97 %      11/23/19 08:06:51   97 5 °F (36 4 °C)   60   16   130/71   91   100 %      11/22/19 22:23:26   97 4 °F (36 3 °C)Abnormal    60   16   130/72   91   97 %      11/22/19 20:07:56   97 7 °F (36 5 °C)   60   17   113/66   82   96 %      11/22/19 18:11:05      60      138/83   101   98 %   None (Room air)   11/22/19 1805      60            98 %   None (Room air)   11/22/19 1800      60            98 %      11/22/19 1755      60            99 %      11/22/19 1750      60            99 %      11/22/19 1745      60            98 %      11/22/19 1740      60            99 %   None (Room air)   11/22/19 1735      60            99 %   None (Room air)   11/22/19 1730   97 7 °F (36 5 °C)   60            99 %      11/22/19 1720         18   140/73                  Pertinent Labs/Diagnostic Test Results:   EKG 11/22 --Left fascicular block, LVH  CT head without contrast 11/22 --  No acute intracranial hemorrhage   Mild periventricular white matter hypodensity seen related to chronic small vessel ischemic changes    CXR 11/22 -- negative for acute cardiopulmonary disease   Large hiatal hernia      Results from last 7 days   Lab Units 11/22/19  1746 11/22/19  1126   WBC Thousand/uL  --  7 36   HEMOGLOBIN g/dL  --  11 3*   HEMATOCRIT %  --  36 7   PLATELETS Thousands/uL 245 227   NEUTROS ABS Thousands/µL  --  4 82             Results from last 7 days   Lab Units 11/23/19  0458 11/22/19  1745 11/22/19  1126   SODIUM mmol/L 139 136 138   POTASSIUM mmol/L 4 3 4 6 4 2   CHLORIDE mmol/L 105 103 102   CO2 mmol/L 29 28 27   ANION GAP mmol/L 5 5 9   BUN mg/dL 13 14 14   CREATININE mg/dL 0 85 0 83 0 85   EGFR ml/min/1 73sq m 63 65 63   CALCIUM mg/dL 9 0 8 8 8 4   PHOSPHORUS mg/dL 3 3  --   --              Results from last 7 days   Lab Units 11/23/19  0458 11/22/19  1745 11/22/19  1126   AST U/L 9 11 13   ALT U/L 15 14 16   ALK PHOS U/L 51 51 52   TOTAL PROTEIN g/dL 5 9* 6 1* 5 9*   ALBUMIN g/dL 3 1* 3 2* 3 1*   TOTAL BILIRUBIN mg/dL 0 64 0 55 0 60               Results from last 7 days   Lab Units 11/23/19  1022 11/23/19  0528 11/22/19  2143 11/22/19  1755 11/22/19  1308   POC GLUCOSE mg/dl 444* 204* 314* 241* 303*             Results from last 7 days   Lab Units 11/23/19  0458 11/22/19  1745 11/22/19  1126   GLUCOSE RANDOM mg/dL 206* 248* 277*               Results from last 7 days   Lab Units 11/23/19  0038 11/22/19  2124 11/22/19  1745 11/22/19  1452 11/22/19  1126   TROPONIN I ng/mL 0 05* 0 06* 0 06* 0 08* 0 05*           Results from last 7 days   Lab Units 11/22/19  1745   TSH 3RD GENERATON uIU/mL 0 027*           Results from last 7 days   Lab Units 11/22/19  1745   NT-PRO BNP pg/mL 1,565*         Medical History        Past Medical History:   Diagnosis Date    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HCC)      Bradycardia      Coronary artery disease      Depression      Depression      Diabetes mellitus ([facility] [address] )       Type 2 DM    Disease of thyroid gland      Essential (primary) hypertension      Facial droop       started 10 years ago    GERD (gastroesophageal reflux disease)      Heart murmur      History of echocardiogram 08/28/2017     2-D w/CFD:  EF 0 60 (60%), LVSF is normal  No regional wall abnormalities  Mild mitral valve regurg  AV was trileaflet  Moderate tricuspid regurg  Trace pulmonic valve  No pericardial effusion   History of echocardiogram 03/28/2018     2-D w/CFD:  EF 0 60 (60%), Mild regurg in the MV  AV was trileaflet, severe stenosis, trace regurg  Mild regurg in the TV  No pericardial effusion  Aortic root exhibited normal size   History of echocardiogram 01/29/2016     2-D:  EF 0 55 (55%), Normal LVSF  Mild concentric LVH  Mild MR  Mild AS with mild regurg      History of Holter monitoring       2/24/16, 9/27/17, 2/6/18    History of nuclear stress test 08/16/2017     Lexiscan MPI:  EF 0 76 (76%), no prior MI or ischemia,    Hyperlipidemia      Hypothyroidism      Nonrheumatic aortic (valve) stenosis      Obstructive sleep apnea (adult) (pediatric)       10/17/17 - NPSG with nasal CPAP/Bi-level titration     Osteoarthritis      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HCC)      SSS (sick sinus syndrome) ([facility])      SVT (supraventricular tachycardia) ([facility])      Vitamin D deficiency           Present on Admission:   Hiatal hernia   Hyperlipidemia   Uncontrolled type 2 diabetes mellitus without complication, without long-term current use of insulin ([facility])   Hypertension   Chronic diastolic congestive heart failure ([facility] [address] )   CAD (coronary artery disease)   Ambulatory dysfunction   Hypothyroidism   Slow transit constipation   Aortic valve stenosis   Adrenal insufficiency (Rafal's disease) ([facility])        Admitting Diagnosis: Chest pressure [R07 89]  Age/Sex: 80 y o  female  Admission Orders:  Scheduled Medications:  Medications:  aspirin 81 mg Oral Daily   docusate sodium 100 mg Oral BID   enoxaparin 40 mg Subcutaneous Daily   [START ON 11/24/2019] ergocalciferol 50,000 Units Oral Weekly   furosemide 20 mg Oral Daily   [START ON 11/24/2019] levothyroxine 50 mcg Oral Early Morning   metoprolol tartrate 25 mg Oral TID   oxybutynin 10 mg Oral BID   pantoprazole 40 mg Oral Daily Before Breakfast   pravastatin 20 mg Oral Daily   predniSONE 5 mg Oral Daily   repaglinide 4 mg Oral TID AC   senna 1 tablet Oral Daily       PRN Meds:  aluminum-magnesium hydroxide-simethicone 30 mL Oral Q6H PRN   ondansetron 4 mg Intravenous Q6H PRN         IP CONSULT TO CARDIOLOGY     Network Utilization Review Department  [email or web address]  ATTENTION: Please call with any questions or concerns to [phone] and carefully listen to the prompts so that you are directed to the right person  All voicemails are confidential   Yue Drew all requests for admission clinical reviews, approved or denied determinations and any other requests to dedicated fax number below belonging to the campus where the patient is receiving treatment    FACILITY NAME UR FAX NUMBER   ADMISSION DENIALS (Administrative/Medical Necessity) [address] [facility] (Maternity/NICU/Pediatrics) [phone]   [facility] [address] [place] [address] [address] [facility] [phone]   [facility] [address]25  [phone]   [facility] [address] [phone]

## 2019-12-03 ENCOUNTER — OFFICE VISIT (OUTPATIENT)
Dept: INTERNAL MEDICINE CLINIC | Facility: CLINIC | Age: 84
End: 2019-12-03
Payer: COMMERCIAL

## 2019-12-03 VITALS
HEART RATE: 106 BPM | BODY MASS INDEX: 24.66 KG/M2 | HEIGHT: 62 IN | TEMPERATURE: 98.4 F | WEIGHT: 134 LBS | SYSTOLIC BLOOD PRESSURE: 124 MMHG | DIASTOLIC BLOOD PRESSURE: 78 MMHG | OXYGEN SATURATION: 98 %

## 2019-12-03 DIAGNOSIS — IMO0001 UNCONTROLLED TYPE 2 DIABETES MELLITUS WITHOUT COMPLICATION, WITHOUT LONG-TERM CURRENT USE OF INSULIN: ICD-10-CM

## 2019-12-03 DIAGNOSIS — R07.9 CHEST PAIN, UNSPECIFIED TYPE: ICD-10-CM

## 2019-12-03 DIAGNOSIS — E11.65 TYPE 2 DIABETES MELLITUS WITH HYPERGLYCEMIA, WITHOUT LONG-TERM CURRENT USE OF INSULIN (HCC): ICD-10-CM

## 2019-12-03 DIAGNOSIS — I35.0 AORTIC VALVE STENOSIS, ETIOLOGY OF CARDIAC VALVE DISEASE UNSPECIFIED: Primary | Chronic | ICD-10-CM

## 2019-12-03 PROCEDURE — 99495 TRANSJ CARE MGMT MOD F2F 14D: CPT | Performed by: INTERNAL MEDICINE

## 2019-12-03 PROCEDURE — 1160F RVW MEDS BY RX/DR IN RCRD: CPT | Performed by: INTERNAL MEDICINE

## 2019-12-03 NOTE — PROGRESS NOTES
Assessment/Plan:         Diagnoses and all orders for this visit:    Aortic valve stenosis, etiology of cardiac valve disease unspecified  Pt again states she does not want TAVR and for now she feels her sxs are managed  She does not seem to have complete understanding of the depth of her AS/possible sxs etc although it has been discussed again today  Her son supports her decision not to have TAVR but is aware other sxs like recent CP may be more frequent      Uncontrolled type 2 diabetes mellitus without complication, without long-term current use of insulin ([address] )  -     HEMOGLOBIN A1C W/ EAG ESTIMATION; Future  Pt last A!c is 11 She has followup with Dr Adeola Gooden discussed regular schedule for meds and sleep as well as eating more consistently    Type 2 diabetes mellitus with hyperglycemia, without long-term current use of insulin ([facility])  -     glucose blood (CONTOUR NEXT TEST) test strip; Use as instructed  See above Stressed med compliance and reviewed    Chest pain, unspecified type  Proposed cath was not done at [facility] during admission as patient seems to have deferred She in conversation seems agreeable if stent needed but does not want further intervention for her valve or surgery if there was a complication during the catherization  Pt does have upcoming cardiology appt to further discuss but since she is presently sxs free I do not foresee her scheduling a cath unless recurrent sxs lead to it    Rto 3month       Patient ID: Tavo Govea is a 80 y o  female  HPI   Pt recently admitted with chest pain and elevated troponins Pt was transferred from [facility] for presumed cardiac cath  The cath was not done and patient discharged Pt family unsure why cath not done but they reiterate that patient does not want TAVR  Pt states she does not want valve replacement and that her chest pain has resolved   Per the DC summary it appears patient declined catherization and family in conversation thinks she may have deferred when she heard that if something was found on the cath other than needing a stent they would proceed to repair and pt does not want sx  She does have appt with cardiology this month for followup No dizziness or syncope Some álvarez No falls Her sugars are fkuctuant and sleep/eat schedule remains a challenge to stabilize  Most sugars are under 400 and many under 300 as well as lower 200 and high 100 range  Pt states she is taking her medications as ordered but her time schedule is off due to her sleep schedule and varies  Pt son and granddaughter ae with her tonight and reiterate that her sleeping and eating schedules are quite varied They do not buy her treats but others bring them to her  TCM Call (since 11/2/2019)     Date and time call was made  11/25/2019  9:43 AM    Hospital care reviewed  Records reviewed    Patient was hospitialized at  [address] [place] [address]    Date of Admission  11/22/19    Date of discharge  11/23/19    Diagnosis  aortic stenosis, hiatal hernis, Overactive bladder    Disposition  Home    Were the patients medications reviewed and updated  No    Current Symptoms  None      TCM Call (since 11/2/2019)     Post hospital issues  None    Scheduled for follow up? Yes    Did you obtain your prescribed medications  Yes    Do you need help managing your prescriptions or medications  No    Is transportation to your appointment needed  No    I have advised the patient to call PCP with any new or worsening symptoms  Anya [address]; Friends    Are you recieving any outpatient services  No    Are you recieving home care services  No    Are you using any community resources  No    Current waiver services  No    Interperter language line needed  No    Counseling  Patient; Family          Review of Systems   Constitutional: Positive for activity change and fatigue  Negative for chills and fever  HENT: Negative  Eyes: Negative      Respiratory: Positive for shortness of breath  Cardiovascular: Positive for chest pain  Gastrointestinal: Negative  Genitourinary: Negative  Musculoskeletal: Positive for arthralgias and gait problem  Skin: Negative  Hematological: Negative  Psychiatric/Behavioral: Negative  Past Medical History:   Diagnosis Date    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[place] [id number] )     Bradycardia     Coronary artery disease     Depression     Depression     Diabetes mellitus ([place] [address] )     Type 2 DM    Disease of thyroid gland     Essential (primary) hypertension     Facial droop     started 10 years ago    GERD (gastroesophageal reflux disease)     Heart murmur     History of echocardiogram 08/28/2017    2-D w/CFD:  EF 0 60 (60%), LVSF is normal  No regional wall abnormalities  Mild mitral valve regurg  AV was trileaflet  Moderate tricuspid regurg  Trace pulmonic valve  No pericardial effusion   History of echocardiogram 03/28/2018    2-D w/CFD:  EF 0 60 (60%), Mild regurg in the MV  AV was trileaflet, severe stenosis, trace regurg  Mild regurg in the TV  No pericardial effusion  Aortic root exhibited normal size   History of echocardiogram 01/29/2016    2-D:  EF 0 55 (55%), Normal LVSF  Mild concentric LVH  Mild MR  Mild AS with mild regurg      History of Holter monitoring     2/24/16, 9/27/17, 2/6/18    History of nuclear stress test 08/16/2017    Lexiscan MPI:  EF 0 76 (76%), no prior MI or ischemia,    Hyperlipidemia     Hypothyroidism     Nonrheumatic aortic (valve) stenosis     Obstructive sleep apnea (adult) (pediatric)     10/17/17 - NPSG with nasal CPAP/Bi-level titration     Osteoarthritis     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HCC)     SSS (sick sinus syndrome) ([facility])     SVT (supraventricular tachycardia) ([place] [address] )     Vitamin D deficiency      Past Surgical History:   Procedure Laterality Date    ANKLE SURGERY Left 2005   [facility]    CATARACT EXTRACTION Bilateral 09/1995    HEMORRHOID SURGERY  1978    HERNIA REPAIR 1990    INSERT / Kait Sotelo / Ayush Newbury PACEMAKER  05/14/2018    dual lead pacer st yony model 2272    JOINT REPLACEMENT      b/l hip replacements, right 2006    NEUROPLASTY / TRANSPOSITION MEDIAN NERVE AT CARPAL TUNNEL  1977    PARTIAL HYSTERECTOMY  1971    TOTAL HIP ARTHROPLASTY      TRANSPHENOIDAL / TRANSNASAL HYPOPHYSECTOMY / RESECTION PITUITARY TUMOR  1989    excision of pituitary gland     Social History     Socioeconomic History    Marital status:      Spouse name: Not on file    Number of children: Not on file    Years of education: Not on file    Highest education level: Not on file   Occupational History    Not on file   Social Needs    Financial resource strain: Not on file    Food insecurity:     Worry: Not on file     Inability: Not on file    Transportation needs:     Medical: Not on file     Non-medical: Not on file   Tobacco Use    Smoking status: Never Smoker    Smokeless tobacco: Never Used   Substance and Sexual Activity    Alcohol use: Not Currently    Drug use: No    Sexual activity: Never   Lifestyle    Physical activity:     Days per week: Not on file     Minutes per session: Not on file    Stress: Not on file   Relationships    Social connections:     Talks on phone: Not on file     Gets together: Not on file     Attends [employer] service: Not on file     Active member of club or organization: Not on file     Attends meetings of clubs or organizations: Not on file     Relationship status: Not on file    Intimate partner violence:     Fear of current or ex partner: Not on file     Emotionally abused: Not on file     Physically abused: Not on file     Forced sexual activity: Not on file   Other Topics Concern    Not on file   Social History Narrative    Caffeine use    Dental care regularly    Good dental hygiene    Uses safety equipment: seatbelts     Allergies   Allergen Reactions    Cephalosporins GI Intolerance    Levemir [Insulin Detemir]      Brittle diabetic  Hypersensitive to insulin  Severe hypoglycemia results if given insulin  Please speak with son, Michelle Danielson, before giving ANY type of insulin   Acetaminophen     Cephalexin GI Intolerance    Clindamycin      Other reaction(s): Unspecified    Demeclocycline     Hydrocodone      Bitartrate  Other reaction(s): Unspecified    Hydrocodone-Acetaminophen Nausea Only    Oxycodone-Acetaminophen      HCL    Oxycodone-Acetaminophen     Penicillins Hives     Other reaction(s): Other (See Comments)  ITCHING/RASH    Simvastatin      Other reaction(s): GI upset    Sulfamethoxazole-Trimethoprim     Tetrabenazine     Tetracycline Nausea Only    Tetracyclines & Related     Vicodin [Hydrocodone-Acetaminophen]              /78   Pulse (!) 106   Temp 98 4 °F (36 9 °C) (Tympanic)   Ht 5' 2" (1 575 m)   Wt 60 8 kg (134 lb)   SpO2 98%   BMI 24 51 kg/m²          Physical Exam   Constitutional: She is oriented to person, place, and time  She appears well-developed and well-nourished  No distress  HENT:   Head: Normocephalic and atraumatic  Mouth/Throat: Oropharynx is clear and moist  No oropharyngeal exudate  Eyes: Pupils are equal, round, and reactive to light  Conjunctivae and EOM are normal  No scleral icterus  Neck: Normal range of motion  Neck supple  No JVD present  Cardiovascular: Normal rate and regular rhythm  No murmur heard  Pulmonary/Chest: Effort normal and breath sounds normal  No respiratory distress  She has no wheezes  Abdominal: Soft  Bowel sounds are normal  She exhibits no distension  There is no guarding  Musculoskeletal: Normal range of motion  She exhibits no edema  Lymphadenopathy:     She has no cervical adenopathy  Neurological: She is alert and oriented to person, place, and time  She displays normal reflexes  No cranial nerve deficit  She exhibits abnormal muscle tone  Coordination normal    Skin: Skin is warm and dry  Capillary refill takes less than 2 seconds   She is not diaphoretic  Psychiatric: She has a normal mood and affect  Her behavior is normal  Judgment and thought content normal    Nursing note and vitals reviewed

## 2019-12-06 ENCOUNTER — REMOTE DEVICE CLINIC VISIT (OUTPATIENT)
Dept: CARDIOLOGY CLINIC | Facility: CLINIC | Age: 84
End: 2019-12-06
Payer: COMMERCIAL

## 2019-12-06 DIAGNOSIS — Z45.010 ENCOUNTER FOR CHECKING AND TESTING OF CARDIAC PACEMAKER PULSE GENERATOR (BATTERY): ICD-10-CM

## 2019-12-06 DIAGNOSIS — I49.5 SICK SINUS SYNDROME (HCC): Primary | ICD-10-CM

## 2019-12-06 PROCEDURE — 93294 REM INTERROG EVL PM/LDLS PM: CPT | Performed by: INTERNAL MEDICINE

## 2019-12-06 PROCEDURE — 93296 REM INTERROG EVL PM/IDS: CPT | Performed by: INTERNAL MEDICINE

## 2019-12-06 NOTE — PROGRESS NOTES
Merlin remote check pacer  1 VHR 23 minutes  Normal battery function        Current Outpatient Medications:     aspirin 81 MG tablet, Take 1 tablet by mouth daily, Disp: , Rfl:     Blood Glucose Monitoring Suppl (ONE TOUCH ULTRA MINI) w/Device KIT, USE TO TEST 4 TIMES DAILY, Disp: , Rfl: 0    ergocalciferol (VITAMIN D2) 50,000 units, Take 1 capsule (50,000 Units total) by mouth once a week for 90 days (Patient taking differently: Take 50,000 Units by mouth once a week Sundays), Disp: 12 capsule, Rfl: 0    esomeprazole (NexIUM) 20 mg capsule, Take 20 mg by mouth every morning before breakfast, Disp: , Rfl:     furosemide (LASIX) 20 mg tablet, Take twice a week as needed for edema/swelling, Disp: 10 tablet, Rfl: 5    glucose blood (CONTOUR NEXT TEST) test strip, Use as instructed, Disp: 360 each, Rfl: 5    levothyroxine 50 mcg tablet, Take 1 tablet (50 mcg total) by mouth daily in the early morning, Disp: 30 tablet, Rfl: 0    metFORMIN (FORTAMET) 500 MG (OSM) 24 hr tablet, Take 1 tablet (500 mg total) by mouth 2 (two) times a day with meals for 90 days, Disp: 180 tablet, Rfl: 3    metoprolol tartrate (LOPRESSOR) 25 mg tablet, Take 25 mg by mouth 3 (three) times a day, Disp: , Rfl:     ONE TOUCH ULTRA TEST test strip, 4 (four) times a day Test, Disp: , Rfl: 0    ONETOUCH DELICA LANCETS FINE MISC, 4 (four) times a day Test, Disp: , Rfl: 0    oxybutynin (DITROPAN-XL) 10 MG 24 hr tablet, Take 10 mg by mouth daily at bedtime, Disp: , Rfl:     pravastatin (PRAVACHOL) 20 mg tablet, Take 1 tablet (20 mg total) by mouth daily, Disp: 90 tablet, Rfl: 0    predniSONE 5 mg tablet, Take 1 tablet (5 mg total) by mouth daily, Disp: 90 tablet, Rfl: 3    repaglinide (PRANDIN) 2 mg tablet, Take 2 tablets (4 mg total) by mouth 3 (three) times a day before meals for 90 days, Disp: 540 tablet, Rfl: 3

## 2020-01-15 ENCOUNTER — OFFICE VISIT (OUTPATIENT)
Dept: CARDIOLOGY CLINIC | Facility: CLINIC | Age: 85
End: 2020-01-15
Payer: COMMERCIAL

## 2020-01-15 DIAGNOSIS — I50.32 CHRONIC DIASTOLIC CONGESTIVE HEART FAILURE (HCC): ICD-10-CM

## 2020-01-15 DIAGNOSIS — I35.0 AORTIC VALVE STENOSIS, ETIOLOGY OF CARDIAC VALVE DISEASE UNSPECIFIED: Primary | Chronic | ICD-10-CM

## 2020-01-15 DIAGNOSIS — Z86.79 H/O TACHYCARDIA-BRADYCARDIA SYNDROME: ICD-10-CM

## 2020-01-15 PROCEDURE — 99214 OFFICE O/P EST MOD 30 MIN: CPT | Performed by: INTERNAL MEDICINE

## 2020-01-15 PROCEDURE — 1160F RVW MEDS BY RX/DR IN RCRD: CPT | Performed by: INTERNAL MEDICINE

## 2020-01-15 NOTE — PROGRESS NOTES
Subjective:        Patient ID: Juan J Henry is a 80 y o  female  Chief Complaint:  Eddi Melgar again refused recommended catheterization and TAVR workup down a [place]  Records reviewed  She continues to be disinterested in the recommendations that have now been from many cardiologists  Sister present aware  Eddi Melgar denies any chest pains chest tightness concerning shortness of breath palpitations presyncope syncope TIA or claudication like symptoms since she is home this last time  Blood pressure 120/80 heart rate 62 weight 134  She examines euvolemic  The following portions of the patient's history were reviewed and updated as appropriate: allergies, current medications, past family history, past medical history, past social history, past surgical history and problem list   Review of Systems   Constitution: Positive for malaise/fatigue  Negative for chills, diaphoresis and weight gain  HENT: Negative for nosebleeds and stridor  Eyes: Negative for double vision, vision loss in left eye, vision loss in right eye and visual disturbance  Cardiovascular: Negative for chest pain, claudication, cyanosis, dyspnea on exertion, irregular heartbeat, leg swelling, near-syncope, orthopnea, palpitations, paroxysmal nocturnal dyspnea and syncope  Respiratory: Negative for cough, shortness of breath, snoring and wheezing  Endocrine: Negative for polydipsia, polyphagia and polyuria  Hematologic/Lymphatic: Negative for bleeding problem  Does not bruise/bleed easily  Skin: Negative for flushing and rash  Musculoskeletal: Positive for arthritis  Negative for falls and myalgias  Gastrointestinal: Negative for abdominal pain, heartburn, hematemesis, hematochezia, melena and nausea  Genitourinary: Negative for hematuria  Neurological: Negative for brief paralysis, dizziness, focal weakness, headaches, light-headedness, loss of balance and vertigo     Psychiatric/Behavioral: Negative for altered mental status and substance abuse  Allergic/Immunologic: Negative for hives  Objective: There were no vitals taken for this visit  Physical Exam   Constitutional: She is oriented to person, place, and time  She appears well-developed and well-nourished  HENT:   Head: Normocephalic and atraumatic  Eyes: Pupils are equal, round, and reactive to light  EOM are normal    Neck: Normal range of motion  Neck supple  No JVD present  No thyromegaly present  Cardiovascular: Normal rate, regular rhythm and intact distal pulses  Exam reveals no gallop and no friction rub  Murmur (3/6 AS murmur diminished S2) heard  Pulmonary/Chest: Effort normal and breath sounds normal  No stridor  No respiratory distress  She has no wheezes  She has no rales  Abdominal: Soft  Bowel sounds are normal  She exhibits no mass  There is no tenderness  Musculoskeletal: Normal range of motion  She exhibits no edema  Lymphadenopathy:     She has no cervical adenopathy  Neurological: She is alert and oriented to person, place, and time  Skin: Skin is warm and dry  No rash noted  No pallor  Psychiatric: She has a normal mood and affect  Her behavior is normal    Nursing note and vitals reviewed        Lab Review:   Admission on 11/22/2019, Discharged on 11/23/2019   Component Date Value    Sodium 11/22/2019 136     Potassium 11/22/2019 4 6     Chloride 11/22/2019 103     CO2 11/22/2019 28     ANION GAP 11/22/2019 5     BUN 11/22/2019 14     Creatinine 11/22/2019 0 83     Glucose 11/22/2019 248*    Calcium 11/22/2019 8 8     AST 11/22/2019 11     ALT 11/22/2019 14     Alkaline Phosphatase 11/22/2019 51     Total Protein 11/22/2019 6 1*    Albumin 11/22/2019 3 2*    Total Bilirubin 11/22/2019 0 55     eGFR 11/22/2019 65     TSH 3RD GENERATON 11/22/2019 0 027*    Troponin I 11/22/2019 0 06*    Troponin I 11/22/2019 0 06*    NT-proBNP 11/22/2019 1,565*    Platelets 86/33/8857 245     MPV 11/22/2019 10 6     POC Glucose 11/22/2019 241*    Troponin I 11/23/2019 0 05*    POC Glucose 11/22/2019 314*    Sodium 11/23/2019 139     Potassium 11/23/2019 4 3     Chloride 11/23/2019 105     CO2 11/23/2019 29     ANION GAP 11/23/2019 5     BUN 11/23/2019 13     Creatinine 11/23/2019 0 85     Glucose 11/23/2019 206*    Calcium 11/23/2019 9 0     AST 11/23/2019 9     ALT 11/23/2019 15     Alkaline Phosphatase 11/23/2019 51     Total Protein 11/23/2019 5 9*    Albumin 11/23/2019 3 1*    Total Bilirubin 11/23/2019 0 64     eGFR 11/23/2019 63     Phosphorus 11/23/2019 3 3     Cholesterol 11/23/2019 186     Triglycerides 11/23/2019 128     HDL, Direct 11/23/2019 73     LDL Calculated 11/23/2019 87     Non-HDL-Chol (CHOL-HDL) 11/23/2019 113     POC Glucose 11/23/2019 204*    POC Glucose 11/23/2019 444*    Ventricular Rate 11/22/2019 60     Atrial Rate 11/22/2019 60     OH Interval 11/22/2019 244     QRSD Interval 11/22/2019 106     QT Interval 11/22/2019 456     QTC Interval 11/22/2019 456     QRS Axis 11/22/2019 -52     T Wave Axis 11/22/2019 4     Ventricular Rate 11/22/2019 60     Atrial Rate 11/22/2019 60     OH Interval 11/22/2019 246     QRSD Interval 11/22/2019 98     QT Interval 11/22/2019 456     QTC Interval 11/22/2019 456     QRS Axis 11/22/2019 -55     T Wave Axis 11/22/2019 -16     Ventricular Rate 11/22/2019 60     Atrial Rate 11/22/2019 60     OH Interval 11/22/2019 212     QRSD Interval 11/22/2019 98     QT Interval 11/22/2019 462     QTC Interval 11/22/2019 462     P Axis 11/22/2019 -27     QRS Axis 11/22/2019 -39     T Wave Axis 11/22/2019 12    Admission on 11/22/2019, Discharged on 11/22/2019   Component Date Value    WBC 11/22/2019 7 36     RBC 11/22/2019 3 95     Hemoglobin 11/22/2019 11 3*    Hematocrit 11/22/2019 36 7     MCV 11/22/2019 93     [address] 11/22/2019 28 6     MCHC 11/22/2019 30 8*    RDW 11/22/2019 14 6     MPV 11/22/2019 10 3  Platelets 85/01/2132 227     nRBC 11/22/2019 0     Neutrophils Relative 11/22/2019 65     Immat GRANS % 11/22/2019 0     Lymphocytes Relative 11/22/2019 24     Monocytes Relative 11/22/2019 8     Eosinophils Relative 11/22/2019 2     Basophils Relative 11/22/2019 1     Neutrophils Absolute 11/22/2019 4 82     Immature Grans Absolute 11/22/2019 0 03     Lymphocytes Absolute 11/22/2019 1 73     Monocytes Absolute 11/22/2019 0 60     Eosinophils Absolute 11/22/2019 0 14     Basophils Absolute 11/22/2019 0 04     Sodium 11/22/2019 138     Potassium 11/22/2019 4 2     Chloride 11/22/2019 102     CO2 11/22/2019 27     ANION GAP 11/22/2019 9     BUN 11/22/2019 14     Creatinine 11/22/2019 0 85     Glucose 11/22/2019 277*    Calcium 11/22/2019 8 4     AST 11/22/2019 13     ALT 11/22/2019 16     Alkaline Phosphatase 11/22/2019 52     Total Protein 11/22/2019 5 9*    Albumin 11/22/2019 3 1*    Total Bilirubin 11/22/2019 0 60     eGFR 11/22/2019 63     Troponin I 11/22/2019 0 05*    Extra Tube 11/22/2019 Hold for add-ons   POC Glucose 11/22/2019 303*    Troponin I 11/22/2019 0 08*    Ventricular Rate 11/22/2019 60     Atrial Rate 11/22/2019 60     NE Interval 11/22/2019 194     QRSD Interval 11/22/2019 96     QT Interval 11/22/2019 432     QTC Interval 11/22/2019 432     QRS Axis 11/22/2019 -52     T Wave [place] 11/22/2019 17    Appointment on 11/16/2019   Component Date Value    Hemoglobin A1C 11/16/2019 11 0*    EAG 11/16/2019 269      No results found  Assessment:       1  Aortic valve stenosis, etiology of cardiac valve disease unspecified     2  H/O tachycardia-bradycardia syndrome s/p PPM     3  Chronic diastolic congestive heart failure ([address] )          Plan:       Currently Renetta De Souza is angina free but I am not sure this will last very long        Her meds however are optimized, I have recommended no changes as she is euvolemic, normotensive, and her heart rate ideal     We will see her back every couple of months in an effort to treat her medically as she remains disinterested in I recommendations from a cardiac perspective      I too told her she could call if she changes her mind about proceeding with heart catheterization and TAVR eval

## 2020-01-21 DIAGNOSIS — E55.9 VITAMIN D DEFICIENCY: ICD-10-CM

## 2020-01-21 DIAGNOSIS — E03.9 ACQUIRED HYPOTHYROIDISM: Primary | ICD-10-CM

## 2020-01-21 DIAGNOSIS — E78.2 MIXED HYPERLIPIDEMIA: ICD-10-CM

## 2020-01-21 DIAGNOSIS — E13.9 DIABETES 1.5, MANAGED AS TYPE 1 (HCC): Primary | ICD-10-CM

## 2020-01-21 RX ORDER — PRAVASTATIN SODIUM 20 MG
20 TABLET ORAL DAILY
Qty: 90 TABLET | Refills: 0 | Status: SHIPPED | OUTPATIENT
Start: 2020-01-21 | End: 2020-07-14

## 2020-01-21 RX ORDER — ERGOCALCIFEROL 1.25 MG/1
50000 CAPSULE ORAL WEEKLY
Qty: 4 CAPSULE | Refills: 5 | Status: SHIPPED | OUTPATIENT
Start: 2020-01-21 | End: 2020-05-19

## 2020-01-21 RX ORDER — LEVOTHYROXINE SODIUM 0.07 MG/1
TABLET ORAL
Qty: 90 TABLET | Refills: 0 | Status: SHIPPED | OUTPATIENT
Start: 2020-01-21 | End: 2020-02-26

## 2020-02-26 DIAGNOSIS — E03.9 ACQUIRED HYPOTHYROIDISM: ICD-10-CM

## 2020-02-26 DIAGNOSIS — R32 URINARY INCONTINENCE, UNSPECIFIED TYPE: Primary | ICD-10-CM

## 2020-02-26 DIAGNOSIS — Z79.4 TYPE 2 DIABETES MELLITUS WITH HYPERGLYCEMIA, WITH LONG-TERM CURRENT USE OF INSULIN (HCC): ICD-10-CM

## 2020-02-26 DIAGNOSIS — E11.65 TYPE 2 DIABETES MELLITUS WITH HYPERGLYCEMIA, WITH LONG-TERM CURRENT USE OF INSULIN (HCC): ICD-10-CM

## 2020-02-26 DIAGNOSIS — E13.9 DIABETES 1.5, MANAGED AS TYPE 1 (HCC): ICD-10-CM

## 2020-02-26 RX ORDER — OXYBUTYNIN CHLORIDE 10 MG/1
TABLET, EXTENDED RELEASE ORAL
Qty: 180 TABLET | Refills: 0 | Status: SHIPPED | OUTPATIENT
Start: 2020-02-26 | End: 2020-07-14 | Stop reason: SDUPTHER

## 2020-02-26 RX ORDER — REPAGLINIDE 2 MG/1
4 TABLET ORAL
Qty: 540 TABLET | Refills: 0 | Status: SHIPPED | OUTPATIENT
Start: 2020-02-26 | End: 2020-07-14

## 2020-02-26 RX ORDER — LEVOTHYROXINE SODIUM 0.07 MG/1
TABLET ORAL
Qty: 90 TABLET | Refills: 0 | Status: SHIPPED | OUTPATIENT
Start: 2020-02-26 | End: 2020-07-14

## 2020-03-02 ENCOUNTER — OFFICE VISIT (OUTPATIENT)
Dept: INTERNAL MEDICINE CLINIC | Facility: CLINIC | Age: 85
End: 2020-03-02
Payer: COMMERCIAL

## 2020-03-02 ENCOUNTER — APPOINTMENT (OUTPATIENT)
Dept: LAB | Facility: MEDICAL CENTER | Age: 85
End: 2020-03-02
Payer: COMMERCIAL

## 2020-03-02 VITALS
HEIGHT: 62 IN | BODY MASS INDEX: 24.01 KG/M2 | OXYGEN SATURATION: 97 % | DIASTOLIC BLOOD PRESSURE: 78 MMHG | SYSTOLIC BLOOD PRESSURE: 124 MMHG | TEMPERATURE: 98.6 F | HEART RATE: 66 BPM | WEIGHT: 130.5 LBS

## 2020-03-02 DIAGNOSIS — I35.0 AORTIC VALVE STENOSIS, ETIOLOGY OF CARDIAC VALVE DISEASE UNSPECIFIED: Chronic | ICD-10-CM

## 2020-03-02 DIAGNOSIS — IMO0001 UNCONTROLLED TYPE 2 DIABETES MELLITUS WITHOUT COMPLICATION, WITHOUT LONG-TERM CURRENT USE OF INSULIN: ICD-10-CM

## 2020-03-02 DIAGNOSIS — Z86.79 H/O TACHYCARDIA-BRADYCARDIA SYNDROME: ICD-10-CM

## 2020-03-02 DIAGNOSIS — E03.9 ACQUIRED HYPOTHYROIDISM: ICD-10-CM

## 2020-03-02 DIAGNOSIS — IMO0001 UNCONTROLLED TYPE 2 DIABETES MELLITUS WITHOUT COMPLICATION, WITHOUT LONG-TERM CURRENT USE OF INSULIN: Primary | ICD-10-CM

## 2020-03-02 LAB
ALBUMIN SERPL BCP-MCNC: 3.2 G/DL (ref 3.5–5)
ALP SERPL-CCNC: 52 U/L (ref 46–116)
ALT SERPL W P-5'-P-CCNC: 18 U/L (ref 12–78)
ANION GAP SERPL CALCULATED.3IONS-SCNC: 4 MMOL/L (ref 4–13)
AST SERPL W P-5'-P-CCNC: 12 U/L (ref 5–45)
BASOPHILS # BLD AUTO: 0.03 THOUSANDS/ΜL (ref 0–0.1)
BASOPHILS NFR BLD AUTO: 0 % (ref 0–1)
BILIRUB SERPL-MCNC: 0.63 MG/DL (ref 0.2–1)
BUN SERPL-MCNC: 16 MG/DL (ref 5–25)
CALCIUM SERPL-MCNC: 8.8 MG/DL (ref 8.3–10.1)
CHLORIDE SERPL-SCNC: 103 MMOL/L (ref 100–108)
CO2 SERPL-SCNC: 27 MMOL/L (ref 21–32)
CREAT SERPL-MCNC: 0.89 MG/DL (ref 0.6–1.3)
EOSINOPHIL # BLD AUTO: 0.08 THOUSAND/ΜL (ref 0–0.61)
EOSINOPHIL NFR BLD AUTO: 1 % (ref 0–6)
ERYTHROCYTE [DISTWIDTH] IN BLOOD BY AUTOMATED COUNT: 14.4 % (ref 11.6–15.1)
EST. AVERAGE GLUCOSE BLD GHB EST-MCNC: 280 MG/DL
GFR SERPL CREATININE-BSD FRML MDRD: 59 ML/MIN/1.73SQ M
GLUCOSE SERPL-MCNC: 399 MG/DL (ref 65–140)
HBA1C MFR BLD: 11.4 %
HCT VFR BLD AUTO: 36.1 % (ref 34.8–46.1)
HGB BLD-MCNC: 11 G/DL (ref 11.5–15.4)
IMM GRANULOCYTES # BLD AUTO: 0.02 THOUSAND/UL (ref 0–0.2)
IMM GRANULOCYTES NFR BLD AUTO: 0 % (ref 0–2)
LDLC SERPL DIRECT ASSAY-MCNC: 83 MG/DL (ref 0–100)
LYMPHOCYTES # BLD AUTO: 1.21 THOUSANDS/ΜL (ref 0.6–4.47)
LYMPHOCYTES NFR BLD AUTO: 15 % (ref 14–44)
MCH RBC QN AUTO: 27.8 PG (ref 26.8–34.3)
MCHC RBC AUTO-ENTMCNC: 30.5 G/DL (ref 31.4–37.4)
MCV RBC AUTO: 91 FL (ref 82–98)
MONOCYTES # BLD AUTO: 0.54 THOUSAND/ΜL (ref 0.17–1.22)
MONOCYTES NFR BLD AUTO: 7 % (ref 4–12)
NEUTROPHILS # BLD AUTO: 6.16 THOUSANDS/ΜL (ref 1.85–7.62)
NEUTS SEG NFR BLD AUTO: 77 % (ref 43–75)
NRBC BLD AUTO-RTO: 0 /100 WBCS
PLATELET # BLD AUTO: 257 THOUSANDS/UL (ref 149–390)
PMV BLD AUTO: 11.1 FL (ref 8.9–12.7)
POTASSIUM SERPL-SCNC: 5.3 MMOL/L (ref 3.5–5.3)
PROT SERPL-MCNC: 6.1 G/DL (ref 6.4–8.2)
RBC # BLD AUTO: 3.96 MILLION/UL (ref 3.81–5.12)
SODIUM SERPL-SCNC: 134 MMOL/L (ref 136–145)
TSH SERPL DL<=0.05 MIU/L-ACNC: 0.02 UIU/ML (ref 0.36–3.74)
WBC # BLD AUTO: 8.04 THOUSAND/UL (ref 4.31–10.16)

## 2020-03-02 PROCEDURE — 80053 COMPREHEN METABOLIC PANEL: CPT

## 2020-03-02 PROCEDURE — 83036 HEMOGLOBIN GLYCOSYLATED A1C: CPT

## 2020-03-02 PROCEDURE — 3078F DIAST BP <80 MM HG: CPT | Performed by: INTERNAL MEDICINE

## 2020-03-02 PROCEDURE — 36415 COLL VENOUS BLD VENIPUNCTURE: CPT

## 2020-03-02 PROCEDURE — 84443 ASSAY THYROID STIM HORMONE: CPT

## 2020-03-02 PROCEDURE — 83721 ASSAY OF BLOOD LIPOPROTEIN: CPT

## 2020-03-02 PROCEDURE — 3074F SYST BP LT 130 MM HG: CPT | Performed by: INTERNAL MEDICINE

## 2020-03-02 PROCEDURE — 99214 OFFICE O/P EST MOD 30 MIN: CPT | Performed by: INTERNAL MEDICINE

## 2020-03-02 PROCEDURE — 4040F PNEUMOC VAC/ADMIN/RCVD: CPT | Performed by: INTERNAL MEDICINE

## 2020-03-02 PROCEDURE — 85025 COMPLETE CBC W/AUTO DIFF WBC: CPT

## 2020-03-02 PROCEDURE — 3008F BODY MASS INDEX DOCD: CPT | Performed by: INTERNAL MEDICINE

## 2020-03-02 PROCEDURE — 1036F TOBACCO NON-USER: CPT | Performed by: INTERNAL MEDICINE

## 2020-03-02 PROCEDURE — 1160F RVW MEDS BY RX/DR IN RCRD: CPT | Performed by: INTERNAL MEDICINE

## 2020-03-02 NOTE — PROGRESS NOTES
Assessment/Plan:         Diagnoses and all orders for this visit:    Uncontrolled type 2 diabetes mellitus without complication, without long-term current use of insulin (HCC)  -     Diabetic Shoe  -     Comprehensive metabolic panel; Future  -     Hemoglobin A1C; Future  -     CBC and differential; Future  -     LDL cholesterol, direct; Future  Discussed diet changes and walking to the mailbox as tolerated for exercise  Monitor blood sugar     H/O tachycardia-bradycardia syndrome s/p PPM  -     TSH, 3rd generation; Future  -     LDL cholesterol, direct; Future  Has appt with Dr Jus Parks 3/12    Aortic valve stenosis, etiology of cardiac valve disease unspecified  -     CBC and differential; Future  -     LDL cholesterol, direct; Future  Pt does not want valve surgery     Acquired hypothyroidism  -     TSH, 3rd generation; Future  Going for labs today   Rto 3 months         Patient ID: Gonzalo Leung is a 80 y o  female  HPI  Pt sugars are still high - trend average is over 200 She has not had labs since the fall She did see Dr Jus Parks and has followup 3/12 She has had some chest sxs and has appt next week Her vision has been stable   No n/v Her eating habits are still varied and do affect her sugar She denies cough and she does not have sob No syncope She does walk a little when the weather is nicer with her granddaughter       Review of Systems   Constitutional: Positive for activity change  Negative for chills and fever  HENT: Negative  Respiratory: Negative  Cardiovascular: Negative  Gastrointestinal: Negative  Genitourinary: Negative  Musculoskeletal: Positive for arthralgias and gait problem  Skin: Negative  Hematological: Negative  Psychiatric/Behavioral: Negative          Past Medical History:   Diagnosis Date    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HCC)     Bradycardia     Coronary artery disease     Depression     Depression     Diabetes mellitus (HCC)     Type 2 DM    Disease of thyroid gland     Essential (primary) hypertension     Facial droop     started 10 years ago    GERD (gastroesophageal reflux disease)     Heart murmur     History of echocardiogram 08/28/2017    2-D w/CFD:  EF 0 60 (60%), LVSF is normal  No regional wall abnormalities  Mild mitral valve regurg  AV was trileaflet  Moderate tricuspid regurg  Trace pulmonic valve  No pericardial effusion   History of echocardiogram 03/28/2018    2-D w/CFD:  EF 0 60 (60%), Mild regurg in the MV  AV was trileaflet, severe stenosis, trace regurg  Mild regurg in the TV  No pericardial effusion  Aortic root exhibited normal size   History of echocardiogram 01/29/2016    2-D:  EF 0 55 (55%), Normal LVSF  Mild concentric LVH  Mild MR  Mild AS with mild regurg      History of Holter monitoring     2/24/16, 9/27/17, 2/6/18    History of nuclear stress test 08/16/2017    Lexiscan MPI:  EF 0 76 (76%), no prior MI or ischemia,    Hyperlipidemia     Hypothyroidism     Nonrheumatic aortic (valve) stenosis     Obstructive sleep apnea (adult) (pediatric)     10/17/17 - NPSG with nasal CPAP/Bi-level titration     Osteoarthritis     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HCC)     [facility]     SVT (supraventricular tachycardia) ([facility] [address] [facility]     Vitamin D deficiency      Past Surgical History:   Procedure Laterality Date    ANKLE SURGERY Left 2005   [facility]    CATARACT EXTRACTION Bilateral 09/1995    HEMORRHOID SURGERY  1978    HERNIA REPAIR  1990    INSERT / Elonda Hikes / REMOVE PACEMAKER  05/14/2018    dual lead pacer st yony model 2272    JOINT REPLACEMENT      b/l hip replacements, right 2006    NEUROPLASTY / TRANSPOSITION MEDIAN NERVE AT CARPAL TUNNEL  1977    PARTIAL HYSTERECTOMY  1971    TOTAL HIP ARTHROPLASTY      TRANSPHENOIDAL / TRANSNASAL HYPOPHYSECTOMY / RESECTION PITUITARY TUMOR  1989    excision of pituitary gland     Social History     Socioeconomic History    Marital status:      Spouse name: Not on file    Number of children: Not on file    Years of education: Not on file    Highest education level: Not on file   Occupational History    Not on file   Social Needs    Financial resource strain: Not on file    Food insecurity:     Worry: Not on file     Inability: Not on file    Transportation needs:     Medical: Not on file     Non-medical: Not on file   Tobacco Use    Smoking status: Never Smoker    Smokeless tobacco: Never Used   Substance and Sexual Activity    Alcohol use: Not Currently    Drug use: No    Sexual activity: Never   Lifestyle    Physical activity:     Days per week: Not on file     Minutes per session: Not on file    Stress: Not on file   Relationships    Social connections:     Talks on phone: Not on file     Gets together: Not on file     Attends Islam service: Not on file     Active member of club or organization: Not on file     Attends meetings of clubs or organizations: Not on file     Relationship status: Not on file    Intimate partner violence:     Fear of current or ex partner: Not on file     Emotionally abused: Not on file     Physically abused: Not on file     Forced sexual activity: Not on file   Other Topics Concern    Not on file   Social History Narrative    Caffeine use    Dental care regularly    Good dental hygiene    Uses safety equipment: seatbelts     Allergies   Allergen Reactions    Cephalosporins GI Intolerance    Levemir [Insulin Detemir]      Brittle diabetic  Hypersensitive to insulin  Severe hypoglycemia results if given insulin  Please speak with son, Favio Pozo, before giving ANY type of insulin   Acetaminophen     Cephalexin GI Intolerance    Clindamycin      Other reaction(s): Unspecified    Demeclocycline     Hydrocodone      Bitartrate  Other reaction(s): Unspecified    Hydrocodone-Acetaminophen Nausea Only    Oxycodone-Acetaminophen      HCL    Oxycodone-Acetaminophen     Penicillins Hives     Other reaction(s):  Other (See Comments)  ITCHING/RASH    Simvastatin      Other reaction(s): GI upset    Sulfamethoxazole-Trimethoprim     Tetrabenazine     Tetracycline Nausea Only    Tetracyclines & Related     Vicodin [Hydrocodone-Acetaminophen]             /78   Pulse 66   Temp 98 6 °F (37 °C) (Tympanic)   Ht 5' 2" (1 575 m)   Wt 59 2 kg (130 lb 8 oz)   SpO2 97%   BMI 23 87 kg/m²          Physical Exam   Constitutional: She is oriented to person, place, and time  She appears well-developed and well-nourished  No distress  HENT:   Head: Normocephalic and atraumatic  Mouth/Throat: Oropharynx is clear and moist    Eyes: Pupils are equal, round, and reactive to light  Conjunctivae and EOM are normal  No scleral icterus  Neck: Normal range of motion  Neck supple  No JVD present  Cardiovascular: Normal rate  Murmur heard  Pulmonary/Chest: Effort normal and breath sounds normal  No respiratory distress  She has no wheezes  Abdominal: Soft  Bowel sounds are normal  She exhibits no distension  There is no tenderness  Musculoskeletal: Normal range of motion  She exhibits no edema  Lymphadenopathy:     She has no cervical adenopathy  Neurological: She is alert and oriented to person, place, and time  She displays abnormal reflex  No cranial nerve deficit  Coordination abnormal    Skin: Skin is warm and dry  Capillary refill takes less than 2 seconds  She is not diaphoretic  Psychiatric: She has a normal mood and affect  Her behavior is normal  Judgment and thought content normal    Nursing note and vitals reviewed

## 2020-03-06 ENCOUNTER — TELEPHONE (OUTPATIENT)
Dept: INTERNAL MEDICINE CLINIC | Facility: CLINIC | Age: 85
End: 2020-03-06

## 2020-03-06 NOTE — TELEPHONE ENCOUNTER
Elsie aware of lab results  States her thyroid medication can't be lowered because Dr Lopez instructed her that her dose can't go lower then 75mcg because of her pitutary surgery  Asking if you wanted to talk to Dr Lopez about this or keep at the same dose?

## 2020-03-06 NOTE — TELEPHONE ENCOUNTER
----- Message from Jonathan Montilla DO sent at 3/2/2020  9:14 PM EST -----  Average sugar is in same range as prior - 11 4 She has appt scheduled with endocrine on 3/5 Check if she is going to see Dr Shadia Gerardo as scheduled  Tsh is still low range would decrease to 25mcg daily and recheck in 8 weeks

## 2020-03-06 NOTE — TELEPHONE ENCOUNTER
I was not sure if he was monitoring or not - it has been in the same range so would keep it the same as long as he is aware/monitoring

## 2020-03-12 ENCOUNTER — IN-CLINIC DEVICE VISIT (OUTPATIENT)
Dept: CARDIOLOGY CLINIC | Facility: CLINIC | Age: 85
End: 2020-03-12
Payer: COMMERCIAL

## 2020-03-12 ENCOUNTER — OFFICE VISIT (OUTPATIENT)
Dept: CARDIOLOGY CLINIC | Facility: CLINIC | Age: 85
End: 2020-03-12
Payer: COMMERCIAL

## 2020-03-12 VITALS
SYSTOLIC BLOOD PRESSURE: 120 MMHG | HEIGHT: 62 IN | WEIGHT: 132 LBS | HEART RATE: 60 BPM | BODY MASS INDEX: 24.29 KG/M2 | DIASTOLIC BLOOD PRESSURE: 80 MMHG

## 2020-03-12 DIAGNOSIS — Z86.79 H/O TACHYCARDIA-BRADYCARDIA SYNDROME: ICD-10-CM

## 2020-03-12 DIAGNOSIS — I35.0 AORTIC VALVE STENOSIS, ETIOLOGY OF CARDIAC VALVE DISEASE UNSPECIFIED: Primary | Chronic | ICD-10-CM

## 2020-03-12 DIAGNOSIS — Z95.0 CARDIAC PACEMAKER IN SITU: ICD-10-CM

## 2020-03-12 PROCEDURE — 93280 PM DEVICE PROGR EVAL DUAL: CPT | Performed by: INTERNAL MEDICINE

## 2020-03-12 PROCEDURE — 3008F BODY MASS INDEX DOCD: CPT | Performed by: INTERNAL MEDICINE

## 2020-03-12 PROCEDURE — 3074F SYST BP LT 130 MM HG: CPT | Performed by: INTERNAL MEDICINE

## 2020-03-12 PROCEDURE — 99214 OFFICE O/P EST MOD 30 MIN: CPT | Performed by: INTERNAL MEDICINE

## 2020-03-12 PROCEDURE — 1160F RVW MEDS BY RX/DR IN RCRD: CPT | Performed by: INTERNAL MEDICINE

## 2020-03-12 PROCEDURE — 1036F TOBACCO NON-USER: CPT | Performed by: INTERNAL MEDICINE

## 2020-03-12 PROCEDURE — 3079F DIAST BP 80-89 MM HG: CPT | Performed by: INTERNAL MEDICINE

## 2020-03-12 PROCEDURE — 3046F HEMOGLOBIN A1C LEVEL >9.0%: CPT | Performed by: INTERNAL MEDICINE

## 2020-03-12 PROCEDURE — 4040F PNEUMOC VAC/ADMIN/RCVD: CPT | Performed by: INTERNAL MEDICINE

## 2020-03-12 NOTE — PROGRESS NOTES
Subjective:        Patient ID: Jane Mariscal is a 80 y o  female  Chief Complaint:  Mattie Martinez is here for follow-up  She has severe aortic stenosis presumed symptomatic, has politely declined consideration of TAVR and catheterization recommended by several cardiologists including myself  She continues to feel this way  Says she feels generally well  Denies any concerning chest pains or shortness of breath only rarely gets some swelling, says she is taking Lasix about 4 times since our last visit here  I did review her last proBNP of 1500 what this means  I have again reiterated this symptoms of significant aortic stenosis in detail  The following portions of the patient's history were reviewed and updated as appropriate: allergies, current medications, past family history, past medical history, past social history, past surgical history and problem list   Review of Systems   Constitution: Negative for chills, diaphoresis, malaise/fatigue and weight gain  HENT: Negative for nosebleeds and stridor  Eyes: Negative for double vision, vision loss in left eye, vision loss in right eye and visual disturbance  Cardiovascular: Positive for dyspnea on exertion (Nonprogressive) and leg swelling ( mild intermittent)  Negative for chest pain, claudication, cyanosis, irregular heartbeat, near-syncope, orthopnea, palpitations, paroxysmal nocturnal dyspnea and syncope  Respiratory: Negative for cough, shortness of breath, snoring and wheezing  Endocrine: Negative for polydipsia, polyphagia and polyuria  Hematologic/Lymphatic: Negative for bleeding problem  Does not bruise/bleed easily  Skin: Negative for flushing and rash  Musculoskeletal: Positive for arthritis  Negative for falls and myalgias  Gastrointestinal: Negative for abdominal pain, heartburn, hematemesis, hematochezia, melena and nausea  Genitourinary: Negative for hematuria     Neurological: Negative for brief paralysis, dizziness, focal weakness, headaches, light-headedness, loss of balance and vertigo  Psychiatric/Behavioral: Negative for altered mental status and substance abuse  Allergic/Immunologic: Negative for hives  Objective:      /80   Pulse 60   Ht 5' 2" (1 575 m)   Wt 59 9 kg (132 lb)   BMI 24 14 kg/m²   Physical Exam   Constitutional: She is oriented to person, place, and time  She appears well-developed and well-nourished  HENT:   Head: Normocephalic and atraumatic  Eyes: Pupils are equal, round, and reactive to light  EOM are normal    Neck: Normal range of motion  Neck supple  No JVD present  No thyromegaly present  Cardiovascular: Normal rate, regular rhythm and intact distal pulses  Exam reveals no gallop and no friction rub  Murmur (3/6 AS quality murmur at base) heard  Pulmonary/Chest: Effort normal and breath sounds normal  No stridor  No respiratory distress  She has no wheezes  She has no rales  Abdominal: Soft  Bowel sounds are normal  She exhibits no mass  There is no tenderness  Musculoskeletal: Normal range of motion  She exhibits edema (Trace predominantly at ankles bilaterally)  Lymphadenopathy:     She has no cervical adenopathy  Neurological: She is alert and oriented to person, place, and time  Skin: Skin is warm and dry  No rash noted  No pallor  Psychiatric: She has a normal mood and affect  Her behavior is normal  Judgment and thought content normal    Nursing note and vitals reviewed        Lab Review:   Appointment on 03/02/2020   Component Date Value    Hemoglobin A1C 03/02/2020 11 4*    EAG 03/02/2020 280     Sodium 03/02/2020 134*    Potassium 03/02/2020 5 3     Chloride 03/02/2020 103     CO2 03/02/2020 27     ANION GAP 03/02/2020 4     BUN 03/02/2020 16     Creatinine 03/02/2020 0 89     Glucose 03/02/2020 399*    Calcium 03/02/2020 8 8     AST 03/02/2020 12     ALT 03/02/2020 18     Alkaline Phosphatase 03/02/2020 52     Total Protein 03/02/2020 6 1*    Albumin 03/02/2020 3 2*    Total Bilirubin 03/02/2020 0 63     eGFR 03/02/2020 59     TSH 3RD GENERATON 03/02/2020 0 020*    WBC 03/02/2020 8 04     RBC 03/02/2020 3 96     Hemoglobin 03/02/2020 11 0*    Hematocrit 03/02/2020 36 1     MCV 03/02/2020 91     MCH 03/02/2020 27 8     MCHC 03/02/2020 30 5*    RDW 03/02/2020 14 4     MPV 03/02/2020 11 1     Platelets 06/67/6572 257     nRBC 03/02/2020 0     Neutrophils Relative 03/02/2020 77*    Immat GRANS % 03/02/2020 0     Lymphocytes Relative 03/02/2020 15     Monocytes Relative 03/02/2020 7     Eosinophils Relative 03/02/2020 1     Basophils Relative 03/02/2020 0     Neutrophils Absolute 03/02/2020 6 16     Immature Grans Absolute 03/02/2020 0 02     Lymphocytes Absolute 03/02/2020 1 21     Monocytes Absolute 03/02/2020 0 54     Eosinophils Absolute 03/02/2020 0 08     Basophils Absolute 03/02/2020 0 03     LDL Direct 03/02/2020 83      No results found  Assessment:       1  Aortic valve stenosis, etiology of cardiac valve disease unspecified     2  H/O tachycardia-bradycardia syndrome s/p PPM          Plan:       Mehreen Peña remains disinterested in pursuing TAVR  I do believe she has symptomatic severe aortic stenosis, likely relatively intermittently symptom-free due to lack of significant activity  Preliminarily her pacemaker was checked today and found to be functioning appropriately  Full report to follow  I suggested she take Lasix any time she develops any lower extremity edema perhaps at least twice a week or so to keep her out of heart failure  Metoprolol dose is optimal I recommended no changes here, she will continue with baby dose aspirin  She will call sooner than her 2 month scheduled follow-up visit if she reconsiders recommended catheterization and TAVR  I did reiterate the chance of severe heart failure, severe angina, and sudden death with the patient

## 2020-03-12 NOTE — PATIENT INSTRUCTIONS
Aortic Stenosis   WHAT YOU NEED TO KNOW:   What is aortic stenosis? Aortic stenosis is a condition where the aortic valve in your heart is narrowed  The aortic valve is between the left ventricle and the aorta  The left ventricle is the lower left chamber of your heart  The aorta is a blood vessel that pumps blood to your head and body  The aortic valve opens and closes to direct blood flow through your heart  When the aortic valve is narrowed, blood flow may decrease  Your tissues and organs will not have enough oxygen and nutrients to function properly  What causes aortic stenosis? · Calcium buildup:  As you age, calcium can build up on the aortic valve walls  The calcium stiffens and thickens the valve  · Congenital heart defect:  Some people are born with a damaged aortic valve that leads to narrowing and blockage  · Rheumatic fever: This is fever and inflammation of your joints  It can develop after you have a strep throat infection  Rheumatic fever can cause inflammation and damage to your aortic valve  The walls of your aortic valve may narrow, and may even join together  What are the signs and symptoms of aortic stenosis? · Chest pain or tightness    · Fast, jumpy, or fluttery heartbeat    · Shortness of breath during activity or when you lie down    · Severe tiredness    · Dizziness or feeling faint  How is aortic stenosis diagnosed? Your healthcare provider will ask about your signs and symptoms and listen to your heart  He will ask if you have had strep throat or rheumatic fever in the past  You may need any of the following tests:  · Blood tests: You may need blood taken to give caregivers information about how your body is working  The blood may be taken from your hand, arm, or IV  · Chest x-ray: This is used to check the size of your heart and look for fluid around your heart and lungs  · EKG: This test records the electrical activity of your heart   It is used to check for abnormal heart rhythm caused by aortic stenosis  · An echocardiogram  is a type of ultrasound  Sound waves are used to show the structure and function of your heart  · A stress test  may show the changes that take place in your heart while it is under stress  Stress may be placed on your heart with exercise or medicine  Ask for more information about this test     · Cardiac catheterization: This procedure is done to find and treat heart blockages  A thin, bendable tube is inserted into your arm, neck, or groin and moved into your heart  An x-ray may be used to guide the tube to the right place  Dye may be put into your vein so the pictures show up better on a monitor  Tell the healthcare provider if you have ever had an allergic reaction to contrast dye  How is aortic stenosis treated? · Medicines: You may have the following medicines to improve your symptoms or prevent problems caused by aortic stenosis:    ¨ Cholesterol medicine: This medicine will help decrease the amount of cholesterol in your blood  ¨ Antibiotics: This medicine will help fight or prevent an infection  You may need it if you had rheumatic fever in the past  You may need to take the medicine every day, or once a month  · Procedures:      ¨ Valvotomy: This helps widen your aortic valve and allow blood to flow through easier  A catheter (long thin tube) with a balloon on the tip is inserted through a small incision in your arm or groin  The catheter is guided through a blood vessel and into your left atrium near your aortic valve  When the balloon is inflated, it stretches the valve opening  ¨ Valvuloplasty:  Healthcare providers make an incision in your chest to repair and widen your aortic valve  The valve walls are  or calcium buildup is removed  This helps improve the blood flow through your heart      ¨ Replacement:  Healthcare Providers make an incision in your chest to replace your damaged aortic valve  Part or all of your aortic valve is removed, and a new valve is secured in place  The new valve may be from a donor (another person or animal), or may be a manmade valve  What are the risks of aortic stenosis? Aortic stenosis may cause endocarditis  Endocarditis is an infection of the inner lining of the heart  Aortic stenosis can also cause congestive heart failure (CHF)  This is when the heart cannot pump enough blood for the body  This may cause irregular heartbeats and can lead to cardiac arrest (the heart stops beating)  This can be life-threatening  How can I manage my symptoms? · Eat a variety of healthy foods:  Healthy foods include fruits, vegetables, whole-grain breads, low-fat dairy products, beans, lean meats, and fish  Ask if you need to be on a special diet  · Exercise: This will improve your heart health  Ask your healthcare provider about the best exercise plan for you  · Maintain a healthy weight:  Ask your healthcare provider how much you should weigh  Ask him to help you create a weight loss plan if you are overweight  When should I contact my healthcare provider? · You are bleeding from your nose or gums  · The veins in your neck look swollen or are bulging  · You have a fever  · You have blood in your urine or bowel movements  · You have questions or concerns about your condition or care  When should I seek immediate care or call 911? · Your arm or leg feels warm, tender, and painful  It may look swollen and red  · Your heart is beating faster than normal for you, and you feel fluttering in your chest     · You suddenly feel lightheaded and short of breath  · You have chest pain that feels like squeezing, pressure, fullness, or pain  · You have chest pain that lasts for more than a few minutes or returns  · You are nauseated and have trouble breathing  · You have a severe headache, cold sweats, and feel lightheaded or dizzy      · You have weakness or numbness on one side of your arm, leg, or face  · You are confused and cannot speak clearly  CARE AGREEMENT:   You have the right to help plan your care  Learn about your health condition and how it may be treated  Discuss treatment options with your caregivers to decide what care you want to receive  You always have the right to refuse treatment  The above information is an  only  It is not intended as medical advice for individual conditions or treatments  Talk to your doctor, nurse or pharmacist before following any medical regimen to see if it is safe and effective for you  © 2017 [facility] Information is for End User's use only and may not be sold, redistributed or otherwise used for commercial purposes  All illustrations and images included in CareNotes® are the copyrighted property of A D A M , Inc  or Javon Covarrubias

## 2020-03-12 NOTE — PROGRESS NOTES
St yony dual chamber pacemaker  No alerts  No episodes  10 plus years on battery        Current Outpatient Medications:     aspirin 81 MG tablet, Take 1 tablet by mouth daily, Disp: , Rfl:     Blood Glucose Monitoring Suppl (ONE TOUCH ULTRA MINI) w/Device KIT, USE TO TEST 4 TIMES DAILY, Disp: , Rfl: 0    ergocalciferol (VITAMIN D2) 50,000 units, Take 1 capsule (50,000 Units total) by mouth once a week, Disp: 4 capsule, Rfl: 5    esomeprazole (NexIUM) 20 mg capsule, Take 20 mg by mouth every morning before breakfast, Disp: , Rfl:     furosemide (LASIX) 20 mg tablet, Take twice a week as needed for edema/swelling, Disp: 10 tablet, Rfl: 5    glucose blood (CONTOUR NEXT TEST) test strip, Use as instructed, Disp: 360 each, Rfl: 5    levothyroxine 50 mcg tablet, Take 1 tablet (50 mcg total) by mouth daily in the early morning, Disp: 30 tablet, Rfl: 0    levothyroxine 75 mcg tablet, Take 1 tablet (75 mcg total) by mouth daily in the early morning, Disp: 90 tablet, Rfl: 0    metFORMIN (FORTAMET) 500 MG (OSM) 24 hr tablet, Take 1 tablet (500 mg total) by mouth 2 (two) times a day with meals for 90 days, Disp: 180 tablet, Rfl: 3    metFORMIN (GLUCOPHAGE) 500 mg tablet, TAKE 1 TABLET TWICE DAILY WITH MEALS FOR 90 DAYS, Disp: 180 tablet, Rfl: 0    metoprolol tartrate (LOPRESSOR) 25 mg tablet, Take 25 mg by mouth 3 (three) times a day, Disp: , Rfl:     ONE TOUCH ULTRA TEST test strip, 4 (four) times a day Test, Disp: , Rfl: 0    ONETOUCH DELICA LANCETS FINE MISC, 4 (four) times a day Test, Disp: , Rfl: 0    oxybutynin (DITROPAN-XL) 10 MG 24 hr tablet, TAKE ONE TABLET BY MOUTH TWICE A DAY , Disp: 180 tablet, Rfl: 0    pravastatin (PRAVACHOL) 20 mg tablet, Take 1 tablet (20 mg total) by mouth daily, Disp: 90 tablet, Rfl: 0    predniSONE 5 mg tablet, Take 1 tablet (5 mg total) by mouth daily, Disp: 90 tablet, Rfl: 3    repaglinide (PRANDIN) 2 mg tablet, Take 2 tablets (4 mg total) by mouth 3 (three) times a day before meals for 90 days, Disp: 540 tablet, Rfl: 0

## 2020-03-16 DIAGNOSIS — I35.0 AORTIC VALVE STENOSIS, ETIOLOGY OF CARDIAC VALVE DISEASE UNSPECIFIED: ICD-10-CM

## 2020-03-16 DIAGNOSIS — I50.32 CHRONIC DIASTOLIC CONGESTIVE HEART FAILURE (HCC): ICD-10-CM

## 2020-03-16 RX ORDER — FUROSEMIDE 20 MG/1
TABLET ORAL
Qty: 30 TABLET | Refills: 5 | Status: SHIPPED | OUTPATIENT
Start: 2020-03-16 | End: 2020-06-17

## 2020-04-21 DIAGNOSIS — L60.9 FINGERNAIL ABNORMALITIES: Primary | ICD-10-CM

## 2020-05-19 DIAGNOSIS — E55.9 VITAMIN D DEFICIENCY: ICD-10-CM

## 2020-05-19 RX ORDER — ERGOCALCIFEROL 1.25 MG/1
CAPSULE ORAL
Qty: 12 CAPSULE | Refills: 0 | Status: SHIPPED | OUTPATIENT
Start: 2020-05-19 | End: 2020-07-14

## 2020-06-12 ENCOUNTER — REMOTE DEVICE CLINIC VISIT (OUTPATIENT)
Dept: CARDIOLOGY CLINIC | Facility: CLINIC | Age: 85
End: 2020-06-12
Payer: COMMERCIAL

## 2020-06-12 DIAGNOSIS — Z45.010 ENCOUNTER FOR CHECKING AND TESTING OF CARDIAC PACEMAKER PULSE GENERATOR (BATTERY): ICD-10-CM

## 2020-06-12 DIAGNOSIS — I49.5 SICK SINUS SYNDROME (HCC): Primary | ICD-10-CM

## 2020-06-12 PROCEDURE — 93296 REM INTERROG EVL PM/IDS: CPT | Performed by: INTERNAL MEDICINE

## 2020-06-12 PROCEDURE — 93294 REM INTERROG EVL PM/LDLS PM: CPT | Performed by: INTERNAL MEDICINE

## 2020-06-17 ENCOUNTER — OFFICE VISIT (OUTPATIENT)
Dept: CARDIOLOGY CLINIC | Facility: CLINIC | Age: 85
End: 2020-06-17
Payer: COMMERCIAL

## 2020-06-17 VITALS
WEIGHT: 130 LBS | SYSTOLIC BLOOD PRESSURE: 120 MMHG | HEIGHT: 62 IN | DIASTOLIC BLOOD PRESSURE: 62 MMHG | BODY MASS INDEX: 23.92 KG/M2 | HEART RATE: 60 BPM

## 2020-06-17 DIAGNOSIS — I35.0 AORTIC VALVE STENOSIS, ETIOLOGY OF CARDIAC VALVE DISEASE UNSPECIFIED: ICD-10-CM

## 2020-06-17 DIAGNOSIS — Z86.79 H/O TACHYCARDIA-BRADYCARDIA SYNDROME: Primary | ICD-10-CM

## 2020-06-17 DIAGNOSIS — I50.32 CHRONIC DIASTOLIC CONGESTIVE HEART FAILURE (HCC): ICD-10-CM

## 2020-06-17 PROCEDURE — 3046F HEMOGLOBIN A1C LEVEL >9.0%: CPT | Performed by: INTERNAL MEDICINE

## 2020-06-17 PROCEDURE — 3074F SYST BP LT 130 MM HG: CPT | Performed by: INTERNAL MEDICINE

## 2020-06-17 PROCEDURE — 99214 OFFICE O/P EST MOD 30 MIN: CPT | Performed by: INTERNAL MEDICINE

## 2020-06-17 PROCEDURE — 1160F RVW MEDS BY RX/DR IN RCRD: CPT | Performed by: INTERNAL MEDICINE

## 2020-06-17 PROCEDURE — 1036F TOBACCO NON-USER: CPT | Performed by: INTERNAL MEDICINE

## 2020-06-17 PROCEDURE — 3078F DIAST BP <80 MM HG: CPT | Performed by: INTERNAL MEDICINE

## 2020-06-17 PROCEDURE — 3008F BODY MASS INDEX DOCD: CPT | Performed by: INTERNAL MEDICINE

## 2020-06-17 PROCEDURE — 4040F PNEUMOC VAC/ADMIN/RCVD: CPT | Performed by: INTERNAL MEDICINE

## 2020-06-17 RX ORDER — FUROSEMIDE 20 MG/1
TABLET ORAL
Qty: 30 TABLET | Refills: 5
Start: 2020-06-17 | End: 2020-06-19 | Stop reason: SDUPTHER

## 2020-06-18 ENCOUNTER — APPOINTMENT (OUTPATIENT)
Dept: LAB | Facility: MEDICAL CENTER | Age: 85
End: 2020-06-18
Payer: COMMERCIAL

## 2020-06-18 ENCOUNTER — TRANSCRIBE ORDERS (OUTPATIENT)
Dept: ADMINISTRATIVE | Facility: HOSPITAL | Age: 85
End: 2020-06-18

## 2020-06-18 DIAGNOSIS — IMO0001 UNCONTROLLED TYPE 2 DIABETES MELLITUS WITHOUT COMPLICATION, WITHOUT LONG-TERM CURRENT USE OF INSULIN: ICD-10-CM

## 2020-06-18 DIAGNOSIS — E23.0 HYPOPITUITARISM (HCC): Primary | ICD-10-CM

## 2020-06-18 DIAGNOSIS — E23.0 HYPOPITUITARISM (HCC): ICD-10-CM

## 2020-06-18 LAB
EST. AVERAGE GLUCOSE BLD GHB EST-MCNC: 278 MG/DL
HBA1C MFR BLD: 11.3 %
T4 FREE SERPL-MCNC: 1.22 NG/DL (ref 0.76–1.46)

## 2020-06-18 PROCEDURE — 36415 COLL VENOUS BLD VENIPUNCTURE: CPT

## 2020-06-18 PROCEDURE — 83036 HEMOGLOBIN GLYCOSYLATED A1C: CPT

## 2020-06-18 PROCEDURE — 84439 ASSAY OF FREE THYROXINE: CPT

## 2020-06-19 DIAGNOSIS — I35.0 AORTIC VALVE STENOSIS, ETIOLOGY OF CARDIAC VALVE DISEASE UNSPECIFIED: ICD-10-CM

## 2020-06-19 DIAGNOSIS — I50.32 CHRONIC DIASTOLIC CONGESTIVE HEART FAILURE (HCC): ICD-10-CM

## 2020-06-19 RX ORDER — FUROSEMIDE 20 MG/1
TABLET ORAL
Qty: 90 TABLET | Refills: 3 | Status: SHIPPED | OUTPATIENT
Start: 2020-06-19 | End: 2021-06-15 | Stop reason: HOSPADM

## 2020-07-10 ENCOUNTER — OFFICE VISIT (OUTPATIENT)
Dept: INTERNAL MEDICINE CLINIC | Facility: CLINIC | Age: 85
End: 2020-07-10
Payer: COMMERCIAL

## 2020-07-10 VITALS
DIASTOLIC BLOOD PRESSURE: 70 MMHG | WEIGHT: 129 LBS | OXYGEN SATURATION: 96 % | HEIGHT: 62 IN | TEMPERATURE: 99.1 F | BODY MASS INDEX: 23.74 KG/M2 | HEART RATE: 60 BPM | SYSTOLIC BLOOD PRESSURE: 124 MMHG

## 2020-07-10 DIAGNOSIS — Z79.4 TYPE 2 DIABETES MELLITUS WITH HYPERGLYCEMIA, WITH LONG-TERM CURRENT USE OF INSULIN (HCC): ICD-10-CM

## 2020-07-10 DIAGNOSIS — B35.1 ONYCHOMYCOSIS: ICD-10-CM

## 2020-07-10 DIAGNOSIS — L60.9 FINGERNAIL ABNORMALITIES: ICD-10-CM

## 2020-07-10 DIAGNOSIS — E11.65 TYPE 2 DIABETES MELLITUS WITH HYPERGLYCEMIA, WITH LONG-TERM CURRENT USE OF INSULIN (HCC): ICD-10-CM

## 2020-07-10 DIAGNOSIS — Z00.00 MEDICARE ANNUAL WELLNESS VISIT, SUBSEQUENT: Primary | ICD-10-CM

## 2020-07-10 PROCEDURE — G0439 PPPS, SUBSEQ VISIT: HCPCS | Performed by: INTERNAL MEDICINE

## 2020-07-10 PROCEDURE — 3074F SYST BP LT 130 MM HG: CPT | Performed by: INTERNAL MEDICINE

## 2020-07-10 PROCEDURE — 3008F BODY MASS INDEX DOCD: CPT | Performed by: INTERNAL MEDICINE

## 2020-07-10 PROCEDURE — 1036F TOBACCO NON-USER: CPT | Performed by: INTERNAL MEDICINE

## 2020-07-10 PROCEDURE — 1125F AMNT PAIN NOTED PAIN PRSNT: CPT | Performed by: INTERNAL MEDICINE

## 2020-07-10 PROCEDURE — 1170F FXNL STATUS ASSESSED: CPT | Performed by: INTERNAL MEDICINE

## 2020-07-10 PROCEDURE — 3046F HEMOGLOBIN A1C LEVEL >9.0%: CPT | Performed by: INTERNAL MEDICINE

## 2020-07-10 PROCEDURE — 3078F DIAST BP <80 MM HG: CPT | Performed by: INTERNAL MEDICINE

## 2020-07-10 PROCEDURE — 1160F RVW MEDS BY RX/DR IN RCRD: CPT | Performed by: INTERNAL MEDICINE

## 2020-07-10 PROCEDURE — 4040F PNEUMOC VAC/ADMIN/RCVD: CPT | Performed by: INTERNAL MEDICINE

## 2020-07-10 NOTE — PROGRESS NOTES
Assessment and Plan:     Problem List Items Addressed This Visit        Other    Medicare annual wellness visit, subsequent - Primary      Other Visit Diagnoses     Type 2 diabetes mellitus with hyperglycemia, with long-term current use of insulin (HCC)        Relevant Orders    HEMOGLOBIN A1C W/ EAG ESTIMATION    Fingernail abnormalities        Onychomycosis        Relevant Medications    ciclopirox (PENLAC) 8 % solution      Reviewed recent labs  Does not want valve surgery and stable at present   Flu shot in fall  Rto 4 months         Preventive health issues were discussed with patient, and age appropriate screening tests were ordered as noted in patient's After Visit Summary  Personalized health advice and appropriate referrals for health education or preventive services given if needed, as noted in patient's After Visit Summary       History of Present Illness:     Patient presents for Medicare Annual Wellness visit    Patient Care Team:  Darcie Christopher DO as PCP - MD Darcie Robert DO Daina Commodore, MD Hassell Raisin, MD (Ophthalmology)  Benita Owens MD (Endocrinology)     Problem List:     Patient Active Problem List   Diagnosis    Uncontrolled type 2 diabetes mellitus without complication, without long-term current use of insulin    Hyperlipidemia    Hypertension    Hypothyroidism    History of CVA (cerebrovascular accident)    CRISTAL (obstructive sleep apnea)    Chronic diastolic congestive heart failure ([address] )    Aortic valve stenosis    Hiatal hernia    Thyroid nodule    Anemia    Bilateral hearing loss    Chronic bilateral low back pain without sciatica    Depression    Diabetic neuropathy (HCC)    Esophageal dysmotility    Esophageal spasm    Generalized osteoarthritis of multiple sites    Vitamin D deficiency    H/O tachycardia-bradycardia syndrome s/p PPM    Ambulatory dysfunction    CAD (coronary artery disease)    Adrenal insufficiency (Rafal's disease) ([place] [address] )    Slow transit constipation    Medicare annual wellness visit, subsequent    OAB (overactive bladder)      Past Medical and Surgical History:     Past Medical History:   Diagnosis Date    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[place] [address] )     Bradycardia     Coronary artery disease     Depression     Depression     Diabetes mellitus ([place] [address] )     Type 2 DM    Disease of thyroid gland     Essential (primary) hypertension     Facial droop     started 10 years ago    GERD (gastroesophageal reflux disease)     Heart murmur     History of echocardiogram 08/28/2017    2-D w/CFD:  EF 0 60 (60%), LVSF is normal  No regional wall abnormalities  Mild mitral valve regurg  AV was trileaflet  Moderate tricuspid regurg  Trace pulmonic valve  No pericardial effusion   History of echocardiogram 03/28/2018    2-D w/CFD:  EF 0 60 (60%), Mild regurg in the MV  AV was trileaflet, severe stenosis, trace regurg  Mild regurg in the TV  No pericardial effusion  Aortic root exhibited normal size   History of echocardiogram 01/29/2016    2-D:  EF 0 55 (55%), Normal LVSF  Mild concentric LVH  Mild MR  Mild AS with mild regurg      History of Holter monitoring     2/24/16, 9/27/17, 2/6/18    History of nuclear stress test 08/16/2017    Lexiscan MPI:  EF 0 76 (76%), no prior MI or ischemia,    Hyperlipidemia     Hypothyroidism     Nonrheumatic aortic (valve) stenosis     Obstructive sleep apnea (adult) (pediatric)     10/17/17 - NPSG with nasal CPAP/Bi-level titration     Osteoarthritis     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HCC)     [facility]     SVT (supraventricular tachycardia) ([place] [address] )     Vitamin D deficiency      Past Surgical History:   Procedure Laterality Date    ANKLE SURGERY Left 2005   [facility]    CATARACT EXTRACTION Bilateral 09/1995    HEMORRHOID SURGERY  1978    HERNIA REPAIR  1990    INSERT / Joselyn Nap / REMOVE PACEMAKER  05/14/2018    dual lead pacer st yony model 2272    JOINT REPLACEMENT      b/l hip replacements, right 2006    NEUROPLASTY / TRANSPOSITION MEDIAN NERVE AT CARPAL TUNNEL  1977    PARTIAL HYSTERECTOMY  1971    TOTAL HIP ARTHROPLASTY      TRANSPHENOIDAL / TRANSNASAL HYPOPHYSECTOMY / RESECTION PITUITARY TUMOR  1989    excision of pituitary gland      Family History:     Family History   Problem Relation Age of Onset    Other Father         coal worker's pneumoconiosis    Heart disease Father     Diabetes Brother     Heart disease Brother     Prostate cancer Brother     Stomach cancer Maternal Grandmother     Diabetes Paternal Aunt       Social History:     E-Cigarette/Vaping    E-Cigarette Use Never User      E-Cigarette/Vaping Substances    Nicotine No     THC No     CBD No     Flavoring No     Other No     Unknown No      Social History     Socioeconomic History    Marital status:      Spouse name: None    Number of children: None    Years of education: None    Highest education level: None   Occupational History    None   Social Needs    Financial resource strain: None    Food insecurity:     Worry: None     Inability: None    Transportation needs:     Medical: None     Non-medical: None   Tobacco Use    Smoking status: Never Smoker    Smokeless tobacco: Never Used   Substance and Sexual Activity    Alcohol use: Not Currently    Drug use: No    Sexual activity: Never   Lifestyle    Physical activity:     Days per week: None     Minutes per session: None    Stress: None   Relationships    Social connections:     Talks on phone: None     Gets together: None     Attends Jainism service: None     Active member of club or organization: None     Attends meetings of clubs or organizations: None     Relationship status: None    Intimate partner violence:     Fear of current or ex partner: None     Emotionally abused: None     Physically abused: None     Forced sexual activity: None   Other Topics Concern    None   Social History Narrative    Caffeine use    Dental care regularly    Good dental hygiene    Uses safety equipment: seatbelts      Medications and Allergies:     Current Outpatient Medications   Medication Sig Dispense Refill    aspirin 81 MG tablet Take 1 tablet by mouth daily      Blood Glucose Monitoring Suppl (ONE TOUCH ULTRA MINI) w/Device KIT USE TO TEST 4 TIMES DAILY  0    ergocalciferol (VITAMIN D2) 50,000 units Take 1 capsule (50,000 Units total) by mouth once a week for 90 days 12 capsule 0    esomeprazole (NexIUM) 20 mg capsule Take 20 mg by mouth every morning before breakfast      furosemide (LASIX) 20 mg tablet Take one every other day 90 tablet 3    glucose blood (CONTOUR NEXT TEST) test strip Use as instructed 360 each 5    levothyroxine 75 mcg tablet Take 1 tablet (75 mcg total) by mouth daily in the early morning 90 tablet 0    metoprolol tartrate (LOPRESSOR) 25 mg tablet Take 25 mg by mouth 3 (three) times a day      ONE TOUCH ULTRA TEST test strip 4 (four) times a day Test  0    ONETOUCH DELICA LANCETS FINE MISC 4 (four) times a day Test  0    oxybutynin (DITROPAN-XL) 10 MG 24 hr tablet TAKE ONE TABLET BY MOUTH TWICE A DAY   180 tablet 0    pravastatin (PRAVACHOL) 20 mg tablet Take 1 tablet (20 mg total) by mouth daily 90 tablet 0    predniSONE 5 mg tablet Take 1 tablet (5 mg total) by mouth daily 90 tablet 3    repaglinide (PRANDIN) 2 mg tablet Take 2 tablets (4 mg total) by mouth 3 (three) times a day before meals for 90 days 540 tablet 0    ciclopirox (PENLAC) 8 % solution Apply topically daily at bedtime 6 6 mL 5    levothyroxine 50 mcg tablet Take 1 tablet (50 mcg total) by mouth daily in the early morning (Patient not taking: Reported on 6/17/2020) 30 tablet 0    metFORMIN (FORTAMET) 500 MG (OSM) 24 hr tablet Take 1 tablet (500 mg total) by mouth 2 (two) times a day with meals for 90 days (Patient not taking: Reported on 6/17/2020) 180 tablet 3    metFORMIN (GLUCOPHAGE) 500 mg tablet TAKE 1 TABLET TWICE DAILY WITH MEALS FOR 90 DAYS (Patient not taking: Reported on 7/10/2020) 180 tablet 0     No current facility-administered medications for this visit  Allergies   Allergen Reactions    Cephalosporins GI Intolerance    Levemir [Insulin Detemir]      Brittle diabetic  Hypersensitive to insulin  Severe hypoglycemia results if given insulin  Please speak with son, Jeremiah Castillo, before giving ANY type of insulin   Acetaminophen     Cephalexin GI Intolerance    Clindamycin      Other reaction(s): Unspecified    Demeclocycline     Hydrocodone      Bitartrate  Other reaction(s): Unspecified    Hydrocodone-Acetaminophen Nausea Only    Oxycodone-Acetaminophen      HCL    Oxycodone-Acetaminophen     Penicillins Hives     Other reaction(s): Other (See Comments)  ITCHING/RASH    Simvastatin      Other reaction(s): GI upset    Sulfamethoxazole-Trimethoprim     Tetrabenazine     Tetracycline Nausea Only    Tetracyclines & Related     Vicodin [Hydrocodone-Acetaminophen]       Immunizations:     Immunization History   Administered Date(s) Administered    Influenza Quadrivalent Preservative Free 3 years and older IM 10/23/2015    Influenza Split High Dose Preservative Free IM 10/24/2016, 09/27/2017    Influenza TIV (IM) 01/01/2012, 09/16/2013, 09/16/2014    Influenza, high dose seasonal 0 5 mL 11/08/2018, 11/22/2019    Pneumococcal Conjugate 13-Valent 08/20/2015    Pneumococcal Polysaccharide PPV23 05/25/2011      Health Maintenance: There are no preventive care reminders to display for this patient  Topic Date Due    DTaP,Tdap,and Td Vaccines (1 - Tdap) 11/27/1944    Influenza Vaccine  07/01/2020      Medicare Health Risk Assessment:     /70   Pulse 60   Temp 99 1 °F (37 3 °C) (Tympanic)   Ht 5' 2" (1 575 m)   Wt 58 5 kg (129 lb)   SpO2 96%   BMI 23 59 kg/m²      Nanda Mcintosh is here for her Subsequent Wellness visit   Last Medicare Wellness visit information reviewed, patient interviewed, no change since last AWV  Health Risk Assessment:   Patient rates overall health as good  Patient feels that their physical health rating is same  Eyesight was rated as same  Hearing was rated as slightly worse  Patient feels that their emotional and mental health rating is same  Pain experienced in the last 7 days has been none  Patient states that she has experienced no weight loss or gain in last 6 months  Depression Screening:   PHQ-2 Score: 0  PHQ-9 Score: 0      Fall Risk Screening: In the past year, patient has experienced: history of falling in past year    Number of falls: 1  Injured during fall?: No    Feels unsteady when standing or walking?: Yes    Worried about falling?: No      Urinary Incontinence Screening:   Patient has leaked urine accidently in the last six months  Home Safety:  Patient has trouble with stairs inside or outside of their home  Patient has working smoke alarms and has working carbon monoxide detector  Home safety hazards include: none  Nutrition:   Current diet is Regular  Medications:   Patient is currently taking over-the-counter supplements  OTC medications include: see medication list  Patient is not able to manage medications  Activities of Daily Living (ADLs)/Instrumental Activities of Daily Living (IADLs):   Walk and transfer into and out of bed and chair?: Yes  Dress and groom yourself?: Yes    Bathe or shower yourself?: Yes    Feed yourself? Yes  Do your laundry/housekeeping?: No  Manage your money, pay your bills and track your expenses?: No  Make your own meals?: Yes    Do your own shopping?: No    Previous Hospitalizations:   Any hospitalizations or ED visits within the last 12 months?: No      Advance Care Planning:   Living will: Yes    Durable POA for healthcare:  Yes    Advanced directive: Yes    End of Life Decisions reviewed with patient: Yes    Provider agrees with end of life decisions: Yes      Cognitive Screening: Provider or family/friend/caregiver concerned regarding cognition?: No    PREVENTIVE SCREENINGS      Cardiovascular Screening:    General: Screening Not Indicated and History Lipid Disorder      Diabetes Screening:     General: Screening Not Indicated, History Diabetes and Screening Current      Colorectal Cancer Screening:     General: Screening Not Indicated      Breast Cancer Screening:     General: Patient Declines      Cervical Cancer Screening:    General: Screening Not Indicated      Osteoporosis Screening:    General: Patient Declines      Abdominal Aortic Aneurysm (AAA) Screening:        General: Screening Not Indicated      Lung Cancer Screening:     General: Screening Not Indicated      Hepatitis C Screening:    General: Patient Declines    Other Counseling Topics:   Calcium and vitamin D intake         Briana Saba,

## 2020-07-10 NOTE — PATIENT INSTRUCTIONS

## 2020-07-10 NOTE — PROGRESS NOTES
Diabetic Foot Exam    Patient's shoes and socks removed  Right Foot/Ankle   Right Foot Inspection  Skin Exam: skin normal and skin intact no dry skin, no warmth, no callus, no erythema, no maceration, no abnormal color, no pre-ulcer, no ulcer and no callus                          Toe Exam: ROM and strength within normal limits  Sensory       Monofilament testing: intact  Vascular    The right DP pulse is 2+  The right PT pulse is 1+  Left Foot/Ankle  Left Foot Inspection  Skin Exam: skin normal and skin intactno dry skin, no warmth, no erythema, no maceration, normal color, no pre-ulcer, no ulcer and no callus                         Toe Exam: ROM and strength within normal limits                   Sensory       Monofilament: intact  Vascular    The left DP pulse is 2+  The left PT pulse is 1+  Assign Risk Category:  No deformity present;  Loss of protective sensation; Weak pulses       Risk: 2

## 2020-07-14 DIAGNOSIS — I25.10 CORONARY ARTERY DISEASE INVOLVING NATIVE HEART WITHOUT ANGINA PECTORIS, UNSPECIFIED VESSEL OR LESION TYPE: Primary | ICD-10-CM

## 2020-07-14 DIAGNOSIS — E13.9 DIABETES 1.5, MANAGED AS TYPE 1 (HCC): ICD-10-CM

## 2020-07-14 DIAGNOSIS — R32 URINARY INCONTINENCE, UNSPECIFIED TYPE: ICD-10-CM

## 2020-07-14 DIAGNOSIS — E27.1 ADRENAL INSUFFICIENCY (ADDISON'S DISEASE) (HCC): ICD-10-CM

## 2020-07-14 DIAGNOSIS — Z79.4 TYPE 2 DIABETES MELLITUS WITH HYPERGLYCEMIA, WITH LONG-TERM CURRENT USE OF INSULIN (HCC): ICD-10-CM

## 2020-07-14 DIAGNOSIS — E55.9 VITAMIN D DEFICIENCY: ICD-10-CM

## 2020-07-14 DIAGNOSIS — E11.65 TYPE 2 DIABETES MELLITUS WITH HYPERGLYCEMIA, WITH LONG-TERM CURRENT USE OF INSULIN (HCC): ICD-10-CM

## 2020-07-14 DIAGNOSIS — E78.2 MIXED HYPERLIPIDEMIA: ICD-10-CM

## 2020-07-14 DIAGNOSIS — I10 ESSENTIAL HYPERTENSION: Primary | ICD-10-CM

## 2020-07-14 DIAGNOSIS — E03.9 ACQUIRED HYPOTHYROIDISM: ICD-10-CM

## 2020-07-14 DIAGNOSIS — E11.9 TYPE 2 DIABETES MELLITUS WITHOUT COMPLICATION, WITHOUT LONG-TERM CURRENT USE OF INSULIN (HCC): ICD-10-CM

## 2020-07-14 RX ORDER — REPAGLINIDE 2 MG/1
4 TABLET ORAL
Qty: 540 TABLET | Refills: 3 | Status: SHIPPED | OUTPATIENT
Start: 2020-07-14 | End: 2021-06-25

## 2020-07-14 RX ORDER — PREDNISONE 1 MG/1
5 TABLET ORAL DAILY
Qty: 90 TABLET | Refills: 3 | Status: SHIPPED | OUTPATIENT
Start: 2020-07-14 | End: 2021-06-25

## 2020-07-14 RX ORDER — REPAGLINIDE 2 MG/1
TABLET ORAL
Qty: 540 TABLET | Refills: 4 | Status: SHIPPED | OUTPATIENT
Start: 2020-07-14 | End: 2020-10-06 | Stop reason: SDUPTHER

## 2020-07-14 RX ORDER — LEVOTHYROXINE SODIUM 0.07 MG/1
TABLET ORAL
Qty: 90 TABLET | Refills: 4 | Status: SHIPPED | OUTPATIENT
Start: 2020-07-14 | End: 2020-10-06 | Stop reason: SDUPTHER

## 2020-07-14 RX ORDER — ERGOCALCIFEROL 1.25 MG/1
CAPSULE ORAL
Qty: 12 CAPSULE | Refills: 4 | Status: SHIPPED | OUTPATIENT
Start: 2020-07-14 | End: 2020-10-06 | Stop reason: SDUPTHER

## 2020-07-14 RX ORDER — ERGOCALCIFEROL 1.25 MG/1
50000 CAPSULE ORAL WEEKLY
Qty: 12 CAPSULE | Refills: 0 | Status: SHIPPED | OUTPATIENT
Start: 2020-07-14 | End: 2020-12-24

## 2020-07-14 RX ORDER — OXYBUTYNIN CHLORIDE 10 MG/1
10 TABLET, EXTENDED RELEASE ORAL 2 TIMES DAILY
Qty: 180 TABLET | Refills: 0 | Status: SHIPPED | OUTPATIENT
Start: 2020-07-14 | End: 2021-04-05 | Stop reason: SDUPTHER

## 2020-07-14 RX ORDER — PREDNISONE 1 MG/1
5 TABLET ORAL DAILY
Qty: 90 TABLET | Refills: 4 | Status: SHIPPED | OUTPATIENT
Start: 2020-07-14 | End: 2020-10-06 | Stop reason: SDUPTHER

## 2020-07-14 RX ORDER — LEVOTHYROXINE SODIUM 0.07 MG/1
75 TABLET ORAL
Qty: 90 TABLET | Refills: 3 | Status: SHIPPED | OUTPATIENT
Start: 2020-07-14 | End: 2021-06-25

## 2020-07-14 RX ORDER — PRAVASTATIN SODIUM 20 MG
20 TABLET ORAL DAILY
Qty: 90 TABLET | Refills: 4 | Status: SHIPPED | OUTPATIENT
Start: 2020-07-14 | End: 2020-10-06 | Stop reason: SDUPTHER

## 2020-07-14 RX ORDER — PRAVASTATIN SODIUM 20 MG
20 TABLET ORAL DAILY
Qty: 90 TABLET | Refills: 0 | Status: SHIPPED | OUTPATIENT
Start: 2020-07-14 | End: 2020-10-19

## 2020-08-12 ENCOUNTER — TELEPHONE (OUTPATIENT)
Dept: INTERNAL MEDICINE CLINIC | Facility: CLINIC | Age: 85
End: 2020-08-12

## 2020-08-12 DIAGNOSIS — K59.00 CONSTIPATION, UNSPECIFIED CONSTIPATION TYPE: Primary | ICD-10-CM

## 2020-08-12 RX ORDER — LACTULOSE 20 G/30ML
10 SOLUTION ORAL 2 TIMES DAILY
Qty: 300 ML | Refills: 0 | Status: SHIPPED | OUTPATIENT
Start: 2020-08-12 | End: 2021-06-15 | Stop reason: HOSPADM

## 2020-08-12 NOTE — TELEPHONE ENCOUNTER
Jenn Velasco states that doesn't work for her, asking for something stronger  Cephalosporins  High  GI Intolerance     Levemir [insulin Detemir]  High   Brittle diabetic  Hypersensitive to insulin  Severe hypoglycemia results if given insulin  Please speak with son, Bj Asencio, before giving ANY type of insulin  Acetaminophen  Not Specified      Cephalexin  Not Specified  GI Intolerance     Clindamycin  Not Specified   Other reaction(s): Unspecified    Demeclocycline  Not Specified      Hydrocodone  Not Specified   Bitartrate Other reaction(s): Unspecified    Hydrocodone-acetaminophen  Not Specified  Nausea Only     Oxycodone-acetaminophen  Not Specified   HCL    Oxycodone-acetaminophen  Not Specified      Penicillins  Not Specified  Hives  Other reaction(s):  Other (See Comments) ITCHING/RASH    Simvastatin  Not Specified   Other reaction(s): GI upset    Sulfamethoxazole-trimethoprim  Not Specified      Tetrabenazine  Not Specified      Tetracycline  Not Specified  Nausea Only     Tetracyclines & Related  Not Specified      Vicodin [hydrocodone-acetaminophen]

## 2020-08-12 NOTE — TELEPHONE ENCOUNTER
Patient had diarrhea for a week the past week, now is constipated  Asking for something to take, states ducolax,enema and isn't helping  Asking for the med that you take prior to a colonoscopy, worried about her getting impacted

## 2020-09-15 ENCOUNTER — REMOTE DEVICE CLINIC VISIT (OUTPATIENT)
Dept: CARDIOLOGY CLINIC | Facility: CLINIC | Age: 85
End: 2020-09-15
Payer: COMMERCIAL

## 2020-09-15 DIAGNOSIS — Z45.010 ENCOUNTER FOR CHECKING AND TESTING OF CARDIAC PACEMAKER PULSE GENERATOR (BATTERY): ICD-10-CM

## 2020-09-15 DIAGNOSIS — I49.5 SICK SINUS SYNDROME (HCC): Primary | ICD-10-CM

## 2020-09-15 PROCEDURE — 93294 REM INTERROG EVL PM/LDLS PM: CPT | Performed by: INTERNAL MEDICINE

## 2020-09-15 PROCEDURE — 93296 REM INTERROG EVL PM/IDS: CPT | Performed by: INTERNAL MEDICINE

## 2020-09-24 NOTE — PROGRESS NOTES
Merlin remote check pacer  1 VHR 9 seconds  Normal battery function        Current Outpatient Medications:     aspirin 81 MG tablet, Take 1 tablet by mouth daily, Disp: , Rfl:     Blood Glucose Monitoring Suppl (ONE TOUCH ULTRA MINI) w/Device KIT, USE TO TEST 4 TIMES DAILY, Disp: , Rfl: 0    ciclopirox (PENLAC) 8 % solution, Apply topically daily at bedtime, Disp: 6 6 mL, Rfl: 5    ergocalciferol (VITAMIN D2) 50,000 units, Take 1 capsule (50,000 Units total) by mouth once a week, Disp: 12 capsule, Rfl: 0    ergocalciferol (VITAMIN D2) 50,000 units, Take 1 capsule (50,000 Units total) by mouth once a week, Disp: 12 capsule, Rfl: 4    esomeprazole (NexIUM) 20 mg capsule, Take 1 capsule (20 mg total) by mouth daily in the early morning, Disp: 90 capsule, Rfl: 3    furosemide (LASIX) 20 mg tablet, Take one every other day, Disp: 90 tablet, Rfl: 3    glucose blood (CONTOUR NEXT TEST) test strip, Use as instructed, Disp: 360 each, Rfl: 5    lactulose 20 g/30 mL, Take 15 mL (10 g total) by mouth 2 (two) times a day, Disp: 300 mL, Rfl: 0    levothyroxine 50 mcg tablet, Take 1 tablet (50 mcg total) by mouth daily in the early morning (Patient not taking: Reported on 6/17/2020), Disp: 30 tablet, Rfl: 0    levothyroxine 75 mcg tablet, Take 1 tablet (75 mcg total) by mouth daily in the early morning, Disp: 90 tablet, Rfl: 3    levothyroxine 75 mcg tablet, Take 1 tablet (75 mcg total) by mouth daily in the early morning, Disp: 90 tablet, Rfl: 4    metFORMIN (FORTAMET) 500 MG (OSM) 24 hr tablet, Take 1 tablet (500 mg total) by mouth 2 (two) times a day with meals for 90 days (Patient not taking: Reported on 6/17/2020), Disp: 180 tablet, Rfl: 3    metFORMIN (GLUCOPHAGE) 500 mg tablet, Take 1 tablet (500 mg total) by mouth 2 (two) times a day with meals, Disp: 180 tablet, Rfl: 3    metFORMIN (GLUCOPHAGE) 500 mg tablet, TAKE ONE (1) TABLET BY MOUTH TWICE A DAY, Disp: 180 tablet, Rfl: 4    metoprolol tartrate (LOPRESSOR) 25 mg tablet, Take 1 tablet (25 mg total) by mouth 3 (three) times a day, Disp: 270 tablet, Rfl: 3    metoprolol tartrate (LOPRESSOR) 25 mg tablet, TAKE ONE AND A HALF TABLETS BY MOUTH EVERY 12 HOURS, Disp: 270 tablet, Rfl: 4    ONE TOUCH ULTRA TEST test strip, 4 (four) times a day Test, Disp: , Rfl: 0    ONETOUCH DELICA LANCETS FINE MISC, 4 (four) times a day Test, Disp: , Rfl: 0    oxybutynin (DITROPAN-XL) 10 MG 24 hr tablet, Take 1 tablet (10 mg total) by mouth 2 (two) times a day, Disp: 180 tablet, Rfl: 0    pravastatin (PRAVACHOL) 20 mg tablet, Take 1 tablet (20 mg total) by mouth daily, Disp: 90 tablet, Rfl: 0    pravastatin (PRAVACHOL) 20 mg tablet, Take 1 tablet (20 mg total) by mouth daily, Disp: 90 tablet, Rfl: 4    predniSONE 5 mg tablet, Take 1 tablet (5 mg total) by mouth daily, Disp: 90 tablet, Rfl: 3    predniSONE 5 mg tablet, Take 1 tablet (5 mg total) by mouth daily, Disp: 90 tablet, Rfl: 4    repaglinide (PRANDIN) 2 mg tablet, Take 2 tablets (4 mg total) by mouth 3 (three) times a day before meals, Disp: 540 tablet, Rfl: 3    repaglinide (PRANDIN) 2 mg tablet, TAKE 2 TABLETS BY MOUTH 3 TIMES A DAY BEFORE MEALS, Disp: 540 tablet, Rfl: 4

## 2020-10-06 ENCOUNTER — TELEPHONE (OUTPATIENT)
Dept: INTERNAL MEDICINE CLINIC | Facility: CLINIC | Age: 85
End: 2020-10-06

## 2020-10-06 ENCOUNTER — OFFICE VISIT (OUTPATIENT)
Dept: INTERNAL MEDICINE CLINIC | Facility: CLINIC | Age: 85
End: 2020-10-06
Payer: COMMERCIAL

## 2020-10-06 VITALS
SYSTOLIC BLOOD PRESSURE: 124 MMHG | WEIGHT: 132 LBS | DIASTOLIC BLOOD PRESSURE: 78 MMHG | HEART RATE: 66 BPM | HEIGHT: 62 IN | OXYGEN SATURATION: 90 % | BODY MASS INDEX: 24.29 KG/M2 | TEMPERATURE: 97.2 F

## 2020-10-06 DIAGNOSIS — R26.2 AMBULATORY DYSFUNCTION: ICD-10-CM

## 2020-10-06 DIAGNOSIS — M79.644 PAIN OF RIGHT THUMB: Primary | ICD-10-CM

## 2020-10-06 PROCEDURE — 99213 OFFICE O/P EST LOW 20 MIN: CPT | Performed by: INTERNAL MEDICINE

## 2020-10-06 PROCEDURE — 1160F RVW MEDS BY RX/DR IN RCRD: CPT | Performed by: INTERNAL MEDICINE

## 2020-10-06 PROCEDURE — 1036F TOBACCO NON-USER: CPT | Performed by: INTERNAL MEDICINE

## 2020-10-06 RX ORDER — SULFAMETHOXAZOLE AND TRIMETHOPRIM 800; 160 MG/1; MG/1
160 TABLET ORAL
COMMUNITY
Start: 2020-10-03 | End: 2020-10-08

## 2020-10-19 DIAGNOSIS — E78.2 MIXED HYPERLIPIDEMIA: ICD-10-CM

## 2020-10-19 RX ORDER — PRAVASTATIN SODIUM 20 MG
20 TABLET ORAL DAILY
Qty: 90 TABLET | Refills: 0 | Status: SHIPPED | OUTPATIENT
Start: 2020-10-19 | End: 2020-12-24

## 2020-10-22 ENCOUNTER — TRANSCRIBE ORDERS (OUTPATIENT)
Dept: LAB | Facility: MEDICAL CENTER | Age: 85
End: 2020-10-22

## 2020-10-28 ENCOUNTER — HOSPITAL ENCOUNTER (EMERGENCY)
Facility: HOSPITAL | Age: 85
Discharge: HOME | End: 2020-10-28
Attending: EMERGENCY MEDICINE | Admitting: EMERGENCY MEDICINE
Payer: COMMERCIAL

## 2020-10-28 ENCOUNTER — TELEPHONE (OUTPATIENT)
Dept: INTERNAL MEDICINE CLINIC | Facility: CLINIC | Age: 85
End: 2020-10-28

## 2020-10-28 ENCOUNTER — APPOINTMENT (EMERGENCY)
Dept: RADIOLOGY | Facility: HOSPITAL | Age: 85
End: 2020-10-28
Payer: COMMERCIAL

## 2020-10-28 VITALS
RESPIRATION RATE: 16 BRPM | HEIGHT: 62 IN | OXYGEN SATURATION: 96 % | WEIGHT: 128.97 LBS | DIASTOLIC BLOOD PRESSURE: 59 MMHG | BODY MASS INDEX: 23.73 KG/M2 | HEART RATE: 60 BPM | TEMPERATURE: 97.9 F | SYSTOLIC BLOOD PRESSURE: 123 MMHG

## 2020-10-28 DIAGNOSIS — L03.011 CELLULITIS OF RIGHT THUMB: Primary | ICD-10-CM

## 2020-10-28 LAB
ALBUMIN SERPL BCP-MCNC: 3.3 G/DL (ref 3.5–5)
ALP SERPL-CCNC: 49 U/L (ref 46–116)
ALT SERPL W P-5'-P-CCNC: 28 U/L (ref 12–78)
ANION GAP SERPL CALCULATED.3IONS-SCNC: 7 MMOL/L (ref 4–13)
APTT PPP: 21 SECONDS (ref 23–37)
AST SERPL W P-5'-P-CCNC: 27 U/L (ref 5–45)
BASOPHILS # BLD AUTO: 0.04 THOUSANDS/ΜL (ref 0–0.1)
BASOPHILS NFR BLD AUTO: 1 % (ref 0–1)
BILIRUB SERPL-MCNC: 0.5 MG/DL (ref 0.2–1)
BUN SERPL-MCNC: 23 MG/DL (ref 5–25)
CALCIUM ALBUM COR SERPL-MCNC: 9.5 MG/DL (ref 8.3–10.1)
CALCIUM SERPL-MCNC: 8.9 MG/DL (ref 8.3–10.1)
CHLORIDE SERPL-SCNC: 101 MMOL/L (ref 100–108)
CHOLEST SERPL-MCNC: 181 MG/DL (ref 50–200)
CO2 SERPL-SCNC: 28 MMOL/L (ref 21–32)
CREAT SERPL-MCNC: 0.94 MG/DL (ref 0.6–1.3)
EOSINOPHIL # BLD AUTO: 0.04 THOUSAND/ΜL (ref 0–0.61)
EOSINOPHIL NFR BLD AUTO: 1 % (ref 0–6)
ERYTHROCYTE [DISTWIDTH] IN BLOOD BY AUTOMATED COUNT: 15.1 % (ref 11.6–15.1)
GFR SERPL CREATININE-BSD FRML MDRD: 55 ML/MIN/1.73SQ M
GLUCOSE SERPL-MCNC: 305 MG/DL (ref 65–140)
HCT VFR BLD AUTO: 34.8 % (ref 34.8–46.1)
HDLC SERPL-MCNC: 72 MG/DL
HGB BLD-MCNC: 10.8 G/DL (ref 11.5–15.4)
IMM GRANULOCYTES # BLD AUTO: 0.02 THOUSAND/UL (ref 0–0.2)
IMM GRANULOCYTES NFR BLD AUTO: 0 % (ref 0–2)
INR PPP: 1.04 (ref 0.84–1.19)
LDLC SERPL CALC-MCNC: 91 MG/DL (ref 0–100)
LYMPHOCYTES # BLD AUTO: 1.16 THOUSANDS/ΜL (ref 0.6–4.47)
LYMPHOCYTES NFR BLD AUTO: 16 % (ref 14–44)
MAGNESIUM SERPL-MCNC: 1.8 MG/DL (ref 1.6–2.6)
MCH RBC QN AUTO: 28.2 PG (ref 26.8–34.3)
MCHC RBC AUTO-ENTMCNC: 31 G/DL (ref 31.4–37.4)
MCV RBC AUTO: 91 FL (ref 82–98)
MONOCYTES # BLD AUTO: 0.51 THOUSAND/ΜL (ref 0.17–1.22)
MONOCYTES NFR BLD AUTO: 7 % (ref 4–12)
NEUTROPHILS # BLD AUTO: 5.37 THOUSANDS/ΜL (ref 1.85–7.62)
NEUTS SEG NFR BLD AUTO: 75 % (ref 43–75)
NONHDLC SERPL-MCNC: 109 MG/DL
NRBC BLD AUTO-RTO: 0 /100 WBCS
PLATELET # BLD AUTO: 222 THOUSANDS/UL (ref 149–390)
PMV BLD AUTO: 10 FL (ref 8.9–12.7)
POTASSIUM SERPL-SCNC: 5 MMOL/L (ref 3.5–5.3)
PROT SERPL-MCNC: 6.5 G/DL (ref 6.4–8.2)
PROTHROMBIN TIME: 13.4 SECONDS (ref 11.6–14.5)
RBC # BLD AUTO: 3.83 MILLION/UL (ref 3.81–5.12)
SODIUM SERPL-SCNC: 136 MMOL/L (ref 136–145)
TRIGL SERPL-MCNC: 92 MG/DL
TROPONIN I SERPL-MCNC: 0.07 NG/ML
WBC # BLD AUTO: 7.14 THOUSAND/UL (ref 4.31–10.16)

## 2020-10-28 PROCEDURE — 85730 THROMBOPLASTIN TIME PARTIAL: CPT | Performed by: EMERGENCY MEDICINE

## 2020-10-28 PROCEDURE — 85025 COMPLETE CBC W/AUTO DIFF WBC: CPT | Performed by: EMERGENCY MEDICINE

## 2020-10-28 PROCEDURE — 83735 ASSAY OF MAGNESIUM: CPT | Performed by: EMERGENCY MEDICINE

## 2020-10-28 PROCEDURE — 83036 HEMOGLOBIN GLYCOSYLATED A1C: CPT | Performed by: EMERGENCY MEDICINE

## 2020-10-28 PROCEDURE — 71045 X-RAY EXAM CHEST 1 VIEW: CPT

## 2020-10-28 PROCEDURE — 99284 EMERGENCY DEPT VISIT MOD MDM: CPT | Performed by: EMERGENCY MEDICINE

## 2020-10-28 PROCEDURE — 36415 COLL VENOUS BLD VENIPUNCTURE: CPT | Performed by: EMERGENCY MEDICINE

## 2020-10-28 PROCEDURE — 85610 PROTHROMBIN TIME: CPT | Performed by: EMERGENCY MEDICINE

## 2020-10-28 PROCEDURE — 84484 ASSAY OF TROPONIN QUANT: CPT | Performed by: EMERGENCY MEDICINE

## 2020-10-28 PROCEDURE — 93005 ELECTROCARDIOGRAM TRACING: CPT

## 2020-10-28 PROCEDURE — 80053 COMPREHEN METABOLIC PANEL: CPT | Performed by: EMERGENCY MEDICINE

## 2020-10-28 PROCEDURE — 80061 LIPID PANEL: CPT | Performed by: EMERGENCY MEDICINE

## 2020-10-28 PROCEDURE — 73130 X-RAY EXAM OF HAND: CPT

## 2020-10-28 PROCEDURE — 99284 EMERGENCY DEPT VISIT MOD MDM: CPT

## 2020-10-28 RX ORDER — DOXYCYCLINE HYCLATE 100 MG/1
100 CAPSULE ORAL 2 TIMES DAILY
Qty: 20 CAPSULE | Refills: 0 | Status: SHIPPED | OUTPATIENT
Start: 2020-10-28 | End: 2020-11-07

## 2020-10-28 RX ORDER — DOXYCYCLINE HYCLATE 100 MG/1
100 CAPSULE ORAL 2 TIMES DAILY
Qty: 20 CAPSULE | Refills: 0 | Status: SHIPPED | OUTPATIENT
Start: 2020-10-28 | End: 2020-10-28 | Stop reason: SDUPTHER

## 2020-10-28 RX ORDER — DOXYCYCLINE HYCLATE 100 MG/1
100 CAPSULE ORAL ONCE
Status: COMPLETED | OUTPATIENT
Start: 2020-10-28 | End: 2020-10-28

## 2020-10-28 RX ORDER — SODIUM CHLORIDE 9 MG/ML
3 INJECTION INTRAVENOUS
Status: DISCONTINUED | OUTPATIENT
Start: 2020-10-28 | End: 2020-10-28 | Stop reason: HOSPADM

## 2020-10-28 RX ADMIN — DOXYCYCLINE 100 MG: 100 CAPSULE ORAL at 14:56

## 2020-10-29 ENCOUNTER — TELEPHONE (OUTPATIENT)
Dept: INTERNAL MEDICINE CLINIC | Facility: CLINIC | Age: 85
End: 2020-10-29

## 2020-10-29 DIAGNOSIS — M79.644 PAIN OF RIGHT THUMB: ICD-10-CM

## 2020-10-29 DIAGNOSIS — L03.011 CELLULITIS OF RIGHT THUMB: ICD-10-CM

## 2020-10-29 DIAGNOSIS — L03.011 CELLULITIS OF RIGHT THUMB: Primary | ICD-10-CM

## 2020-10-29 DIAGNOSIS — M79.644 PAIN OF RIGHT THUMB: Primary | ICD-10-CM

## 2020-10-29 LAB
ATRIAL RATE: 60 BPM
EST. AVERAGE GLUCOSE BLD GHB EST-MCNC: 275 MG/DL
HBA1C MFR BLD: 11.2 %
P AXIS: -16 DEGREES
PR INTERVAL: 194 MS
QRS AXIS: -56 DEGREES
QRSD INTERVAL: 106 MS
QT INTERVAL: 466 MS
QTC INTERVAL: 466 MS
T WAVE AXIS: -43 DEGREES
VENTRICULAR RATE: 60 BPM

## 2020-10-29 PROCEDURE — 93010 ELECTROCARDIOGRAM REPORT: CPT | Performed by: INTERNAL MEDICINE

## 2020-11-07 ENCOUNTER — APPOINTMENT (EMERGENCY)
Dept: RADIOLOGY | Facility: HOSPITAL | Age: 85
End: 2020-11-07
Payer: COMMERCIAL

## 2020-11-07 ENCOUNTER — HOSPITAL ENCOUNTER (EMERGENCY)
Facility: HOSPITAL | Age: 85
Discharge: HOME | End: 2020-11-07
Attending: EMERGENCY MEDICINE
Payer: COMMERCIAL

## 2020-11-07 VITALS
TEMPERATURE: 97.5 F | DIASTOLIC BLOOD PRESSURE: 65 MMHG | OXYGEN SATURATION: 97 % | RESPIRATION RATE: 18 BRPM | BODY MASS INDEX: 23.59 KG/M2 | SYSTOLIC BLOOD PRESSURE: 136 MMHG | WEIGHT: 128.97 LBS | HEART RATE: 60 BPM

## 2020-11-07 DIAGNOSIS — S81.811A SKIN TEAR OF RIGHT LOWER LEG WITHOUT COMPLICATION, INITIAL ENCOUNTER: Primary | ICD-10-CM

## 2020-11-07 PROCEDURE — 99283 EMERGENCY DEPT VISIT LOW MDM: CPT

## 2020-11-07 PROCEDURE — 73590 X-RAY EXAM OF LOWER LEG: CPT

## 2020-11-07 PROCEDURE — 90471 IMMUNIZATION ADMIN: CPT

## 2020-11-07 PROCEDURE — 99282 EMERGENCY DEPT VISIT SF MDM: CPT | Performed by: EMERGENCY MEDICINE

## 2020-11-07 PROCEDURE — 90715 TDAP VACCINE 7 YRS/> IM: CPT | Performed by: EMERGENCY MEDICINE

## 2020-11-07 PROCEDURE — 12002 RPR S/N/AX/GEN/TRNK2.6-7.5CM: CPT | Performed by: EMERGENCY MEDICINE

## 2020-11-07 RX ADMIN — TETANUS TOXOID, REDUCED DIPHTHERIA TOXOID AND ACELLULAR PERTUSSIS VACCINE, ADSORBED 0.5 ML: 5; 2.5; 8; 8; 2.5 SUSPENSION INTRAMUSCULAR at 18:49

## 2020-11-10 ENCOUNTER — TELEPHONE (OUTPATIENT)
Dept: INTERNAL MEDICINE CLINIC | Facility: CLINIC | Age: 85
End: 2020-11-10

## 2020-11-10 DIAGNOSIS — L03.011 CELLULITIS OF RIGHT THUMB: Primary | ICD-10-CM

## 2020-11-10 RX ORDER — DOXYCYCLINE HYCLATE 100 MG/1
100 CAPSULE ORAL EVERY 12 HOURS SCHEDULED
Qty: 20 CAPSULE | Refills: 0 | Status: SHIPPED | OUTPATIENT
Start: 2020-11-10 | End: 2020-11-20

## 2020-11-16 ENCOUNTER — OFFICE VISIT (OUTPATIENT)
Dept: INTERNAL MEDICINE CLINIC | Facility: CLINIC | Age: 85
End: 2020-11-16
Payer: COMMERCIAL

## 2020-11-16 VITALS
BODY MASS INDEX: 24.66 KG/M2 | WEIGHT: 134 LBS | SYSTOLIC BLOOD PRESSURE: 126 MMHG | TEMPERATURE: 96.3 F | HEIGHT: 62 IN | DIASTOLIC BLOOD PRESSURE: 78 MMHG

## 2020-11-16 DIAGNOSIS — L03.011 CELLULITIS OF RIGHT THUMB: Primary | ICD-10-CM

## 2020-11-16 DIAGNOSIS — E03.9 HYPOTHYROIDISM, UNSPECIFIED TYPE: ICD-10-CM

## 2020-11-16 DIAGNOSIS — S81.812S SKIN TEAR OF LEFT LOWER LEG WITHOUT COMPLICATION, SEQUELA: ICD-10-CM

## 2020-11-16 DIAGNOSIS — E11.42 DIABETIC POLYNEUROPATHY ASSOCIATED WITH TYPE 2 DIABETES MELLITUS (HCC): ICD-10-CM

## 2020-11-16 PROBLEM — S81.812A SKIN TEAR OF LEFT LOWER LEG WITHOUT COMPLICATION: Status: ACTIVE | Noted: 2020-11-16

## 2020-11-16 PROCEDURE — 99214 OFFICE O/P EST MOD 30 MIN: CPT | Performed by: INTERNAL MEDICINE

## 2020-11-16 PROCEDURE — 3725F SCREEN DEPRESSION PERFORMED: CPT | Performed by: INTERNAL MEDICINE

## 2020-11-18 ENCOUNTER — OFFICE VISIT (OUTPATIENT)
Dept: CARDIOLOGY CLINIC | Facility: CLINIC | Age: 85
End: 2020-11-18
Payer: COMMERCIAL

## 2020-11-18 VITALS
DIASTOLIC BLOOD PRESSURE: 74 MMHG | WEIGHT: 131 LBS | HEIGHT: 62 IN | BODY MASS INDEX: 24.11 KG/M2 | SYSTOLIC BLOOD PRESSURE: 130 MMHG | HEART RATE: 60 BPM

## 2020-11-18 DIAGNOSIS — I35.0 AORTIC VALVE STENOSIS, ETIOLOGY OF CARDIAC VALVE DISEASE UNSPECIFIED: Primary | Chronic | ICD-10-CM

## 2020-11-18 DIAGNOSIS — I50.32 CHRONIC DIASTOLIC CONGESTIVE HEART FAILURE (HCC): ICD-10-CM

## 2020-11-18 DIAGNOSIS — Z86.79 H/O TACHYCARDIA-BRADYCARDIA SYNDROME: ICD-10-CM

## 2020-11-18 PROCEDURE — 1160F RVW MEDS BY RX/DR IN RCRD: CPT | Performed by: INTERNAL MEDICINE

## 2020-11-18 PROCEDURE — 1036F TOBACCO NON-USER: CPT | Performed by: INTERNAL MEDICINE

## 2020-11-18 PROCEDURE — 99214 OFFICE O/P EST MOD 30 MIN: CPT | Performed by: INTERNAL MEDICINE

## 2020-12-02 LAB
LEFT EYE DIABETIC RETINOPATHY: NORMAL
RIGHT EYE DIABETIC RETINOPATHY: NORMAL

## 2020-12-15 ENCOUNTER — REMOTE DEVICE CLINIC VISIT (OUTPATIENT)
Dept: CARDIOLOGY CLINIC | Facility: CLINIC | Age: 85
End: 2020-12-15
Payer: COMMERCIAL

## 2020-12-15 DIAGNOSIS — Z45.010 ENCOUNTER FOR CHECKING AND TESTING OF CARDIAC PACEMAKER PULSE GENERATOR (BATTERY): ICD-10-CM

## 2020-12-15 DIAGNOSIS — I49.5 SICK SINUS SYNDROME (HCC): Primary | ICD-10-CM

## 2020-12-15 PROCEDURE — 93294 REM INTERROG EVL PM/LDLS PM: CPT | Performed by: INTERNAL MEDICINE

## 2020-12-15 PROCEDURE — 93296 REM INTERROG EVL PM/IDS: CPT | Performed by: INTERNAL MEDICINE

## 2020-12-24 DIAGNOSIS — E55.9 VITAMIN D DEFICIENCY: ICD-10-CM

## 2020-12-24 DIAGNOSIS — E78.2 MIXED HYPERLIPIDEMIA: ICD-10-CM

## 2020-12-24 RX ORDER — ERGOCALCIFEROL 1.25 MG/1
50000 CAPSULE ORAL WEEKLY
Qty: 12 CAPSULE | Refills: 0 | Status: SHIPPED | OUTPATIENT
Start: 2020-12-24 | End: 2021-01-18

## 2020-12-24 RX ORDER — PRAVASTATIN SODIUM 20 MG
20 TABLET ORAL DAILY
Qty: 90 TABLET | Refills: 0 | Status: SHIPPED | OUTPATIENT
Start: 2020-12-24 | End: 2021-01-18

## 2020-12-29 ENCOUNTER — TELEPHONE (OUTPATIENT)
Dept: INTERNAL MEDICINE CLINIC | Facility: CLINIC | Age: 85
End: 2020-12-29

## 2020-12-29 DIAGNOSIS — R35.0 URINE FREQUENCY: Primary | ICD-10-CM

## 2020-12-31 ENCOUNTER — LAB (OUTPATIENT)
Dept: LAB | Facility: MEDICAL CENTER | Age: 85
End: 2020-12-31
Payer: COMMERCIAL

## 2020-12-31 LAB
BACTERIA UR QL AUTO: NORMAL /HPF
BILIRUB UR QL STRIP: NEGATIVE
CLARITY UR: CLEAR
COLOR UR: YELLOW
GLUCOSE UR STRIP-MCNC: ABNORMAL MG/DL
HGB UR QL STRIP.AUTO: NEGATIVE
KETONES UR STRIP-MCNC: NEGATIVE MG/DL
LEUKOCYTE ESTERASE UR QL STRIP: ABNORMAL
NITRITE UR QL STRIP: NEGATIVE
NON-SQ EPI CELLS URNS QL MICRO: NORMAL /HPF
PH UR STRIP.AUTO: 7.5 [PH]
PROT UR STRIP-MCNC: NEGATIVE MG/DL
RBC #/AREA URNS AUTO: NORMAL /HPF
SP GR UR STRIP.AUTO: 1.03 (ref 1–1.03)
UROBILINOGEN UR QL STRIP.AUTO: 0.2 E.U./DL
WBC #/AREA URNS AUTO: NORMAL /HPF

## 2020-12-31 PROCEDURE — 81001 URINALYSIS AUTO W/SCOPE: CPT

## 2021-01-12 NOTE — PROGRESS NOTES
Merlin remote check pacer  1 HVR 51 seconds  Normal battey function          Current Outpatient Medications:     aspirin 81 MG tablet, Take 1 tablet by mouth daily, Disp: , Rfl:     Blood Glucose Monitoring Suppl (ONE TOUCH ULTRA MINI) w/Device KIT, USE TO TEST 4 TIMES DAILY, Disp: , Rfl: 0    ciclopirox (PENLAC) 8 % solution, Apply topically daily at bedtime, Disp: 6 6 mL, Rfl: 5    ergocalciferol (VITAMIN D2) 50,000 units, Take 1 capsule (50,000 Units total) by mouth once a week, Disp: 12 capsule, Rfl: 0    esomeprazole (NexIUM) 20 mg capsule, Take 1 capsule (20 mg total) by mouth daily in the early morning, Disp: 90 capsule, Rfl: 3    furosemide (LASIX) 20 mg tablet, Take one every other day, Disp: 90 tablet, Rfl: 3    glucose blood (CONTOUR NEXT TEST) test strip, Use as instructed, Disp: 360 each, Rfl: 5    lactulose 20 g/30 mL, Take 15 mL (10 g total) by mouth 2 (two) times a day, Disp: 300 mL, Rfl: 0    levothyroxine 50 mcg tablet, Take 1 tablet (50 mcg total) by mouth daily in the early morning, Disp: 30 tablet, Rfl: 0    levothyroxine 75 mcg tablet, Take 1 tablet (75 mcg total) by mouth daily in the early morning, Disp: 90 tablet, Rfl: 3    metFORMIN (GLUCOPHAGE) 500 mg tablet, Take 1 tablet (500 mg total) by mouth 2 (two) times a day with meals, Disp: 180 tablet, Rfl: 3    metoprolol tartrate (LOPRESSOR) 25 mg tablet, Take 1 tablet (25 mg total) by mouth 3 (three) times a day, Disp: 270 tablet, Rfl: 3    metoprolol tartrate (LOPRESSOR) 25 mg tablet, TAKE ONE AND A HALF TABLETS BY MOUTH EVERY 12 HOURS, Disp: 270 tablet, Rfl: 4    ONE TOUCH ULTRA TEST test strip, 4 (four) times a day Test, Disp: , Rfl: 0    ONETOUCH DELICA LANCETS FINE MISC, 4 (four) times a day Test, Disp: , Rfl: 0    oxybutynin (DITROPAN-XL) 10 MG 24 hr tablet, Take 1 tablet (10 mg total) by mouth 2 (two) times a day, Disp: 180 tablet, Rfl: 0    pravastatin (PRAVACHOL) 20 mg tablet, Take 1 tablet (20 mg total) by mouth daily, Disp: 90 tablet, Rfl: 0    predniSONE 5 mg tablet, Take 1 tablet (5 mg total) by mouth daily, Disp: 90 tablet, Rfl: 3    repaglinide (PRANDIN) 2 mg tablet, Take 2 tablets (4 mg total) by mouth 3 (three) times a day before meals, Disp: 540 tablet, Rfl: 3

## 2021-01-18 DIAGNOSIS — E78.2 MIXED HYPERLIPIDEMIA: ICD-10-CM

## 2021-01-18 DIAGNOSIS — E55.9 VITAMIN D DEFICIENCY: ICD-10-CM

## 2021-01-18 RX ORDER — PRAVASTATIN SODIUM 20 MG
20 TABLET ORAL DAILY
Qty: 90 TABLET | Refills: 0 | Status: SHIPPED | OUTPATIENT
Start: 2021-01-18 | End: 2021-04-05 | Stop reason: SDUPTHER

## 2021-01-18 RX ORDER — ERGOCALCIFEROL 1.25 MG/1
50000 CAPSULE ORAL WEEKLY
Qty: 12 CAPSULE | Refills: 0 | Status: SHIPPED | OUTPATIENT
Start: 2021-01-18 | End: 2021-04-05 | Stop reason: SDUPTHER

## 2021-02-03 ENCOUNTER — TELEPHONE (OUTPATIENT)
Dept: INTERNAL MEDICINE CLINIC | Facility: CLINIC | Age: 86
End: 2021-02-03

## 2021-02-03 DIAGNOSIS — K59.00 CONSTIPATION, UNSPECIFIED CONSTIPATION TYPE: Primary | ICD-10-CM

## 2021-02-03 RX ORDER — LINACLOTIDE 145 UG/1
1 CAPSULE, GELATIN COATED ORAL DAILY
Qty: 30 CAPSULE | Refills: 2 | Status: SHIPPED | OUTPATIENT
Start: 2021-02-03 | End: 2021-06-15 | Stop reason: HOSPADM

## 2021-02-03 NOTE — TELEPHONE ENCOUNTER
Pt sister called that pt has been staying with her and has been tot he er 2 times due to constipation and she is asking that you call her as she would like to discuss some things with you  [phone]

## 2021-02-09 ENCOUNTER — TELEPHONE (OUTPATIENT)
Dept: INTERNAL MEDICINE CLINIC | Facility: CLINIC | Age: 86
End: 2021-02-09

## 2021-03-16 ENCOUNTER — REMOTE DEVICE CLINIC VISIT (OUTPATIENT)
Dept: CARDIOLOGY CLINIC | Facility: CLINIC | Age: 86
End: 2021-03-16
Payer: COMMERCIAL

## 2021-03-16 DIAGNOSIS — I49.5 SICK SINUS SYNDROME (HCC): Primary | ICD-10-CM

## 2021-03-16 DIAGNOSIS — Z45.010 ENCOUNTER FOR CHECKING AND TESTING OF CARDIAC PACEMAKER PULSE GENERATOR (BATTERY): ICD-10-CM

## 2021-03-16 PROCEDURE — 93296 REM INTERROG EVL PM/IDS: CPT | Performed by: INTERNAL MEDICINE

## 2021-03-16 PROCEDURE — 93294 REM INTERROG EVL PM/LDLS PM: CPT | Performed by: INTERNAL MEDICINE

## 2021-03-17 ENCOUNTER — OFFICE VISIT (OUTPATIENT)
Dept: INTERNAL MEDICINE CLINIC | Facility: CLINIC | Age: 86
End: 2021-03-17
Payer: COMMERCIAL

## 2021-03-17 VITALS
HEIGHT: 62 IN | WEIGHT: 129 LBS | TEMPERATURE: 96.6 F | OXYGEN SATURATION: 97 % | BODY MASS INDEX: 23.74 KG/M2 | DIASTOLIC BLOOD PRESSURE: 78 MMHG | HEART RATE: 68 BPM | SYSTOLIC BLOOD PRESSURE: 124 MMHG

## 2021-03-17 DIAGNOSIS — Z79.4 TYPE 2 DIABETES MELLITUS WITH HYPERGLYCEMIA, WITH LONG-TERM CURRENT USE OF INSULIN (HCC): Primary | ICD-10-CM

## 2021-03-17 DIAGNOSIS — E11.65 TYPE 2 DIABETES MELLITUS WITH HYPERGLYCEMIA, WITH LONG-TERM CURRENT USE OF INSULIN (HCC): Primary | ICD-10-CM

## 2021-03-17 DIAGNOSIS — G62.9 NEUROPATHY: ICD-10-CM

## 2021-03-17 DIAGNOSIS — R26.2 AMBULATORY DYSFUNCTION: ICD-10-CM

## 2021-03-17 DIAGNOSIS — E23.0 HYPOPITUITARISM (HCC): ICD-10-CM

## 2021-03-17 DIAGNOSIS — I50.32 CHRONIC DIASTOLIC CONGESTIVE HEART FAILURE (HCC): ICD-10-CM

## 2021-03-17 DIAGNOSIS — K59.01 SLOW TRANSIT CONSTIPATION: ICD-10-CM

## 2021-03-17 DIAGNOSIS — M15.9 GENERALIZED OSTEOARTHRITIS OF MULTIPLE SITES: ICD-10-CM

## 2021-03-17 DIAGNOSIS — B35.1 ONYCHOMYCOSIS: ICD-10-CM

## 2021-03-17 PROBLEM — L03.011 CELLULITIS OF RIGHT THUMB: Status: RESOLVED | Noted: 2020-10-28 | Resolved: 2021-03-17

## 2021-03-17 PROBLEM — M79.644 PAIN OF RIGHT THUMB: Status: RESOLVED | Noted: 2020-10-06 | Resolved: 2021-03-17

## 2021-03-17 LAB — SL AMB POCT HEMOGLOBIN AIC: 10.8 (ref ?–6.5)

## 2021-03-17 PROCEDURE — 99214 OFFICE O/P EST MOD 30 MIN: CPT | Performed by: INTERNAL MEDICINE

## 2021-03-17 PROCEDURE — 83036 HEMOGLOBIN GLYCOSYLATED A1C: CPT | Performed by: INTERNAL MEDICINE

## 2021-03-17 NOTE — PROGRESS NOTES
Assessment/Plan:         Diagnoses and all orders for this visit:    Type 2 diabetes mellitus with hyperglycemia, with long-term current use of insulin ([facility])  -     POCT hemoglobin A1c  A1c is slightly better but her sugars remain fluctuant as are her eating and sleeping habits   Foot doctor appt today - Pt likely has neuropathy and unsure if she will take medication     Generalized osteoarthritis of multiple sites  Discussed [facility] referral and pt deferred but her sister will call if she changes her mind as Thuy Marquez felt it would be a good idea     Ambulatory dysfunction  Fall precautions Not very active at home and feel she would benefit from in home therapy     Slow transit constipation  Pt was seen by GI near her sister and they had her on Linzess Pt now off rx due to loose stools but her sister states not moving her bowels  Recommended fiber supplement and increased water and followup with GI     Pt considering vaccine for covid but did not want to be onlist as of today   Rto 3 months      Patient ID: Ky Lazar is a 80 y o  female  HPI   Pt complaint of pain ball of her left foot geni at night No trauma Her diabetic control from her readings looks slightly better Few days where average is 200 or below No falls but not very active and is unsteady per her sister No chest pain or sob No syncope cardio appt tomorrow Her sleep and eating schedule remain inconsistent She now is constipated again but had diarrhea when on linzess She did see GI and her sister has been in touch with them about her meds/plan Preently off rx and monitoring bowel function but pt has not had BM for past few days No n/v Appetite varies She has burning of her feet unclear if it is more at night She does use cane or walker for support         Review of Systems   Constitutional: Positive for activity change  Negative for chills and fever  HENT: Negative  Respiratory: Negative  Cardiovascular: Negative      Gastrointestinal: Negative  Genitourinary: Negative for difficulty urinating and flank pain  Neurological: Positive for numbness  Negative for dizziness, light-headedness and headaches  Hematological: Negative  Psychiatric/Behavioral: Negative for sleep disturbance  The patient is not nervous/anxious  Past Medical History:   Diagnosis Date    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[facility] )     Bradycardia     Coronary artery disease     Depression     Depression     Diabetes mellitus ([place] [address] )     Type 2 DM    Disease of thyroid gland     Essential (primary) hypertension     Facial droop     started 10 years ago    GERD (gastroesophageal reflux disease)     Heart murmur     History of echocardiogram 08/28/2017    2-D w/CFD:  EF 0 60 (60%), LVSF is normal  No regional wall abnormalities  Mild mitral valve regurg  AV was trileaflet  Moderate tricuspid regurg  Trace pulmonic valve  No pericardial effusion   History of echocardiogram 03/28/2018    2-D w/CFD:  EF 0 60 (60%), Mild regurg in the MV  AV was trileaflet, severe stenosis, trace regurg  Mild regurg in the TV  No pericardial effusion  Aortic root exhibited normal size   History of echocardiogram 01/29/2016    2-D:  EF 0 55 (55%), Normal LVSF  Mild concentric LVH  Mild MR  Mild AS with mild regurg      History of Holter monitoring     2/24/16, 9/27/17, 2/6/18    History of nuclear stress test 08/16/2017    Lexiscan MPI:  EF 0 76 (76%), no prior MI or ischemia,    Hyperlipidemia     Hypothyroidism     Nonrheumatic aortic (valve) stenosis     Obstructive sleep apnea (adult) (pediatric)     10/17/17 - NPSG with nasal CPAP/Bi-level titration     Osteoarthritis     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HCC)     SSS [facility] syndrome) [facility])     SVT (supraventricular tachycardia) ([place] [address] )     Vitamin D deficiency      Past Surgical History:   Procedure Laterality Date    ANKLE SURGERY Left 2005   [facility]    CATARACT EXTRACTION Bilateral 09/1995    HEMORRHOID SURGERY [address] [facility] / Kushal Diane / REMOVE PACEMAKER  05/14/2018    dual lead pacer st yony model 2272    JOINT REPLACEMENT      b/l hip replacements, right 2006    NEUROPLASTY / TRANSPOSITION MEDIAN NERVE AT CARPAL TUNNEL  1977    PARTIAL HYSTERECTOMY  1971    TOTAL HIP ARTHROPLASTY      TRANSPHENOIDAL / TRANSNASAL HYPOPHYSECTOMY / RESECTION PITUITARY TUMOR  1989    excision of pituitary gland     Social History     Socioeconomic History    Marital status:      Spouse name: Not on file    Number of children: Not on file    Years of education: Not on file    Highest education level: Not on file   Occupational History    Not on file   Social Needs    Financial resource strain: Not on file    Food insecurity     Worry: Not on file     Inability: Not on file   [employer] needs     Medical: Not on file     Non-medical: Not on file   Tobacco Use    Smoking status: Never Smoker    Smokeless tobacco: Never Used   Substance and Sexual Activity    Alcohol use: Not Currently    Drug use: No    Sexual activity: Never   Lifestyle    Physical activity     Days per week: Not on file     Minutes per session: Not on file    Stress: Not on file   Relationships    Social connections     Talks on phone: Not on file     Gets together: Not on file     Attends [employer] service: Not on file     Active member of club or organization: Not on file     Attends meetings of clubs or organizations: Not on file     Relationship status: Not on file    Intimate partner violence     Fear of current or ex partner: Not on file     Emotionally abused: Not on file     Physically abused: Not on file     Forced sexual activity: Not on file   Other Topics Concern    Not on file   Social History Narrative    Caffeine use    Dental care regularly    Good dental hygiene    Uses safety equipment: seatbelts     Allergies   Allergen Reactions    Cephalosporins GI Intolerance    Levemir [Insulin Detemir] Brittle diabetic  Hypersensitive to insulin  Severe hypoglycemia results if given insulin  Please speak with son, Lew Arroyo, before giving ANY type of insulin   Acetaminophen     Cephalexin GI Intolerance    Clindamycin      Other reaction(s): Unspecified    Demeclocycline     Hydrocodone      Bitartrate  Other reaction(s): Unspecified    Hydrocodone-Acetaminophen Nausea Only    Oxycodone-Acetaminophen      HCL    Oxycodone-Acetaminophen     Penicillins Hives     Other reaction(s): Other (See Comments)  ITCHING/RASH    Simvastatin GI Intolerance    Sulfamethoxazole-Trimethoprim     Tetrabenazine     Tetracycline Nausea Only    Tetracyclines & Related     Vicodin [Hydrocodone-Acetaminophen]               /78   Pulse 68   Temp (!) 96 6 °F (35 9 °C) (Temporal)   Ht 5' 2" (1 575 m)   Wt 58 5 kg (129 lb)   SpO2 97%   BMI 23 59 kg/m²          Physical Exam  Vitals signs reviewed  Constitutional:       General: She is not in acute distress  Appearance: Normal appearance  She is not ill-appearing, toxic-appearing or diaphoretic  HENT:      Head: Normocephalic and atraumatic  Right Ear: Tympanic membrane, ear canal and external ear normal  There is no impacted cerumen  Left Ear: Tympanic membrane, ear canal and external ear normal  There is no impacted cerumen  Nose: Nose normal       Mouth/Throat:      Mouth: Mucous membranes are dry  Eyes:      General: No scleral icterus  Extraocular Movements: Extraocular movements intact  Conjunctiva/sclera: Conjunctivae normal       Pupils: Pupils are equal, round, and reactive to light  Neck:      Musculoskeletal: Normal range of motion and neck supple  No neck rigidity  Cardiovascular:      Rate and Rhythm: Normal rate  Rhythm irregular  Pulses: Normal pulses  Heart sounds: Murmur present  Pulmonary:      Effort: Pulmonary effort is normal  No respiratory distress  Breath sounds: Normal breath sounds   No wheezing  Abdominal:      General: Bowel sounds are normal  There is no distension  Palpations: Abdomen is soft  Tenderness: There is no abdominal tenderness  Musculoskeletal:         General: Tenderness and deformity present  Right lower leg: No edema  Left lower leg: No edema  Lymphadenopathy:      Cervical: No cervical adenopathy  Skin:     General: Skin is dry  Coloration: Skin is not jaundiced  Neurological:      General: No focal deficit present  Mental Status: She is alert and oriented to person, place, and time  Mental status is at baseline  Cranial Nerves: No cranial nerve deficit  Sensory: Sensory deficit present  Gait: Gait normal       Deep Tendon Reflexes: Reflexes normal    Psychiatric:         Mood and Affect: Mood normal          Behavior: Behavior normal          Thought Content:  Thought content normal          Judgment: Judgment normal

## 2021-03-18 ENCOUNTER — OFFICE VISIT (OUTPATIENT)
Dept: CARDIOLOGY CLINIC | Facility: CLINIC | Age: 86
End: 2021-03-18
Payer: COMMERCIAL

## 2021-03-18 VITALS
DIASTOLIC BLOOD PRESSURE: 70 MMHG | HEIGHT: 62 IN | BODY MASS INDEX: 23.74 KG/M2 | HEART RATE: 60 BPM | SYSTOLIC BLOOD PRESSURE: 132 MMHG | WEIGHT: 129 LBS

## 2021-03-18 DIAGNOSIS — I10 ESSENTIAL HYPERTENSION: ICD-10-CM

## 2021-03-18 DIAGNOSIS — I35.0 AORTIC VALVE STENOSIS, ETIOLOGY OF CARDIAC VALVE DISEASE UNSPECIFIED: Primary | Chronic | ICD-10-CM

## 2021-03-18 DIAGNOSIS — I50.32 CHRONIC DIASTOLIC CONGESTIVE HEART FAILURE (HCC): ICD-10-CM

## 2021-03-18 PROCEDURE — 1160F RVW MEDS BY RX/DR IN RCRD: CPT | Performed by: INTERNAL MEDICINE

## 2021-03-18 PROCEDURE — 99214 OFFICE O/P EST MOD 30 MIN: CPT | Performed by: INTERNAL MEDICINE

## 2021-03-18 PROCEDURE — 1036F TOBACCO NON-USER: CPT | Performed by: INTERNAL MEDICINE

## 2021-03-18 NOTE — PATIENT INSTRUCTIONS
Aortic Stenosis   WHAT YOU NEED TO KNOW:   What is aortic stenosis? Aortic stenosis is a condition that makes your aortic valve become narrow and stiff  The narrow, stiff valve causes your heart to work harder to pump blood into the aorta  What causes aortic stenosis? · Calcium buildup  can happen as you age  Calcium builds up on the aortic valve walls  The buildup stiffens and thickens the valve  · Congenital heart defect  means you were born with a heart problem  Over time, the problem may lead to narrowing or blocking of your aortic valve  · Rheumatic fever  can develop after you have a strep throat infection  Rheumatic fever causes your heart valves to thicken with scars  What are the signs and symptoms of aortic stenosis? · Chest pain or tightness    · Fast, jumpy, or fluttery heartbeat    · Shortness of breath during activity or when you lie down    · Severe tiredness    · Dizziness or feeling faint    How is aortic stenosis diagnosed? Your healthcare provider will ask about your signs and symptoms and listen to your heart  He or she will ask if you have had strep throat or rheumatic fever in the past  You may need any of the following tests:  · Blood tests: You may need blood taken to give healthcare providers information about how your body is working  The blood may be taken from your hand, arm, or IV  · A chest x-ray  shows the size of your heart  It may also show if fluid is around your heart and lungs  · An EKG  test records the electrical activity of your heart  It is used to check for abnormal heart rhythm caused by aortic stenosis  · An echocardiogram  is a type of ultrasound  Sound waves are used to show the structure and function of your heart  · A stress test  may show the changes that take place in your heart while it is under stress  Stress may be placed on your heart with exercise or medicine   Ask for more information about this test     · Cardiac catheterization  is a procedure done to find and treat heart blockages  A catheter (long thin tube) is inserted into your arm, neck, or groin and moved into your heart  An x-ray may be used to guide the tube to the right place  Contrast liquid may be put into your vein so the pictures show up better on a monitor  Tell the healthcare provider if you have ever had an allergic reaction to contrast liquid  How is aortic stenosis treated? · Valve replacement  is the main treatment for aortic stenosis  It is a surgery to remove part or all of your aortic valve  A new valve is then secured in place  The new valve may be from a donor (another person or animal), or may be an artificial valve  There are 2 different approaches for valve replacement  It may be done as a open heart procedure  Your valve may also be replaced through a catheter placed through a vessel in your groin area  Your healthcare provider will talk to you about which approach is right for you  · Balloon valvuloplasty  helps widen your aortic valve and allow blood to flow through easier  It is also called a closed valvotomy  A catheter with a balloon on the tip is inserted through a small incision in your arm or groin  The catheter is guided through a blood vessel and into your left atrium near your aortic valve  When the balloon is inflated, it stretches the valve opening  How can I manage my symptoms? · Limit activities  Your healthcare provider may have you limit strenuous activity  Strenuous activity will make your heart work too hard  Ask your healthcare provider what activities are safe for you to do  · Take your medicines as directed  You may need medicines to lower your blood pressure  You may also need medicine to help your heart's rhythm  Your healthcare provider will prescribe the medicine that is right for you  How can I prevent aortic stenosis? · Manage other health conditions    High blood pressure and high cholesterol levels increase your risk for aortic stenosis  Ask your healthcare providers for more information on managing these conditions  · Maintain a healthy weight  Being overweight can increase your risk for high blood pressure and coronary artery disease  These conditions can make your symptoms worse  Ask your healthcare provider how much you should weigh  Ask him or her to help you create a weight loss plan if you are overweight  · Eat heart healthy foods  Eat whole grains, fruits, and vegetables every day  Limit salt and high-fat foods  Ask your healthcare provider for more information on a heart healthy diet  · Get treatment for strep throat  If strep throat is not treated, it can cause rheumatic fever  · Take care of your teeth and gums  Gingivitis, a gum disease, increases your risk for aortic stenosis  See your dental provider regularly to treat problems early  Call 911 or have someone else call if:   · You have any of the following signs of a heart attack:      ? Squeezing, pressure, or pain in your chest    ? You may  also have any of the following:     ? Discomfort or pain in your back, neck, jaw, stomach, or arm    ? Shortness of breath    ? Nausea or vomiting    ? Lightheadedness or a sudden cold sweat    · You have any of the following signs of a stroke:      ? Numbness or drooping on one side of your face     ? Weakness in an arm or leg    ? Confusion or difficulty speaking    ? Dizziness, a severe headache, or vision loss    When should I seek immediate care? · You have chest pain when you move around  It goes away when you are still  · You have increasing shortness of breath  · You faint  When should I contact my healthcare provider? · The veins in your neck look swollen or are bulging  · You have increased swelling in your legs or ankles  · Your heart beats faster than usual     · You feel your heart flutter often  · You have questions or concerns about your condition or care      CARE AGREEMENT:   You have the right to help plan your care  Learn about your health condition and how it may be treated  Discuss treatment options with your healthcare providers to decide what care you want to receive  You always have the right to refuse treatment  The above information is an  only  It is not intended as medical advice for individual conditions or treatments  Talk to your doctor, nurse or pharmacist before following any medical regimen to see if it is safe and effective for you  © Copyright [address] Information is for End User's use only and may not be sold, redistributed or otherwise used for commercial purposes   All illustrations and images included in CareNotes® are the copyrighted property of [employer]  or [address] [place] [address]

## 2021-03-18 NOTE — PROGRESS NOTES
Subjective:        Patient ID: Emily Zhao is a 80 y o  female  Chief Complaint:  Raquel Ingram is here for routine follow-up, she has severe aortic stenosis  Also chronic diastolic heart failure and chronic hypertension  She feels no different than our last visit  Dyspnea exertion moderate, nonprogressive  Wearing support stockings, lower extremity edema much better controlled now that she is taking her Lasix p r n  In the mornings and keep her legs elevated every chance she gets  No chest pains  No presyncope or syncope  No fevers or chills  The following portions of the patient's history were reviewed and updated as appropriate: allergies, current medications, past family history, past medical history, past social history, past surgical history and problem list   Review of Systems   Constitution: Negative for chills, diaphoresis, malaise/fatigue and weight gain  HENT: Negative for nosebleeds and stridor  Eyes: Negative for double vision, vision loss in left eye, vision loss in right eye and visual disturbance  Cardiovascular: Positive for dyspnea on exertion  Negative for chest pain, claudication, cyanosis, irregular heartbeat, leg swelling, near-syncope, orthopnea, palpitations, paroxysmal nocturnal dyspnea and syncope  Respiratory: Negative for cough, shortness of breath, snoring and wheezing  Endocrine: Negative for polydipsia, polyphagia and polyuria  Hematologic/Lymphatic: Negative for bleeding problem  Does not bruise/bleed easily  Skin: Negative for flushing and rash  Musculoskeletal: Positive for arthritis and stiffness  Negative for falls and myalgias  Gastrointestinal: Negative for abdominal pain, heartburn, hematemesis, hematochezia, melena and nausea  Genitourinary: Negative for hematuria  Neurological: Negative for brief paralysis, dizziness, focal weakness, headaches, light-headedness, loss of balance and vertigo     Psychiatric/Behavioral: Negative for altered mental status and substance abuse  Allergic/Immunologic: Negative for hives  Objective:      /70   Pulse 60   Ht 5' 2" (1 575 m)   Wt 58 5 kg (129 lb)   BMI 23 59 kg/m²   Physical Exam   Constitutional: She is oriented to person, place, and time  She appears well-developed and well-nourished  HENT:   Head: Normocephalic and atraumatic  Eyes: Pupils are equal, round, and reactive to light  EOM are normal    Neck: Normal range of motion  Neck supple  No JVD present  No thyromegaly present  Cardiovascular: Normal rate, regular rhythm and intact distal pulses  Exam reveals no gallop and no friction rub  Murmur (Grade 3/6 AS murmur at base) heard  Pulmonary/Chest: Effort normal and breath sounds normal  No stridor  No respiratory distress  She has no wheezes  She has no rales  Abdominal: Soft  Bowel sounds are normal  She exhibits no mass  There is no abdominal tenderness  Musculoskeletal: Normal range of motion  General: Edema (Trace bilateral lower extremity) present  Lymphadenopathy:     She has no cervical adenopathy  Neurological: She is alert and oriented to person, place, and time  Skin: Skin is warm and dry  No rash noted  No pallor  Psychiatric: She has a normal mood and affect  Her behavior is normal  Judgment and thought content normal    Nursing note and vitals reviewed  Lab Review:   Office Visit on 03/17/2021   Component Date Value    Hemoglobin A1C 03/17/2021 10 8*     No results found  Assessment:       1  Aortic valve stenosis, etiology of cardiac valve disease unspecified     2  Essential hypertension     3  Chronic diastolic congestive heart failure ([address] )          Plan:       Jesús Bright is fortunately stable  As you know she has refused on multiple occasions recommended AVR  Blood pressure is controlled, she is relatively euvolemic, she is not having any unstable angina  I recommended no changes today    We will see her back in 3-4 months  Asked to call sooner with any concerns

## 2021-04-02 ENCOUNTER — TELEPHONE (OUTPATIENT)
Dept: INTERNAL MEDICINE CLINIC | Facility: CLINIC | Age: 86
End: 2021-04-02

## 2021-04-02 NOTE — TELEPHONE ENCOUNTER
Spoke with pt son   He states pt missed her meds in the afternoon the other day and her sister told her to take all her pills (10 plus per her son) at one time   He wanted it documented that pt was told to do this and her son did not agree with this and is concerned how she takes her meds

## 2021-04-02 NOTE — TELEPHONE ENCOUNTER
patients son was in the office would like you to call him in regards to his mother he has a few things to speak with you about   [phone]

## 2021-04-05 DIAGNOSIS — R32 URINARY INCONTINENCE, UNSPECIFIED TYPE: ICD-10-CM

## 2021-04-05 DIAGNOSIS — E78.2 MIXED HYPERLIPIDEMIA: ICD-10-CM

## 2021-04-05 DIAGNOSIS — E55.9 VITAMIN D DEFICIENCY: ICD-10-CM

## 2021-04-05 DIAGNOSIS — E11.65 TYPE 2 DIABETES MELLITUS WITH HYPERGLYCEMIA, WITHOUT LONG-TERM CURRENT USE OF INSULIN (HCC): ICD-10-CM

## 2021-04-05 DIAGNOSIS — S81.801A WOUND OF RIGHT LOWER EXTREMITY, INITIAL ENCOUNTER: Primary | ICD-10-CM

## 2021-04-05 RX ORDER — LEVOFLOXACIN 250 MG/1
250 TABLET ORAL DAILY
Qty: 5 TABLET | Refills: 0 | Status: SHIPPED | OUTPATIENT
Start: 2021-04-05 | End: 2021-04-10

## 2021-04-05 RX ORDER — ERGOCALCIFEROL 1.25 MG/1
50000 CAPSULE ORAL WEEKLY
Qty: 12 CAPSULE | Refills: 0 | Status: SHIPPED | OUTPATIENT
Start: 2021-04-05 | End: 2021-08-05 | Stop reason: SDUPTHER

## 2021-04-05 RX ORDER — BLOOD SUGAR DIAGNOSTIC
STRIP MISCELLANEOUS
Qty: 350 EACH | Refills: 5 | Status: SHIPPED | OUTPATIENT
Start: 2021-04-05

## 2021-04-05 RX ORDER — OXYBUTYNIN CHLORIDE 10 MG/1
10 TABLET, EXTENDED RELEASE ORAL DAILY
Qty: 90 TABLET | Refills: 3 | Status: SHIPPED | OUTPATIENT
Start: 2021-04-05 | End: 2021-04-05

## 2021-04-05 RX ORDER — PRAVASTATIN SODIUM 20 MG
20 TABLET ORAL DAILY
Qty: 90 TABLET | Refills: 0 | Status: SHIPPED | OUTPATIENT
Start: 2021-04-05 | End: 2021-08-05 | Stop reason: SDUPTHER

## 2021-04-05 RX ORDER — OXYBUTYNIN CHLORIDE 10 MG/1
TABLET, EXTENDED RELEASE ORAL
Qty: 180 TABLET | Refills: 1 | Status: SHIPPED | OUTPATIENT
Start: 2021-04-05

## 2021-04-07 NOTE — PROGRESS NOTES
Merlin remote check pacer  3 VHR longest 4 minutes and 52 seconds  Normal battery function  Current Outpatient Medications:     aspirin 81 MG tablet, Take 1 tablet by mouth daily, Disp: , Rfl:     Blood Glucose Monitoring Suppl (ONE TOUCH ULTRA MINI) w/Device KIT, USE TO TEST 4 TIMES DAILY, Disp: , Rfl: 0    ciclopirox (PENLAC) 8 % solution, Apply topically daily at bedtime, Disp: 6 6 mL, Rfl: 5    ciclopirox (PENLAC) 8 % solution, Apply topically daily at bedtime, Disp: 6 6 mL, Rfl: 2    Contour Next Test test strip, Use as instructed UP TO FOUR TIMES DAILY  , Disp: 350 each, Rfl: 5    ergocalciferol (VITAMIN D2) 50,000 units, Take 1 capsule (50,000 Units total) by mouth once a week, Disp: 12 capsule, Rfl: 0    esomeprazole (NexIUM) 20 mg capsule, Take 1 capsule (20 mg total) by mouth daily in the early morning, Disp: 90 capsule, Rfl: 3    furosemide (LASIX) 20 mg tablet, Take one every other day (Patient not taking: Reported on 3/17/2021), Disp: 90 tablet, Rfl: 3    lactulose 20 g/30 mL, Take 15 mL (10 g total) by mouth 2 (two) times a day (Patient not taking: Reported on 3/17/2021), Disp: 300 mL, Rfl: 0    levofloxacin (LEVAQUIN) 250 mg tablet, Take 1 tablet (250 mg total) by mouth daily for 5 days, Disp: 5 tablet, Rfl: 0    levothyroxine 50 mcg tablet, Take 1 tablet (50 mcg total) by mouth daily in the early morning (Patient not taking: Reported on 3/17/2021), Disp: 30 tablet, Rfl: 0    levothyroxine 75 mcg tablet, Take 1 tablet (75 mcg total) by mouth daily in the early morning, Disp: 90 tablet, Rfl: 3    linaCLOtide (Linzess) 145 MCG CAPS, Take 1 capsule (145 mcg total) by mouth daily, Disp: 30 capsule, Rfl: 2    metFORMIN (GLUCOPHAGE) 500 mg tablet, Take 1 tablet (500 mg total) by mouth 2 (two) times a day with meals, Disp: 180 tablet, Rfl: 3    metoprolol tartrate (LOPRESSOR) 25 mg tablet, Take 1 tablet (25 mg total) by mouth 3 (three) times a day, Disp: 270 tablet, Rfl: 3    metoprolol tartrate (LOPRESSOR) 25 mg tablet, TAKE ONE AND A HALF TABLETS BY MOUTH EVERY 12 HOURS (Patient not taking: Reported on 3/17/2021), Disp: 270 tablet, Rfl: 4    ONE TOUCH ULTRA TEST test strip, 4 (four) times a day Test, Disp: , Rfl: 0    ONETOUCH DELICA LANCETS FINE MISC, 4 (four) times a day Test, Disp: , Rfl: 0    oxybutynin (DITROPAN-XL) 10 MG 24 hr tablet, TAKE ONE (1) TABLET BY MOUTH TWICE A DAY, Disp: 180 tablet, Rfl: 1    pravastatin (PRAVACHOL) 20 mg tablet, Take 1 tablet (20 mg total) by mouth daily, Disp: 90 tablet, Rfl: 0    predniSONE 5 mg tablet, Take 1 tablet (5 mg total) by mouth daily, Disp: 90 tablet, Rfl: 3    repaglinide (PRANDIN) 2 mg tablet, Take 2 tablets (4 mg total) by mouth 3 (three) times a day before meals, Disp: 540 tablet, Rfl: 3

## 2021-06-09 ENCOUNTER — TELEPHONE (OUTPATIENT)
Dept: INTERNAL MEDICINE CLINIC | Facility: CLINIC | Age: 86
End: 2021-06-09

## 2021-06-09 NOTE — TELEPHONE ENCOUNTER
Mylene Moss (son) stopped in to confirm  Debo's appt for next week 6/17  He is requesting a phone conversation with Dr Laya Smith Prior to this appt concerning his mother's  Medications  He is having ongoing issues with them  Included is a number he can be reached   [phone]

## 2021-06-13 ENCOUNTER — APPOINTMENT (EMERGENCY)
Dept: CT IMAGING | Facility: HOSPITAL | Age: 86
End: 2021-06-13
Payer: COMMERCIAL

## 2021-06-13 ENCOUNTER — HOSPITAL ENCOUNTER (INPATIENT)
Facility: HOSPITAL | Age: 86
LOS: 2 days | Discharge: HOME | DRG: 638 | End: 2021-06-15
Attending: HOSPITALIST | Admitting: HOSPITALIST
Payer: COMMERCIAL

## 2021-06-13 ENCOUNTER — APPOINTMENT (EMERGENCY)
Dept: RADIOLOGY | Facility: HOSPITAL | Age: 86
End: 2021-06-13
Payer: COMMERCIAL

## 2021-06-13 ENCOUNTER — HOSPITAL ENCOUNTER (EMERGENCY)
Facility: HOSPITAL | Age: 86
End: 2021-06-13
Attending: EMERGENCY MEDICINE
Payer: COMMERCIAL

## 2021-06-13 VITALS
BODY MASS INDEX: 23.53 KG/M2 | SYSTOLIC BLOOD PRESSURE: 124 MMHG | WEIGHT: 127.87 LBS | DIASTOLIC BLOOD PRESSURE: 66 MMHG | HEART RATE: 60 BPM | TEMPERATURE: 97.6 F | OXYGEN SATURATION: 99 % | HEIGHT: 62 IN | RESPIRATION RATE: 18 BRPM

## 2021-06-13 DIAGNOSIS — R29.818 SUBACUTE NEUROLOGIC DEFICIT: Primary | ICD-10-CM

## 2021-06-13 DIAGNOSIS — G31.84 MILD COGNITIVE IMPAIRMENT: ICD-10-CM

## 2021-06-13 DIAGNOSIS — R26.2 AMBULATORY DYSFUNCTION: ICD-10-CM

## 2021-06-13 DIAGNOSIS — S32.512A CLOSED FRACTURE OF LEFT SUPERIOR PUBIC RAMUS (HCC): Primary | ICD-10-CM

## 2021-06-13 DIAGNOSIS — R77.8 ELEVATED TROPONIN: ICD-10-CM

## 2021-06-13 DIAGNOSIS — E13.9 DIABETES 1.5, MANAGED AS TYPE 1 (HCC): ICD-10-CM

## 2021-06-13 DIAGNOSIS — R42 DIZZINESS AND GIDDINESS: ICD-10-CM

## 2021-06-13 DIAGNOSIS — R29.818 NEUROLOGICAL DEFICIT PRESENT: ICD-10-CM

## 2021-06-13 DIAGNOSIS — S32.592A PUBIC RAMUS FRACTURE, LEFT, CLOSED, INITIAL ENCOUNTER (HCC): ICD-10-CM

## 2021-06-13 PROBLEM — S32.599A PUBIC RAMUS FRACTURE (HCC): Status: ACTIVE | Noted: 2021-06-13

## 2021-06-13 LAB
ANION GAP SERPL CALCULATED.3IONS-SCNC: 6 MMOL/L (ref 4–13)
APTT PPP: 26 SECONDS (ref 23–37)
BACTERIA UR QL AUTO: NORMAL /HPF
BILIRUB UR QL STRIP: NEGATIVE
BUN SERPL-MCNC: 21 MG/DL (ref 5–25)
CALCIUM SERPL-MCNC: 8.7 MG/DL (ref 8.3–10.1)
CHLORIDE SERPL-SCNC: 102 MMOL/L (ref 100–108)
CLARITY UR: CLEAR
CO2 SERPL-SCNC: 29 MMOL/L (ref 21–32)
COLOR UR: ABNORMAL
CREAT SERPL-MCNC: 0.9 MG/DL (ref 0.6–1.3)
ERYTHROCYTE [DISTWIDTH] IN BLOOD BY AUTOMATED COUNT: 16.7 % (ref 11.6–15.1)
GFR SERPL CREATININE-BSD FRML MDRD: 58 ML/MIN/1.73SQ M
GLUCOSE SERPL-MCNC: 279 MG/DL (ref 65–140)
GLUCOSE SERPL-MCNC: 307 MG/DL (ref 65–140)
GLUCOSE UR STRIP-MCNC: ABNORMAL MG/DL
HCT VFR BLD AUTO: 33.2 % (ref 34.8–46.1)
HGB BLD-MCNC: 10 G/DL (ref 11.5–15.4)
HGB UR QL STRIP.AUTO: NEGATIVE
INR PPP: 0.93 (ref 0.84–1.19)
KETONES UR STRIP-MCNC: NEGATIVE MG/DL
LEUKOCYTE ESTERASE UR QL STRIP: ABNORMAL
MCH RBC QN AUTO: 26.4 PG (ref 26.8–34.3)
MCHC RBC AUTO-ENTMCNC: 30.1 G/DL (ref 31.4–37.4)
MCV RBC AUTO: 88 FL (ref 82–98)
NITRITE UR QL STRIP: NEGATIVE
NON-SQ EPI CELLS URNS QL MICRO: NORMAL /HPF
PH UR STRIP.AUTO: 6.5 [PH]
PLATELET # BLD AUTO: 246 THOUSANDS/UL (ref 149–390)
PMV BLD AUTO: 10.4 FL (ref 8.9–12.7)
POTASSIUM SERPL-SCNC: 4.1 MMOL/L (ref 3.5–5.3)
PROT UR STRIP-MCNC: NEGATIVE MG/DL
PROTHROMBIN TIME: 12.3 SECONDS (ref 11.6–14.5)
RBC # BLD AUTO: 3.79 MILLION/UL (ref 3.81–5.12)
RBC #/AREA URNS AUTO: NORMAL /HPF
SARS-COV-2 RNA RESP QL NAA+PROBE: NEGATIVE
SODIUM SERPL-SCNC: 137 MMOL/L (ref 136–145)
SP GR UR STRIP.AUTO: <=1.005 (ref 1–1.03)
TROPONIN I SERPL-MCNC: 0.09 NG/ML
UROBILINOGEN UR QL STRIP.AUTO: 0.2 E.U./DL
WBC # BLD AUTO: 7.21 THOUSAND/UL (ref 4.31–10.16)
WBC #/AREA URNS AUTO: NORMAL /HPF

## 2021-06-13 PROCEDURE — 71045 X-RAY EXAM CHEST 1 VIEW: CPT

## 2021-06-13 PROCEDURE — 85730 THROMBOPLASTIN TIME PARTIAL: CPT | Performed by: EMERGENCY MEDICINE

## 2021-06-13 PROCEDURE — 96360 HYDRATION IV INFUSION INIT: CPT

## 2021-06-13 PROCEDURE — 99284 EMERGENCY DEPT VISIT MOD MDM: CPT | Performed by: EMERGENCY MEDICINE

## 2021-06-13 PROCEDURE — 70498 CT ANGIOGRAPHY NECK: CPT

## 2021-06-13 PROCEDURE — 93005 ELECTROCARDIOGRAM TRACING: CPT

## 2021-06-13 PROCEDURE — 99285 EMERGENCY DEPT VISIT HI MDM: CPT

## 2021-06-13 PROCEDURE — 85027 COMPLETE CBC AUTOMATED: CPT | Performed by: EMERGENCY MEDICINE

## 2021-06-13 PROCEDURE — 80048 BASIC METABOLIC PNL TOTAL CA: CPT | Performed by: EMERGENCY MEDICINE

## 2021-06-13 PROCEDURE — 70496 CT ANGIOGRAPHY HEAD: CPT

## 2021-06-13 PROCEDURE — G1004 CDSM NDSC: HCPCS

## 2021-06-13 PROCEDURE — U0005 INFEC AGEN DETEC AMPLI PROBE: HCPCS | Performed by: EMERGENCY MEDICINE

## 2021-06-13 PROCEDURE — 73502 X-RAY EXAM HIP UNI 2-3 VIEWS: CPT

## 2021-06-13 PROCEDURE — 99223 1ST HOSP IP/OBS HIGH 75: CPT | Performed by: INTERNAL MEDICINE

## 2021-06-13 PROCEDURE — 81001 URINALYSIS AUTO W/SCOPE: CPT | Performed by: EMERGENCY MEDICINE

## 2021-06-13 PROCEDURE — 84484 ASSAY OF TROPONIN QUANT: CPT | Performed by: EMERGENCY MEDICINE

## 2021-06-13 PROCEDURE — 36415 COLL VENOUS BLD VENIPUNCTURE: CPT | Performed by: EMERGENCY MEDICINE

## 2021-06-13 PROCEDURE — U0003 INFECTIOUS AGENT DETECTION BY NUCLEIC ACID (DNA OR RNA); SEVERE ACUTE RESPIRATORY SYNDROME CORONAVIRUS 2 (SARS-COV-2) (CORONAVIRUS DISEASE [COVID-19]), AMPLIFIED PROBE TECHNIQUE, MAKING USE OF HIGH THROUGHPUT TECHNOLOGIES AS DESCRIBED BY CMS-2020-01-R: HCPCS | Performed by: EMERGENCY MEDICINE

## 2021-06-13 PROCEDURE — 85610 PROTHROMBIN TIME: CPT | Performed by: EMERGENCY MEDICINE

## 2021-06-13 PROCEDURE — 82948 REAGENT STRIP/BLOOD GLUCOSE: CPT

## 2021-06-13 RX ORDER — LACTULOSE 20 G/30ML
10 SOLUTION ORAL 2 TIMES DAILY
Status: DISCONTINUED | OUTPATIENT
Start: 2021-06-13 | End: 2021-06-15 | Stop reason: HOSPADM

## 2021-06-13 RX ORDER — LEVOTHYROXINE SODIUM 0.07 MG/1
75 TABLET ORAL
Status: DISCONTINUED | OUTPATIENT
Start: 2021-06-14 | End: 2021-06-15 | Stop reason: HOSPADM

## 2021-06-13 RX ORDER — LEVOTHYROXINE SODIUM 0.07 MG/1
75 TABLET ORAL ONCE
Status: COMPLETED | OUTPATIENT
Start: 2021-06-13 | End: 2021-06-13

## 2021-06-13 RX ORDER — CLOPIDOGREL BISULFATE 75 MG/1
75 TABLET ORAL ONCE
Status: COMPLETED | OUTPATIENT
Start: 2021-06-13 | End: 2021-06-13

## 2021-06-13 RX ORDER — REPAGLINIDE 1 MG/1
4 TABLET ORAL
Status: DISCONTINUED | OUTPATIENT
Start: 2021-06-14 | End: 2021-06-13

## 2021-06-13 RX ORDER — LUBIPROSTONE 8 UG/1
24 CAPSULE, GELATIN COATED ORAL 2 TIMES DAILY
Status: DISCONTINUED | OUTPATIENT
Start: 2021-06-13 | End: 2021-06-15 | Stop reason: HOSPADM

## 2021-06-13 RX ORDER — PRAVASTATIN SODIUM 20 MG
20 TABLET ORAL DAILY
Status: DISCONTINUED | OUTPATIENT
Start: 2021-06-14 | End: 2021-06-15 | Stop reason: HOSPADM

## 2021-06-13 RX ORDER — ERGOCALCIFEROL 1.25 MG/1
50000 CAPSULE ORAL WEEKLY
Status: DISCONTINUED | OUTPATIENT
Start: 2021-06-13 | End: 2021-06-15 | Stop reason: HOSPADM

## 2021-06-13 RX ORDER — REPAGLINIDE 1 MG/1
2 TABLET ORAL
Status: DISCONTINUED | OUTPATIENT
Start: 2021-06-14 | End: 2021-06-15 | Stop reason: HOSPADM

## 2021-06-13 RX ORDER — ONDANSETRON 2 MG/ML
4 INJECTION INTRAMUSCULAR; INTRAVENOUS EVERY 6 HOURS PRN
Status: DISCONTINUED | OUTPATIENT
Start: 2021-06-13 | End: 2021-06-15

## 2021-06-13 RX ORDER — DOCUSATE SODIUM 100 MG/1
100 CAPSULE, LIQUID FILLED ORAL 2 TIMES DAILY
Status: DISCONTINUED | OUTPATIENT
Start: 2021-06-13 | End: 2021-06-15 | Stop reason: HOSPADM

## 2021-06-13 RX ORDER — POLYETHYLENE GLYCOL 3350 17 G/17G
17 POWDER, FOR SOLUTION ORAL DAILY
Status: DISCONTINUED | OUTPATIENT
Start: 2021-06-14 | End: 2021-06-15 | Stop reason: HOSPADM

## 2021-06-13 RX ORDER — ASPIRIN 81 MG/1
81 TABLET, CHEWABLE ORAL DAILY
Status: DISCONTINUED | OUTPATIENT
Start: 2021-06-14 | End: 2021-06-15 | Stop reason: HOSPADM

## 2021-06-13 RX ORDER — PANTOPRAZOLE SODIUM 20 MG/1
20 TABLET, DELAYED RELEASE ORAL
Status: DISCONTINUED | OUTPATIENT
Start: 2021-06-14 | End: 2021-06-15 | Stop reason: HOSPADM

## 2021-06-13 RX ORDER — PREDNISONE 1 MG/1
5 TABLET ORAL DAILY
Status: DISCONTINUED | OUTPATIENT
Start: 2021-06-14 | End: 2021-06-15 | Stop reason: HOSPADM

## 2021-06-13 RX ORDER — OXYBUTYNIN CHLORIDE 5 MG/1
10 TABLET, EXTENDED RELEASE ORAL
Status: DISCONTINUED | OUTPATIENT
Start: 2021-06-13 | End: 2021-06-15 | Stop reason: HOSPADM

## 2021-06-13 RX ORDER — MICONAZOLE NITRATE 20 MG/G
CREAM TOPICAL 2 TIMES DAILY
Status: DISCONTINUED | OUTPATIENT
Start: 2021-06-13 | End: 2021-06-15 | Stop reason: HOSPADM

## 2021-06-13 RX ORDER — MAGNESIUM HYDROXIDE/ALUMINUM HYDROXICE/SIMETHICONE 120; 1200; 1200 MG/30ML; MG/30ML; MG/30ML
30 SUSPENSION ORAL EVERY 6 HOURS PRN
Status: DISCONTINUED | OUTPATIENT
Start: 2021-06-13 | End: 2021-06-15 | Stop reason: HOSPADM

## 2021-06-13 RX ADMIN — OXYBUTYNIN 10 MG: 5 TABLET, FILM COATED, EXTENDED RELEASE ORAL at 22:52

## 2021-06-13 RX ADMIN — METOPROLOL TARTRATE 25 MG: 25 TABLET, FILM COATED ORAL at 22:52

## 2021-06-13 RX ADMIN — LEVOTHYROXINE SODIUM 75 MCG: 75 TABLET ORAL at 12:16

## 2021-06-13 RX ADMIN — CLOPIDOGREL BISULFATE 75 MG: 75 TABLET ORAL at 12:16

## 2021-06-13 RX ADMIN — SODIUM CHLORIDE 500 ML: 0.9 INJECTION, SOLUTION INTRAVENOUS at 12:16

## 2021-06-13 RX ADMIN — IOHEXOL 100 ML: 350 INJECTION, SOLUTION INTRAVENOUS at 10:17

## 2021-06-13 RX ADMIN — LACTULOSE 10 G: 10 SOLUTION ORAL at 22:52

## 2021-06-13 NOTE — Clinical Note
Jenise Nice accompanied Grant Alexandre to the emergency department on 6/13/2021  Return date if applicable: 40/76/2509         If you have any questions or concerns, please don't hesitate to call        Tevin Chapman RN

## 2021-06-13 NOTE — EMTALA/ACUTE CARE TRANSFER
[facility] EMERGENCY DEPARTMENT  [address] [place] [id number]  Dept: [phone]      EMTALA TRANSFER CONSENT    NAME Em Barragan                                         1933                              MRN [id number]    I have been informed of my rights regarding examination, treatment, and transfer   by Dr Corey Carmona MD    Benefits: Specialized equipment and/or services available at the receiving facility (Include comment)________________________    Risks: Potential for delay in receiving treatment, Potential deterioration of medical condition, Loss of IV, Increased discomfort during transfer      Consent for Transfer:  I acknowledge that my medical condition has been evaluated and explained to me by the emergency department physician or other qualified medical person and/or my attending physician, who has recommended that I be transferred to the service of  Accepting Physician: Dr Blaine De La Rosa at [address] Name, [address] : [facility]  The above potential benefits of such transfer, the potential risks associated with such transfer, and the probable risks of not being transferred have been explained to me, and I fully understand them  The doctor has explained that, in my case, the benefits of transfer outweigh the risks  I agree to be transferred  I authorize the performance of emergency medical procedures and treatments upon me in both transit and upon arrival at the receiving facility  Additionally, I authorize the release of any and all medical records to the receiving facility and request they be transported with me, if possible  I understand that the safest mode of transportation during a medical emergency is an ambulance and that the Hospital advocates the use of this mode of transport   Risks of traveling to the receiving facility by car, including absence of medical control, life sustaining equipment, such as oxygen, and medical personnel has been explained to me and I fully understand them  (TERE CORRECT BOX BELOW)  [  ]  I consent to the stated transfer and to be transported by ambulance/helicopter  [  ]  I consent to the stated transfer, but refuse transportation by ambulance and accept full responsibility for my transportation by car  I understand the risks of non-ambulance transfers and I exonerate the Hospital and its staff from any deterioration in my condition that results from this refusal     X___________________________________________    DATE  21  TIME________  Signature of patient or legally responsible individual signing on patient behalf           RELATIONSHIP TO PATIENT_________________________          Provider Certification    NAME Bella Hennessy                                         1933                              MRN [id number]    A medical screening exam was performed on the above named patient  Based on the examination:    Condition Necessitating Transfer The primary encounter diagnosis was Subacute neurologic deficit  Diagnoses of Neurological deficit present, Ambulatory dysfunction, and Elevated troponin were also pertinent to this visit      Patient Condition: The patient has been stabilized such that within reasonable medical probability, no material deterioration of the patient condition or the condition of the unborn child(lisa) is likely to result from the transfer    Reason for Transfer: Level of Care needed not available at this facility    Transfer Requirements: [place]   · Space available and qualified personnel available for treatment as acknowledged by    · Agreed to accept transfer and to provide appropriate medical treatment as acknowledged by       Dr Slick Chowdary  · Appropriate medical records of the examination and treatment of the patient are provided at the time of transfer   [address] _______  · Transfer will be performed by qualified personnel from    and appropriate transfer equipment as required, including the use of necessary and appropriate life support measures  Provider Certification: I have examined the patient and explained the following risks and benefits of being transferred/refusing transfer to the patient/family:  General risk, such as traffic hazards, adverse weather conditions, rough terrain or turbulence, possible failure of equipment (including vehicle or aircraft), or consequences of actions of persons outside the control of the transport personnel      Based on these reasonable risks and benefits to the patient and/or the unborn child(lisa), and based upon the information available at the time of the patients examination, I certify that the medical benefits reasonably to be expected from the provision of appropriate medical treatments at another medical facility outweigh the increasing risks, if any, to the individuals medical condition, and in the case of labor to the unborn child, from effecting the transfer      X____________________________________________ DATE 06/13/21        TIME_______      ORIGINAL - SEND TO MEDICAL RECORDS   COPY - SEND WITH PATIENT DURING TRANSFER

## 2021-06-13 NOTE — Clinical Note
Fran Vitale was seen and treated in our emergency department on 6/13/2021  Diagnosis:     Dionicio Sesaytish    She may return on this date: If you have any questions or concerns, please don't hesitate to call        Dejuan Landis MD    ______________________________           _______________          _______________  Hospital Representative                              Date                                Time

## 2021-06-13 NOTE — ED NOTES
Pt accepted at [facility] by Dr Coni Mcguire  To be picked up at [phone] by [facility] Maribeth, RN  06/13/21 [id number]

## 2021-06-13 NOTE — QUICK NOTE
Stroke alert note:    Ksenia Becerra is 79 y/o female who presents to ED with complaints of ambulation difficulties  She is ambulatory at baseline but had dizziness and inability to ambulate since last night at 4:00 pm  ED MD exam is significant for left arm dysmetria and gait ataxia (required 2 person assist and stated that she could not walk)    CT head showed no territorial infarcts and CTA showed no large vessel occlusion  tPA not given: outside window    Ksenia Becerra has a pacemaker that is MRI compatible (only done at [facility])  Her final diagnosis will most likely require MRI brain to exclude cerebellar stroke, as CT head has not shown anything in >12 hours since symptoms onset, so it is unlikely it will show something if repeated tomorrow  Hence, patient will be transferred to [facility] where she can have the MRI brain and further stroke work-up

## 2021-06-13 NOTE — ED PROVIDER NOTES
EMERGENCY DEPARTMENT ENCOUNTER NOTE    This note has been generated using a voice recognition software  There may be typographic, grammatic, or word substitution errors that have escaped editorial review  ? 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, known facial droop that started over 10 years ago, aortic stenosis, presenting with dizziness and gait problem  Patient was reading a book yesterday and around 4:00 p m  Developed dizziness and difficulties with gait  Her granddaughter visited this morning called 911  Patient continues to have difficulties with gait  She usually ambulates with a cane but reports that her gait is worse than usual   She is unable to clarify dizziness versus vertigo  No headache  Patient was off balance yesterday but denies falling down  She does have bruising to her left shoulder  She denies pain anywhere  No difficulty with vision  No weakness or numbness in any of the extremities  No recent illnesses  Patient is on baby aspirin      Patient has a Patient has a [address] [employer] [facility] [address] Pacemaker ([id number]), which is MRI conditional     REVIEW OF SYSTEMS    Constitutional: denies fevers, chills  Visual/Eyes: no changes in vision, has known right eye exotropia since childhood  HENT: no rhinorrhea, no sore throat  Cardiac: no chest pain, has pacemaker in place for tachy-sana syndrome  Respiratory: no shortness of breath, no cough  GI: no abdominal pain, nausea, vomiting, or diarrhea  : no burning with urination, reports urinary incontinence at baseline  Heme/Onc: no easy bruising  Endocrine:  History of diabetes, blood glucose 323 this morning  Neuro:  As above    Ten systems reviewed and negative unless otherwise noted in HPI and above    PAST MEDICAL HISTORY  Past Medical History:   Diagnosis Date    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[facility] )     Bradycardia     Coronary artery disease     Depression     Depression     Diabetes mellitus (HCC)     Type 2 DM    Disease of thyroid gland     Essential (primary) hypertension     Facial droop     started 10 years ago    GERD (gastroesophageal reflux disease)     Heart murmur     History of echocardiogram 08/28/2017    2-D w/CFD:  EF 0 60 (60%), LVSF is normal  No regional wall abnormalities  Mild mitral valve regurg  AV was trileaflet  Moderate tricuspid regurg  Trace pulmonic valve  No pericardial effusion   History of echocardiogram 03/28/2018    2-D w/CFD:  EF 0 60 (60%), Mild regurg in the MV  AV was trileaflet, severe stenosis, trace regurg  Mild regurg in the TV  No pericardial effusion  Aortic root exhibited normal size   History of echocardiogram 01/29/2016    2-D:  EF 0 55 (55%), Normal LVSF  Mild concentric LVH  Mild MR  Mild AS with mild regurg      History of Holter monitoring     2/24/16, 9/27/17, 2/6/18    History of nuclear stress test 08/16/2017    Lexiscan MPI:  EF 0 76 (76%), no prior MI or ischemia,    Hyperlipidemia     Hypothyroidism     Nonrheumatic aortic (valve) stenosis     Obstructive sleep apnea (adult) (pediatric)     10/17/17 - NPSG with nasal CPAP/Bi-level titration     Osteoarthritis     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HCC)     [facility]) ([facility]     SVT (supraventricular tachycardia) ([facility] )     Vitamin D deficiency        SURGICAL HISTORY  Past Surgical History:   Procedure Laterality Date    ANKLE SURGERY Left 2005   [address]    CATARACT EXTRACTION Bilateral 09/1995    HEMORRHOID SURGERY  1978    HERNIA REPAIR  1990    [place] Dario / Shivam Byron / Esme [place]  05/14/2018    dual lead pacer st yony model 2272    JOINT REPLACEMENT      b/l hip replacements, right 2006    NEUROPLASTY / TRANSPOSITION MEDIAN NERVE AT CARPAL TUNNEL  1977    PARTIAL HYSTERECTOMY 1971    TOTAL HIP ARTHROPLASTY      TRANSPHENOIDAL / TRANSNASAL HYPOPHYSECTOMY / RESECTION PITUITARY TUMOR  1989    excision of pituitary gland       FAMILY HISTORY  Family History   Problem Relation Age of Onset    Other Father         coal worker's pneumoconiosis    Heart disease Father     Diabetes Brother     Heart disease Brother     Prostate cancer Brother     Stomach cancer Maternal Grandmother     Diabetes Paternal Aunt         CURRENT MEDICATIONS  No current facility-administered medications on file prior to encounter  Current Outpatient Medications on File Prior to Encounter   Medication Sig    aspirin 81 MG tablet Take 1 tablet by mouth daily    Blood Glucose Monitoring Suppl (ONE TOUCH ULTRA MINI) w/Device KIT USE TO TEST 4 TIMES DAILY    ciclopirox (PENLAC) 8 % solution Apply topically daily at bedtime    ciclopirox (PENLAC) 8 % solution Apply topically daily at bedtime    Contour Next Test test strip Use as instructed UP TO FOUR TIMES DAILY      ergocalciferol (VITAMIN D2) 50,000 units Take 1 capsule (50,000 Units total) by mouth once a week    esomeprazole (NexIUM) 20 mg capsule Take 1 capsule (20 mg total) by mouth daily in the early morning    furosemide (LASIX) 20 mg tablet Take one every other day (Patient not taking: Reported on 3/17/2021)    lactulose 20 g/30 mL Take 15 mL (10 g total) by mouth 2 (two) times a day (Patient not taking: Reported on 3/17/2021)    levothyroxine 50 mcg tablet Take 1 tablet (50 mcg total) by mouth daily in the early morning (Patient not taking: Reported on 3/17/2021)    levothyroxine 75 mcg tablet Take 1 tablet (75 mcg total) by mouth daily in the early morning    linaCLOtide (Linzess) 145 MCG CAPS Take 1 capsule (145 mcg total) by mouth daily    metFORMIN (GLUCOPHAGE) 500 mg tablet Take 1 tablet (500 mg total) by mouth 2 (two) times a day with meals    metoprolol tartrate (LOPRESSOR) 25 mg tablet Take 1 tablet (25 mg total) by mouth 3 (three) times a day    metoprolol tartrate (LOPRESSOR) 25 mg tablet TAKE ONE AND A HALF TABLETS BY MOUTH EVERY 12 HOURS (Patient not taking: Reported on 3/17/2021)    ONE TOUCH ULTRA TEST test strip 4 (four) times a day Test    ONETOUCH DELICA LANCETS FINE MISC 4 (four) times a day Test    oxybutynin (DITROPAN-XL) 10 MG 24 hr tablet TAKE ONE (1) TABLET BY MOUTH TWICE A DAY    pravastatin (PRAVACHOL) 20 mg tablet Take 1 tablet (20 mg total) by mouth daily    predniSONE 5 mg tablet Take 1 tablet (5 mg total) by mouth daily    repaglinide (PRANDIN) 2 mg tablet Take 2 tablets (4 mg total) by mouth 3 (three) times a day before meals       ALLERGIES  Allergies   Allergen Reactions    Cephalosporins GI Intolerance    Levemir [Insulin Detemir]      Brittle diabetic  Hypersensitive to insulin  Severe hypoglycemia results if given insulin  Please speak with son, Lee Rosario, before giving ANY type of insulin   Acetaminophen     Cephalexin GI Intolerance    Clindamycin      Other reaction(s): Unspecified    Demeclocycline     Hydrocodone      Bitartrate  Other reaction(s): Unspecified    Hydrocodone-Acetaminophen Nausea Only    Oxycodone-Acetaminophen      HCL    Oxycodone-Acetaminophen     Penicillins Hives     Other reaction(s):  Other (See Comments)  ITCHING/RASH    Simvastatin GI Intolerance    Sulfamethoxazole-Trimethoprim     Tetrabenazine     Tetracycline Nausea Only    Tetracyclines & Related     Vicodin [Hydrocodone-Acetaminophen]        SOCIAL HISTORY  Social History     Socioeconomic History    Marital status:      Spouse name: None    Number of children: None    Years of education: None    Highest education level: None   Occupational History    None   Tobacco Use    Smoking status: Never Smoker    Smokeless tobacco: Never Used   Vaping Use    Vaping Use: Never used   Substance and Sexual Activity    Alcohol use: Not Currently    Drug use: No    Sexual activity: Never   Other Topics Concern    None   Social History Narrative    Caffeine use    Dental care regularly    Good dental hygiene    Uses safety equipment: seatbelts     Social Determinants of Health     Financial Resource Strain:     Difficulty of Paying Living Expenses:    Food Insecurity:     Worried About Running Out of Food in the Last Year:     [address] in the Last Year:    Transportation Needs:     Lack of Transportation (Medical):  Lack of Transportation (Non-Medical):    Physical Activity:     Days of Exercise per Week:     Minutes of Exercise per Session:    Stress:     Feeling of Stress :    Social Connections:     Frequency of Communication with Friends and Family:     Frequency of Social Gatherings with Friends and Family:     Attends Orthodoxy Services:     Active Member of Clubs or Organizations:     Attends Club or Organization Meetings:     Marital Status:    Intimate Partner Violence:     Fear of Current or Ex-Partner:     Emotionally Abused:     Physically Abused:     Sexually Abused:        PHYSICAL EXAM    /66 (BP Location: Left arm)   Pulse 60   Temp 97 6 °F (36 4 °C) (Tympanic)   Resp 20   Ht 5' 2" (1 575 m)   Wt 58 kg (127 lb 13 9 oz)   SpO2 98%   BMI 23 39 kg/m²   Vital signs and nursing notes reviewed    CONSTITUTIONAL: female appearing stated age resting in bed, in no acute distress  HEENT: atraumatic, normocephalic  Well-healed surgical incision to left forehead  Right eye exotropia is present, patient has intact extraocular movements otherwise  Pupils are 2 mm and reactive  Sclera anicteric, conjunctiva are not injected  Moist oral mucosa  CARDIOVASCULAR/CHEST: RRR, paced rhythm on the monitor  Pacemaker in left upper chest   There is a 4/6 holosystolic murmur heard throughout precordium consistent with known aortic stenosis  2+ radial pulses and symmetric, 2+ dorsalis pedis and posterior tibial pulses and symmetric  No lower extremity edema    Patient has compression stockings in place  PULMONARY: Breathing comfortably on RA  Breath sounds are equal and clear to auscultation  ABDOMEN: non-distended  BS present, normoactive  Non-tender  MSK: moves all extremities, no deformities, no peripheral edema  There is a small skin tear to right anterior leg, hemostatic and appearing subacute  There is a contusion to left anterior leg without integument breakdown  There is a small ecchymosis to left shoulder without underlying bony instability  There is no shoulder deformity  NEURO: Awake, alert, and oriented x 3  Pupils 2 mm and reactive  Right eye exotropia is present  Left-sided facial droop is present  This is baseline  Tongue protrudes to the left  5/5 strength in bilateral upper and lower extremities  No pronator drift but patient does have left-sided dysmetria  Gait is unsteady, requiring 2 person assist   SKIN: Warm, appears well-perfused  MENTAL STATUS: Normal affect       Stroke Assessment     Row Name 06/13/21 0944             NIH Stroke Scale    Interval  Baseline      Level of Consciousness (1a )  0      LOC Questions (1b )  0      LOC Commands (1c )  0      Best Gaze (2 )  0 Patient has known exotropia of right eye      Visual (3 )  0      Facial Palsy (4 )  0 Known left sided facial palsy      Motor Arm, Left (5a )  0      Motor Arm, Right (5b )  0      Motor Leg, Left (6a )  0      Motor Leg, Right (6b )  0      Limb Ataxia (7 )  1      Sensory (8 )  0      Best Language (9 )  0      Dysarthria (10 )  0      Extinction and Inattention (11 ) (Formerly Neglect)  0      Total  1          First Filed Value   TPA Decision  Patient not a TPA candidate  Patient is not a candidate options  comment [facility] 6/12/2021 1600, patient out of window for tPA]          ?   LABS AND TESTS    Results Reviewed     Procedure Component Value Units Date/Time    Urine Microscopic [[id number]]  (Normal) Collected: 06/13/21 1226    Lab Status: Final result Specimen: Urine, Clean Catch Updated: 06/13/21 1242     RBC, UA None Seen /hpf      WBC, UA 1-2 /hpf      Epithelial Cells None Seen /hpf      Bacteria, UA None Seen /hpf     UA w Reflex to Microscopic w Reflex to Culture [[id number]]  (Abnormal) Collected: 06/13/21 1226    Lab Status: Final result Specimen: Urine, Clean Catch Updated: 06/13/21 1232     Color, UA Light Yellow     Clarity, UA Clear     Specific Gravity, UA <=1 005     pH, UA 6 5     Leukocytes, UA Elevated glucose may cause decreased leukocyte values  See urine microscopic for [facility] result/     Nitrite, UA Negative     Protein, UA Negative mg/dl      Glucose, UA >=1000 (1%) mg/dl      Ketones, UA Negative mg/dl      Urobilinogen, UA 0 2 E U /dl      Bilirubin, UA Negative     Blood, UA Negative    Novel Coronavirus (Covid-19),PCR [facility] - 2 hour stat [[id number]]  (Normal) Collected: 06/13/21 1001    Lab Status: Final result Specimen: Nares from Nose Updated: 06/13/21 1100     SARS-CoV-2 Negative    Narrative: The specimen collection materials, transport medium, and/or testing methodology utilized in the production of these test results have been proven to be reliable in a limited validation with an abbreviated program under the Emergency Utilization Authorization provided by the FDA  Testing reported as "Presumptive positive" will be confirmed with secondary testing to ensure result accuracy  Clinical caution and judgement should be used with the interpretation of these results with consideration of the clinical impression and other laboratory testing  Testing reported as "Positive" or "Negative" has been proven to be accurate according to standard laboratory validation requirements  All testing is performed with control materials showing appropriate reactivity at standard intervals        Protime-INR [[id number]]  (Normal) Collected: 06/13/21 1001    Lab Status: Final result Specimen: Blood from Arm, Right Updated: 06/13/21 1030     Protime 12 3 seconds      INR 0  93    APTT [[id number]]  (Normal) Collected: 06/13/21 1001    Lab Status: Final result Specimen: Blood from Arm, Right Updated: 06/13/21 1030     PTT 26 seconds     Troponin I [[id number]]  (Abnormal) Collected: 06/13/21 1001    Lab Status: Final result Specimen: Blood from Arm, Right Updated: 06/13/21 1029     Troponin I 0 09 ng/mL     Fingerstick Glucose (POCT) [[id number]]  (Abnormal) Collected: 06/13/21 1027    Lab Status: Final result Updated: 06/13/21 1028     POC Glucose 279 mg/dl     Basic metabolic panel [[id number]]  (Abnormal) Collected: 06/13/21 1001    Lab Status: Final result Specimen: Blood from Arm, Right Updated: 06/13/21 1019     Sodium 137 mmol/L      Potassium 4 1 mmol/L      Chloride 102 mmol/L      CO2 29 mmol/L      ANION GAP 6 mmol/L      BUN 21 mg/dL      Creatinine 0 90 mg/dL      Glucose 307 mg/dL      Calcium 8 7 mg/dL      eGFR 58 ml/min/1 73sq m     Narrative:      [facility] guidelines for Chronic Kidney Disease (CKD):     Stage 1 with normal or high GFR (GFR > 90 mL/min/1 73 square meters)    Stage 2 Mild CKD (GFR = 60-89 mL/min/1 73 square meters)    Stage 3A Moderate CKD (GFR = 45-59 mL/min/1 73 square meters)    Stage 3B Moderate CKD (GFR = 30-44 mL/min/1 73 square meters)    Stage 4 Severe CKD (GFR = 15-29 mL/min/1 73 square meters)    Stage 5 End Stage CKD (GFR <15 mL/min/1 73 square meters)  Note: GFR calculation is accurate only with a steady state creatinine    CBC and Platelet [[id number]]  (Abnormal) Collected: 06/13/21 1001    Lab Status: Final result Specimen: Blood from Arm, Right Updated: 06/13/21 1008     WBC 7 21 Thousand/uL      RBC 3 79 Million/uL      Hemoglobin 10 0 g/dL      Hematocrit 33 2 %      MCV 88 fL      MCH 26 4 pg      MCHC 30 1 g/dL      RDW 16 7 %      Platelets 155 Thousands/uL      MPV 10 4 fL           XR hip/pelv 2-3 vws left if performed   ED Interpretation by Belinda Zhao MD (06/13 1510)   Nondisplaced superior pubic ramus fracture on the left  CTA stroke alert (head/neck)   Final Result by Jose Moser MD (06/13 1042)         1  No hemodynamically significant stenosis in the major arteries of the neck  2   No intracranial aneurysm or major intracranial arterial stenosis  3   Pagetoid changes of the skull incidentally noted               Findings were directly discussed with Dr Susy Zhu on 6/13/2021 10:32 AM                      Workstation performed: [id number]         CT stroke alert brain   Final Result by Jose Moser MD (06/13 1106)   Addendum 1 of 1 by Jose Moser MD (06/13 1106)   ADDENDUM:      Please correct the final line of the report to:      Findings were directly discussed with Stewart White on 6/13/2021 11:03 AM,    not 10:29 AM as originally dictated  The preliminary noncontrast head CT results were discussed with attending    neurologist Dr Susy Zhu at the time of CTA review at 10:32 AM       Final      No acute intracranial hemorrhage  Stable microangiopathic changes within the brain  Findings were directly discussed with Stewart White on 6/13/2021 10:29 AM       Workstation performed: [id number]         X-ray chest 1 view portable   ED Interpretation by Richard Hanks MD (06/13 1511)   Clear lungs, pacemaker in place  Final Result by Isis Dykes MD (06/13 1914)      No acute cardiopulmonary disease  Workstation performed: [id number]             ED COURSE & MEDICAL DECISION MAKING  ECG 12 Lead Documentation Only    Date/Time: 6/13/2021 10:00 AM  Performed by: Richard Hanks MD  Authorized by: Richard Hanks MD     Comments:      Atrial paced rhythm, ventricular rate 60, KS interval 206, , , left anterior fascicular block, Q-waves in lead V2 suggestive of age-indeterminate septal infarct, T-wave inversions in lateral precordial and inferior leads  LVH by aVL criteria  No change from prior EKG dated 10/20/2020  Medications   iohexol (OMNIPAQUE) 350 MG/ML injection (SINGLE-DOSE) 100 mL (100 mL Intravenous Given 6/13/21 87)     41-year-old female presenting with difficulties with balance and possible resultant fall  Last known normal was 4:00 p m  Yesterday  Vital signs reviewed, afebrile, mildly hypertensive, within normal limits otherwise  Differential diagnosis includes CVA/TIA, underlying infection, dehydration, electrolyte abnormality, versus another etiology of symptoms  I am evaluating the patient as a part of a stroke alert given neurologic findings on exam   Patient is out of window for tPA  ED Course as of Jun 13 1935   Sun Jun 13, 2021   1000 Case discussed with Dr Vonnie Hewitt of neurology  65 Patient's grand daughter is at bedside contributing to history of present illness  Over the past month, patient has had worsening memory issues  She has occasionally had moments of asking how to get out of the kitchen  Some days she has no issues, others, she is confused  Today, when granddaughter arrived at patient's house, patient was sitting on the toilet and was leaning over to the left  Granddaughter had to catch her to prevent her from falling down  200 Dr Vonnie Hewitt recommends transfer to [facility] MRI, since patient's pacemaker is MRI conditional       46 Tried calling patient's son to update him as the POA, unable to leave  due to inbox full       1202 Per Dr Maycol Berry recommendations, will administer Plavix 75 mg daily      1509 Dr Cindy Montana updated regarding left hip x-ray findings  EKG is without acute ischemic changes and unchanged from prior  Point of care glucose is 279  BMP reveals intact renal function, glucose of 3 of 7, CBC is without leukocytosis, with hemoglobin of 10 , decreased compared to prior of 10 8 in October of 2020  Coags are within normal limits  Troponin x1 is 0 09  Patient denies chest pain    Record review reveals that patient has previously had elevated troponin  Patient has a [address] Pacemaker ([id number]) according to interrogation report from 9/24/2020  Following discussion with Dr Deborah Mehta, patient would benefit from MRI brain at [facility] where she may have the appropriate study given her MRI-conditional pacemaker  On review of patient's imaging, patient does have a superior pubic ramus fracture on the left likely explaining her left hip pains over the past several days  I updated Dr Kathy Will regarding this finding        MDM  Number of Diagnoses or Management Options  Ambulatory dysfunction: new and requires workup  Elevated troponin: new and does not require workup  Neurological deficit present: new and requires workup  Pubic ramus fracture, left, closed, initial encounter [facility]): new and does not require workup  Subacute neurologic deficit: new and requires workup     Amount and/or Complexity of Data Reviewed  Clinical lab tests: ordered and reviewed  Tests in the radiology section of CPT®: ordered and reviewed  Tests in the medicine section of CPT®: ordered and reviewed  Obtain history from someone other than the patient: yes (Granddaughter)  Review and summarize past medical records: yes  Discuss the patient with other providers: yes (Neurology, [facility])  Independent visualization of images, tracings, or specimens: yes    Risk of Complications, Morbidity, and/or Mortality  Presenting problems: high  Diagnostic procedures: high  Management options: high    Patient Progress  Patient progress: stable      CLINICAL IMPRESSION  Final diagnoses:   Subacute neurologic deficit   Neurological deficit present   Ambulatory dysfunction   Elevated troponin   Pubic ramus fracture, left, closed, initial encounter ([facility] [address] )       DISPOSITION  Time reflects when diagnosis was documented in both MDM as applicable and the Disposition within this note     Time User Action Codes Description Comment    6/13/2021  9:58 AM Juan Vega Add [T54 686] Subacute neurologic deficit     6/13/2021 11:05 AM Cherl [place] Add [R29 818] Neurological deficit present     6/13/2021 11:05 AM Cherl Libra Add [R26 2] Ambulatory dysfunction     6/13/2021 11:05 AM Cherl Libra Add [R77 8] Elevated troponin     6/13/2021  7:36 PM Cherl Libra Add [S32 592A] Pubic ramus fracture, left, closed, initial encounter [facility])       ED Disposition     ED Disposition Condition Date/Time Comment    Transfer to Another Facility-In Network  [place] Jun 13, 2021 12:03 PM Willam Aviles should be transferred out to [facility]          MD Documentation      Most Recent Value   Patient Condition  The patient has been stabilized such that within reasonable medical probability, no material deterioration of the patient condition or the condition of the unborn child(lisa) is likely to result from the transfer   Reason for Transfer  Level of Care needed not available at this facility   Benefits of Transfer  Specialized equipment and/or services available at the receiving facility (Include comment)________________________   Risks of Transfer  Potential for delay in receiving treatment, Potential deterioration of medical condition, Loss of IV, Increased discomfort during transfer   Accepting Physician  Dr Marilin Doshi Name, [facility]   Provider Certification  General risk, such as traffic hazards, adverse weather conditions, rough terrain or turbulence, possible failure of equipment (including vehicle or aircraft), or consequences of actions of persons outside the control of the transport personnel      RN Documentation      Most [facility] Name, Tha Atkinson   Bed Assignment  [id number]   Report Given to  [address]    None            Ryan Conway MD  06/13/21 7469

## 2021-06-13 NOTE — Clinical Note
accompanied Pk Jacobs to the emergency department on 6/13/2021  Return date if applicable: If you have any questions or concerns, please don't hesitate to call        Neo Scanlon MD

## 2021-06-14 ENCOUNTER — APPOINTMENT (INPATIENT)
Dept: RADIOLOGY | Facility: HOSPITAL | Age: 86
DRG: 638 | End: 2021-06-14
Payer: COMMERCIAL

## 2021-06-14 LAB
ANION GAP SERPL CALCULATED.3IONS-SCNC: 5 MMOL/L (ref 4–13)
ATRIAL RATE: 60 BPM
BASOPHILS # BLD AUTO: 0.03 THOUSANDS/ΜL (ref 0–0.1)
BASOPHILS NFR BLD AUTO: 0 % (ref 0–1)
BUN SERPL-MCNC: 14 MG/DL (ref 5–25)
CALCIUM SERPL-MCNC: 8.6 MG/DL (ref 8.3–10.1)
CHLORIDE SERPL-SCNC: 108 MMOL/L (ref 100–108)
CO2 SERPL-SCNC: 28 MMOL/L (ref 21–32)
CREAT SERPL-MCNC: 0.63 MG/DL (ref 0.6–1.3)
EOSINOPHIL # BLD AUTO: 0.19 THOUSAND/ΜL (ref 0–0.61)
EOSINOPHIL NFR BLD AUTO: 3 % (ref 0–6)
ERYTHROCYTE [DISTWIDTH] IN BLOOD BY AUTOMATED COUNT: 16.7 % (ref 11.6–15.1)
EST. AVERAGE GLUCOSE BLD GHB EST-MCNC: 286 MG/DL
GFR SERPL CREATININE-BSD FRML MDRD: 81 ML/MIN/1.73SQ M
GLUCOSE SERPL-MCNC: 203 MG/DL (ref 65–140)
GLUCOSE SERPL-MCNC: 337 MG/DL (ref 65–140)
GLUCOSE SERPL-MCNC: 382 MG/DL (ref 65–140)
GLUCOSE SERPL-MCNC: 75 MG/DL (ref 65–140)
GLUCOSE SERPL-MCNC: 78 MG/DL (ref 65–140)
HBA1C MFR BLD: 11.6 %
HCT VFR BLD AUTO: 34.3 % (ref 34.8–46.1)
HGB BLD-MCNC: 10.4 G/DL (ref 11.5–15.4)
IMM GRANULOCYTES # BLD AUTO: 0.03 THOUSAND/UL (ref 0–0.2)
IMM GRANULOCYTES NFR BLD AUTO: 0 % (ref 0–2)
LYMPHOCYTES # BLD AUTO: 2.24 THOUSANDS/ΜL (ref 0.6–4.47)
LYMPHOCYTES NFR BLD AUTO: 30 % (ref 14–44)
MCH RBC QN AUTO: 26.3 PG (ref 26.8–34.3)
MCHC RBC AUTO-ENTMCNC: 30.3 G/DL (ref 31.4–37.4)
MCV RBC AUTO: 87 FL (ref 82–98)
MONOCYTES # BLD AUTO: 0.52 THOUSAND/ΜL (ref 0.17–1.22)
MONOCYTES NFR BLD AUTO: 7 % (ref 4–12)
NEUTROPHILS # BLD AUTO: 4.4 THOUSANDS/ΜL (ref 1.85–7.62)
NEUTS SEG NFR BLD AUTO: 60 % (ref 43–75)
NRBC BLD AUTO-RTO: 0 /100 WBCS
P AXIS: 17 DEGREES
PLATELET # BLD AUTO: 249 THOUSANDS/UL (ref 149–390)
PMV BLD AUTO: 10.5 FL (ref 8.9–12.7)
POTASSIUM SERPL-SCNC: 3.8 MMOL/L (ref 3.5–5.3)
PR INTERVAL: 206 MS
QRS AXIS: -55 DEGREES
QRSD INTERVAL: 112 MS
QT INTERVAL: 464 MS
QTC INTERVAL: 464 MS
RBC # BLD AUTO: 3.96 MILLION/UL (ref 3.81–5.12)
SODIUM SERPL-SCNC: 141 MMOL/L (ref 136–145)
T WAVE AXIS: -46 DEGREES
VENTRICULAR RATE: 60 BPM
WBC # BLD AUTO: 7.41 THOUSAND/UL (ref 4.31–10.16)

## 2021-06-14 PROCEDURE — 99223 1ST HOSP IP/OBS HIGH 75: CPT | Performed by: PSYCHIATRY & NEUROLOGY

## 2021-06-14 PROCEDURE — 99222 1ST HOSP IP/OBS MODERATE 55: CPT | Performed by: NURSE PRACTITIONER

## 2021-06-14 PROCEDURE — 82948 REAGENT STRIP/BLOOD GLUCOSE: CPT

## 2021-06-14 PROCEDURE — 85025 COMPLETE CBC W/AUTO DIFF WBC: CPT | Performed by: INTERNAL MEDICINE

## 2021-06-14 PROCEDURE — 97167 OT EVAL HIGH COMPLEX 60 MIN: CPT

## 2021-06-14 PROCEDURE — 99222 1ST HOSP IP/OBS MODERATE 55: CPT | Performed by: ORTHOPAEDIC SURGERY

## 2021-06-14 PROCEDURE — 80048 BASIC METABOLIC PNL TOTAL CA: CPT | Performed by: INTERNAL MEDICINE

## 2021-06-14 PROCEDURE — 83036 HEMOGLOBIN GLYCOSYLATED A1C: CPT | Performed by: INTERNAL MEDICINE

## 2021-06-14 PROCEDURE — 93010 ELECTROCARDIOGRAM REPORT: CPT | Performed by: INTERNAL MEDICINE

## 2021-06-14 PROCEDURE — 97163 PT EVAL HIGH COMPLEX 45 MIN: CPT

## 2021-06-14 PROCEDURE — 72190 X-RAY EXAM OF PELVIS: CPT

## 2021-06-14 PROCEDURE — 99232 SBSQ HOSP IP/OBS MODERATE 35: CPT | Performed by: PHYSICIAN ASSISTANT

## 2021-06-14 RX ADMIN — LUBIPROSTONE 24 MCG: 8 CAPSULE, GELATIN COATED ORAL at 10:32

## 2021-06-14 RX ADMIN — MICONAZOLE NITRATE: 20 CREAM TOPICAL at 10:32

## 2021-06-14 RX ADMIN — METOPROLOL TARTRATE 25 MG: 25 TABLET, FILM COATED ORAL at 16:26

## 2021-06-14 RX ADMIN — MICONAZOLE NITRATE: 20 CREAM TOPICAL at 18:58

## 2021-06-14 RX ADMIN — METOPROLOL TARTRATE 25 MG: 25 TABLET, FILM COATED ORAL at 10:29

## 2021-06-14 RX ADMIN — REPAGLINIDE 2 MG: 1 TABLET ORAL at 10:31

## 2021-06-14 RX ADMIN — PRAVASTATIN SODIUM 20 MG: 20 TABLET ORAL at 10:38

## 2021-06-14 RX ADMIN — POLYETHYLENE GLYCOL 3350 17 G: 17 POWDER, FOR SOLUTION ORAL at 10:30

## 2021-06-14 RX ADMIN — ASPIRIN 81 MG: 81 TABLET, CHEWABLE ORAL at 10:30

## 2021-06-14 RX ADMIN — OXYBUTYNIN 10 MG: 5 TABLET, FILM COATED, EXTENDED RELEASE ORAL at 22:28

## 2021-06-14 RX ADMIN — ERGOCALCIFEROL 50000 UNITS: 1.25 CAPSULE ORAL at 00:19

## 2021-06-14 RX ADMIN — LACTULOSE 10 G: 10 SOLUTION ORAL at 18:58

## 2021-06-14 RX ADMIN — REPAGLINIDE 2 MG: 1 TABLET ORAL at 06:07

## 2021-06-14 RX ADMIN — DOCUSATE SODIUM 100 MG: 100 CAPSULE ORAL at 10:29

## 2021-06-14 RX ADMIN — METOPROLOL TARTRATE 25 MG: 25 TABLET, FILM COATED ORAL at 22:29

## 2021-06-14 RX ADMIN — PREDNISONE 5 MG: 5 TABLET ORAL at 10:30

## 2021-06-14 RX ADMIN — REPAGLINIDE 2 MG: 1 TABLET ORAL at 16:27

## 2021-06-14 RX ADMIN — DOCUSATE SODIUM 100 MG: 100 CAPSULE ORAL at 18:58

## 2021-06-14 RX ADMIN — LEVOTHYROXINE SODIUM 75 MCG: 75 TABLET ORAL at 06:07

## 2021-06-14 RX ADMIN — LACTULOSE 10 G: 10 SOLUTION ORAL at 10:31

## 2021-06-14 RX ADMIN — LUBIPROSTONE 24 MCG: 8 CAPSULE, GELATIN COATED ORAL at 22:28

## 2021-06-14 RX ADMIN — PANTOPRAZOLE SODIUM 20 MG: 20 TABLET, DELAYED RELEASE ORAL at 06:07

## 2021-06-14 RX ADMIN — ENOXAPARIN SODIUM 40 MG: 40 INJECTION SUBCUTANEOUS at 10:31

## 2021-06-14 NOTE — ASSESSMENT & PLAN NOTE
· BP on transfer to [facility] was 124/62  Current /67 and well controlled    · Patient is on Lopressor 25 mg TID  · Plan:  · Continue current regimen  · Maintain normotensive BP range

## 2021-06-14 NOTE — PLAN OF CARE
Problem: MOBILITY - ADULT  Goal: Maintain or return to baseline ADL function  Description: INTERVENTIONS:  -  Assess patient's ability to carry out ADLs; assess patient's baseline for ADL function and identify physical deficits which impact ability to perform ADLs (bathing, care of mouth/teeth, toileting, grooming, dressing, etc )  - Assess/evaluate cause of self-care deficits   - Assess range of motion  - Assess patient's mobility; develop plan if impaired  - Assess patient's need for assistive devices and provide as appropriate  - Encourage maximum independence but intervene and supervise when necessary  - Involve family in performance of ADLs  - Assess for home care needs following discharge   - Consider OT consult to assist with ADL evaluation and planning for discharge  - Provide patient education as appropriate  Outcome: Progressing  Goal: Maintains/Returns to pre admission functional level  Description: INTERVENTIONS:  - Perform BMAT or MOVE assessment daily    - Set and communicate daily mobility goal to care team and patient/family/caregiver  - Collaborate with rehabilitation services on mobility goals if consulted  - Perform Range of Motion 3 times a day    - Dangle patient 3 times a day  - Stand patient 3 times a day  - Ambulate patient 3 times a day  - Out of bed to chair 3 times a day   - Out of bed for meals 3 times a day  - Out of bed for toileting  - Record patient progress and toleration of activity level   Outcome: Progressing     Problem: MUSCULOSKELETAL - ADULT  Goal: Maintain or return mobility to safest level of function  Description: INTERVENTIONS:  - Assess patient's ability to carry out ADLs; assess patient's baseline for ADL function and identify physical deficits which impact ability to perform ADLs (bathing, care of mouth/teeth, toileting, grooming, dressing, etc )  - Assess/evaluate cause of self-care deficits   - Assess range of motion  - Assess patient's mobility  - Assess patient's need for assistive devices and provide as appropriate  - Encourage maximum independence but intervene and supervise when necessary  - Involve family in performance of ADLs  - Assess for home care needs following discharge   - Consider OT consult to assist with ADL evaluation and planning for discharge  - Provide patient education as appropriate  Outcome: Progressing  Goal: Maintain proper alignment of affected body part  Description: INTERVENTIONS:  - Support, maintain and protect limb and body alignment  - Provide patient/ family with appropriate education  Outcome: Progressing     Problem: PAIN - ADULT  Goal: Verbalizes/displays adequate comfort level or baseline comfort level  Description: Interventions:  - Encourage patient to monitor pain and request assistance  - Assess pain using appropriate pain scale  - Administer analgesics based on type and severity of pain and evaluate response  - Implement non-pharmacological measures as appropriate and evaluate response  - Consider cultural and social influences on pain and pain management  - Notify physician/advanced practitioner if interventions unsuccessful or patient reports new pain  Outcome: Progressing     Problem: INFECTION - ADULT  Goal: Absence or prevention of progression during hospitalization  Description: INTERVENTIONS:  - Assess and monitor for signs and symptoms of infection  - Monitor lab/diagnostic results  - Monitor all insertion sites, i e  indwelling lines, tubes, and drains  - [place] appropriate cooling/warming therapies per order  - Administer medications as ordered  - Instruct and encourage patient and family to use good hand hygiene technique  - Identify and instruct in appropriate isolation precautions for identified infection/condition  Outcome: Progressing     Problem: SAFETY ADULT  Goal: Maintain or return to baseline ADL function  Description: INTERVENTIONS:  -  Assess patient's ability to carry out ADLs; assess patient's baseline for ADL function and identify physical deficits which impact ability to perform ADLs (bathing, care of mouth/teeth, toileting, grooming, dressing, etc )  - Assess/evaluate cause of self-care deficits   - Assess range of motion  - Assess patient's mobility; develop plan if impaired  - Assess patient's need for assistive devices and provide as appropriate  - Encourage maximum independence but intervene and supervise when necessary  - Involve family in performance of ADLs  - Assess for home care needs following discharge   - Consider OT consult to assist with ADL evaluation and planning for discharge  - Provide patient education as appropriate  Outcome: Progressing  Goal: Patient will remain free of falls  Description: INTERVENTIONS:  - Educate patient/family on patient safety including physical limitations  - Instruct patient to call for assistance with activity   - Consult OT/PT to assist with strengthening/mobility   - Keep Call bell within reach  - Keep bed low and locked with side rails adjusted as appropriate  - Keep care items and personal belongings within reach  - Initiate and maintain comfort rounds  - Make Fall Risk Sign visible to staff  - Offer Toileting every 2 Hours, in advance of need  - Initiate/Maintain bed alarm  - Apply yellow socks and bracelet for high fall risk patients  - Consider moving patient to room near nurses station  Outcome: Progressing     Problem: DISCHARGE PLANNING  Goal: Discharge to home or other facility with appropriate resources  Description: INTERVENTIONS:  - Identify barriers to discharge w/patient and caregiver  - Arrange for needed discharge resources and transportation as appropriate  - Identify discharge learning needs (meds, wound care, etc )  - Arrange for interpretive services to assist at discharge as needed  - Refer to Case Management Department for coordinating discharge planning if the patient needs post-hospital services based on physician/advanced practitioner order or complex needs related to functional status, cognitive ability, or social support system  Outcome: Progressing     Problem: Knowledge Deficit  Goal: Patient/family/caregiver demonstrates understanding of disease process, treatment plan, medications, and discharge instructions  Description: Complete learning assessment and assess knowledge base    Interventions:  - Provide teaching at level of understanding  - Provide teaching via preferred learning methods  Outcome: Progressing

## 2021-06-14 NOTE — PHYSICAL THERAPY NOTE
Physical Therapy Evaluation     Patient's Name: Raquel Denny    Admitting Diagnosis  Subacute neurologic deficit [R29 [id number]]    Problem List  Patient Active Problem List   Diagnosis    Uncontrolled type 2 diabetes mellitus with hyperglycemia ([facility] )    Hyperlipidemia    Hypertension    Hypothyroidism    History of CVA (cerebrovascular accident)    CRISTAL (obstructive sleep apnea)    Chronic diastolic congestive heart failure (HCC)    Aortic valve stenosis    Hiatal hernia    Thyroid nodule    Anemia    Bilateral hearing loss    Chronic bilateral low back pain without sciatica    Depression    Diabetic neuropathy ([facility])    Esophageal dysmotility    Esophageal spasm    Generalized osteoarthritis of multiple sites    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[facility] )    Vitamin D deficiency    H/O tachycardia-bradycardia syndrome s/p PPM    At high risk for falls    Ambulatory dysfunction    CAD (coronary artery disease)    Adrenal insufficiency ([place]'s disease) ([facility])    Slow transit constipation    Medicare annual wellness visit, subsequent    OAB (overactive bladder)    Skin tear of left lower leg without complication    Dizziness and giddiness    [facility]us fracture [facility]       Past Medical History  Past Medical History:   Diagnosis Date    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[facility] )     Bradycardia     Coronary artery disease     Depression     Depression     Diabetes mellitus ([facility] )     Type 2 DM    Disease of thyroid gland     Essential (primary) hypertension     Facial droop     started 10 years ago    GERD (gastroesophageal reflux disease)     Heart murmur     History of echocardiogram 08/28/2017    2-D w/CFD:  EF 0 60 (60%), LVSF is normal  No regional wall abnormalities  Mild mitral valve regurg  AV was trileaflet  Moderate tricuspid regurg  Trace pulmonic valve  No pericardial effusion      History of echocardiogram 03/28/2018    2-D w/CFD:  EF 0 60 (60%), Mild regurg in the MV  AV was trileaflet, severe stenosis, trace regurg  Mild regurg in the TV  No pericardial effusion  Aortic root exhibited normal size   History of echocardiogram 01/29/2016    2-D:  EF 0 55 (55%), Normal LVSF  Mild concentric LVH  Mild MR  Mild AS with mild regurg   History of Holter monitoring     2/24/16, 9/27/17, 2/6/18    History of nuclear stress test 08/16/2017    Lexiscan MPI:  EF 0 76 (76%), no prior MI or ischemia,    Hyperlipidemia     Hypothyroidism     Nonrheumatic aortic (valve) stenosis     Obstructive sleep apnea (adult) (pediatric)     10/17/17 - NPSG with nasal CPAP/Bi-level titration     Osteoarthritis     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HCC)     SSS [facility]sick [facility]) ([facility]     SVT (supraventricular tachycardia) ([facility] )     Vitamin D deficiency        Past Surgical History  Past Surgical History:   Procedure Laterality Date    ANKLE SURGERY Left 2005   [facility]    CATARACT EXTRACTION Bilateral 09/1995    HEMORRHOID SURGERY  1978    HERNIA REPAIR  1990    INSERT / Naida Luis Armando / REMOVE PACEMAKER  05/14/2018    dual lead pacer st yony model 2272    JOINT REPLACEMENT      b/l hip replacements, right 2006    NEUROPLASTY / TRANSPOSITION MEDIAN NERVE AT CARPAL TUNNEL  1977    PARTIAL HYSTERECTOMY  1971    TOTAL HIP ARTHROPLASTY      TRANSPHENOIDAL / TRANSNASAL HYPOPHYSECTOMY / RESECTION PITUITARY TUMOR  1989    excision of pituitary gland        06/14/21 1124   PT Last Visit   PT Visit Date 06/14/21   Note Type   Note type Evaluation   Pain Assessment   Pain Assessment Tool 0-10   Pain Score 2   Pain Location/Orientation Orientation: Left; Location: Groin   Hospital Pain Intervention(s) Repositioned; Ambulation/increased activity; Emotional support   Home Living   Type of [address] One [address]; Laundry in basement;Stairs to enter with rails   Bathroom Shower/Tub Tub/shower unit   Bathroom Toilet Standard   Home Equipment Walker;Cane   Additional Comments Pt resides in [facility] w/ 3 MARCIA and FF to laundry in basement  Pt reports ambulating w/ SPC PTA  Pt reports she will be d/c to sister's home which is a [facility] w/ FFSU   Prior Function   Level of [place] Independent with ADLs and functional mobility; Needs assistance with IADLs   Lives With Alone   Receives Help From Family   ADL Assistance Independent   IADLs Needs assistance   Falls in the last 6 months 1 to 4   Comments Pt reports local son and sister assist w/ IADLs  Pt will be d/c to sister's house w/ 24/7 support   Restrictions/Precautions   Weight Bearing Precautions Per Order Yes   LLE Weight Bearing Per Order WBAT   Other Precautions Bed Alarm; Chair Alarm;Multiple lines;Telemetry; Fall Risk;Pain   General   Family/Caregiver Present No   Cognition   Overall Cognitive Status WFL   Arousal/Participation Alert   Attention Within functional limits   Orientation Level Oriented X4   Memory Decreased recall of precautions   Following Commands Follows one step commands without difficulty   Comments Pt pleasant and cooperative to work w/ therapy   RLE Assessment   RLE Assessment WFL   LLE Assessment   LLE Assessment WFL   Coordination   Movements are Fluid and Coordinated 1   Bed Mobility   Supine to Sit 5  Supervision   Additional items HOB elevated; Increased time required   Sit to Supine 4  Minimal assistance   Additional items Assist x 1;LE management   Additional Comments Pt lying supine upon PT arrival  Pt returned lying supine at end of session w/ all needs within reach and bed alarm activated   Transfers   Sit to Stand 5  Supervision   Additional items Verbal cues   Stand to Sit 5  Supervision   Additional items Verbal cues   Additional Comments Transfers w/ RW  Pt initially requiring supervision to ambulate  After ~30ft, pt reports feeling dizzy and required Min A to ambulate back to room  Pt returned supine and BP found to be 124/65   Ambulation/Elevation   Gait pattern Excessively slow; Short stride; Inconsistent zcak   Gait Assistance 4  Minimal assist Additional items Assist x 1;Verbal cues   Assistive Device Rolling walker   Distance 30ft x2  (limited 2' dizziness)   Stair Management Assistance   (limited 2' dizziness)   Balance   Static Sitting Fair +   Dynamic Sitting Fair +   Static Standing Fair   Dynamic Standing Fair -   Ambulatory Poor +   Endurance Deficit   Endurance Deficit Yes   Endurance Deficit Description weakness, fatigue, dizziness   Activity Tolerance   Activity Tolerance Patient limited by fatigue;Treatment limited secondary to medical complications (Comment)  (dizziness)   Medical Staff Made Aware OT Nya, CM; Co-evaluation performed w/ OT due to pt's multiple co-morbidities, clinically unstable presentation, and regression in functional impairments as compared to baseline  PT focused on transfers, mobility, and LE assessment   Nurse Made Aware yes   Assessment   Prognosis Good   Problem List Decreased strength;Decreased endurance; Impaired balance;Decreased mobility   Assessment Pt is 80 y o  female seen for PT evaluation s/p admit to [facility] on 6/13/2021 w/ Dizziness and giddiness  PT consulted to assess pt's functional mobility and d/c needs  Order placed for PT eval and tx, w/ up w/ A order  Comorbidities affecting pt's physical performance at time of assessment include: Type 2 DM, HTN, CHF, CAD, ambulatory dysfunction, pubic ramus fx, hx of CVA  PTA, pt was independent w/ all functional mobility w/ SPC and lives alone in one level house w/ 3 MARCIA  Pt reports she will be d/c to sister's home w/ FFSU and 24/7 support  Personal factors affecting pt at time of IE include: inaccessible home environment, ambulating w/ assistive device, stairs to enter home, inability to navigate community distances, positive fall history and inability to perform IADLs   Please find objective findings from PT assessment regarding body systems outlined above with impairments and limitations including weakness, impaired balance, decreased endurance, gait deviations, decreased activity tolerance, decreased functional mobility tolerance and fall risk  Pt performed bed mobility at supervision, STS at supervision, and ambulated 30ft x2 w/ RW  Pt initially requiring supervision and then became dizzy following 30ft of ambulation  Pt then required Min A to ambulate 30ft back to room  Pt returned supine and /65  The following objective measures performed on IE also reveal limitations: Barthel Index: 52/790 and AM-PAC 6-Clicks: 14/01  Pt's clinical presentation is currently unstable/unpredictable seen in pt's presentation of recent admission for dizziness requiring medical attention, recent decline in function as compared to baseline, multiple lines, fall risk, dizziness upon ambulation  Pt to benefit from continued PT tx to address deficits as defined above and maximize level of functional independent mobility and consistency  From PT/mobility standpoint, recommendation at time of d/c would be home with home health rehabilitation pending progress in order to facilitate return to PLOF   Barriers to Discharge Inaccessible home environment   Goals   Patient Goals to feel better   [facility] Expiration Date 06/28/21   Short Term Goal #1 1  Pt will demonstrate the ability to perform all aspects of bed mobility at Mod I in onder to maximize functional independence and decrease burden on caregivers  2 Pt will demonstrate the ability to perform all functional transfers at Mod I in order to maximize functional independence and decrease burden on caregivers  3 Pt will demonstrate the ability to ambulate at least 150ft with least restrictive device at Mod I in order to maximize functional independence  4 Pt will demonstrate the ability to negotiate 3 steps at Mod I in order to return to household/community mobility  5 Pt will demonstrate improved balance by one grade in order to decrease risk of falls   6 Pt will increase b/l LE strength by 1 grade in order to increase ease of functional mobility and transfers   PT Treatment Day 0   Plan   Treatment/Interventions Functional transfer training;LE strengthening/ROM; Elevations; Therapeutic exercise; Endurance training;Patient/family training;Equipment eval/education; Bed mobility;Gait training;Spoke to nursing   PT Frequency   (3-5x/week)   Recommendation   PT Discharge Recommendation Home with home health rehabilitation   Equipment Recommended Walker   PT - OK to Discharge No   Additional Comments Pt requires more ambulation prior to d/c   AM-PAC Basic Mobility Inpatient   Turning in Bed Without Bedrails 4   Lying on Back to Sitting on Edge of Flat Bed 3   Moving Bed to Chair 3   Standing Up From Chair 4   Walk in Room 3   Climb 3-5 Stairs 3   Basic Mobility Inpatient Raw Score 20   Basic Mobility Standardized Score 43 99   Modified Beltrami Scale   Modified Beltrami Scale 4   Barthel Index   Feeding 10   Bathing 0   Grooming Score 5   Dressing Score 5   Bladder Score 10   Bowels Score 10   Toilet Use Score 5   Transfers (Bed/Chair) Score 10   Mobility (Level Surface) Score 0   Stairs Score 0   Barthel Index Score 55     The patient's AM-PAC Basic Mobility Inpatient Short Form Raw Score is 20, Standardized Score is 43 99  A standardized score greater than 42 9 suggests the patient may benefit from discharge to home  Please also refer to the recommendation of the Physical Therapist for safe discharge planning      Janice Douglass, PT, DPT

## 2021-06-14 NOTE — H&P
[address] [facility]  H&P- Naren Roca 1933, 80 y o  female MRN: [id number]  Unit/Bed#: [facility] 726-01 Encounter: [id number]  Primary Care Provider: Severo Elliott DO   Date and time admitted to hospital: 6/13/2021  8:14 PM    * Dizziness and giddiness  Assessment & Plan  Dizziness giddiness disequilibrium gait instability   Vertiginous symptoms   Case was discussed with Neurology by ER physician at [address] [place] [address] Drive recommended transfer to [place] for evaluation  Neurology following   Physical therapy    Pubic ramus fracture [facility])  Assessment & Plan   Imaging reveals left superior pubic ramus fracture   analgesics    request orthopedic input   Physical therapy    CAD (coronary artery disease)  Assessment & Plan   Continue aspirin beta-blocker statin    Ambulatory dysfunction  Assessment & Plan   Ambulatory dysfunction   Safe ambulation  Fall precautions  Physical therapy    At high risk for falls  Assessment & Plan   Patient at high risk for falls   Fall precautions  Physical therapy     H/O tachycardia-bradycardia syndrome s/p PPM  Assessment & Plan   Status post ppm noted    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[address] )  Assessment & Plan   Patient with history of acromegaly status post Pitutary surgery in 1989   Patient is on replacement levothyroxine 75 mcg daily, prednisone 5 mg p o  daily   Follows Dr Hayde Asencio   outpatient endocrinology input noted    Chronic diastolic congestive heart failure [facility])  Assessment & Plan  Wt Readings from Last 3 Encounters:   06/13/21 58 kg (127 lb 13 9 oz)   03/18/21 58 5 kg (129 lb)   03/17/21 58 5 kg (129 lb)      continue metoprolol 25 mg q 8 hours    monitor I/ O, daily weights  Outpatient follow-up with Cardiology    Hypothyroidism  Assessment & Plan   Continue levothyroxine 75 mg p o  daily  Outpatient endocrinology input noted    Hypertension  Assessment & Plan   Monitor blood pressures  Avoid hypotension    Hyperlipidemia  Assessment & Plan   Continue statin    Uncontrolled type 2 diabetes mellitus with hyperglycemia (HCC)  Assessment & Plan  Lab Results   Component Value Date    HGBA1C 10 8 (A) 03/17/2021       Recent Labs     06/13/21  1027   POCGLU 279*       Blood Sugar Average: Last 72 hrs:     Metformin on hold while inpatient   Continue Prandin - will have her on 2 mg t i d  with meals while inpatient     discussed with the patient as well as son -  They adamantly refuse insulin therapy while inpatient     monitor Accu-Cheks  Avoid hypoglycemia  Hypoglycemia protocol in place        VTE Prophylaxis: Enoxaparin (Lovenox)  / sequential compression device   Code Status: Full code  POLST: There is no POLST form on file for this patient (pre-hospital)  Discussion with family:  Discussed with the patient, called and updated son Maye Vital and granddaughter Bharti Da Silva requests regular updates    Anticipated Length of Stay:  Patient will be admitted on an Inpatient basis with an anticipated length of stay of   More than 2 midnights  Justification for Hospital Stay:  Falls, gait instability dizziness for Neurology evaluation and management as outlined      Chief Complaint:        transfer from [facility] for further management of dizziness gait instability       History of Present Illness:    Krystyna Patel is a 80 y o  female who presents with Transfer from [address] [place] [address] for management , Neurology evaluation of dizziness and gait instability  patient reports for the last couple of days she has been feeling dizzy " I feel a little off", " sometimes spinning sensation"  She also reports Gait instability and disequilibrium  Patient usually ambulates with a cane, she reports gait has been getting worse last few days  She also reports episodes of falls  She has bruises on her left shoulder  She denied focal weakness sensory symptoms or speech abnormalities    Her granddaughter was visiting today noted the dizziness called EMS, patient was taken to the ER at [facility]       She was evaluated by the physician at [facility], her symptomatology and clinical features of discussed with on-call Neurology Dr Loan Mittal who recommended transferred to [place] for further management including imaging studies as needed  History is obtained from the patient  History is also obtained from review of chart  Outpatient endocrinology Cardiology primary care physician input noted  Called and discussed with son Opal Youngblood as well as granddaughter Faustino Oscar -  History reviewed  granddaughter Faustino Oscar  Reports episodes of forgetfulness, sometimes not oriented to time  Review of Systems:    Review of Systems   All other systems reviewed and are negative  Past Medical and Surgical History:     Past Medical History:   Diagnosis Date    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[address] )     Bradycardia     Coronary artery disease     Depression     Depression     Diabetes mellitus (HCC)     Type 2 DM    Disease of thyroid gland     Essential (primary) hypertension     Facial droop     started 10 years ago    GERD (gastroesophageal reflux disease)     Heart murmur     History of echocardiogram 08/28/2017    2-D w/CFD:  EF 0 60 (60%), LVSF is normal  No regional wall abnormalities  Mild mitral valve regurg  AV was trileaflet  Moderate tricuspid regurg  Trace pulmonic valve  No pericardial effusion   History of echocardiogram 03/28/2018    2-D w/CFD:  EF 0 60 (60%), Mild regurg in the MV  AV was trileaflet, severe stenosis, trace regurg  Mild regurg in the TV  No pericardial effusion  Aortic root exhibited normal size   History of echocardiogram 01/29/2016    2-D:  EF 0 55 (55%), Normal LVSF  Mild concentric LVH  Mild MR  Mild AS with mild regurg      History of Holter monitoring     2/24/16, 9/27/17, 2/6/18    History of nuclear stress test 08/16/2017    Lexiscan MPI:  EF 0 76 (76%), no prior MI or ischemia,    Hyperlipidemia     Hypothyroidism     Nonrheumatic aortic (valve) stenosis     Obstructive sleep apnea (adult) (pediatric)     10/17/17 - NPSG with nasal CPAP/Bi-level titration     Osteoarthritis     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HCC)     SSS [facility]ick [facility] syndrome) ([facility])     SVT (supraventricular tachycardia) ([facility] )     Vitamin D deficiency        Past Surgical History:   Procedure Laterality Date    ANKLE SURGERY Left 2005   [address] [facility] [address]    CATARACT EXTRACTION Bilateral 09/1995    HEMORRHOID SURGERY  1978    HERNIA REPAIR  1990    INSERT / REPLACE / REMOVE PACEMAKER  05/14/2018    dual lead pacer st yony model 2272    JOINT REPLACEMENT      b/l hip replacements, right 2006    NEUROPLASTY / TRANSPOSITION MEDIAN NERVE AT CARPAL TUNNEL  1977    PARTIAL HYSTERECTOMY  1971    TOTAL HIP ARTHROPLASTY      TRANSPHENOIDAL / TRANSNASAL HYPOPHYSECTOMY / RESECTION PITUITARY TUMOR  1989    excision of pituitary gland       Meds/Allergies:    Prior to Admission medications    Medication Sig Start Date End Date Taking?  Authorizing Provider   aspirin 81 MG tablet Take 1 tablet by mouth daily    Historical Provider, MD   Blood Glucose Monitoring Suppl (ONE TOUCH ULTRA MINI) w/Device KIT USE TO TEST 4 TIMES DAILY 12/7/18   Historical Provider, MD   ciclopirox (PENLAC) 8 % solution Apply topically daily at bedtime 7/10/20   Briana Saba, DO   ciclopirox [facility]) 8 % solution Apply topically daily at bedtime 3/17/21   Briana Saba, DO   Contour Next Test test strip Use as instructed UP TO FOUR TIMES DAILY  4/5/21   Briana Saba, DO   ergocalciferol (VITAMIN D2) 50,000 units Take 1 capsule (50,000 Units total) by mouth once a week 4/5/21   Briana Saba, DO   esomeprazole (NexIUM) 20 mg capsule Take 1 capsule (20 mg total) by mouth daily in the early morning 7/14/20   Briana Saba, DO   furosemide (LASIX) 20 mg tablet Take one every other day  Patient not taking: Reported on 3/17/2021 6/19/20   Natasha Medico Douglas,    lactulose 20 g/30 mL Take 15 mL (10 g total) by mouth 2 (two) times a day  Patient not taking: Reported on 3/17/2021 8/12/20   Pepito Montano DO   levothyroxine 75 mcg tablet Take 1 tablet (75 mcg total) by mouth daily in the early morning 7/14/20   Pepito Montano DO   linaCLOtide (Linzess) 145 MCG CAPS Take 1 capsule (145 mcg total) by mouth daily 2/3/21   Pepito Montano DO   metFORMIN (GLUCOPHAGE) 500 mg tablet Take 1 tablet (500 mg total) by mouth 2 (two) times a day with meals 7/14/20   Pepito Montano DO   metoprolol tartrate (LOPRESSOR) 25 mg tablet Take 1 tablet (25 mg total) by mouth 3 (three) times a day 7/14/20   Pepito Montano DO   ONE TOUCH ULTRA TEST test strip 4 (four) times a day Test 12/7/18   Historical Provider, MD Erich Negrete LANCETS FINE MISC 4 (four) times a day Test 12/7/18   Historical Provider, MD   oxybutynin (DITROPAN-XL) 10 MG 24 hr tablet TAKE ONE (1) TABLET BY MOUTH TWICE A DAY 4/5/21   Pepito Montano DO   pravastatin (PRAVACHOL) 20 mg tablet Take 1 tablet (20 mg total) by mouth daily 4/5/21   Pepito Montano DO   predniSONE 5 mg tablet Take 1 tablet (5 mg total) by mouth daily 7/14/20   Pepito Montano DO   repaglinide (PRANDIN) 2 mg tablet Take 2 tablets (4 mg total) by mouth 3 (three) times a day before meals 7/14/20 3/18/21  Pepito Montano DO   levothyroxine 50 mcg tablet Take 1 tablet (50 mcg total) by mouth daily in the early morning  Patient not taking: Reported on 3/17/2021 11/23/19 6/13/21  PRESTON Barros   metoprolol tartrate (LOPRESSOR) 25 mg tablet TAKE ONE AND A HALF TABLETS BY MOUTH EVERY 12 HOURS  Patient not taking: Reported on 3/17/2021 7/14/20 6/13/21  Pepito Montano DO     I have reviewed home medications with patient family member  Allergies: Allergies   Allergen Reactions    Cephalosporins GI Intolerance    Levemir [Insulin Detemir]      Brittle diabetic  Hypersensitive to insulin   Severe hypoglycemia results if given insulin  Please speak with son, Christina Graff, before giving ANY type of insulin   Acetaminophen     Cephalexin GI Intolerance    Clindamycin      Other reaction(s): Unspecified    Demeclocycline     Hydrocodone      Bitartrate  Other reaction(s): Unspecified    Hydrocodone-Acetaminophen Nausea Only    Oxycodone-Acetaminophen      HCL    Oxycodone-Acetaminophen     Penicillins Hives     Other reaction(s): Other (See Comments)  ITCHING/RASH    Simvastatin GI Intolerance    Sulfamethoxazole-Trimethoprim     Tetrabenazine     Tetracycline Nausea Only    Tetracyclines & Related     Vicodin [Hydrocodone-Acetaminophen]        Social History:     Marital Status:    Occupation:   Patient Pre-hospital Living Situation: home  Patient Pre-hospital Level of Mobility:   Ambulatory dysfunction  Patient Pre-hospital Diet Restrictions: no  Substance Use History:   Social History     Substance and Sexual Activity   Alcohol Use Not Currently     Social History     Tobacco Use   Smoking Status Never Smoker   Smokeless Tobacco Never Used     Social History     Substance and Sexual Activity   Drug Use No       Family History:    Family History   Problem Relation Age of Onset    Other Father         coal worker's pneumoconiosis    Heart disease Father     Diabetes Brother     Heart disease Brother     Prostate cancer Brother     Stomach cancer Maternal Grandmother     Diabetes Paternal Aunt        Physical Exam:     Vitals:      temperature 97 6°   Pulse 60 per minute   Respiratory rate 14-16 Per minute   blood pressure 124/66    Physical Exam     patient is comfortably sitting up in bed   Left shoulder bruise noted   Neck supple   Lungs diminished breath sounds bilaterally   Heart sounds S1-S2 noted   Abdomen soft nontender   Awake obeys simple commands  Pulses noted   No pedal edema   No rash    Additional Data:     Lab Results: I have personally reviewed pertinent reports        Results from last 7 days   Lab Units 06/13/21  1001   WBC Thousand/uL 7 21   HEMOGLOBIN g/dL 10 0*   HEMATOCRIT % 33 2*   PLATELETS Thousands/uL 246     Results from last 7 days   Lab Units 06/13/21  1001   SODIUM mmol/L 137   POTASSIUM mmol/L 4 1   CHLORIDE mmol/L 102   CO2 mmol/L 29   BUN mg/dL 21   CREATININE mg/dL 0 90   ANION GAP mmol/L 6   CALCIUM mg/dL 8 7   GLUCOSE RANDOM mg/dL 307*     Results from last 7 days   Lab Units 06/13/21  1001   INR  0 93     Results from last 7 days   Lab Units 06/13/21  1027   POC GLUCOSE mg/dl 279*               Imaging: I have personally reviewed pertinent reports  EKG, Pathology, and Other Studies Reviewed on Admission:   · EKG:  Paced rhythm    Allscripts / Epic Records Reviewed: Yes     ** Please Note: This note has been constructed using a voice recognition system   **

## 2021-06-14 NOTE — ASSESSMENT & PLAN NOTE
Patient with history of acromegaly status post Pitutary surgery in 1989   Patient is on replacement levothyroxine 75 mcg daily, prednisone 5 mg p o  daily   Follows Dr Kaushal Conti   outpatient endocrinology input noted

## 2021-06-14 NOTE — ASSESSMENT & PLAN NOTE
· 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  On chronic levothyroxine 75 mcg daily and prednisone 5 mg daily    · Plan:  · Continue current medication regimen

## 2021-06-14 NOTE — ASSESSMENT & PLAN NOTE
Lab Results   Component Value Date    HGBA1C 11 6 (H) 06/14/2021       Recent Labs     06/13/21  1027 06/14/21  0624 06/14/21  1045   POCGLU 279* 75 203*       Blood Sugar Average: Last 72 hrs:  (P) 139   Metformin on hold while inpatient - increase Metformin dose at discharge given uncontrolled DM  Continue Prandin - will have her on 2 mg t i d  with meals while inpatient     discussed with the patient as well as son -  They adamantly refuse insulin therapy while inpatient     monitor Accu-Cheks  Avoid hypoglycemia  Hypoglycemia protocol in place

## 2021-06-14 NOTE — WOUND OSTOMY CARE
Consult Note - Wound   Melody Luci 80 y o  female MRN: [id number]  Unit/Bed#: [facility] 726-01 Encounter: [id number]      History and Present Illness:  Patient is seen for skin assessment  Patient examined in bed and is able to turn with minimal assistance for full exam  Patient is incontinent  Patient has a wound on her right leg  She states that she has had this for over a month  She has been seeing her podiatrist for this wound  Assessment Findings:   1  Right leg abrasion with dry scab      See flowsheets for details      Skin care plans:  1-Hydraguard to bilateral sacrum, buttock and heels BID and PRN  2-Elevate heels to offload pressure  3-Ehob cushion in chair when out of bed  4-Moisturize skin daily with skin nourishing cream   5-Turn/reposition q2h or when medically stable for pressure re-distribution on skin  6-Apply 3M skin protectant to right leg scab daily       Call or tigertext with any questions  Wound Care will continue to follow    Wound 06/14/21 Traumatic Abrasion(s) (Active)   Wound Description Clean;Dry; Intact; Brown 06/14/21 1400   Saritha-wound Assessment Clean;Dry; Intact 06/14/21 1400   Wound Length (cm) 1 cm 06/14/21 1400   Wound Width (cm) 1 cm 06/14/21 1400   Wound Surface Area (cm^2) 1 cm^2 06/14/21 1400   Dressing Protective barrier 06/14/21 1400

## 2021-06-14 NOTE — ASSESSMENT & PLAN NOTE
Dizziness giddiness disequilibrium gait instability   Vertiginous symptoms   Case was discussed with Neurology by ER physician at [address] [place] [address] Drive recommended transfer to [place] for evaluation  Neurology following   Physical therapy

## 2021-06-14 NOTE — ASSESSMENT & PLAN NOTE
· Patient presents with dizziness, described as lightheadedness, and unstable gait  Lightheadedness was present when getting up and exacerbated with ambulation  Present for the last few days  In the setting of dehydration, hyperglycemia, medication nonadherence, and inadequate sleep  Reportedly fell x2 in the last couple of days, with bruising on shoulder and hip on left side  · Etiology of the presentation likely in the setting of the above mentioned factors  · Education provided to patient, grand-daughter, and son regarding sleep hygiene and medication adherence  · Orthostatic BP measurement negative but not enough time elapsed between BP measurements    · Plan:  · Obtain MRI head without contrast  If unable to obtain in timely manner, will repeat CT of head without contrast   · F/u echocardiogram read  · Continue adequate fluid intake  · Continue PT/OT  · Fall precautions

## 2021-06-14 NOTE — ASSESSMENT & PLAN NOTE
· XRAY shows pubic rami fractures, unclear date  Hx of likely recent falls  Repeat XRAY on 6/14 shows the same fracture that was also present in 2015  · Ortho consulted and recommend PT/OT and outpatient follow up  Weight bearing as tolerated  · Patient has been doing PT/OT with recommendations for home health OT and rehab    · Plan:  · Continue PT/OT  · Management per Primary Team

## 2021-06-14 NOTE — PLAN OF CARE
Problem: OCCUPATIONAL THERAPY ADULT  Goal: Performs self-care activities at highest level of function for planned discharge setting  See evaluation for individualized goals  Description: self care tasks  Note: Limitation: Decreased ADL status, Decreased endurance, Decreased self-care trans, Decreased high-level ADLs  Prognosis: Fair  Assessment: Pt is a 80 y o  female who was admitted [facility] [address] [facility] to [facility] on 6/13/2021 with Dizziness and giddiness , pubis ramus fracture, CAD, ambulatory dysfunction, high fall risk, panhypopituitarism, HTN, hyperlipidemia, diabetes, CHF,   Pt's problem list also includes PMH of  has a past medical history of 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[facility] ), Bradycardia, Coronary artery disease, Depression, Depression, Diabetes mellitus ([address] ), Disease of thyroid gland, Essential (primary) hypertension, Facial droop, GERD (gastroesophageal reflux disease), Heart murmur, History of echocardiogram (08/28/2017), History of echocardiogram (03/28/2018), History of echocardiogram (01/29/2016), History of Holter monitoring, History of nuclear stress test (08/16/2017), Hyperlipidemia, Hypothyroidism, Nonrheumatic aortic (valve) stenosis, Obstructive sleep apnea (adult) (pediatric), Osteoarthritis, 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[facility] ), SSS (sick sinus syndrome) ([facility] ), SVT (supraventricular tachycardia) ([facility] ), and Vitamin D deficiency    At baseline pt was completing I with ADL's/assistance with IALD's, +SpC with functional ambulation  Pt lives alone in a [facility] with 3STE and laundry in basement (sister assists)  Currently pt requires set-up UB, min a LB bathing, max a dressing (uses AD at baseline) for overall ADLS and min a with RW for functional mobility/transfers  Pt currently presents with impairments in the following categories -steps to enter environment, difficulty performing ADLS and difficulty performing IADLS  activity tolerance, endurance, standing balance/tolerance and memory   These impairments, as well as pt's fatigue, pain, WBS , decreased caregiver support and risk for falls  limit pt's ability to safely engage in all baseline areas of occupation, includingbathing, dressing, toileting, functional mobility/transfers, community mobility, laundry , house maintenance, medication management, social participation  and leisure activities  The patient's raw score on the AM-PAC Daily Activity inpatient short form is 20, standardized score is 42 03, greater than 39 4  Patients at this level are likely to benefit from discharge to home  Please refer to the recommendation of the Occupational Therapist for safe discharge planning  From OT standpoint, recommend home with increased support ([facility] per pt report upon D/C  OT will continue to follow to address the below stated goals

## 2021-06-14 NOTE — CONSULTS
Orthopedics   Kira Willis 80 y o  female MRN: [id number]  Unit/Bed#: [facility] 726-01      Chief Complaint:   left anterior pelvis pain    HPI:   80 y o  female ambulates with cane complaining or left groin pain yesterday at home  This morning she denies significant discomfort  Pain was worse with motion, improved at rest  She has hx of BL JODEE done multiple years ago (Right by Dr Tani Anderson in 2006, and left by Dr Daphne Whitt in 2013)  She was transferred from [place] for stroke workup  She has history of prior strokes, CHF, CAD, hypothyroid and DM2 w last a1c 10 8  She does not recall recent falls    Review Of Systems:   · Skin: Normal  · Neuro: See HPI  · Musculoskeletal: See HPI  · 14 point review of systems negative except as stated above     Past Medical History:   Past Medical History:   Diagnosis Date    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[facility] [address] )     Bradycardia     Coronary artery disease     Depression     Depression     Diabetes mellitus ([facility] [address] [facility] )     Type 2 DM    Disease of thyroid gland     Essential (primary) hypertension     Facial droop     started 10 years ago    GERD (gastroesophageal reflux disease)     Heart murmur     History of echocardiogram 08/28/2017    2-D w/CFD:  EF 0 60 (60%), LVSF is normal  No regional wall abnormalities  Mild mitral valve regurg  AV was trileaflet  Moderate tricuspid regurg  Trace pulmonic valve  No pericardial effusion   History of echocardiogram 03/28/2018    2-D w/CFD:  EF 0 60 (60%), Mild regurg in the MV  AV was trileaflet, severe stenosis, trace regurg  Mild regurg in the TV  No pericardial effusion  Aortic root exhibited normal size   History of echocardiogram 01/29/2016    2-D:  EF 0 55 (55%), Normal LVSF  Mild concentric LVH  Mild MR  Mild AS with mild regurg      History of Holter monitoring     2/24/16, 9/27/17, 2/6/18    History of nuclear stress test 08/16/2017    Lexiscan MPI:  EF 0 76 (76%), no prior MI or ischemia,    Hyperlipidemia     Hypothyroidism  Nonrheumatic aortic (valve) stenosis     Obstructive sleep apnea (adult) (pediatric)     10/17/17 - NPSG with nasal CPAP/Bi-level titration     Osteoarthritis     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HCC)     SSS (sick sinus syndrome) ([facility])     SVT (supraventricular tachycardia) ([facility] [address] [facility]     Vitamin D deficiency        Past Surgical History:   Past Surgical History:   Procedure Laterality Date    [address]KLE SURGERY Left 2005   [address]    CATARACT EXTRACTION Bilateral 09/1995    HEMORRHOID SURGERY  1978    HERNIA REPAIR  1990    INSERT / REPLACE / REMOVE PACEMAKER  05/14/2018    dual lead pacer st yony model 2272    JOINT REPLACEMENT      b/l hip replacements, right 2006    NEUROPLASTY / TRANSPOSITION MEDIAN NERVE AT CARPAL TUNNEL  1977    PARTIAL HYSTERECTOMY  1971    TOTAL HIP ARTHROPLASTY      TRANSPHENOIDAL / TRANSNASAL HYPOPHYSECTOMY / RESECTION PITUITARY TUMOR  1989    excision of pituitary gland       Family History:  Family history reviewed and non-contributory  Family History   Problem Relation Age of Onset    Other Father         coal worker's pneumoconiosis    Heart disease Father     Diabetes Brother     Heart disease Brother     Prostate cancer Brother     Stomach cancer Maternal Grandmother     Diabetes Paternal Aunt        Social History:  Social History     Socioeconomic History    Marital status:      Spouse name: Not on file    Number of children: Not on file    Years of education: Not on file    Highest education level: Not on file   Occupational History    Not on file   Tobacco Use    Smoking status: Never Smoker    Smokeless tobacco: Never Used   Vaping Use    Vaping Use: Never used   Substance and Sexual Activity    Alcohol use: Not Currently    Drug use: No    Sexual activity: Never   Other Topics Concern    Not on file   Social History Narrative    Caffeine use    Dental care regularly    Good dental hygiene    Uses safety equipment: seatbelts     Social Determinants of Health     Financial Resource Strain:     Difficulty of Paying Living Expenses:    Food Insecurity:     Worried About Running Out of Food in the Last Year:     [address] in the Last Year:    Transportation Needs:     Lack of Transportation (Medical):  Lack of Transportation (Non-Medical):    Physical Activity:     Days of Exercise per Week:     Minutes of Exercise per Session:    Stress:     Feeling of Stress :    Social Connections:     Frequency of Communication with Friends and Family:     Frequency of Social Gatherings with Friends and Family:     Attends Jew Services:     Active Member of Clubs or Organizations:     Attends Club or Organization Meetings:     Marital Status:    Intimate Partner Violence:     Fear of Current or Ex-Partner:     Emotionally Abused:     Physically Abused:     Sexually Abused: Allergies: Allergies   Allergen Reactions    Cephalosporins GI Intolerance    Levemir [Insulin Detemir]      Brittle diabetic  Hypersensitive to insulin  Severe hypoglycemia results if given insulin  Please speak with son, Génesis Kuo, before giving ANY type of insulin   Acetaminophen     Cephalexin GI Intolerance    Clindamycin      Other reaction(s): Unspecified    Demeclocycline     Hydrocodone      Bitartrate  Other reaction(s): Unspecified    Hydrocodone-Acetaminophen Nausea Only    Oxycodone-Acetaminophen      HCL    Oxycodone-Acetaminophen     Penicillins Hives     Other reaction(s):  Other (See Comments)  ITCHING/RASH    Simvastatin GI Intolerance    Sulfamethoxazole-Trimethoprim     Tetrabenazine     Tetracycline Nausea Only    Tetracyclines & Related     Vicodin [Hydrocodone-Acetaminophen]            Labs:  0   Lab Value Date/Time    HCT 34 3 (L) 06/14/2021 0444    HCT 33 2 (L) 06/13/2021 1001    HCT 34 8 10/28/2020 1427    HCT 40 1 05/14/2018 1948    HCT 39 7 05/14/2018 0530    HCT 38 1 05/13/2018 0510    HGB 10 4 (L) 06/14/2021 0444    HGB 10 0 (L) 06/13/2021 1001    HGB 10 8 (L) 10/28/2020 1427    HGB 12 7 05/14/2018 1948    HGB 12 8 05/14/2018 0530    HGB 12 7 05/13/2018 0510    INR 0 93 06/13/2021 1001    INR 0 94 05/11/2018 1550    WBC 7 41 06/14/2021 0444    WBC 7 21 06/13/2021 1001    WBC 7 14 10/28/2020 1427    WBC 5 5 05/14/2018 1948    WBC 7 5 05/14/2018 0530    WBC 8 9 05/13/2018 0510       Meds:    Current Facility-Administered Medications:     aluminum-magnesium hydroxide-simethicone (MYLANTA) oral suspension 30 mL, 30 mL, Oral, Q6H PRN, Ivis Talley MD    aspirin chewable tablet 81 mg, 81 mg, Oral, Daily, Ivis Talley MD    docusate sodium (COLACE) capsule 100 mg, 100 mg, Oral, BID, Ivis Talley MD    enoxaparin (LOVENOX) subcutaneous injection 40 mg, 40 mg, Subcutaneous, Daily, Ivis Talley MD    ergocalciferol (VITAMIN D2) capsule 50,000 Units, 50,000 Units, Oral, Weekly, Ivis Talley MD, 50,000 Units at 06/14/21 0019    lactulose oral solution 10 g, 10 g, Oral, BID, Ivis Talley MD, 10 g at 06/13/21 2252    levothyroxine tablet 75 mcg, 75 mcg, Oral, Early Morning, Ivis Talley MD    lubiprostone (AMITIZA) capsule 24 mcg, 24 mcg, Oral, BID, Ivis Talley MD    metoprolol tartrate (LOPRESSOR) tablet 25 mg, 25 mg, Oral, TID, Ivis Talley MD, 25 mg at 06/13/21 2252    moisture barrier miconazole 2% cream (aka ILIANA MOISTURE BARRIER ANTIFUNGAL CREAM), , Topical, BID, Ivis Talley MD    [facility]etron [facility]) injection 4 mg, 4 mg, Intravenous, Q6H PRN, Ivis Talley MD    oxybutynin (DITROPAN-XL) 24 hr tablet 10 mg, 10 mg, Oral, HS, Ivis Talley MD, 10 mg at 06/13/21 2252    pantoprazole (PROTONIX) EC tablet 20 mg, 20 mg, Oral, Early Morning, Ivis Talley MD    polyethylene glycol (MIRALAX) packet 17 g, 17 g, Oral, Daily, Ivis Talley MD    pravastatin (PRAVACHOL) tablet 20 mg, 20 mg, Oral, Daily, Lupe Nance MD    predniSONE tablet 5 mg, 5 mg, Oral, Daily, Lupe Nance MD    repaglinide (PRANDIN) tablet 2 mg, 2 mg, Oral, TID AC, Lupe Nance MD    Blood Culture:   No results found for: BLOODCX    Wound Culture:   No results found for: WOUNDCULT    Ins and Outs:  No intake/output data recorded  Physical Exam:   /62   Pulse 89   Temp 98 1 °F (36 7 °C) (Oral)   Resp 18   Ht 5' 2" (1 575 m)   Wt 58 kg (127 lb 13 9 oz)   BMI 23 39 kg/m²   Gen: Alert and oriented to person, place, time  HEENT: EOMI, eyes clear, moist mucus membranes, hearing intact  Respiratory: Bilateral chest rise  No audible wheezing found  Cardiovascular: Regular Rate and Rhythm  Abdomen: soft nontender/nondistended  Musculoskeletal: left lower extremity  · Skin intact  · Non tender to palpation over anterior pelvis  · Leg lengths equal  · Sensation intact L3-S1  · Positive knee flexion/extension, ankle dorsi/plantar flexion, EHL/FHL  Able to SLR w/o discomfort  · 2+P pulse    Radiology:   I personally reviewed the films  X-rays pelvis shows left superior pubic rami fracture, previous hip replacement visualized, appears in acceptable position although no previous imaging available at this time    _*_*_*_*_*_*_*_*_*_*_*_*_*_*_*_*_*_*_*_*_*_*_*_*_*_*_*_*_*_*_*_*_*_*_*_*_*_*_*_*_*    Assessment:  80 y o  female with left superior pubic rami fracture on imaging, unclear acuity       Plan:   · Weight bearing as tolerated  · Analgesics for pain  · DVT ppx  · PT/OT  · Follow up with surgeon of record   · Dispo: Ortho signing off    Pedro Sanchez MD

## 2021-06-14 NOTE — CASE MANAGEMENT
LOS 1   Not a 30 day readmission  Not a bundle  Unplanned readmission risk color- Green  CM met pt at bedside, introduce self and made aware of CM role at dc  Pt has a LW and POA  POA is her son Patience Sevilla- [phone]  Pt lives alone in a 1 story house with 3 MARCIA with railings  Pt was IPTA with all ADL's, does not drive and retired  Pt's son transport pt to her appts  Pt uses an [facility] for ambulation  Pt reported that she has hx with STR in [facility]  Pt denies hx with HHc, alc, drug and IP psych tx  PCP is dr Drew Serrano  Pt uses [facility] in [place]  Pt's sister will transport pt when dc  CM informed pt and pt's son that therapy's recommendation is home pt and ot with increase family support  Pt refused HHC and commode recommendation d/t that she will be staying at her sister's house when dc  CM offered pt to find HHc at her sister's address but pt refused still  CM reviewed d/c planning process including the following: identifying help at home, patient preference for d/c planning needs, Discharge Lounge, [facility] Meds to Bed program, availability of treatment team to discuss questions or concerns patient and/or family may have regarding understanding medications and recognizing signs and symptoms once discharged  CM also encouraged patient to follow up with all recommended appointments after discharge  Patient advised of importance for patient and family to participate in managing patients medical well being

## 2021-06-14 NOTE — ASSESSMENT & PLAN NOTE
Lab Results   Component Value Date    HGBA1C 11 6 (H) 06/14/2021       Recent Labs     06/14/21  1045 06/14/21  1553 06/14/21  2053 06/15/21  0623   POCGLU 203* 382* 337* 166*       Blood Sugar Average: Last 72 hrs:  (P) 232 6   · Pt is brittle diabetic, and insulin administration can lead to severe hypoglycemia    · On repaglinide 2 mg TID before meals at home  · Plan:  · Continue per primary team

## 2021-06-14 NOTE — ASSESSMENT & PLAN NOTE
Patient with history of acromegaly status post Pitutary surgery in 1989   Patient is on replacement levothyroxine 75 mcg daily, prednisone 5 mg p o  daily   Follows Dr Gomez Done   outpatient endocrinology input noted

## 2021-06-14 NOTE — ASSESSMENT & PLAN NOTE
Wt Readings from Last 3 Encounters:   06/13/21 58 kg (127 lb 13 9 oz)   03/18/21 58 5 kg (129 lb)   03/17/21 58 5 kg (129 lb)      continue metoprolol 25 mg q 8 hours    monitor I/ O, daily weights  Outpatient follow-up with Cardiology

## 2021-06-14 NOTE — PROGRESS NOTES
[facility]  Progress Note - Ermelinda Aschoff 1933, 80 y o  female MRN: [id number]  Unit/Bed#: [facility] 726-01 Encounter: [id number]  Primary Care Provider: Akash Leblanc DO   Date and time admitted to hospital: 6/13/2021  8:14 PM    * Dizziness and giddiness  Assessment & Plan  Dizziness giddiness disequilibrium gait instability   Vertiginous symptoms   Case was discussed with Neurology by ER physician at [address] [place] [address] Drive recommended transfer to [place] for evaluation  Neurology following   Physical therapy  MRI pending  Pacemaker is compatible    Pubic ramus fracture [facility])  Assessment & Plan   Imaging reveals left superior pubic ramus fracture   analgesics   Ortho consult appreciated - WBAT and pain control, they signed off   Physical therapy - home PT, patient declines and will go to her sister's home       Uncontrolled type 2 diabetes mellitus with hyperglycemia [facility])  Assessment & Plan  Lab Results   Component Value Date    HGBA1C 11 6 (H) 06/14/2021       Recent Labs     06/13/21  1027 06/14/21  0624 06/14/21  1045   POCGLU 279* 75 203*       Blood Sugar Average: Last 72 hrs:  (P) 139   Metformin on hold while inpatient - increase Metformin dose at discharge given uncontrolled DM  Continue Prandin - will have her on 2 mg t i d  with meals while inpatient     discussed with the patient as well as son -  They adamantly refuse insulin therapy while inpatient     monitor Accu-Cheks  Avoid hypoglycemia  Hypoglycemia protocol in place      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[facility])  Assessment & Plan   Patient with history of acromegaly status post Pitutary surgery in 1989   Patient is on replacement levothyroxine 75 mcg daily, prednisone 5 mg p o  daily   Follows Dr Dawit Olvera   outpatient endocrinology input noted    Ambulatory dysfunction  Assessment & Plan   Ambulatory dysfunction   Safe ambulation  Fall precautions  Physical therapy          VTE Pharmacologic Prophylaxis: VTE Score: 10 High Risk (Score >/= 5) - Pharmacological DVT Prophylaxis Ordered: enoxaparin (Lovenox)  Sequential Compression Devices Ordered  Patient Centered Rounds: I performed bedside rounds with nursing staff today  Discussions with Specialists or Other Care Team Provider: case management    Education and Discussions with Family / Patient: Updated  (son) via phone  Time Spent for Care: 30 minutes  More than 50% of total time spent on counseling and coordination of care as described above  Current Length of Stay: 1 day(s)  Current Patient Status: Inpatient   Certification Statement: The patient will continue to require additional inpatient hospital stay due to Neurology workup   Discharge Plan: Anticipate discharge in 24-48 hrs to sister's home    Code Status: Level 1 - Full Code    Subjective:   States she had an episode of dizziness today when trying to work with PT  Now feeling better, but c/o constipation and abdominal cramping  Denies significant pelvic pain when walking  Objective:     Vitals:   Temp (24hrs), Av 9 °F (36 6 °C), Min:97 6 °F (36 4 °C), Max:98 1 °F (36 7 °C)    Temp:  [97 6 °F (36 4 °C)-98 1 °F (36 7 °C)] 97 6 °F (36 4 °C)  HR:  [60-89] 62  Resp:  [18-20] 18  BP: (122-138)/(61-69) 122/61  SpO2:  [95 %-99 %] 96 %  Body mass index is 23 39 kg/m²  Input and Output Summary (last 24 hours):   No intake or output data in the 24 hours ending 21 1456    Physical Exam:   Physical Exam  Constitutional:       Appearance: Normal appearance  Cardiovascular:      Rate and Rhythm: Normal rate and regular rhythm  Heart sounds: No murmur heard  Pulmonary:      Effort: Pulmonary effort is normal       Breath sounds: Normal breath sounds  Abdominal:      General: Bowel sounds are normal  There is no distension  Palpations: Abdomen is soft  Tenderness: There is no abdominal tenderness  Skin:     General: Skin is warm and dry     Neurological:      General: No focal deficit present  Mental Status: She is alert and oriented to person, place, and time     Psychiatric:         Mood and Affect: Mood normal          Additional Data:     Labs:  Results from last 7 days   Lab Units 06/14/21  0444   WBC Thousand/uL 7 41   HEMOGLOBIN g/dL 10 4*   HEMATOCRIT % 34 3*   PLATELETS Thousands/uL 249   NEUTROS PCT % 60   LYMPHS PCT % 30   MONOS PCT % 7   EOS PCT % 3     Results from last 7 days   Lab Units 06/14/21  0444   SODIUM mmol/L 141   POTASSIUM mmol/L 3 8   CHLORIDE mmol/L 108   CO2 mmol/L 28   BUN mg/dL 14   CREATININE mg/dL 0 63   ANION GAP mmol/L 5   CALCIUM mg/dL 8 6   GLUCOSE RANDOM mg/dL 78     Results from last 7 days   Lab Units 06/13/21  1001   INR  0 93     Results from last 7 days   Lab Units 06/14/21  1045 06/14/21  0624 06/13/21  1027   POC GLUCOSE mg/dl 203* 75 279*     Results from last 7 days   Lab Units 06/14/21  0444   HEMOGLOBIN A1C % 11 6*           Lines/Drains:  Invasive Devices     Peripheral Intravenous Line            Peripheral IV 06/13/21 Right Antecubital 1 day                      Imaging: Reviewed radiology reports from this admission including: xray(s) and CTA head/neck    Recent Cultures (last 7 days):         Last 24 Hours Medication List:   Current Facility-Administered Medications   Medication Dose Route Frequency Provider Last Rate    aluminum-magnesium hydroxide-simethicone  30 mL Oral Q6H PRN Misty Wen MD      aspirin  81 mg Oral Daily Misty Wen MD      docusate sodium  100 mg Oral BID Misty Wen MD      enoxaparin  40 mg Subcutaneous Daily Misty Wen MD      ergocalciferol  50,000 Units Oral Weekly Misty Wen MD      lactulose  10 g Oral BID Misty Wen MD      levothyroxine  75 mcg Oral Early Morning Misty Wen MD      lubiprostone  24 mcg Oral BID Misty Wen MD      metoprolol tartrate  25 mg Oral TID MD Emily Farias Courser [facility] ANTIFUNGAL   Topical BID Mehreen Leong MD      ondansetron  4 mg Intravenous Q6H PRN Mehreen Leong MD      oxybutynin  10 mg Oral HS Mehreen Leong MD      pantoprazole  20 mg Oral Early Morning Mehreen Leong MD      polyethylene glycol  17 g Oral Daily Mehreen Leong MD      pravastatin  20 mg Oral Daily Mehreen Leong MD      predniSONE  5 mg Oral Daily Mehreen Leong MD      repaglinide  2 mg Oral TID Savannah Sneed MD          Today, Patient Was Seen By: Colleen Robledo PA-C    **Please Note: This note may have been constructed using a voice recognition system  **

## 2021-06-14 NOTE — RESTORATIVE TECHNICIAN NOTE
Restorative Technician Note      Patient Name: Talon Sim     Note Type: Mobility  Patient Position Upon Consult: Supine  Assistive Device: Roller walker  Education Provided: Other (comment) (Educated/encouraged pt to ambulate with assistance 3-4 x's/day )  Patient Position at End of Consult: Supine; All needs within reach;Bed/Chair alarm activated    Jayy DALY, Restorative Technician, [employer]

## 2021-06-14 NOTE — CONSULTS
Consultation - Geriatrics   Marla Srinivasan 80 y o  female MRN: [id number]  [facility] Encounter: [id number]      Assessment/Plan  1  Mild cognitive impairment  Hx of forgetfulness  Sister manages IADLs, drives, appointments, bills, medications  Mini cog 5/5  MRI pending  Continue supportive care    2  Ambulatory dysfunction  Hx of previous falls  PT/OT  Rehab post hospitalization    3  Bilateral hearing loss  Has bilateral hearing aids  Hx of cerumen impactions  Speak loudly and clearly  enunciate words    4  Frailty   Moderate  Risk factors: hospitalization, polypharmacy, ambulatory dysfunction, cognitive decline  PT/OT  Rehab post hospitalization    5  Delirium precautions  At risk hx of delirium with previous hospitalization  Provide frequent redirection, reorientation, distraction techniques  Avoid deliriogenic medications such as tramadol, benzodiazepines, anticholinergics,  Benadryl  Treat pain, See geriatric pain protocol  Monitor for constipation and urinary retention  Encourage early and frequent moblization, OOB  Encourage Hydration/ Nutrition  Implement sleep hygiene, limit night time interuptions, group activities    6  DM2  HgA1c 11 6 on 6/14/21  Pt follows [facility] endocrine as outpatient (last visit 6/21/20)  Pt had serious reaction to insulin during prior hospitalization, per patient "almost caused her to die" does not want insulin    7  Pituitary replacement  Pt on levothyroxine and prednisone 5 mg po daily  recommend check T4 free level  T4 free -1 22 on 6/18/2020    8  Pubic ramis fracture  Ortho on consult  Scheduled acetaminophen    9  Dizziness and giddiness  MRI pending   Neurology following  Pt started on lubiprostone on admission, recommend discontinuation, may cause dizziness (pt was not taking linzess as outpatient, was ordered for constipation, pt stopped taking due to side effects- diarrhea)    10   Home medication review  [phone], confirmed with pharmacist, Laura Diaz  Confirmed with sister, who manages medications  Gabapentin 100 mg po daily- per sister not taking  Levothyroxine 75 mcg po daily  Metoprolol tartrate 25 mg po TID  Repaglinde 4 mg po TID   Metformin 500 mg po BID  Prednisone 5 mg po daily  esomeprazole 20 mg po daily  Asa 81 mg po daily  Pravastatin 20 mg po daily  Oxybutynin 10 mg po QHS    Pt not taking linzess at home prior to arrival    History of Present Illness   Physician Requesting Consult: Ayaz Vitale MD  Reason for Consult / Principal Problem: NA  Hx and PE limited by: NA  HPI: Michaeline Castleman is a 80y o  year old female who presents with dizziness, feeling off to FOBO  She reports that she sometimes had a spinning sensation  Additionally she was complaining of gait instability and disequilibrium  She ambulates with a cane at baseline  She reports that her gait was getting worse the last few days and she has had several falls  Her granddaughter was visiting her today noted dizziness in called EMS  Upon evaluation at [facility] her clinical features were discussed with on-call Neurology  He was recommended she was transferred to [facility] for further management  She has bradycardia, CAD, depression, DM 2, hypothyroidism, hypertension, GERD, heart murmur, osteoarthritis, aortic stenosis, peripheral neuropathy, sleep apnea, narcolepsy  Hx of delirium with previous hospitalization, stroke, pituitary surgery fo acromegaly on 1989, panhypopituitarism  Prior to arrival patient resides at home  She does not drive  Son assists with IADLs  She ambulates with a cane  She has a history of previous falls  She wears glasses  She is hard of hearing  She has constipation  Upon exam pt is lying in bed  She is alert and oriented x3  Toward end of exam/interivew, pt states she is not feeling well, dizziness  No repetition noted on exam, occasional word finding  She is able to provide good history  Spoke with sister, Gage Patient to review HPI and medication list     Inpatient consult to Gerontology  Consult performed by: PRESTON Qureshi  Consult ordered by: Nemo Brown MD          Review of Systems   Constitutional: Negative for unexpected weight change  HENT: Positive for hearing loss  Eyes: Negative for visual disturbance  Respiratory: Negative for cough  Cardiovascular: Negative for chest pain  Gastrointestinal: Negative for constipation  Genitourinary:        Incontinence   Musculoskeletal: Positive for gait problem  Skin: Negative for color change  Neurological: Positive for dizziness  Psychiatric/Behavioral: Negative for sleep disturbance  Historical Information   Past Medical History:   Diagnosis Date    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[facility] [address] )     Bradycardia     Coronary artery disease     Depression     Depression     Diabetes mellitus (HCC)     Type 2 DM    Disease of thyroid gland     Essential (primary) hypertension     Facial droop     started 10 years ago    GERD (gastroesophageal reflux disease)     Heart murmur     History of echocardiogram 08/28/2017    2-D w/CFD:  EF 0 60 (60%), LVSF is normal  No regional wall abnormalities  Mild mitral valve regurg  AV was trileaflet  Moderate tricuspid regurg  Trace pulmonic valve  No pericardial effusion   History of echocardiogram 03/28/2018    2-D w/CFD:  EF 0 60 (60%), Mild regurg in the MV  AV was trileaflet, severe stenosis, trace regurg  Mild regurg in the TV  No pericardial effusion  Aortic root exhibited normal size   History of echocardiogram 01/29/2016    2-D:  EF 0 55 (55%), Normal LVSF  Mild concentric LVH  Mild MR  Mild AS with mild regurg      History of Holter monitoring     2/24/16, 9/27/17, 2/6/18    History of nuclear stress test 08/16/2017    Lexiscan MPI:  EF 0 76 (76%), no prior MI or ischemia,    Hyperlipidemia     Hypothyroidism     Nonrheumatic aortic (valve) stenosis     Obstructive sleep apnea (adult) (pediatric)     10/17/17 - NPSG with nasal CPAP/Bi-level titration     Osteoarthritis     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HCC)     [facility] ([facility])     SVT [facility]supraventricular tachycardia) ([facility] [address] )     Vitamin D deficiency      Past Surgical History:   Procedure Laterality Date    ANKLE SURGERY Left 2005    APPENDECTOMY  1955    CATARACT EXTRACTION Bilateral 09/1995    HEMORRHOID SURGERY  1978    HERNIA REPAIR  1990    INSERT / REPLACE / REMOVE PACEMAKER  05/14/2018    dual lead pacer st yony model 2272    JOINT REPLACEMENT      b/l hip replacements, right 2006    NEUROPLASTY / TRANSPOSITION MEDIAN NERVE AT CARPAL TUNNEL  1977    PARTIAL HYSTERECTOMY  1971    TOTAL HIP ARTHROPLASTY      TRANSPHENOIDAL / TRANSNASAL HYPOPHYSECTOMY / RESECTION PITUITARY TUMOR  1989    excision of pituitary gland     Social History   Social History     Substance and Sexual Activity   Alcohol Use Not Currently     Social History     Substance and Sexual Activity   Drug Use No     Social History     Tobacco Use   Smoking Status Never Smoker   Smokeless Tobacco Never Used         Family History:   Family History   Problem Relation Age of Onset    Other Father         coal worker's pneumoconiosis    Heart disease Father     Diabetes Brother     Heart disease Brother     Prostate cancer Brother     Stomach cancer Maternal Grandmother     Diabetes Paternal Aunt        Meds/Allergies   Current meds:   Current Facility-Administered Medications   Medication Dose Route Frequency    aluminum-magnesium hydroxide-simethicone (MYLANTA) oral suspension 30 mL  30 mL Oral Q6H PRN    aspirin chewable tablet 81 mg  81 mg Oral Daily    docusate sodium (COLACE) capsule 100 mg  100 mg Oral BID    enoxaparin (LOVENOX) subcutaneous injection 40 mg  40 mg Subcutaneous Daily    ergocalciferol (VITAMIN D2) capsule 50,000 Units  50,000 Units Oral Weekly    lactulose oral solution 10 g  10 g Oral BID    levothyroxine tablet 75 mcg  75 mcg Oral Early Morning    lubiprostone (AMITIZA) capsule 24 mcg  24 mcg Oral BID    metoprolol tartrate (LOPRESSOR) tablet 25 mg  25 mg Oral TID    moisture barrier miconazole 2% cream (aka ILIANA MOISTURE BARRIER ANTIFUNGAL CREAM)   Topical BID    ondansetron (ZOFRAN) injection 4 mg  4 mg Intravenous Q6H PRN    oxybutynin (DITROPAN-XL) 24 hr tablet 10 mg  10 mg Oral HS    pantoprazole (PROTONIX) EC tablet 20 mg  20 mg Oral Early Morning    polyethylene glycol (MIRALAX) packet 17 g  17 g Oral Daily    pravastatin (PRAVACHOL) tablet 20 mg  20 mg Oral Daily    predniSONE tablet 5 mg  5 mg Oral Daily    repaglinide (PRANDIN) tablet 2 mg  2 mg Oral TID AC           Allergies   Allergen Reactions    Cephalosporins GI Intolerance    Levemir [Insulin Detemir]      Brittle diabetic  Hypersensitive to insulin  Severe hypoglycemia results if given insulin  Please speak with son, Caryn Lopez, before giving ANY type of insulin   Acetaminophen     Cephalexin GI Intolerance    Clindamycin      Other reaction(s): Unspecified    Demeclocycline     Hydrocodone      Bitartrate  Other reaction(s): Unspecified    Hydrocodone-Acetaminophen Nausea Only    Oxycodone-Acetaminophen      HCL    Oxycodone-Acetaminophen     Penicillins Hives     Other reaction(s): Other (See Comments)  ITCHING/RASH    Simvastatin GI Intolerance    Sulfamethoxazole-Trimethoprim     Tetrabenazine     Tetracycline Nausea Only    Tetracyclines & Related     Vicodin [Hydrocodone-Acetaminophen]        Objective   Vitals: Blood pressure 122/61, pulse 62, temperature 97 6 °F (36 4 °C), temperature source Oral, resp  rate 18, height 5' 2" (1 575 m), weight 58 kg (127 lb 13 9 oz), SpO2 96 %  ,Body mass index is 23 39 kg/m²  Physical Exam  Vitals and nursing note reviewed  HENT:      Head: Normocephalic  Nose: Nose normal       Mouth/Throat:      Mouth: Mucous membranes are dry     Eyes:      General: Right eye: No discharge  Left eye: No discharge  Cardiovascular:      Rate and Rhythm: Normal rate  Pulses: Normal pulses  Pulmonary:      Effort: No respiratory distress  Breath sounds: Normal breath sounds  Abdominal:      General: Bowel sounds are normal       Palpations: Abdomen is soft  Musculoskeletal:         General: Normal range of motion  Cervical back: Normal range of motion  Skin:     General: Skin is warm and dry  Neurological:      Mental Status: She is alert and oriented to person, place, and time  Mental status is at baseline  Psychiatric:         Mood and Affect: Mood normal          Lab Results:   Results from last 7 days   Lab Units 06/14/21  0444   WBC Thousand/uL 7 41   HEMOGLOBIN g/dL 10 4*   HEMATOCRIT % 34 3*   PLATELETS Thousands/uL 249        Results from last 7 days   Lab Units 06/14/21  0444   POTASSIUM mmol/L 3 8   CHLORIDE mmol/L 108   CO2 mmol/L 28   BUN mg/dL 14   CREATININE mg/dL 0 63   CALCIUM mg/dL 8 6       Imaging Studies: I have personally reviewed pertinent reports  EKG, Pathology, and Other Studies: I have personally reviewed pertinent reports      VTE Prophylaxis: Sequential compression device (Venodyne)     Code Status: Level 1 - Full Code

## 2021-06-14 NOTE — ASSESSMENT & PLAN NOTE
Imaging reveals left superior pubic ramus fracture   analgesics   Ortho consult appreciated - WBAT and pain control, they signed off   Physical therapy - home PT, patient declines and will go to her sister's home

## 2021-06-14 NOTE — PLAN OF CARE
Problem: MOBILITY - ADULT  Goal: Maintain or return to baseline ADL function  Description: INTERVENTIONS:  -  Assess patient's ability to carry out ADLs; assess patient's baseline for ADL function and identify physical deficits which impact ability to perform ADLs (bathing, care of mouth/teeth, toileting, grooming, dressing, etc )  - Assess/evaluate cause of self-care deficits   - Assess range of motion  - Assess patient's mobility; develop plan if impaired  - Assess patient's need for assistive devices and provide as appropriate  - Encourage maximum independence but intervene and supervise when necessary  - Involve family in performance of ADLs  - Assess for home care needs following discharge   - Consider OT consult to assist with ADL evaluation and planning for discharge  - Provide patient education as appropriate  Outcome: Progressing  Goal: Maintains/Returns to pre admission functional level  Description: INTERVENTIONS:  - Perform BMAT or MOVE assessment daily    - Set and communicate daily mobility goal to care team and patient/family/caregiver  - Collaborate with rehabilitation services on mobility goals if consulted  - Perform Range of Motion 3 times a day  - Reposition patient every 3 hours    - Dangle patient 3 times a day  - Stand patient 3 times a day  - Ambulate patient 3 times a day  - Out of bed to chair 3 times a day   - Out of bed for meals 3 times a day  - Out of bed for toileting  - Record patient progress and toleration of activity level   Outcome: Progressing     Problem: MUSCULOSKELETAL - ADULT  Goal: Maintain or return mobility to safest level of function  Description: INTERVENTIONS:  - Assess patient's ability to carry out ADLs; assess patient's baseline for ADL function and identify physical deficits which impact ability to perform ADLs (bathing, care of mouth/teeth, toileting, grooming, dressing, etc )  - Assess/evaluate cause of self-care deficits   - Assess range of motion  - Assess patient's mobility  - Assess patient's need for assistive devices and provide as appropriate  - Encourage maximum independence but intervene and supervise when necessary  - Involve family in performance of ADLs  - Assess for home care needs following discharge   - Consider OT consult to assist with ADL evaluation and planning for discharge  - Provide patient education as appropriate  Outcome: Progressing  Goal: Maintain proper alignment of affected body part  Description: INTERVENTIONS:  - Support, maintain and protect limb and body alignment  - Provide patient/ family with appropriate education  Outcome: Progressing     Problem: PAIN - ADULT  Goal: Verbalizes/displays adequate comfort level or baseline comfort level  Description: Interventions:  - Encourage patient to monitor pain and request assistance  - Assess pain using appropriate pain scale  - Administer analgesics based on type and severity of pain and evaluate response  - Implement non-pharmacological measures as appropriate and evaluate response  - Consider cultural and social influences on pain and pain management  - Notify physician/advanced practitioner if interventions unsuccessful or patient reports new pain  Outcome: Progressing     Problem: INFECTION - ADULT  Goal: Absence or prevention of progression during hospitalization  Description: INTERVENTIONS:  - Assess and monitor for signs and symptoms of infection  - Monitor lab/diagnostic results  - Monitor all insertion sites, i e  indwelling lines, tubes, and drains  - [place] appropriate cooling/warming therapies per order  - Administer medications as ordered  - Instruct and encourage patient and family to use good hand hygiene technique  - Identify and instruct in appropriate isolation precautions for identified infection/condition  Outcome: Progressing     Problem: SAFETY ADULT  Goal: Maintain or return to baseline ADL function  Description: INTERVENTIONS:  -  Assess patient's ability to carry out ADLs; assess patient's baseline for ADL function and identify physical deficits which impact ability to perform ADLs (bathing, care of mouth/teeth, toileting, grooming, dressing, etc )  - Assess/evaluate cause of self-care deficits   - Assess range of motion  - Assess patient's mobility; develop plan if impaired  - Assess patient's need for assistive devices and provide as appropriate  - Encourage maximum independence but intervene and supervise when necessary  - Involve family in performance of ADLs  - Assess for home care needs following discharge   - Consider OT consult to assist with ADL evaluation and planning for discharge  - Provide patient education as appropriate  Outcome: Progressing  Goal: Maintains/Returns to pre admission functional level  Description: INTERVENTIONS:  - Perform BMAT or MOVE assessment daily    - Set and communicate daily mobility goal to care team and patient/family/caregiver  - Collaborate with rehabilitation services on mobility goals if consulted  - Perform Range of Motion 3 times a day  - Reposition patient every 2 hours    - Dangle patient 3 times a day  - Stand patient 3 times a day  - Ambulate patient 3 times a day  - Out of bed to chair 2 times a day   - Out of bed for meals 3 times a day  - Out of bed for toileting  - Record patient progress and toleration of activity level   Outcome: Progressing  Goal: Patient will remain free of falls  Description: INTERVENTIONS:  - Educate patient/family on patient safety including physical limitations  - Instruct patient to call for assistance with activity   - Consult OT/PT to assist with strengthening/mobility   - Keep Call bell within reach  - Keep bed low and locked with side rails adjusted as appropriate  - Keep care items and personal belongings within reach  - Initiate and maintain comfort rounds  - Make Fall Risk Sign visible to staff  - Offer Toileting every 2 Hours, in advance of need  - Initiate/Maintain bed alarm  - Obtain necessary fall risk management equipment  - Apply yellow socks and bracelet for high fall risk patients  - Consider moving patient to room near nurses station  Outcome: Progressing     Problem: DISCHARGE PLANNING  Goal: Discharge to home or other facility with appropriate resources  Description: INTERVENTIONS:  - Identify barriers to discharge w/patient and caregiver  - Arrange for needed discharge resources and transportation as appropriate  - Identify discharge learning needs (meds, wound care, etc )  - Arrange for interpretive services to assist at discharge as needed  - Refer to Case Management Department for coordinating discharge planning if the patient needs post-hospital services based on physician/advanced practitioner order or complex needs related to functional status, cognitive ability, or social support system  Outcome: Progressing     Problem: Knowledge Deficit  Goal: Patient/family/caregiver demonstrates understanding of disease process, treatment plan, medications, and discharge instructions  Description: Complete learning assessment and assess knowledge base    Interventions:  - Provide teaching at level of understanding  - Provide teaching via preferred learning methods  Outcome: Progressing

## 2021-06-14 NOTE — PLAN OF CARE
Problem: PHYSICAL THERAPY ADULT  Goal: Performs mobility at highest level of function for planned discharge setting  See evaluation for individualized goals  Description: Treatment/Interventions: Functional transfer training, LE strengthening/ROM, Elevations, Therapeutic exercise, Endurance training, Patient/family training, Equipment eval/education, Bed mobility, Gait training, Spoke to nursing  Equipment Recommended: Malou Weber       See flowsheet documentation for full assessment, interventions and recommendations  6/14/2021 1524 by Luis Eduardo Murphy, PT  Note: Prognosis: Good  Problem List: Decreased strength, Decreased endurance, Impaired balance, Decreased mobility  Assessment: Pt is 80 y o  female seen for PT evaluation s/p admit to [facility] on 6/13/2021 w/ Dizziness and giddiness  PT consulted to assess pt's functional mobility and d/c needs  Order placed for PT eval and tx, w/ up w/ A order  Comorbidities affecting pt's physical performance at time of assessment include: Type 2 DM, HTN, CHF, CAD, ambulatory dysfunction, pubic ramus fx, hx of CVA  PTA, pt was independent w/ all functional mobility w/ SPC and lives alone in one level house w/ 3 MACRIA  Pt reports she will be d/c to sister's home w/ FFSU and 24/7 support  Personal factors affecting pt at time of IE include: inaccessible home environment, ambulating w/ assistive device, stairs to enter home, inability to navigate community distances, positive fall history and inability to perform IADLs  Please find objective findings from PT assessment regarding body systems outlined above with impairments and limitations including weakness, impaired balance, decreased endurance, gait deviations, decreased activity tolerance, decreased functional mobility tolerance and fall risk  Pt performed bed mobility at supervision, STS at supervision, and ambulated 30ft x2 w/ RW  Pt initially requiring supervision and then became dizzy following 30ft of ambulation  Pt then required Min A to ambulate 30ft back to room  Pt returned supine and /65  The following objective measures performed on IE also reveal limitations: Barthel Index: 54/004 and AM-PAC 6-Clicks: 89/40  Pt's clinical presentation is currently unstable/unpredictable seen in pt's presentation of recent admission for dizziness requiring medical attention, recent decline in function as compared to baseline, multiple lines, fall risk, dizziness upon ambulation  Pt to benefit from continued PT tx to address deficits as defined above and maximize level of functional independent mobility and consistency  From PT/mobility standpoint, recommendation at time of d/c would be home with home health rehabilitation pending progress in order to facilitate return to PLOF  Barriers to Discharge: Inaccessible home environment        PT Discharge Recommendation: Home with home health rehabilitation     PT - OK to Discharge: No    See flowsheet documentation for full assessment

## 2021-06-14 NOTE — UTILIZATION REVIEW
Initial Clinical Review    Admission: Date/Time/Statement:   Admission Orders (From admission, onward)     Ordered        06/13/21 2049  Inpatient Admission  Once                   Orders Placed This Encounter   Procedures    Inpatient Admission     Standing Status:   Standing     Number of Occurrences:   1     Order Specific Question:   Level of Care     Answer:   Med Surg [16]     Order Specific Question:   Estimated length of stay     Answer:   More than 2 Midnights     Order Specific Question:   Certification     Answer:   I certify that inpatient services are medically necessary for this patient for a duration of greater than two midnights  See H&P and MD Progress Notes for additional information about the patient's course of treatment  6/13/2021 (9 hours)  [facility] Emergency Department  Subacute neurologic deficit, ambulatory dysfunction, elevated troponin, pubic ramus fracture /left/closed NHISS =1 due to limb ataxia  Transfer to [facility] for higher level of care  Prior to arrival : iv ns bolus, clopidogrel, levothyroxine, not a TPA candidate - outside window       Initial Presentation:       80year old female received from [address] [place] [address] ed for inpatient med surg admission to further evaluate and treat dizziness and ambulatory dysfunction resulting in episodes of falls at home ( baseline use of cane)  PMHX: DM, CAD, TACHY-RUDDY, PACEMAKER  Clinical assessment significant for  Bruises on her left shoulder  Ekg with left anterior fascicular block  Imaging show pubic ramus fracture ?age  Admit to inpatient medical surgical for dizziness and gait disequilibrium and instability, pubic ramus fracture  Plan includes: consult neurology, PT/OT evaluations, consult orthopedic surgery, neuro checks, pelvic x ray, consult gerontology  Date: 6-14 Day 2: inpatient     Consult orthopedics  Nondisplaced pelvic ring fracture    No signs of any loosening or fractures about the total hip prosthesis  She can be weight-bearing as tolerated and work with physical therapy  Follow up with prior orthopedist outpatient, repeat EKG, MRI brain to rule out CVA  ED Triage Vitals   06/13/21 2244 06/13/21 2244 06/13/21 2244 06/13/21 2244 06/14/21 0737   98 1 °F (36 7 °C) 89 18 124/62 95 %      Oral Monitor         No Pain          06/13/21 58 kg (127 lb 13 9 oz)     Additional Vital Signs:     Date/Time  Temp  Pulse  Resp  BP  MAP   SpO2   06/14/21 10:37:06  --  62  --  122/61  81  96 %   06/14/21 07:37:22  97 6 °F (36 4 °C)  60  18  124/61  82  95 %   06/13/21   2252  --  89  --  --  --  --   06/13/21 22:44:38  98 1 °F (36 7 °C)  89  18  124/62  83  --       Date and Time Eye Opening Best Verbal Response Best Motor Response Gerardo Coma Scale Score   06/13/21 2200 4 4 6 14   06/13/21 1830 4 5 6 15   06/13/21 1745 4 5 6 15   06/13/21 1645 4 5 6 15   06/13/21 1545 4 5 6 15   06/13/21 1445 4 5 6 15   06/13/21 1345 4 5 6 15   06/13/21 1245 4 5 6 15   06/13/21 1145 4 5 6 15   06/13/21 1115 4 5 6 15   06/13/21 1045 4 5 6 15   06/13/21 1030 4 5 6 15   06/13/21 1015 4 5 6 15   06/13/21 1000 4 5 6 15       Pertinent Labs/Diagnostic Test Results:     Ref Range & Units  6/13/21 0948   Ventricular Rate BPM 60    Atrial Rate BPM 60    TX Interval ms 206    QRSD Interval ms 112    QT Interval ms 464    QTC Interval ms 464    P [place] degrees 17    QRS Axis degrees -55    T Wave Axis degrees -46        Atrial-paced rhythm  Left anterior fascicular block  Left ventricular hypertrophy with repolarization abnormality  Abnormal ECG  When compared with ECG of 28-OCT-2020 13:58,  No significant change was found     Collected: 06/14/21 0648   LEFT HIP     INDICATION:  Possible fall, rule out fracture  Age-indeterminate but possibly chronic fracture of the left superior pubic ramus near the junction with the acetabulum  Intact bilateral hip prostheses                 Collected: 06/13/21 1032   CTA NECK AND BRAIN WITH CONTRAST     INDICATION: Ataxia, stroke suspected   Concern for stroke     1   No hemodynamically significant stenosis in the major arteries of the neck  2   No intracranial aneurysm or major intracranial arterial stenosis  3   Pagetoid changes of the skull incidentally noted              Collected: 06/13/21 1029     CT BRAIN - STROKE ALERT PROTOCOL     INDICATION:   Ataxia, stroke suspected   Dizziness, gait is affected, tongue deviates to the left, dysmetria on the left  IMPRESSION  No acute intracranial hemorrhage   Stable microangiopathic changes within the brain  PELVIS   6-14-21 1640   INDICATION:   eval of possible pelvic fracture  inlet/outlet      COMPARISON:  Pelvic and left hip plain films from 6/13/2021  Comparison also also made with the pelvic and right hip plain films from 10/1/2015      IMPRESSION:     No acute osseous injuries noted      The apparent, possibly acute left superior pubic ramus fracture described on 6/13/2021 plain films is again seen  Because a similar appearance is seen on the pelvic plain film from 10/1/2015, this is probably not an acute fracture  CT BRAIN - WITHOUT CONTRAST   6-15-21 1328  INDICATION:   Stroke, follow up  Follow up stroke symptoms  Results pending discharge, please read today  Thank you  IMPRESSION:     No acute intracranial abnormality    Stable microangiopathic changes within the brain        Results from last 7 days   Lab Units 06/13/21  1001   SARS-COV-2  Negative     Results from last 7 days   Lab Units 06/14/21  0444 06/13/21  1001   WBC Thousand/uL 7 41 7 21   HEMOGLOBIN g/dL 10 4* 10 0*   HEMATOCRIT % 34 3* 33 2*   PLATELETS Thousands/uL 249 246   NEUTROS ABS Thousands/µL 4 40  --          Results from last 7 days   Lab Units 06/14/21  0444 06/13/21  1001   SODIUM mmol/L 141 137   POTASSIUM mmol/L 3 8 4 1   CHLORIDE mmol/L 108 102   CO2 mmol/L 28 29   ANION GAP mmol/L 5 6   BUN mg/dL 14 21   CREATININE mg/dL 0 63 0 90   EGFR ml/min/1 73sq m 81 58   CALCIUM mg/dL 8 6 8 7         Results from last 7 days   Lab Units 06/14/21  1045 06/14/21  0624 06/13/21  1027   POC GLUCOSE mg/dl 203* 75 279*     Results from last 7 days   Lab Units 06/14/21  0444 06/13/21  1001   GLUCOSE RANDOM mg/dL 78 307*         Results from last 7 days   Lab Units 06/14/21  0444   HEMOGLOBIN A1C % 11 6*   EAG mg/dl 286       Results from last 7 days   Lab Units 06/13/21  1001   TROPONIN I ng/mL 0 09*         Results from last 7 days   Lab Units 06/13/21  1001   PROTIME seconds 12 3   INR  0 93   PTT seconds 26     Results from last 7 days   Lab Units 06/13/21  1226   CLARITY UA  Clear   COLOR UA  Light Yellow   SPEC GRAV UA  <=1 005   PH UA  6 5   GLUCOSE UA mg/dl >=1000 (1%)*   KETONES UA mg/dl Negative   BLOOD UA  Negative   PROTEIN UA mg/dl Negative   NITRITE UA  Negative   BILIRUBIN UA  Negative   UROBILINOGEN UA E U /dl 0 2   LEUKOCYTES UA  Elevated glucose may cause decreased leukocyte values  See urine microscopic for UWBC result/*   WBC UA /hpf 1-2   RBC UA /hpf None Seen   BACTERIA UA /hpf None Seen   EPITHELIAL CELLS WET PREP /hpf None Seen       ED Treatment:   Medication Administration - No Administrations Displayed (No Start Event Found)     None        Past Medical History:   Diagnosis Date    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HCC)     Bradycardia     Coronary artery disease     Depression     Depression     Diabetes mellitus ([facility] [address] )     Type 2 DM    Disease of thyroid gland     Essential (primary) hypertension     Facial droop     started 10 years ago    GERD (gastroesophageal reflux disease)     Heart murmur     History of echocardiogram 08/28/2017    2-D w/CFD:  EF 0 60 (60%), LVSF is normal  No regional wall abnormalities  Mild mitral valve regurg  AV was trileaflet  Moderate tricuspid regurg  Trace pulmonic valve  No pericardial effusion      History of echocardiogram 03/28/2018    2-D w/CFD:  EF 0 60 (60%), Mild regurg in the MV  AV was trileaflet, severe stenosis, trace regurg  Mild regurg in the TV  No pericardial effusion  Aortic root exhibited normal size   History of echocardiogram 01/29/2016    2-D:  EF 0 55 (55%), Normal LVSF  Mild concentric LVH  Mild MR  Mild AS with mild regurg      History of Holter monitoring     2/24/16, 9/27/17, 2/6/18    History of nuclear stress test 08/16/2017    Lexiscan MPI:  EF 0 76 (76%), no prior MI or ischemia,    Hyperlipidemia     Hypothyroidism     Nonrheumatic aortic (valve) stenosis     Obstructive sleep apnea (adult) (pediatric)     10/17/17 - NPSG with nasal CPAP/Bi-level titration     Osteoarthritis     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HCC)     SSS (sick sinus syndrome) ([facility])     SVT (supraventricular tachycardia) ([facility])     Vitamin D deficiency      Present on Admission:   Thyroid nodule   Hypothyroidism   Hypertension   Hyperlipidemia   Chronic diastolic congestive heart failure (HCC)   CAD (coronary artery disease)   Ambulatory dysfunction   Adrenal insufficiency (Rafal's disease) ([facility] [address] )      Admitting Diagnosis: Subacute neurologic deficit [R29 818]  Age/Sex: 80 y o  female    Scheduled Medications:  aspirin, 81 mg, Oral, Daily  docusate sodium, 100 mg, Oral, BID  enoxaparin, 40 mg, Subcutaneous, Daily  ergocalciferol, 50,000 Units, Oral, Weekly  lactulose, 10 g, Oral, BID  levothyroxine, 75 mcg, Oral, Early Morning  lubiprostone, 24 mcg, Oral, BID  metoprolol tartrate, 25 mg, Oral, TID  ILIANA ANTIFUNGAL, , Topical, BID  oxybutynin, 10 mg, Oral, HS  pantoprazole, 20 mg, Oral, Early Morning  polyethylene glycol, 17 g, Oral, Daily  pravastatin, 20 mg, Oral, Daily  predniSONE, 5 mg, Oral, Daily  repaglinide, 2 mg, Oral, TID AC      Continuous IV Infusions:     PRN Meds:  aluminum-magnesium hydroxide-simethicone, 30 mL, Oral, Q6H PRN  ondansetron, 4 mg, Intravenous, Q6H PRN        IP CONSULT TO ORTHOPEDIC SURGERY  IP CONSULT TO GERONTOLOGY  IP CONSULT TO NEUROLOGY    Network Utilization Review Department  ATTENTION: Please call with any questions or concerns to [phone] and carefully listen to the prompts so that you are directed to the right person  All voicemails are confidential   [place] all requests for admission clinical reviews, approved or denied determinations and any other requests to dedicated fax number below belonging to the campus where the patient is receiving treatment   List of dedicated fax numbers for the Facilities:  [address] DENIALS (Administrative/Medical Necessity) [phone]   [address] (Maternity/NICU/Pediatrics) [phone]   [address] [place] [address] [id number] [address] [place] [place] [address] [phone]

## 2021-06-14 NOTE — CONSULTS
NEUROLOGY RESIDENCY CONSULT NOTE     Name: Jacinta Hernandez   Age & Sex: 80 y o  female   MRN: [id number]  Unit/Bed#: [facility] 726-01   Encounter: [id number]  Length of Stay: 1    ASSESSMENT & PLAN     * Dizziness and giddiness  Assessment & Plan  · Patient presents with dizziness, described as lightheadedness, and unstable gait  Lightheadedness was present when getting up and exacerbated with ambulation  Present for the last few days  In the setting of dehydration, hyperglycemia, medication nonadherence, and inadequate sleep  Reportedly fell x2 in the last couple of days, with bruising on shoulder and hip on left side  · Etiology of the presentation likely in the setting of the above mentioned factors  · Education provided to patient, grand-daughter, and son regarding sleep hygiene and medication adherence  · Plan:  · Obtain MRI head without contrast  · Obtain orthostatic BP measurements with HR  · Obtain echocardiogram   · Continue adequate fluid intake  · Continue PT/OT  · Fall precautions    Pubic ramus fracture ([address] )  Assessment & Plan  · XRAY shows pubic rami fractures, unclear date  Hx of likely recent falls  Repeat XRAY on 6/14 shows the same fracture that was also present in 2015  · Ortho consulted and recommend PT/OT and outpatient follow up  Weight bearing as tolerated  · Plan:  · Continue PT/OT  · Management per Primary and Orthopedic team    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[facility])  Assessment & Plan  · 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  On chronic levothyroxine 75 mcg daily and prednisone 5 mg daily  · Plan:  · Continue current medication regimen    Hypertension  Assessment & Plan  · BP on transfer to [facility] was 124/62  Current /61 and well controlled    · Patient is on Lopressor 25 mg TID  · Plan:  · Continue current regimen  · Maintain normotensive BP range    Uncontrolled type 2 diabetes mellitus with hyperglycemia [facility])  Assessment & Plan  Lab Results   Component Value Date HGBA1C 11 6 (H) 06/14/2021       Recent Labs     06/13/21  1027 06/14/21  0624 06/14/21  1045   POCGLU 279* 75 203*       Blood Sugar Average: Last 72 hrs:  (P) 139   · Pt is brittle diabetic, and insulin administration can lead to severe hypoglycemia  · On repaglinide 2 mg TID before meals at home  · Plan:  · Continue per primary team    SUBJECTIVE     Reason for Consult / Principal Problem: Dizziness and giddiness    HPI: Lazara William is a 80 y o   female w/ hx of T2DM, CAD, tachy-sana w/ Pacer, severe AS, who presents with dizziness and ambulatory difficulty  Patient was reading a book on Saturday, when she developed dizziness and was unstable on her feet when attempting to get up and walk with her cane  She describes dizziness as "lightheadedness", which persisted as she was attempting to ambulate using her cane  She thinks she may have fallen several times, and family (grand-daughter and son) confirmed that she fell at least twice, on Saturday afternoon and Sunday early morning  When granddaughter found out about her dizziness and gait instability, she called EMS and was brought to  [facility] ED  Physical exam by ED physician was notable for left arm dysmetria and postural ataxia as she required 2 person assist and stated she was unable to walk  CTH showed no infracts and CTA showed no large vessel occlusion  tPA was not given  Patient has an MRI compatible pacemaker ([employer] [address] Pacemaker [id number])  Patient notes that she suffered a stroke over 10 years ago with residual left sided facial droop and tongue deviation that have unchanged  Furthermore, patient states that after she fell down, she started to experience left hip pain  XRay of hip showed left superior pubic rami fractures which appeared to be chronic as it was seen in 2015  Orthopedic surgery was consulted and recommended PT/OT and outpatient f/u      Moreover, patient, son, and granddaughter note that she has not been sleeping consistently for the last few days  She has been staying up at night and as a result have been napping throughout the day while watching TV  She was told to stay up several hours after her evening Blood glucose measurements in a belief that it would decrease  As a result, she has been staying up later, sometimes overnight, thinking that her blood glucose will decrease  Complicating the schedule is her need to frequently urinate, likely because of her glucosuria  In addition, because of the inconsistent schedule and due to the recommendation by a family member, she has not been taking her morning Levothyroxine at times  Inpatient consult to Neurology     Performed by  Tor Oconnell MD     Authorized by Ivis Talley MD            Historical Information   Past Medical History:   Diagnosis Date    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 [address] [facility]     Bradycardia     Coronary artery disease     Depression     Depression     Diabetes mellitus [facility]     Type 2 DM    Disease of thyroid gland     Essential (primary) hypertension     Facial droop     started 10 years ago    GERD (gastroesophageal reflux disease)     Heart murmur     History of echocardiogram 08/28/2017    2-D w/CFD:  EF 0 60 (60%), LVSF is normal  No regional wall abnormalities  Mild mitral valve regurg  AV was trileaflet  Moderate tricuspid regurg  Trace pulmonic valve  No pericardial effusion   History of echocardiogram 03/28/2018    2-D w/CFD:  EF 0 60 (60%), Mild regurg in the MV  AV was trileaflet, severe stenosis, trace regurg  Mild regurg in the TV  No pericardial effusion  Aortic root exhibited normal size   History of echocardiogram 01/29/2016    2-D:  EF 0 55 (55%), Normal LVSF  Mild concentric LVH  Mild MR  Mild AS with mild regurg      History of Holter monitoring     2/24/16, 9/27/17, 2/6/18    History of nuclear stress test 08/16/2017    Lexiscan MPI:  EF 0 76 (76%), no prior MI or ischemia,    Hyperlipidemia     Hypothyroidism     Nonrheumatic aortic (valve) stenosis     Obstructive sleep apnea (adult) (pediatric)     10/17/17 - NPSG with nasal CPAP/Bi-level titration     Osteoarthritis     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HCC)     SSS [facility]) [facility]     SVT (supraventricular tachycardia) ([facility] [address] )     Vitamin D deficiency      Past Surgical History:   Procedure Laterality Date    ANKLE SURGERY Left 2005    APPENDECTOMY  1955    CATARACT EXTRACTION Bilateral 09/1995    HEMORRHOID SURGERY  1978    HERNIA REPAIR  1990    INSERT / REPLACE / REMOVE PACEMAKER  05/14/2018    dual lead pacer st [employer]ony model 2272    JOINT REPLACEMENT      b/l hip replacements, right 2006    NEUROPLASTY / TRANSPOSITION MEDIAN NERVE AT CARPAL TUNNEL  1977    PARTIAL HYSTERECTOMY  1971    TOTAL HIP ARTHROPLASTY      TRANSPHENOIDAL / TRANSNASAL HYPOPHYSECTOMY / RESECTION PITUITARY TUMOR  1989    excision of pituitary gland     Social History   Social History     Substance and Sexual Activity   Alcohol Use Not Currently     Social History     Substance and Sexual Activity   Drug Use No     E-Cigarette/Vaping    E-Cigarette Use Never User      E-Cigarette/Vaping Substances    Nicotine No     THC No     CBD No     Flavoring No     Other No     Unknown No      Social History     Tobacco Use   Smoking Status Never Smoker   Smokeless Tobacco Never Used     Family History:   Family History   Problem Relation Age of Onset    Other Father         coal worker's pneumoconiosis    Heart disease Father     Diabetes Brother     Heart disease Brother     Prostate cancer Brother     Stomach cancer Maternal Grandmother     Diabetes Paternal Aunt      Meds/Allergies   all current active meds have been reviewed, current meds:   Current Facility-Administered Medications   Medication Dose Route Frequency    aluminum-magnesium hydroxide-simethicone (MYLANTA) oral suspension 30 mL  30 mL Oral Q6H PRN    aspirin chewable tablet 81 mg  81 mg Oral Daily    docusate sodium (COLACE) capsule 100 mg  100 mg Oral BID    enoxaparin (LOVENOX) subcutaneous injection 40 mg  40 mg Subcutaneous Daily    ergocalciferol (VITAMIN D2) capsule 50,000 Units  50,000 Units Oral Weekly    lactulose oral solution 10 g  10 g Oral BID    levothyroxine tablet 75 mcg  75 mcg Oral Early Morning    lubiprostone (AMITIZA) capsule 24 mcg  24 mcg Oral BID    metoprolol tartrate (LOPRESSOR) tablet 25 mg  25 mg Oral TID    moisture barrier miconazole 2% cream (aka ILIANA MOISTURE BARRIER ANTIFUNGAL CREAM)   Topical BID    ondansetron (ZOFRAN) injection 4 mg  4 mg Intravenous Q6H PRN    oxybutynin (DITROPAN-XL) 24 hr tablet 10 mg  10 mg Oral HS    pantoprazole (PROTONIX) EC tablet 20 mg  20 mg Oral Early Morning    polyethylene glycol (MIRALAX) packet 17 g  17 g Oral Daily    pravastatin (PRAVACHOL) tablet 20 mg  20 mg Oral Daily    predniSONE tablet 5 mg  5 mg Oral Daily    repaglinide (PRANDIN) tablet 2 mg  2 mg Oral TID AC    and PTA meds:   Prior to Admission Medications   Prescriptions Last Dose Informant Patient Reported? Taking? Blood Glucose Monitoring Suppl (ONE TOUCH ULTRA MINI) w/Device KIT   Yes No   Sig: USE TO TEST 4 TIMES DAILY   Contour Next Test test strip   No No   Sig: Use as instructed UP TO FOUR TIMES DAILY     ONE TOUCH ULTRA TEST test strip   Yes No   Si (four) times a day Test   ONETOUCH DELICA LANCETS FINE MISC   Yes No   Si (four) times a day Test   aspirin 81 MG tablet  Self Yes No   Sig: Take 1 tablet by mouth daily   ciclopirox (PENLAC) 8 % solution   No No   Sig: Apply topically daily at bedtime   ciclopirox (PENLAC) 8 % solution   No No   Sig: Apply topically daily at bedtime   ergocalciferol (VITAMIN D2) 50,000 units   No No   Sig: Take 1 capsule (50,000 Units total) by mouth once a week   esomeprazole (NexIUM) 20 mg capsule   No No   Sig: Take 1 capsule (20 mg total) by mouth daily in the early morning   furosemide (LASIX) 20 mg tablet No No   Sig: Take one every other day   Patient not taking: Reported on 3/17/2021   lactulose 20 g/30 mL   No No   Sig: Take 15 mL (10 g total) by mouth 2 (two) times a day   Patient not taking: Reported on 3/17/2021   levothyroxine 75 mcg tablet   No No   Sig: Take 1 tablet (75 mcg total) by mouth daily in the early morning   linaCLOtide (Linzess) 145 MCG CAPS   No No   Sig: Take 1 capsule (145 mcg total) by mouth daily   metFORMIN (GLUCOPHAGE) 500 mg tablet   No No   Sig: Take 1 tablet (500 mg total) by mouth 2 (two) times a day with meals   metoprolol tartrate (LOPRESSOR) 25 mg tablet   No No   Sig: Take 1 tablet (25 mg total) by mouth 3 (three) times a day   oxybutynin (DITROPAN-XL) 10 MG 24 hr tablet   No No   Sig: TAKE ONE (1) TABLET BY MOUTH TWICE A DAY   pravastatin (PRAVACHOL) 20 mg tablet   No No   Sig: Take 1 tablet (20 mg total) by mouth daily   predniSONE 5 mg tablet   No No   Sig: Take 1 tablet (5 mg total) by mouth daily   repaglinide (PRANDIN) 2 mg tablet   No No   Sig: Take 2 tablets (4 mg total) by mouth 3 (three) times a day before meals      Facility-Administered Medications: None     Allergies   Allergen Reactions    Cephalosporins GI Intolerance    Levemir [Insulin Detemir]      Brittle diabetic  Hypersensitive to insulin  Severe hypoglycemia results if given insulin  Please speak with son, Isaura Gold, before giving ANY type of insulin   Acetaminophen     Cephalexin GI Intolerance    Clindamycin      Other reaction(s): Unspecified    Demeclocycline     Hydrocodone      Bitartrate  Other reaction(s): Unspecified    Hydrocodone-Acetaminophen Nausea Only    Oxycodone-Acetaminophen      HCL    Oxycodone-Acetaminophen     Penicillins Hives     Other reaction(s):  Other (See Comments)  ITCHING/RASH    Simvastatin GI Intolerance    Sulfamethoxazole-Trimethoprim     Tetrabenazine     Tetracycline Nausea Only    Tetracyclines & Related     Vicodin [Hydrocodone-Acetaminophen]          Review of Systems   Constitutional: Negative for chills and fever  HENT: Negative for ear pain and sore throat  Eyes: Negative for pain and visual disturbance  Respiratory: Negative for cough and shortness of breath  Cardiovascular: Negative for chest pain and palpitations  Gastrointestinal: Negative for abdominal pain, diarrhea, nausea and vomiting  Genitourinary: Negative for dysuria and hematuria  Musculoskeletal: Negative for arthralgias and back pain  Skin: Negative for color change and rash  Neurological: Positive for dizziness, facial asymmetry and light-headedness  Negative for seizures, syncope, numbness and headaches  Psychiatric/Behavioral: Negative for agitation and behavioral problems  All other systems reviewed and are negative  OBJECTIVE     Patient ID: Carson Root is a 80 y o  female  Vitals:   Vitals:    21 2252 21 0737 21 1037 21 1517   BP:  124/61 122/61 125/66   BP Location:  Right arm     Pulse: 89 60 62 59   Resp:  18  17   Temp:  97 6 °F (36 4 °C)  (!) 97 4 °F (36 3 °C)   TempSrc:  Oral     SpO2:  95% 96% 91%   Weight:       Height:          Body mass index is 23 39 kg/m²  No intake or output data in the 24 hours ending 21 1734    Temperature:   Temp (24hrs), Av 7 °F (36 5 °C), Min:97 4 °F (36 3 °C), Max:98 1 °F (36 7 °C)    Temperature: (!) 97 4 °F (36 3 °C)    Invasive Devices: Invasive Devices     Peripheral Intravenous Line            Peripheral IV 21 Right Antecubital 1 day              Physical Exam  Vitals and nursing note reviewed  Constitutional:       General: She is not in acute distress  Appearance: She is well-developed  HENT:      Head: Normocephalic and atraumatic  Right Ear: External ear normal       Left Ear: External ear normal       Nose: Nose normal       Mouth/Throat:      Mouth: Mucous membranes are moist    Eyes:      Extraocular Movements: Extraocular movements intact  Conjunctiva/sclera: Conjunctivae normal       Pupils: Pupils are equal, round, and reactive to light  Cardiovascular:      Rate and Rhythm: Normal rate and regular rhythm  Heart sounds: No murmur heard  Pulmonary:      Effort: Pulmonary effort is normal  No respiratory distress  Breath sounds: Normal breath sounds  Abdominal:      Palpations: Abdomen is soft  Musculoskeletal:      Cervical back: Normal range of motion and neck supple  Skin:     General: Skin is warm and dry  Neurological:      Mental Status: She is alert and oriented to person, place, and time  Cranial Nerves: Cranial nerve deficit present  Deep Tendon Reflexes:      Reflex Scores:       Tricep reflexes are 1+ on the right side and 1+ on the left side  Patellar reflexes are 1+ on the right side and 1+ on the left side  Psychiatric:         Mood and Affect: Mood normal          Speech: Speech normal          Behavior: Behavior normal           Neurologic Exam     Mental Status   Oriented to person, place, and time  Speech: speech is normal     Cranial Nerves     CN II   Visual fields full to confrontation  CN III, IV, VI   Pupils are equal, round, and reactive to light  Nystagmus: none     CN V   Facial sensation intact  CN XII   Tongue deviation: left  Left facial droop  Right eye exotropia  Left tongue deviation  Grossly normal Head Impulse Test; Grossly normal Test of Skew with regard to known existing prior stroke  Motor Exam   Overall muscle tone: normal5/5 muscle strength of RUE and LUE  4-5 muscle strength of LLE and RLE  Sensory Exam   Light touch normal    Pinprick normal      Gait, Coordination, and Reflexes     Reflexes   Right triceps: 1+  Left triceps: 1+  Right patellar: 1+  Left patellar: 1+Left finger to nose slightly less accurate than right  LABORATORY DATA     Labs:  I have personally reviewed pertinent films in PACS  Results from last 7 days   Lab Units 06/14/21  0444 06/13/21  1001   WBC Thousand/uL 7 41 7 21   HEMOGLOBIN g/dL 10 4* 10 0*   HEMATOCRIT % 34 3* 33 2*   PLATELETS Thousands/uL 249 246   NEUTROS PCT % 60  --    MONOS PCT % 7  --       Results from last 7 days   Lab Units 06/14/21  0444 06/13/21  1001   POTASSIUM mmol/L 3 8 4 1   CHLORIDE mmol/L 108 102   CO2 mmol/L 28 29   BUN mg/dL 14 21   CREATININE mg/dL 0 63 0 90   CALCIUM mg/dL 8 6 8 7              Results from last 7 days   Lab Units 06/13/21  1001   INR  0 93   PTT seconds 26         Results from last 7 days   Lab Units 06/13/21  1001   TROPONIN I ng/mL 0 09*       IMAGING & DIAGNOSTIC TESTING     Radiology Results: I have personally reviewed pertinent films in PACS  XR pelvis complete 3+ vw   Final Result by Taj Bartlett MD (06/14 1450)      No acute osseous injuries noted  The apparent, possibly acute left superior pubic ramus fracture described on 6/13/2021 plain films is again seen  Because a similar appearance is seen on the pelvic plain film from 10/1/2015, this is probably not an acute fracture           Workstation performed: [id number]         MRI inpatient order    (Results Pending)       Other Diagnostic Testing: I have personally reviewed pertinent films in PACS    ACTIVE MEDICATIONS     Current Facility-Administered Medications   Medication Dose Route Frequency    aluminum-magnesium hydroxide-simethicone (MYLANTA) oral suspension 30 mL  30 mL Oral Q6H PRN    aspirin chewable tablet 81 mg  81 mg Oral Daily    docusate sodium (COLACE) capsule 100 mg  100 mg Oral BID    enoxaparin (LOVENOX) subcutaneous injection 40 mg  40 mg Subcutaneous Daily    ergocalciferol (VITAMIN D2) capsule 50,000 Units  50,000 Units Oral Weekly    lactulose oral solution 10 g  10 g Oral BID    levothyroxine tablet 75 mcg  75 mcg Oral Early Morning    lubiprostone (AMITIZA) capsule 24 mcg  24 mcg Oral BID    metoprolol tartrate (LOPRESSOR) tablet 25 mg  25 mg Oral TID    moisture barrier miconazole 2% cream (aka ILIANA MOISTURE BARRIER ANTIFUNGAL CREAM)   Topical BID    ondansetron (ZOFRAN) injection 4 mg  4 mg Intravenous Q6H PRN    oxybutynin (DITROPAN-XL) 24 hr tablet 10 mg  10 mg Oral HS    pantoprazole (PROTONIX) EC tablet 20 mg  20 mg Oral Early Morning    polyethylene glycol (MIRALAX) packet 17 g  17 g Oral Daily    pravastatin (PRAVACHOL) tablet 20 mg  20 mg Oral Daily    predniSONE tablet 5 mg  5 mg Oral Daily    repaglinide (PRANDIN) tablet 2 mg  2 mg Oral TID AC       Prior to Admission medications    Medication Sig Start Date End Date Taking?  Authorizing Provider   aspirin 81 MG tablet Take 1 tablet by mouth daily    Historical Provider, MD   Blood Glucose Monitoring Suppl (ONE TOUCH ULTRA MINI) w/Device KIT USE TO TEST 4 TIMES DAILY 12/7/18   Historical Provider, MD   ciclopirox (PENLAC) 8 % solution Apply topically daily at bedtime 7/10/20   Severo Elliott DO   ciclopirox [facility]) 8 % solution Apply topically daily at bedtime 3/17/21   Severo Elliott DO   Contour Next Test test strip Use as instructed UP TO FOUR TIMES DAILY  4/5/21   Severo Elliott DO   ergocalciferol (VITAMIN D2) 50,000 units Take 1 capsule (50,000 Units total) by mouth once a week 4/5/21   Severo Elliott DO   esomeprazole (NexIUM) 20 mg capsule Take 1 capsule (20 mg total) by mouth daily in the early morning 7/14/20   Severo Elliott DO   furosemide (LASIX) 20 mg tablet Take one every other day  Patient not taking: Reported on 3/17/2021 6/19/20   Crispin Partida DO   lactulose 20 g/30 mL Take 15 mL (10 g total) by mouth 2 (two) times a day  Patient not taking: Reported on 3/17/2021 8/12/20   Severo Elliott DO   levothyroxine 75 mcg tablet Take 1 tablet (75 mcg total) by mouth daily in the early morning 7/14/20   Severo Elliott DO   linaCLOtide (Linzess) 145 MCG CAPS Take 1 capsule (145 mcg total) by mouth daily 2/3/21   Idelia Earl, DO   metFORMIN (GLUCOPHAGE) 500 mg tablet Take 1 tablet (500 mg total) by mouth 2 (two) times a day with meals 7/14/20   Negra Gallop, DO   metoprolol tartrate (LOPRESSOR) 25 mg tablet Take 1 tablet (25 mg total) by mouth 3 (three) times a day 7/14/20   Negra Gallop, DO   ONE TOUCH ULTRA TEST test strip 4 (four) times a day Test 12/7/18   Historical Provider, MD Lizarraga Brain LANCETS FINE MISC 4 (four) times a day Test 12/7/18   Historical Provider, MD   oxybutynin (DITROPAN-XL) 10 MG 24 hr tablet TAKE ONE (1) TABLET BY MOUTH TWICE A DAY 4/5/21   [facility], DO   pravastatin (PRAVACHOL) 20 mg tablet Take 1 tablet (20 mg total) by mouth daily 4/5/21   Negra Gallop, DO   predniSONE 5 mg tablet Take 1 tablet (5 mg total) by mouth daily 7/14/20   Negra Gallop, DO   repaglinide (PRANDIN) 2 mg tablet Take 2 tablets (4 mg total) by mouth 3 (three) times a day before meals 7/14/20 3/18/21  Negra Gallop, DO         CODE STATUS & ADVANCED DIRECTIVES     Code Status: Level 1 - Full Code  Advance Directive and Living Will:      Power of :    POLST:        VTE Pharmacologic Prophylaxis: Enoxaparin (Lovenox)  VTE Mechanical Prophylaxis: sequential compression device    ==  MD Nilda Bowden [facility]'s Psychiatry Residency, PGY-1

## 2021-06-14 NOTE — ASSESSMENT & PLAN NOTE
Dizziness giddiness disequilibrium gait instability   Vertiginous symptoms   Case was discussed with Neurology by ER physician at [address] recommended transfer to [place] for evaluation  Neurology following   Physical therapy  MRI pending   Pacemaker is compatible

## 2021-06-14 NOTE — ASSESSMENT & PLAN NOTE
Imaging reveals left superior pubic ramus fracture   analgesics    request orthopedic input   Physical therapy

## 2021-06-14 NOTE — ASSESSMENT & PLAN NOTE
Lab Results   Component Value Date    HGBA1C 10 8 (A) 03/17/2021       Recent Labs     06/13/21  1027   POCGLU 279*       Blood Sugar Average: Last 72 hrs:     Metformin on hold while inpatient   Continue Prandin - will have her on 2 mg t i d  with meals while inpatient     discussed with the patient as well as son -  They adamantly refuse insulin therapy while inpatient     monitor Accu-Cheks  Avoid hypoglycemia  Hypoglycemia protocol in place

## 2021-06-15 ENCOUNTER — APPOINTMENT (INPATIENT)
Dept: NON INVASIVE DIAGNOSTICS | Facility: HOSPITAL | Age: 86
DRG: 638 | End: 2021-06-15
Payer: COMMERCIAL

## 2021-06-15 ENCOUNTER — APPOINTMENT (INPATIENT)
Dept: RADIOLOGY | Facility: HOSPITAL | Age: 86
DRG: 638 | End: 2021-06-15
Payer: COMMERCIAL

## 2021-06-15 VITALS
SYSTOLIC BLOOD PRESSURE: 107 MMHG | TEMPERATURE: 98.3 F | OXYGEN SATURATION: 93 % | HEART RATE: 60 BPM | BODY MASS INDEX: 23.53 KG/M2 | RESPIRATION RATE: 18 BRPM | HEIGHT: 62 IN | WEIGHT: 127.87 LBS | DIASTOLIC BLOOD PRESSURE: 65 MMHG

## 2021-06-15 LAB
GLUCOSE SERPL-MCNC: 166 MG/DL (ref 65–140)
GLUCOSE SERPL-MCNC: 263 MG/DL (ref 65–140)
GLUCOSE SERPL-MCNC: 276 MG/DL (ref 65–140)
GLUCOSE SERPL-MCNC: 331 MG/DL (ref 65–140)

## 2021-06-15 PROCEDURE — 93306 TTE W/DOPPLER COMPLETE: CPT | Performed by: INTERNAL MEDICINE

## 2021-06-15 PROCEDURE — 70450 CT HEAD/BRAIN W/O DYE: CPT

## 2021-06-15 PROCEDURE — 99233 SBSQ HOSP IP/OBS HIGH 50: CPT | Performed by: PSYCHIATRY & NEUROLOGY

## 2021-06-15 PROCEDURE — 99239 HOSP IP/OBS DSCHRG MGMT >30: CPT | Performed by: PHYSICIAN ASSISTANT

## 2021-06-15 PROCEDURE — 82948 REAGENT STRIP/BLOOD GLUCOSE: CPT

## 2021-06-15 PROCEDURE — G1004 CDSM NDSC: HCPCS

## 2021-06-15 PROCEDURE — 97530 THERAPEUTIC ACTIVITIES: CPT

## 2021-06-15 PROCEDURE — 93306 TTE W/DOPPLER COMPLETE: CPT

## 2021-06-15 RX ORDER — ONDANSETRON 4 MG/1
4 TABLET, ORALLY DISINTEGRATING ORAL EVERY 6 HOURS PRN
Status: DISCONTINUED | OUTPATIENT
Start: 2021-06-15 | End: 2021-06-15 | Stop reason: HOSPADM

## 2021-06-15 RX ADMIN — REPAGLINIDE 2 MG: 1 TABLET ORAL at 08:32

## 2021-06-15 RX ADMIN — LACTULOSE 10 G: 10 SOLUTION ORAL at 08:32

## 2021-06-15 RX ADMIN — LUBIPROSTONE 24 MCG: 8 CAPSULE, GELATIN COATED ORAL at 08:33

## 2021-06-15 RX ADMIN — PANTOPRAZOLE SODIUM 20 MG: 20 TABLET, DELAYED RELEASE ORAL at 06:29

## 2021-06-15 RX ADMIN — DOCUSATE SODIUM 100 MG: 100 CAPSULE ORAL at 08:33

## 2021-06-15 RX ADMIN — ASPIRIN 81 MG: 81 TABLET, CHEWABLE ORAL at 08:33

## 2021-06-15 RX ADMIN — ENOXAPARIN SODIUM 40 MG: 40 INJECTION SUBCUTANEOUS at 08:33

## 2021-06-15 RX ADMIN — PRAVASTATIN SODIUM 20 MG: 20 TABLET ORAL at 08:32

## 2021-06-15 RX ADMIN — REPAGLINIDE 2 MG: 1 TABLET ORAL at 15:46

## 2021-06-15 RX ADMIN — POLYETHYLENE GLYCOL 3350 17 G: 17 POWDER, FOR SOLUTION ORAL at 08:32

## 2021-06-15 RX ADMIN — LEVOTHYROXINE SODIUM 75 MCG: 75 TABLET ORAL at 06:29

## 2021-06-15 RX ADMIN — PREDNISONE 5 MG: 5 TABLET ORAL at 08:32

## 2021-06-15 RX ADMIN — REPAGLINIDE 2 MG: 1 TABLET ORAL at 10:53

## 2021-06-15 RX ADMIN — ONDANSETRON 4 MG: 4 TABLET, ORALLY DISINTEGRATING ORAL at 10:52

## 2021-06-15 RX ADMIN — MICONAZOLE NITRATE: 20 CREAM TOPICAL at 08:37

## 2021-06-15 NOTE — DISCHARGE SUMMARY
[address] [facility]  Discharge- Conway Iha 1933, 80 y o  female MRN: [id number]  Unit/Bed#: [facility] 726-01 Encounter: [id number]  Primary Care Provider: Negra Ortiz DO   Date and time admitted to hospital: 6/13/2021  8:14 PM    * Dizziness and giddiness  Assessment & Plan  · Dizziness giddiness disequilibrium gait instability   · Neurology consult appreciated  Suspect patient's symptoms are more from hyperglycemia, dehydration and sleep deprivation  · Discussed diabetic management with patient and sister  Sister has patient keep her blood sugars high to prevent hypoglycemia  Patient also does not drink a lot of fluids secondary to polyuria  Discussed with family the importance of better blood sugar control and that her hyperglycemia is contributing to the majority of her symptoms  · Physical therapy - recommend home with home health  Patient is going to stay with her sister for a while, so declines for now  Discussed with sister, would likely be beneficial for her to have home health when she returns home  · Repeat CT head normal   MRI deferred as could not be scheduled within a reasonable amount of time, and given her low likelihood of CVA, it did not seem plausible to keep her here for the MRI  Pubic ramus fracture [facility])  Assessment & Plan   Imaging reveals left superior pubic ramus fracture, likely chronic  Ortho consult appreciated - WBAT and pain control, they signed off   Physical therapy - home PT, patient declines and will go to her sister's home       Uncontrolled type 2 diabetes mellitus with hyperglycemia [facility])  Assessment & Plan  Lab Results   Component Value Date    HGBA1C 11 6 (H) 06/14/2021       Recent Labs     06/14/21  2053 06/15/21  0623 06/15/21  1037 06/15/21  1105   POCGLU 337* 166* 263* 276*       Blood Sugar Average: Last 72 hrs:  (P) 243 [id number]   Metformin on hold while inpatient - increase Metformin dose to 750 mg twice a day at discharge given uncontrolled DM  Continue Prandin - will have her on 2 mg t i d  with meals while inpatient     discussed with the patient as well as son -  They adamantly refuse insulin therapy, secondary to history of severe hypoglycemia    A sister has the patient keep a high blood sugar for fear of hypoglycemia  Patient also does not adhere to a diabetic diet  She eats sugary foods throughout the day  Sister also tries to have her blood sugars run above 300 at night for fear of hypoglycemia in the evening/early morning  Discussed with family the need for better blood sugar control  Sister states patient has outpatient follow-up with Dr Omero Alexandra in the next week  Encouraged them to discuss better management for the patient  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[facility])  Assessment & Plan   Patient with history of acromegaly status post Pitutary surgery in 1989   Patient is on replacement levothyroxine 75 mcg daily, prednisone 5 mg p o  daily   Follows Dr Omero Alexandra   outpatient endocrinology input noted    Ambulatory dysfunction  Assessment & Plan   Ambulatory dysfunction   Safe ambulation  Fall precautions  Physical therapy      Medical Problems     Resolved Problems  Date Reviewed: 6/15/2021    None              Discharging Physician / Practitioner: Mitra Jernigan PA-C  PCP: Long Obregon DO  Admission Date:   Admission Orders (From admission, onward)     Ordered        06/13/21 2049  Inpatient Admission  Once                   Discharge Date: 06/15/21    Consultations During Hospital Stay:  · Dr Rosalind Nichols  · Dr Monse Clements  · Dr Dolores Hansen    Procedures Performed:     6/15 CT head - No acute intracranial abnormality  Stable microangiopathic changes within the brain    XR pelvis - No acute osseous injuries noted  The apparent, possibly acute left superior pubic ramus fracture described on 6/13/2021 plain films is again seen    Because a similar appearance is seen on the pelvic plain film from 10/1/2015, this is probably not an acute fracture  XR left hip - Age-indeterminate but possibly chronic fracture of the left superior pubic ramus near the junction with the acetabulum  Intact bilateral hip prostheses  6/13 CT head/neck - 1  No hemodynamically significant stenosis in the major arteries of the neck  2   No intracranial aneurysm or major intracranial arterial stenosis  3   Pagetoid changes of the skull incidentally noted    CT head - No acute intracranial hemorrhage  Stable microangiopathic changes within the brain  CXR - No acute cardiopulmonary disease    Significant Findings / Test Results:   · See above    Incidental Findings:   · none     Test Results Pending at Discharge (will require follow up): · Echocardiogram     Outpatient Tests Requested:  · none    Complications:  none    Reason for Admission:  Dizziness and gait instability    Hospital Course:   April Mariano is a 80 y o  female patient who originally presented to the hospital on 6/13/2021 due to dizziness and gait instability  She was a transfer from [facility] for Neurology evaluation  Patient was seen in consultation by Neurology  They felt that the patient most likely did not have a CVA  They felt her symptoms were likely secondary to hyperglycemia, dehydration, possibly orthostasis, and sleep deprivation  MRI of the brain with her pacemaker could not be performed in a reasonably timely manner, therefore repeat CT head was performed which was negative  Discussed with patient's sister who is 1 of her primary caretakers diabetic management  Will increase her metformin to 750 mg twice a day  See above for conversation  Patient does follow Dr Nat Brittle, recommended follow-up with him as an outpatient  PT saw the patient in consultation  They recommended home rehab  Patient declined as she is going to stay with her sister for some time  I recommended she start home health when she returns home        Please see above list of diagnoses and related plan for additional information  Condition at Discharge: good    Discharge Day Visit / Exam:   Subjective:  Nauseated this morning, but now feels improved  Vitals: Blood Pressure: 107/65 (06/15/21 1529)  Pulse: 60 (06/15/21 1529)  Temperature: 98 3 °F (36 8 °C) (06/15/21 1529)  Temp Source: Oral (06/14/21 0737)  Respirations: 18 (06/15/21 1529)  Height: 5' 2" (157 5 cm) (06/13/21 2244)  Weight - Scale: 58 kg (127 lb 13 9 oz) (06/13/21 2244)  SpO2: 93 % (06/15/21 1529)  Exam:   Physical Exam  Constitutional:       Appearance: Normal appearance  Cardiovascular:      Rate and Rhythm: Normal rate and regular rhythm  Heart sounds: No murmur heard  Pulmonary:      Effort: Pulmonary effort is normal       Breath sounds: Normal breath sounds  Abdominal:      General: Bowel sounds are normal  There is no distension  Palpations: Abdomen is soft  Tenderness: There is no abdominal tenderness  Skin:     General: Skin is warm and dry  Neurological:      General: No focal deficit present  Mental Status: She is alert and oriented to person, place, and time  Psychiatric:         Mood and Affect: Mood normal           Discussion with Family: Updated  (sister) at bedside  Called son with update  Discharge instructions/Information to patient and family:   See after visit summary for information provided to patient and family  Provisions for Follow-Up Care:  See after visit summary for information related to follow-up care and any pertinent home health orders  Disposition:   Home    Planned Readmission: none     Discharge Statement:  I spent 45 minutes discharging the patient  This time was spent on the day of discharge  I had direct contact with the patient on the day of discharge   Greater than 50% of the total time was spent examining patient, answering all patient questions, arranging and discussing plan of care with patient as well as directly providing post-discharge instructions  Additional time then spent on discharge activities  Discharge Medications:  See after visit summary for reconciled discharge medications provided to patient and/or family        **Please Note: This note may have been constructed using a voice recognition system**

## 2021-06-15 NOTE — PLAN OF CARE
Problem: MOBILITY - ADULT  Goal: Maintain or return to baseline ADL function  Description: INTERVENTIONS:  -  Assess patient's ability to carry out ADLs; assess patient's baseline for ADL function and identify physical deficits which impact ability to perform ADLs (bathing, care of mouth/teeth, toileting, grooming, dressing, etc )  - Assess/evaluate cause of self-care deficits   - Assess range of motion  - Assess patient's mobility; develop plan if impaired  - Assess patient's need for assistive devices and provide as appropriate  - Encourage maximum independence but intervene and supervise when necessary  - Involve family in performance of ADLs  - Assess for home care needs following discharge   - Consider OT consult to assist with ADL evaluation and planning for discharge  - Provide patient education as appropriate  Outcome: Progressing  Goal: Maintains/Returns to pre admission functional level  Description: INTERVENTIONS:  - Perform BMAT or MOVE assessment daily    - Set and communicate daily mobility goal to care team and patient/family/caregiver  - Collaborate with rehabilitation services on mobility goals if consulted  - Perform Range of Motion 3 times a day  - Reposition patient every 3 hours    - Dangle patient 3 times a day  - Stand patient 3 times a day  - Ambulate patient 3 times a day  - Out of bed to chair 3 times a day   - Out of bed for meals 3 times a day  - Out of bed for toileting  - Record patient progress and toleration of activity level   Outcome: Progressing     Problem: MUSCULOSKELETAL - ADULT  Goal: Maintain or return mobility to safest level of function  Description: INTERVENTIONS:  - Assess patient's ability to carry out ADLs; assess patient's baseline for ADL function and identify physical deficits which impact ability to perform ADLs (bathing, care of mouth/teeth, toileting, grooming, dressing, etc )  - Assess/evaluate cause of self-care deficits   - Assess range of motion  - Assess patient's mobility  - Assess patient's need for assistive devices and provide as appropriate  - Encourage maximum independence but intervene and supervise when necessary  - Involve family in performance of ADLs  - Assess for home care needs following discharge   - Consider OT consult to assist with ADL evaluation and planning for discharge  - Provide patient education as appropriate  Outcome: Progressing  Goal: Maintain proper alignment of affected body part  Description: INTERVENTIONS:  - Support, maintain and protect limb and body alignment  - Provide patient/ family with appropriate education  Outcome: Progressing     Problem: PAIN - ADULT  Goal: Verbalizes/displays adequate comfort level or baseline comfort level  Description: Interventions:  - Encourage patient to monitor pain and request assistance  - Assess pain using appropriate pain scale  - Administer analgesics based on type and severity of pain and evaluate response  - Implement non-pharmacological measures as appropriate and evaluate response  - Consider cultural and social influences on pain and pain management  - Notify physician/advanced practitioner if interventions unsuccessful or patient reports new pain  Outcome: Progressing     Problem: INFECTION - ADULT  Goal: Absence or prevention of progression during hospitalization  Description: INTERVENTIONS:  - Assess and monitor for signs and symptoms of infection  - Monitor lab/diagnostic results  - Monitor all insertion sites, i e  indwelling lines, tubes, and drains  - [place] appropriate cooling/warming therapies per order  - Administer medications as ordered  - Instruct and encourage patient and family to use good hand hygiene technique  - Identify and instruct in appropriate isolation precautions for identified infection/condition  Outcome: Progressing     Problem: SAFETY ADULT  Goal: Maintain or return to baseline ADL function  Description: INTERVENTIONS:  -  Assess patient's ability to carry out ADLs; assess patient's baseline for ADL function and identify physical deficits which impact ability to perform ADLs (bathing, care of mouth/teeth, toileting, grooming, dressing, etc )  - Assess/evaluate cause of self-care deficits   - Assess range of motion  - Assess patient's mobility; develop plan if impaired  - Assess patient's need for assistive devices and provide as appropriate  - Encourage maximum independence but intervene and supervise when necessary  - Involve family in performance of ADLs  - Assess for home care needs following discharge   - Consider OT consult to assist with ADL evaluation and planning for discharge  - Provide patient education as appropriate  Outcome: Progressing  Goal: Maintains/Returns to pre admission functional level  Description: INTERVENTIONS:  - Perform BMAT or MOVE assessment daily    - Set and communicate daily mobility goal to care team and patient/family/caregiver  - Collaborate with rehabilitation services on mobility goals if consulted  - Perform Range of Motion 3 times a day  - Reposition patient every 3 hours    - Dangle patient 3 times a day  - Stand patient 3 times a day  - Ambulate patient 3 times a day  - Out of bed to chair 3 times a day   - Out of bed for meals 3 times a day  - Out of bed for toileting  - Record patient progress and toleration of activity level   Outcome: Progressing  Goal: Patient will remain free of falls  Description: INTERVENTIONS:  - Educate patient/family on patient safety including physical limitations  - Instruct patient to call for assistance with activity   - Consult OT/PT to assist with strengthening/mobility   - Keep Call bell within reach  - Keep bed low and locked with side rails adjusted as appropriate  - Keep care items and personal belongings within reach  - Initiate and maintain comfort rounds  - Make Fall Risk Sign visible to staff  - Offer Toileting every 3 Hours, in advance of need  - Initiate/Maintain alarm  - Obtain necessary fall risk management equipment:   - Apply yellow socks and bracelet for high fall risk patients  - Consider moving patient to room near nurses station  Outcome: Progressing     Problem: DISCHARGE PLANNING  Goal: Discharge to home or other facility with appropriate resources  Description: INTERVENTIONS:  - Identify barriers to discharge w/patient and caregiver  - Arrange for needed discharge resources and transportation as appropriate  - Identify discharge learning needs (meds, wound care, etc )  - Arrange for interpretive services to assist at discharge as needed  - Refer to Case Management Department for coordinating discharge planning if the patient needs post-hospital services based on physician/advanced practitioner order or complex needs related to functional status, cognitive ability, or social support system  Outcome: Progressing     Problem: Knowledge Deficit  Goal: Patient/family/caregiver demonstrates understanding of disease process, treatment plan, medications, and discharge instructions  Description: Complete learning assessment and assess knowledge base  Interventions:  - Provide teaching at level of understanding  - Provide teaching via preferred learning methods  Outcome: Progressing     Problem: Nutrition/Hydration-ADULT  Goal: Nutrient/Hydration intake appropriate for improving, restoring or maintaining nutritional needs  Description: Monitor and assess patient's nutrition/hydration status for malnutrition  Collaborate with interdisciplinary team and initiate plan and interventions as ordered  Monitor patient's weight and dietary intake as ordered or per policy  Utilize nutrition screening tool and intervene as necessary  Determine patient's food preferences and provide high-protein, high-caloric foods as appropriate       INTERVENTIONS:  - Monitor oral intake, urinary output, labs, and treatment plans  - Assess nutrition and hydration status and recommend course of action  - Evaluate amount of meals eaten  - Assist patient with eating if necessary   - Allow adequate time for meals  - Recommend/ encourage appropriate diets, oral nutritional supplements, and vitamin/mineral supplements  - Order, calculate, and assess calorie counts as needed  - Recommend, monitor, and adjust tube feedings and TPN/PPN based on assessed needs  - Assess need for intravenous fluids  - Provide specific nutrition/hydration education as appropriate  - Include patient/family/caregiver in decisions related to nutrition  Outcome: Progressing

## 2021-06-15 NOTE — PROGRESS NOTES
NEUROLOGY RESIDENCY PROGRESS NOTE     Name: Lazara William   Age & Sex: 80 y o  female   MRN: [id number]  Unit/Bed#: [facility] 726-01   Encounter: [id number]    ASSESSMENT & PLAN     * Dizziness and giddiness  Assessment & Plan  · Patient presents with dizziness, described as lightheadedness, and unstable gait  Lightheadedness was present when getting up and exacerbated with ambulation  Present for the last few days  In the setting of dehydration, hyperglycemia, medication nonadherence, and inadequate sleep  Reportedly fell x2 in the last couple of days, with bruising on shoulder and hip on left side  · Etiology of the presentation likely in the setting of the above mentioned factors  · Education provided to patient, grand-daughter, and son regarding sleep hygiene and medication adherence  · Orthostatic BP measurement negative but not enough time elapsed between BP measurements  · Plan:  · Obtain MRI head without contrast  If unable to obtain in timely manner, will repeat CT of head without contrast   · F/u echocardiogram read  · Continue adequate fluid intake  · Continue PT/OT  · Fall precautions    Pubic ramus fracture ([address] )  Assessment & Plan  · XRAY shows pubic rami fractures, unclear date  Hx of likely recent falls  Repeat XRAY on 6/14 shows the same fracture that was also present in 2015  · Ortho consulted and recommend PT/OT and outpatient follow up  Weight bearing as tolerated  · Patient has been doing PT/OT with recommendations for home health OT and rehab  · Plan:  · Continue PT/OT  · Management per Primary Team    [facility]  Assessment & Plan  · 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  On chronic levothyroxine 75 mcg daily and prednisone 5 mg daily  · Plan:  · Continue current medication regimen    Hypertension  Assessment & Plan  · BP on transfer to [facility]"" was 124/62  Current /67 and well controlled    · Patient is on Lopressor 25 mg TID  · Plan:  · Continue current regimen  · Maintain normotensive BP range    Uncontrolled type 2 diabetes mellitus with hyperglycemia [facility])  Assessment & Plan  Lab Results   Component Value Date    HGBA1C 11 6 (H) 06/14/2021       Recent Labs     06/14/21  1045 06/14/21  1553 06/14/21  2053 06/15/21  0623   POCGLU 203* 382* 337* 166*       Blood Sugar Average: Last 72 hrs:  (P) 232 6   · Pt is brittle diabetic, and insulin administration can lead to severe hypoglycemia  · On repaglinide 2 mg TID before meals at home  · Plan:  · Continue per primary team      SUBJECTIVE     Patient was seen and examined  No acute events overnight  She reports that she is doing well overall  She denies current dizziness but noted some dizziness while doing PT  Otherwise she states that she had some difficulty with bowel movement in the evening  She denies muscle weakness, numbness, lightheadedness, changes in hearing, vision, coordination  Reports eating and drinking well  Review of Systems   Constitutional: Negative for chills and fever  HENT: Negative for ear pain, tinnitus and trouble swallowing  Eyes: Negative for pain and visual disturbance  Respiratory: Negative for cough and shortness of breath  Cardiovascular: Negative for chest pain and palpitations  Gastrointestinal: Positive for constipation  Negative for abdominal pain and vomiting  Genitourinary: Negative for dysuria and hematuria  Musculoskeletal: Negative for arthralgias and back pain  Skin: Negative for color change and rash  Neurological: Positive for facial asymmetry  Negative for dizziness, seizures, syncope, speech difficulty, weakness, light-headedness, numbness and headaches  Psychiatric/Behavioral: Negative for behavioral problems and confusion  All other systems reviewed and are negative  OBJECTIVE     Patient ID: María Sepulveda is a 80 y o  female      Vitals:    06/14/21 1736 06/14/21 1737 06/14/21 2224 06/15/21 0745   BP: 127/69 112/63 118/65 105/67   BP Location: Right arm Right arm     Pulse: 61 64 62 60   Resp:   17 16   Temp:   98 7 °F (37 1 °C) 98 2 °F (36 8 °C)   TempSrc:       SpO2: 95% 97% 97% 97%   Weight:       Height:          Temperature:   Temp (24hrs), Av 1 °F (36 7 °C), Min:97 4 °F (36 3 °C), Max:98 7 °F (37 1 °C)    Temperature: 98 2 °F (36 8 °C)      Physical Exam  Vitals and nursing note reviewed  Constitutional:       General: She is not in acute distress  Appearance: Normal appearance  She is well-developed  HENT:      Head: Normocephalic and atraumatic  Right Ear: External ear normal       Left Ear: External ear normal       Mouth/Throat:      Pharynx: Oropharynx is clear  Eyes:      Extraocular Movements: Extraocular movements intact  Conjunctiva/sclera: Conjunctivae normal       Pupils: Pupils are equal, round, and reactive to light  Cardiovascular:      Rate and Rhythm: Normal rate and regular rhythm  Heart sounds: Murmur heard  Comments: Crescendo-decrescendo murmur   Pulmonary:      Effort: Pulmonary effort is normal       Breath sounds: Normal breath sounds  Abdominal:      General: Abdomen is flat  Palpations: Abdomen is soft  Musculoskeletal:         General: No swelling or tenderness  Normal range of motion  Cervical back: Neck supple  Skin:     General: Skin is warm and dry  Neurological:      Mental Status: She is alert and oriented to person, place, and time  Cranial Nerves: Cranial nerve deficit present  Deep Tendon Reflexes:      Reflex Scores:       Tricep reflexes are 1+ on the right side and 1+ on the left side  Brachioradialis reflexes are 1+ on the right side and 1+ on the left side  Patellar reflexes are 1+ on the right side and 1+ on the left side    Psychiatric:         Mood and Affect: Mood normal          Speech: Speech normal          Behavior: Behavior normal           Neurologic Exam     Mental Status Oriented to person, place, and time  Speech: speech is normal     Cranial Nerves     CN II   Visual fields full to confrontation  CN III, IV, VI   Pupils are equal, round, and reactive to light  Nystagmus: none     CN V   Facial sensation intact  CN XII   Tongue deviation: left  Left facial droop and Right eye exotropia, with Left tongue deviation  Motor Exam   Overall muscle tone: normal5/5 muscle strength of RUE and LUE  4-5 muscle strength of LLE and RLE  Sensory Exam   Light touch normal    Pinprick normal      Gait, Coordination, and Reflexes     Reflexes   Right brachioradialis: 1+  Left brachioradialis: 1+  Right triceps: 1+  Left triceps: 1+  Right patellar: 1+  Left patellar: 1+       LABORATORY DATA     Labs: I have personally reviewed pertinent films in PACS  Results from last 7 days   Lab Units 06/14/21  0444 06/13/21  1001   WBC Thousand/uL 7 41 7 21   HEMOGLOBIN g/dL 10 4* 10 0*   HEMATOCRIT % 34 3* 33 2*   PLATELETS Thousands/uL 249 246   NEUTROS PCT % 60  --    MONOS PCT % 7  --       Results from last 7 days   Lab Units 06/14/21  0444 06/13/21  1001   SODIUM mmol/L 141 137   POTASSIUM mmol/L 3 8 4 1   CHLORIDE mmol/L 108 102   CO2 mmol/L 28 29   BUN mg/dL 14 21   CREATININE mg/dL 0 63 0 90   CALCIUM mg/dL 8 6 8 7              Results from last 7 days   Lab Units 06/13/21  1001   INR  0 93   PTT seconds 26         Results from last 7 days   Lab Units 06/13/21  1001   TROPONIN I ng/mL 0 09*       IMAGING & DIAGNOSTIC TESTING     Radiology Results: I have personally reviewed pertinent films in PACS    XR pelvis complete 3+ vw   Final Result by Hans Martini MD (06/14 3960)      No acute osseous injuries noted  The apparent, possibly acute left superior pubic ramus fracture described on 6/13/2021 plain films is again seen  Because a similar appearance is seen on the pelvic plain film from 10/1/2015, this is probably not an acute fracture           Workstation performed: [id number]         CT head wo contrast    (Results Pending)       Other Diagnostic Testing: I have personally reviewed pertinent films in PACS    ACTIVE MEDICATIONS     Current Facility-Administered Medications   Medication Dose Route Frequency    aluminum-magnesium hydroxide-simethicone (MYLANTA) oral suspension 30 mL  30 mL Oral Q6H PRN    aspirin chewable tablet 81 mg  81 mg Oral Daily    docusate sodium (COLACE) capsule 100 mg  100 mg Oral BID    enoxaparin (LOVENOX) subcutaneous injection 40 mg  40 mg Subcutaneous Daily    ergocalciferol (VITAMIN D2) capsule 50,000 Units  50,000 Units Oral Weekly    lactulose oral solution 10 g  10 g Oral BID    levothyroxine tablet 75 mcg  75 mcg Oral Early Morning    lubiprostone (AMITIZA) capsule 24 mcg  24 mcg Oral BID    metoprolol tartrate (LOPRESSOR) tablet 25 mg  25 mg Oral TID    moisture barrier miconazole 2% cream (aka ILIANA MOISTURE BARRIER ANTIFUNGAL CREAM)   Topical BID    ondansetron (ZOFRAN-ODT) dispersible tablet 4 mg  4 mg Oral Q6H PRN    oxybutynin (DITROPAN-XL) 24 hr tablet 10 mg  10 mg Oral HS    pantoprazole (PROTONIX) EC tablet 20 mg  20 mg Oral Early Morning    polyethylene glycol (MIRALAX) packet 17 g  17 g Oral Daily    pravastatin (PRAVACHOL) tablet 20 mg  20 mg Oral Daily    predniSONE tablet 5 mg  5 mg Oral Daily    repaglinide (PRANDIN) tablet 2 mg  2 mg Oral TID AC       Prior to Admission medications    Medication Sig Start Date End Date Taking?  Authorizing Provider   aspirin 81 MG tablet Take 1 tablet by mouth daily    Historical Provider, MD   Blood Glucose Monitoring Suppl (ONE TOUCH ULTRA MINI) w/Device KIT USE TO TEST 4 TIMES DAILY 12/7/18   Historical Provider, MD   ciclopirox (PENLAC) 8 % solution Apply topically daily at bedtime 7/10/20   Long Obregon DO   ciclopirox [facility]) 8 % solution Apply topically daily at bedtime 3/17/21   Long DO Sabas   Contour Next Test test strip Use as instructed UP TO FOUR TIMES DAILY  4/5/21 Norton Rota, DO   ergocalciferol (VITAMIN D2) 50,000 units Take 1 capsule (50,000 Units total) by mouth once a week 4/5/21 Norton Rota, DO   esomeprazole (NexIUM) 20 mg capsule Take 1 capsule (20 mg total) by mouth daily in the early morning 7/14/20 Norton Rota, DO   furosemide (LASIX) 20 mg tablet Take one every other day  Patient not taking: Reported on 3/17/2021 6/19/20   Nataly Duffy, DO   lactulose 20 g/30 mL Take 15 mL (10 g total) by mouth 2 (two) times a day  Patient not taking: Reported on 3/17/2021 8/12/20   Ibarra Rota, DO   levothyroxine 75 mcg tablet Take 1 tablet (75 mcg total) by mouth daily in the early morning 7/14/20 Norton Rota, DO   linaCLOtide (Linzess) 145 MCG CAPS Take 1 capsule (145 mcg total) by mouth daily 2/3/21   Ibarra Rota, DO   metFORMIN (GLUCOPHAGE) 500 mg tablet Take 1 tablet (500 mg total) by mouth 2 (two) times a day with meals 7/14/20 Norton Rota, DO   metoprolol tartrate (LOPRESSOR) 25 mg tablet Take 1 tablet (25 mg total) by mouth 3 (three) times a day 7/14/20 Norton Rota, DO   ONE TOUCH ULTRA TEST test strip 4 (four) times a day Test 12/7/18   Historical Provider, MD Millard Hole LANCETS FINE MISC 4 (four) times a day Test 12/7/18   Historical Provider, MD   oxybutynin (DITROPAN-XL) 10 MG 24 hr tablet TAKE ONE (1) TABLET BY MOUTH TWICE A DAY 4/5/21 Norton Rota, DO   pravastatin (PRAVACHOL) 20 mg tablet Take 1 tablet (20 mg total) by mouth daily 4/5/21 Norton Rota, DO   predniSONE 5 mg tablet Take 1 tablet (5 mg total) by mouth daily 7/14/20 Norton Rota, DO   repaglinide (PRANDIN) 2 mg tablet Take 2 tablets (4 mg total) by mouth 3 (three) times a day before meals 7/14/20 3/18/21  Ibarra Rota, DO     VTE Pharmacologic Prophylaxis: Enoxaparin (Lovenox)  VTE Mechanical Prophylaxis: sequential compression device    ==  Patel Adame, MD [facility] Resident, PGY-1

## 2021-06-15 NOTE — PLAN OF CARE
Problem: MOBILITY - ADULT  Goal: Maintain or return to baseline ADL function  Description: INTERVENTIONS:  -  Assess patient's ability to carry out ADLs; assess patient's baseline for ADL function and identify physical deficits which impact ability to perform ADLs (bathing, care of mouth/teeth, toileting, grooming, dressing, etc )  - Assess/evaluate cause of self-care deficits   - Assess range of motion  - Assess patient's mobility; develop plan if impaired  - Assess patient's need for assistive devices and provide as appropriate  - Encourage maximum independence but intervene and supervise when necessary  - Involve family in performance of ADLs  - Assess for home care needs following discharge   - Consider OT consult to assist with ADL evaluation and planning for discharge  - Provide patient education as appropriate  6/15/2021 1703 by Daniel Whitaker RN  Outcome: Adequate for Discharge  6/15/2021 0731 by Daniel Whitaker RN  Outcome: Progressing  Goal: Maintains/Returns to pre admission functional level  Description: INTERVENTIONS:  - Perform BMAT or MOVE assessment daily    - Set and communicate daily mobility goal to care team and patient/family/caregiver  - Collaborate with rehabilitation services on mobility goals if consulted  - Perform Range of Motion 3 times a day  - Reposition patient every 3 hours    - Dangle patient 3 times a day  - Stand patient 3 times a day  - Ambulate patient 3 times a day  - Out of bed to chair 3 times a day   - Out of bed for meals 3 times a day  - Out of bed for toileting  - Record patient progress and toleration of activity level   6/15/2021 1703 by Daniel Whitaker RN  Outcome: Adequate for Discharge  6/15/2021 0731 by Daniel Whitaker RN  Outcome: Progressing     Problem: MUSCULOSKELETAL - ADULT  Goal: Maintain or return mobility to safest level of function  Description: INTERVENTIONS:  - Assess patient's ability to carry out ADLs; assess patient's baseline for ADL function and identify physical deficits which impact ability to perform ADLs (bathing, care of mouth/teeth, toileting, grooming, dressing, etc )  - Assess/evaluate cause of self-care deficits   - Assess range of motion  - Assess patient's mobility  - Assess patient's need for assistive devices and provide as appropriate  - Encourage maximum independence but intervene and supervise when necessary  - Involve family in performance of ADLs  - Assess for home care needs following discharge   - Consider OT consult to assist with ADL evaluation and planning for discharge  - Provide patient education as appropriate  6/15/2021 1703 by Tona Brunson RN  Outcome: Adequate for Discharge  6/15/2021 0731 by Tona Brunson RN  Outcome: Progressing  Goal: Maintain proper alignment of affected body part  Description: INTERVENTIONS:  - Support, maintain and protect limb and body alignment  - Provide patient/ family with appropriate education  6/15/2021 1703 by Tona Brunson RN  Outcome: Adequate for Discharge  6/15/2021 0731 by Tona Brunson RN  Outcome: Progressing     Problem: PAIN - ADULT  Goal: Verbalizes/displays adequate comfort level or baseline comfort level  Description: Interventions:  - Encourage patient to monitor pain and request assistance  - Assess pain using appropriate pain scale  - Administer analgesics based on type and severity of pain and evaluate response  - Implement non-pharmacological measures as appropriate and evaluate response  - Consider cultural and social influences on pain and pain management  - Notify physician/advanced practitioner if interventions unsuccessful or patient reports new pain  6/15/2021 1703 by Tona Brunson RN  Outcome: Adequate for Discharge  6/15/2021 0731 by Tona Brunson RN  Outcome: Progressing     Problem: INFECTION - ADULT  Goal: Absence or prevention of progression during hospitalization  Description: INTERVENTIONS:  - Assess and monitor for signs and symptoms of infection  - Monitor lab/diagnostic results  - Monitor all insertion sites, i e  indwelling lines, tubes, and drains  - [place] appropriate cooling/warming therapies per order  - Administer medications as ordered  - Instruct and encourage patient and family to use good hand hygiene technique  - Identify and instruct in appropriate isolation precautions for identified infection/condition  6/15/2021 1703 by Amanda Kathleen RN  Outcome: Adequate for Discharge  6/15/2021 0731 by Amanda Kathleen RN  Outcome: Progressing     Problem: SAFETY ADULT  Goal: Maintain or return to baseline ADL function  Description: INTERVENTIONS:  -  Assess patient's ability to carry out ADLs; assess patient's baseline for ADL function and identify physical deficits which impact ability to perform ADLs (bathing, care of mouth/teeth, toileting, grooming, dressing, etc )  - Assess/evaluate cause of self-care deficits   - Assess range of motion  - Assess patient's mobility; develop plan if impaired  - Assess patient's need for assistive devices and provide as appropriate  - Encourage maximum independence but intervene and supervise when necessary  - Involve family in performance of ADLs  - Assess for home care needs following discharge   - Consider OT consult to assist with ADL evaluation and planning for discharge  - Provide patient education as appropriate  6/15/2021 1703 by Amanda Kathleen RN  Outcome: Adequate for Discharge  6/15/2021 0731 by Amanda Kathleen RN  Outcome: Progressing  Goal: Maintains/Returns to pre admission functional level  Description: INTERVENTIONS:  - Perform BMAT or MOVE assessment daily    - Set and communicate daily mobility goal to care team and patient/family/caregiver  - Collaborate with rehabilitation services on mobility goals if consulted  - Perform Range of Motion 3 times a day  - Reposition patient every 3 hours    - Dangle patient 3 times a day  - Stand patient 3 times a day  - Ambulate patient 3 times a day  - Out of bed to chair 3 times a day   - Out of bed for meals 3 times a day  - Out of bed for toileting  - Record patient progress and toleration of activity level   6/15/2021 1703 by Denys Doe RN  Outcome: Adequate for Discharge  6/15/2021 0731 by Denys Doe RN  Outcome: Progressing  Goal: Patient will remain free of falls  Description: INTERVENTIONS:  - Educate patient/family on patient safety including physical limitations  - Instruct patient to call for assistance with activity   - Consult OT/PT to assist with strengthening/mobility   - Keep Call bell within reach  - Keep bed low and locked with side rails adjusted as appropriate  - Keep care items and personal belongings within reach  - Initiate and maintain comfort rounds  - Make Fall Risk Sign visible to staff  - Offer Toileting every 3 Hours, in advance of need  - Initiate/Maintain bed alarm  - Obtain necessary fall risk management equipment: alarms  - Apply yellow socks and bracelet for high fall risk patients  - Consider moving patient to room near nurses station  6/15/2021 1703 by Denys Doe RN  Outcome: Adequate for Discharge  6/15/2021 0731 by Denys Doe RN  Outcome: Progressing     Problem: DISCHARGE PLANNING  Goal: Discharge to home or other facility with appropriate resources  Description: INTERVENTIONS:  - Identify barriers to discharge w/patient and caregiver  - Arrange for needed discharge resources and transportation as appropriate  - Identify discharge learning needs (meds, wound care, etc )  - Arrange for interpretive services to assist at discharge as needed  - Refer to Case Management Department for coordinating discharge planning if the patient needs post-hospital services based on physician/advanced practitioner order or complex needs related to functional status, cognitive ability, or social support system  6/15/2021 1703 by Denys Doe RN  Outcome: Adequate for Discharge  6/15/2021 0731 by Denys Doe RN  Outcome: Progressing     Problem: Knowledge Deficit  Goal: Patient/family/caregiver demonstrates understanding of disease process, treatment plan, medications, and discharge instructions  Description: Complete learning assessment and assess knowledge base  Interventions:  - Provide teaching at level of understanding  - Provide teaching via preferred learning methods  6/15/2021 1703 by Quoc Briggs RN  Outcome: Adequate for Discharge  6/15/2021 0731 by Quoc Briggs RN  Outcome: Progressing     Problem: Nutrition/Hydration-ADULT  Goal: Nutrient/Hydration intake appropriate for improving, restoring or maintaining nutritional needs  Description: Monitor and assess patient's nutrition/hydration status for malnutrition  Collaborate with interdisciplinary team and initiate plan and interventions as ordered  Monitor patient's weight and dietary intake as ordered or per policy  Utilize nutrition screening tool and intervene as necessary  Determine patient's food preferences and provide high-protein, high-caloric foods as appropriate       INTERVENTIONS:  - Monitor oral intake, urinary output, labs, and treatment plans  - Assess nutrition and hydration status and recommend course of action  - Evaluate amount of meals eaten  - Assist patient with eating if necessary   - Allow adequate time for meals  - Recommend/ encourage appropriate diets, oral nutritional supplements, and vitamin/mineral supplements  - Order, calculate, and assess calorie counts as needed  - Recommend, monitor, and adjust tube feedings and TPN/PPN based on assessed needs  - Assess need for intravenous fluids  - Provide specific nutrition/hydration education as appropriate  - Include patient/family/caregiver in decisions related to nutrition  6/15/2021 1703 by Quoc Briggs RN  Outcome: Adequate for Discharge  6/15/2021 0731 by Quoc Briggs RN  Outcome: Progressing     Problem: Potential for Falls  Goal: Patient will remain free of falls  Description: INTERVENTIONS:  - Educate patient/family on patient safety including physical limitations  - Instruct patient to call for assistance with activity   - Consult OT/PT to assist with strengthening/mobility   - Keep Call bell within reach  - Keep bed low and locked with side rails adjusted as appropriate  - Keep care items and personal belongings within reach  - Initiate and maintain comfort rounds  - Make Fall Risk Sign visible to staff  - Offer Toileting every 2 Hours, in advance of need  - Initiate/Maintain bed alarm  - Obtain necessary fall risk management equipment: alarms  - Apply yellow socks and bracelet for high fall risk patients  - Consider moving patient to room near nurses station  6/15/2021 1703 by Quoc Briggs RN  Outcome: Adequate for Discharge  6/15/2021 0731 by Quoc Briggs, RN  Outcome: Progressing

## 2021-06-15 NOTE — PLAN OF CARE
Problem: PHYSICAL THERAPY ADULT  Goal: Performs mobility at highest level of function for planned discharge setting  See evaluation for individualized goals  Description: Treatment/Interventions: Functional transfer training, LE strengthening/ROM, Elevations, Therapeutic exercise, Endurance training, Patient/family training, Equipment eval/education, Bed mobility, Gait training, Spoke to nursing  Equipment Recommended: Marysol Garay       See flowsheet documentation for full assessment, interventions and recommendations  Outcome: Progressing  Note: Prognosis: Good  Problem List: Decreased strength, Decreased endurance, Impaired balance, Decreased mobility  Assessment: Pt presents with decreased mobility, strength, balance, and activity tolerance  Pt demonstrated ability to perform bed mobility and functional transfers at supervision  Pt ambulated 10ft + 10ft with [facility] at [facility]  Pt performed toilet transfer at [address] [place] [address]  Upon arrival, pt requesting to use restroom  Once pt in restroom, pt reporting stomach pain and nausea and requesting basin  Session limited at this time 2' nausea  Pt continues to benefit from skilled therapy to maximize functional independence  Recommendation at this time is Home PT  Pt would benefit from continued ambulation, functional transfers, strength, balance, and activity tolerance  Barriers to Discharge: Inaccessible home environment        PT Discharge Recommendation: Home with home health rehabilitation     PT - OK to Discharge: No    See flowsheet documentation for full assessment

## 2021-06-15 NOTE — ASSESSMENT & PLAN NOTE
Lab Results   Component Value Date    HGBA1C 11 6 (H) 06/14/2021       Recent Labs     06/14/21  2053 06/15/21  0623 06/15/21  1037 06/15/21  1105   POCGLU 337* 166* 263* 276*       Blood Sugar Average: Last 72 hrs:  (P) 243 [id number]   Metformin on hold while inpatient - increase Metformin dose to 750 mg twice a day at discharge given uncontrolled DM  Continue Prandin - will have her on 2 mg t i d  with meals while inpatient     discussed with the patient as well as son -  They adamantly refuse insulin therapy, secondary to history of severe hypoglycemia    A sister has the patient keep a high blood sugar for fear of hypoglycemia  Patient also does not adhere to a diabetic diet  She eats sugary foods throughout the day  Sister also tries to have her blood sugars run above 300 at night for fear of hypoglycemia in the evening/early morning  Discussed with family the need for better blood sugar control  Sister states patient has outpatient follow-up with Dr Leonardo Cano in the next week  Encouraged them to discuss better management for the patient

## 2021-06-15 NOTE — ASSESSMENT & PLAN NOTE
Lab Results   Component Value Date    HGBA1C 11 6 (H) 06/14/2021       Recent Labs     06/14/21  2053 06/15/21  0623 06/15/21  1037 06/15/21  1105   POCGLU 337* 166* 263* 276*       Blood Sugar Average: Last 72 hrs:  (P) 243 [id number]   · Pt is brittle diabetic, and insulin administration can lead to severe hypoglycemia    · On repaglinide 2 mg TID before meals at home  · Plan:  · Continue per primary team

## 2021-06-15 NOTE — DISCHARGE INSTRUCTIONS
Discharge Instructions - Orthopedics  Gorge Suazo 80 y o  female MRN: [id number]  Unit/Bed#: X ray    Weight bearing to tolerance bilateral lower extremities      Appt Instructions: Follow up as needed with surgeon of record for hip or pelvic pain  Meal Planning with Diabetes Exchanges   WHAT YOU NEED TO KNOW:   Exchanges are servings of food that contain similar amounts of carbohydrate, fat, protein, and calories within a food group  The exchanges can be used to develop a healthy meal plan that helps to keep your blood sugar within the recommended levels  A meal plan with the right amount of carbohydrates is especially important  Your blood sugar naturally rises after you eat carbohydrates  Too many carbohydrates in 1 meal or snack can raise your blood sugar level  Carbohydrates are found in starches, fruit, milk, yogurt, and sweets  DISCHARGE INSTRUCTIONS:   Call your doctor if:   · You have high blood sugar levels during a certain time of day, or almost all of the time  · You often have low blood sugar levels  · You have questions or concerns about your condition or care  Create a meal plan with exchanges:  A dietitian will work with you to develop a healthy meal plan that is right for you  This meal plan will include the amount of exchanges you can have from each food group throughout the day  Follow your meal plan by keeping track of the amount of exchanges you eat for each meal and snack  Your meal plan will be based on your age, weight, blood sugar levels, medicine, and activity level  Starch food group exchanges:  Each exchange below contains about 15 grams of carbohydrate , 3 grams of protein, 1 gram of fat, and 80 calories  · 1 ounce of white, whole wheat or rye bread (1 slice)    · 1 ounce of bagel (about ¼ of a bagel)    · 1 6-inch flour or corn tortilla or 1 4-inch pancake (about ¼ inch thick)    · ?  cup of cooked pasta or rice    · ¾ cup of dry, ready-to-eat cereal with no sugar added     · ½ cup of cooked cereal, such as oatmeal    · 3 maryuri cracker squares or 8 animal crackers    · 6 saltine-type crackers or     · 3 cups of popcorn or ¾ ounce of pretzels     · Starchy vegetables and cooked legumes:      ? ½ cup of corn, green peas, sweet potatoes, or mashed potatoes     ? ¼ of a large baked potato     ? 1 cup of acorn, butternut squash, or pumpkin     ? ½ cup of beans, lentils, or peas (such as holloway, kidney, or black-eyed)    ? ? cup of lima beans    Fruit group exchanges:  Each exchange contains about 15 grams of carbohydrate  and 60 calories  · 1 small (4 ounce) apple, banana orange, or nectarine    · ½ cup of canned or fresh fruit    · ½ cup (4 ounces) of unsweetened fruit juice    · 2 tablespoons of dried fruit    Milk group exchanges:  Each exchange contains about 12 grams of carbohydrate  and 8 grams of protein  The amount of fat and calories in each serving depends on the type of milk (such as whole, low-fat, or fat-free)  · 1 cup fat-free or low-fat milk    · ¾ cup of plain, nonfat yogurt    · 1 cup fat-free, flavored yogurt with artificial (no calorie) sweetener    Non-starchy vegetable group exchanges:  Each exchange contains about 5 grams of carbohydrate , 2 grams of protein, and 25 calories  Examples include beets, broccoli, cabbage, carrots, cauliflower, cucumber, mushrooms, tomatoes, and zucchini  · ½ cup of cooked vegetables or 1 cup of raw vegetables     · ½ cup of vegetable juice    Meat and meat substitute group exchanges:  Each exchange of a lean meat  listed below contains about 7 grams of protein, 0 to 3 grams of fat, and 45 calories  The meat and meat substitutes food group does not contain any carbohydrates  Medium and high-fat meats have more calories    · 1 ounce of chicken or turkey without skin, or 1 ounce of fish (not breaded or fried)     · 1 ounce of lean beef, pork, or lamb     · 1-inch cube or 1 ounce of low-fat cheese     · 2 egg whites or ¼ cup of egg substitute     · ½ cup of tofu    Sweets, desserts, and other carbohydrate group exchanges:   · Sweets and other desserts:  Each exchange has about 15 grams of carbohydrate   ? 1 ounce of linda food cake or 2-inch square cake (unfrosted)    ? 2 small cookies     ? ½ cup of sugar-free, fat-free ice cream    ? 1 tablespoon of syrup, jam, jelly, table sugar, or honey    · Combination foods:     ? 1 cup of an entrée, such as lasagna, spaghetti with meatballs, macaroni and cheese, and chili with beans (each serving counts as 2 carbohydrate exchanges )     ? 1 cup of tomato or vegetable beef soup (each serving counts as 1 carbohydrate exchange )    Fat group exchanges:  Each exchange contains 5 grams of fat and 45 calories  · 1 teaspoon of oil (such as canola, olive, or corn oil)     · 6 almonds or cashews, 10 peanuts, or 4 pecan halves     · 2 tablespoons of avocado     · ½ tablespoon of peanut butter     · 1 teaspoon of regular margarine or 2 teaspoons of low-fat margarine     · 1 teaspoon of regular butter or 1 tablespoon of low-fat butter     · 1 teaspoon of regular mayonnaise or 1 tablespoon of low-fat mayonnaise     · 1 tablespoon of regular salad dressing or 2 tablespoons of low-fat salad dressing    Free foods: The foods on this list are called free foods because they have very few calories  Free foods usually do not increase your blood sugar if you limit them    · 1 tablespoon of catsup or taco sauce     · ¼ cup of salsa     · 2 tablespoons of sugar-free syrup or 2 teaspoons of light jam or jelly     · 1 tablespoon of fat-free salad dressing     · 4 tablespoons of fat-free margarine or fat-free mayonnaise     · Sugar-free drinks: diet soda, sugar-free drink mixes, or mineral water     · Low-sodium bouillon or fat-free broth     · Mustard     · Seasonings such as spices, herbs, and garlic     · Sugar-free gelatin without added fruit    Other healthy nutrition guidelines:   · Limit drinks with sugar substitutes  Your dietitian or healthcare provider will encourage you to drink water  Water helps your kidneys to function properly  Ask how much water you should drink every day  · Eat more fiber  Choose foods that are good sources of fiber, such as fruits, vegetables, and whole grains  Cereals that contain 5 or more grams of fiber per serving are good sources of fiber  Legumes such as garbanzo, holloway beans, kidney beans, and lentils are also good sources  · Limit fat  Ask your dietitian or healthcare provider how much fat you should eat each day  Choose foods low in fat, saturated fat, trans fat, and cholesterol  Examples include turkey or chicken without the skin, fish, lean cuts of meat, and beans  Low-fat dairy foods, such as low-fat or fat-free milk and low-fat yogurt are also good choices  Omega-3 fatty acids are healthy fats that are found in canola oil, soybean oil and fatty fish  [place], albacore tuna, and sardines are good sources of omega 3 fatty acids  Eat 2 servings of these types of fish each week  Do not eat fried fish  · Limit sugar  Sugar and sweets must be counted toward the carbohydrate exchanges that you can have within your meal plan  Limit sugar and sweets because they are usually also high in calories and fat  Eat smaller portions of sweets by sharing a dessert or asking for a child-size portion at a restaurant  · Limit sodium  (salt) to about 2,300 mg per day  You may need to eat even less sodium if you have certain medical conditions  Foods high in sodium include soy sauce, potato chips, and soup  · Limit alcohol  Ask your healthcare provider if it is safe for you to drink alcohol  If alcohol is safe for you to have, eat a meal when you drink alcohol  If you drink alcohol on an empty stomach, your blood sugar may drop to a low level  Women 21 years or older and men 72 years or older should limit alcohol to 1 drink a day   Men aged 24 to 59 years should limit alcohol to 2 drinks a day  A drink of alcohol is 5 ounces of wine, 12 ounces of beer, or 1½ ounces of liquor  Other ways to manage your diabetes:   · Control your blood sugar level  Test your blood sugar level regularly and keep a record of the results  Ask your healthcare provider when and how often to test your blood sugar  You may need to check your blood sugar level at least 3 times each day  · Talk to your healthcare provider about your weight  Ask if you need to lose weight, and how much you need to lose  If you are overweight, you may need to make other changes to lose weight  Ask your healthcare provider to help you create a weight loss program      · Get regular physical activity  Physical activity can help decrease your blood sugar level  It can also help to decrease your risk for heart disease and help you lose weight  Adults should have moderate intensity physical activity for at least 150 minutes every week  Spread the amount of activity over at least 3 days a week  Do not skip more than 2 days in a row  Children should get at least 60 minutes of moderate physical activity on most days of the week  Examples of moderate physical activity include brisk walking, running, and swimming  Do not sit for longer than 30 minutes  Work with your healthcare provider to create a plan for physical activity  © Copyright [address] Information is for End User's use only and may not be sold, redistributed or otherwise used for commercial purposes  All illustrations and images included in CareNotes® are the copyrighted property of A D A M , Inc  or [facility]   The above information is an  only  It is not intended as medical advice for individual conditions or treatments  Talk to your doctor, nurse or pharmacist before following any medical regimen to see if it is safe and effective for you

## 2021-06-15 NOTE — ASSESSMENT & PLAN NOTE
· Dizziness giddiness disequilibrium gait instability   · Neurology consult appreciated  Suspect patient's symptoms are more from hyperglycemia, dehydration and sleep deprivation  · Discussed diabetic management with patient and sister  Sister has patient keep her blood sugars high to prevent hypoglycemia  Patient also does not drink a lot of fluids secondary to polyuria  Discussed with family the importance of better blood sugar control and that her hyperglycemia is contributing to the majority of her symptoms  · Physical therapy - recommend home with home health  Patient is going to stay with her sister for a while, so declines for now  Discussed with sister, would likely be beneficial for her to have home health when she returns home  · Repeat CT head normal   MRI deferred as could not be scheduled within a reasonable amount of time, and given her low likelihood of CVA, it did not seem plausible to keep her here for the MRI

## 2021-06-15 NOTE — ASSESSMENT & PLAN NOTE
Imaging reveals left superior pubic ramus fracture, likely chronic  Ortho consult appreciated - WBAT and pain control, they signed off   Physical therapy - home PT, patient declines and will go to her sister's home

## 2021-06-15 NOTE — ASSESSMENT & PLAN NOTE
Patient with history of acromegaly status post Pitutary surgery in 1989   Patient is on replacement levothyroxine 75 mcg daily, prednisone 5 mg p o  daily   Follows Dr Stock Left   outpatient endocrinology input noted

## 2021-06-15 NOTE — PHYSICAL THERAPY NOTE
PHYSICAL THERAPY NOTE  Patient Name: Carson Root  PMSKF'B Date: 6/15/2021     06/15/21 1021   PT Last Visit   PT Visit Date 06/15/21   Note Type   Note Type Treatment   Pain Assessment   Pain Assessment Tool 0-10   Pain Score No Pain   Restrictions/Precautions   Weight Bearing Precautions Per Order Yes   LLE Weight Bearing Per Order WBAT   Other Precautions Bed Alarm; Fall Risk;Telemetry;Pain   General   Chart Reviewed Yes   Response to Previous Treatment Patient with no complaints from previous session  Family/Caregiver Present No   Cognition   Overall Cognitive Status WFL   Arousal/Participation Alert; Responsive; Cooperative   Attention Within functional limits   Orientation Level Oriented to person;Oriented to place   Following Commands Follows one step commands without difficulty   Subjective   Subjective Pt requesting to use the bathroom   Bed Mobility   Supine to Sit 5  Supervision   Additional items HOB elevated; Increased time required   Sit to Supine 5  Supervision   Additional items HOB elevated; Increased time required   Additional Comments Pt lying supine upon PT arrival  Pt returned lying supine at end of session w/ all needs within reach and bed alarm activated   Transfers   Sit to Stand 5  Supervision   Additional items Verbal cues   Stand to Sit 5  Supervision   Additional items Verbal cues   Toilet transfer 4  Minimal assistance   Additional items Assist x 1;Standard toilet   Additional Comments Transfers w/ RW  Pt ambulated to the bathroom and then reports significant nausea, pt given basin and RN made aware  Pt required ~10 minutes in the bathroom and reports stomach pain and nausea   Ambulation/Elevation   Gait pattern Excessively slow; Short stride; Inconsistent zack   Gait Assistance   (CGA)   Additional items Assist x 1;Verbal cues   Assistive Device Rolling walker   Distance 10ft + 10ft  (limited 2' nausea) Balance   Static Sitting Fair +   Dynamic Sitting Fair   Static Standing Fair   Dynamic Standing Fair -   Ambulatory Fair -   Endurance Deficit   Endurance Deficit Yes   Endurance Deficit Description nausea   Activity Tolerance   Activity Tolerance Patient limited by fatigue; Other (Comment)  (nausea)   Nurse Made Aware yes   Assessment   Prognosis Good   Problem List Decreased strength;Decreased endurance; Impaired balance;Decreased mobility   Assessment Pt presents with decreased mobility, strength, balance, and activity tolerance  Pt demonstrated ability to perform bed mobility and functional transfers at supervision  Pt ambulated 10ft + 10ft with RW at [facility]  Pt performed toilet transfer at [address] [place] [address]  Upon arrival, pt requesting to use restroom  Once pt in restroom, pt reporting stomach pain and nausea and requesting basin  Session limited at this time 2' nausea  Pt continues to benefit from skilled therapy to maximize functional independence  Recommendation at this time is Home PT  Pt would benefit from continued ambulation, functional transfers, strength, balance, and activity tolerance   Goals   Patient Goals to feel better   [facility] Expiration Date 06/28/21   PT Treatment Day 1   Plan   Treatment/Interventions Functional transfer training;LE strengthening/ROM; Endurance training;Patient/family training;Equipment eval/education; Bed mobility;Gait training;Spoke to nursing   Progress Progressing toward goals   PT Frequency   (3-5x/week)   Recommendation   PT Discharge Recommendation Home with home health rehabilitation   [address]   Turning in Bed Without Bedrails 4   Lying on Back to Sitting on Edge of Flat Bed 4   Moving Bed to Chair 3   Standing Up From Chair 4   Walk in Room 3   Climb 3-5 Stairs 3   Basic Mobility Inpatient Raw Score 21   Basic Mobility Standardized Score 45 55   The patient's AM-PAC Basic Mobility Inpatient Short Form Raw Score is 21, Standardized Score is 45 55  A standardized score greater than 42 9 suggests the patient may benefit from discharge to home  Please also refer to the recommendation of the Physical Therapist for safe discharge planning      David Soria PT, DPT

## 2021-06-16 ENCOUNTER — TRANSITIONAL CARE MANAGEMENT (OUTPATIENT)
Dept: INTERNAL MEDICINE CLINIC | Facility: CLINIC | Age: 86
End: 2021-06-16

## 2021-06-16 NOTE — UTILIZATION REVIEW
Inpatient Admission Authorization Request   NOTIFICATION OF INPATIENT ADMISSION/INPATIENT AUTHORIZATION REQUEST   SERVICING FACILITY:   [facility]  Address: [address] [place] [address], [address] [place] [address]  Tax ID: [id number]  NPI: [id number]  Place of Service: Inpatient [address] [facility] Code: 24     ATTENDING PROVIDER:  Attending Name and NPI#: Dio Grace Md [[id number]]  Address: [address] [place] [address], [address] [place] [address]  Phone: [phone]     UTILIZATION REVIEW CONTACT:  [facility] Utilization   Network Utilization Review Department  Phone: [phone]  Fax: [phone]  Email: [email or web address]     PHYSICIAN ADVISORY SERVICES:  FOR ORDJ-IG-PHOO REVIEW - MEDICAL NECESSITY DENIAL  Phone: [phone]  Fax: [phone]  Email: [email or web address]     TYPE OF REQUEST:  Inpatient Status     ADMISSION INFORMATION:  ADMISSION DATE/TIME: 6/13/21  8:14 PM  PATIENT DIAGNOSIS CODE/DESCRIPTION:  Subacute neurologic deficit [R29 818]  DISCHARGE DATE/TIME: 6/15/2021  6:07 PM  DISCHARGE DISPOSITION (IF DISCHARGED): Home/Self Care     IMPORTANT INFORMATION:  Please contact the [facility] directly with any questions or concerns regarding this request  Department voicemails are confidential     Send requests for admission clinical reviews, concurrent reviews, approvals, and administrative denials due to lack of clinical to fax [phone]

## 2021-06-24 DIAGNOSIS — E11.65 TYPE 2 DIABETES MELLITUS WITH HYPERGLYCEMIA, WITH LONG-TERM CURRENT USE OF INSULIN (HCC): ICD-10-CM

## 2021-06-24 DIAGNOSIS — E03.9 ACQUIRED HYPOTHYROIDISM: ICD-10-CM

## 2021-06-24 DIAGNOSIS — E27.1 ADRENAL INSUFFICIENCY (ADDISON'S DISEASE) (HCC): ICD-10-CM

## 2021-06-24 DIAGNOSIS — I10 ESSENTIAL HYPERTENSION: ICD-10-CM

## 2021-06-24 DIAGNOSIS — Z79.4 TYPE 2 DIABETES MELLITUS WITH HYPERGLYCEMIA, WITH LONG-TERM CURRENT USE OF INSULIN (HCC): ICD-10-CM

## 2021-06-24 NOTE — TELEPHONE ENCOUNTER
Confirm that patient needs scripts to [facility] - she was dc from hospital recently and discharged to stay with her sister in [place] so check with her sister Maximiano Kin if these should be sent to [facility] or elsewhere if needed

## 2021-06-24 NOTE — TELEPHONE ENCOUNTER
Pt sister asking for seomthing for pain for her due to hairline fracture  She said they gave her nothing and is asking what she can take even OTCstuff  Please advise  Also [facility] is the preferred pharmacy

## 2021-06-25 RX ORDER — REPAGLINIDE 2 MG/1
4 TABLET ORAL
Qty: 540 TABLET | Refills: 3 | Status: SHIPPED | OUTPATIENT
Start: 2021-06-25 | End: 2021-08-05 | Stop reason: SDUPTHER

## 2021-06-25 RX ORDER — LEVOTHYROXINE SODIUM 0.07 MG/1
75 TABLET ORAL
Qty: 90 TABLET | Refills: 3 | Status: SHIPPED | OUTPATIENT
Start: 2021-06-25 | End: 2021-08-05 | Stop reason: SDUPTHER

## 2021-06-25 RX ORDER — PREDNISONE 1 MG/1
5 TABLET ORAL DAILY
Qty: 90 TABLET | Refills: 3 | Status: SHIPPED | OUTPATIENT
Start: 2021-06-25 | End: 2021-08-05 | Stop reason: SDUPTHER

## 2021-06-25 NOTE — TELEPHONE ENCOUNTER
Extra strength Tylenol 2 every 6 hours can be scheduled for pain   She can apply ice and topical voltaren gel to affected area   I will send scripts to [facility]

## 2021-07-09 DIAGNOSIS — R10.2 PELVIC PAIN: Primary | ICD-10-CM

## 2021-07-09 RX ORDER — GABAPENTIN 100 MG/1
100 CAPSULE ORAL 2 TIMES DAILY
Qty: 60 CAPSULE | Refills: 0 | Status: SHIPPED | OUTPATIENT
Start: 2021-07-09 | End: 2021-07-27 | Stop reason: ALTCHOICE

## 2021-07-09 NOTE — TELEPHONE ENCOUNTER
Pt sister called and asked for pain meds as she has a fracture in her pubic area per sister  I explained we cant give something without seeing her  Her appt isnt until July 27th however she cx's her [facility] appt after her hospital stay  Please advise

## 2021-07-09 NOTE — TELEPHONE ENCOUNTER
Can schedule Tylenol 2 regular strength every 6 hours  Can also offer rx for gabapentin 100mg bid and would recommend ortho followup

## 2021-07-14 ENCOUNTER — REMOTE DEVICE CLINIC VISIT (OUTPATIENT)
Dept: CARDIOLOGY CLINIC | Facility: CLINIC | Age: 86
End: 2021-07-14
Payer: COMMERCIAL

## 2021-07-14 DIAGNOSIS — I49.5 SICK SINUS SYNDROME (HCC): Primary | ICD-10-CM

## 2021-07-14 DIAGNOSIS — Z45.010 ENCOUNTER FOR CHECKING AND TESTING OF CARDIAC PACEMAKER PULSE GENERATOR (BATTERY): ICD-10-CM

## 2021-07-14 PROCEDURE — 93296 REM INTERROG EVL PM/IDS: CPT | Performed by: INTERNAL MEDICINE

## 2021-07-14 PROCEDURE — 93294 REM INTERROG EVL PM/LDLS PM: CPT | Performed by: INTERNAL MEDICINE

## 2021-07-15 NOTE — PROGRESS NOTES
Merlin remote check pacer  No events  Normal battery function  Current Outpatient Medications:     aspirin 81 MG tablet, Take 1 tablet by mouth daily, Disp: , Rfl:     Blood Glucose Monitoring Suppl (ONE TOUCH ULTRA MINI) w/Device KIT, USE TO TEST 4 TIMES DAILY, Disp: , Rfl: 0    ciclopirox (PENLAC) 8 % solution, Apply topically daily at bedtime, Disp: 6 6 mL, Rfl: 5    ciclopirox (PENLAC) 8 % solution, Apply topically daily at bedtime, Disp: 6 6 mL, Rfl: 2    Contour Next Test test strip, Use as instructed UP TO FOUR TIMES DAILY  , Disp: 350 each, Rfl: 5    ergocalciferol (VITAMIN D2) 50,000 units, Take 1 capsule (50,000 Units total) by mouth once a week, Disp: 12 capsule, Rfl: 0    esomeprazole (NexIUM) 20 mg capsule, Take 1 capsule (20 mg total) by mouth daily in the early morning, Disp: 90 capsule, Rfl: 3    gabapentin (NEURONTIN) 100 mg capsule, Take 1 capsule (100 mg total) by mouth 2 (two) times a day, Disp: 60 capsule, Rfl: 0    levothyroxine 75 mcg tablet, Take 1 tablet (75 mcg total) by mouth daily in the early morning, Disp: 90 tablet, Rfl: 3    metFORMIN (GLUCOPHAGE) 500 mg tablet, Take 1 5 tablets (750 mg total) by mouth 2 (two) times a day with meals, Disp: 180 tablet, Rfl: 0    metoprolol tartrate (LOPRESSOR) 25 mg tablet, Take 1 tablet (25 mg total) by mouth 3 (three) times a day, Disp: 270 tablet, Rfl: 3    ONE TOUCH ULTRA TEST test strip, 4 (four) times a day Test, Disp: , Rfl: 0    ONETOUCH DELICA LANCETS FINE MISC, 4 (four) times a day Test, Disp: , Rfl: 0    oxybutynin (DITROPAN-XL) 10 MG 24 hr tablet, TAKE ONE (1) TABLET BY MOUTH TWICE A DAY, Disp: 180 tablet, Rfl: 1    pravastatin (PRAVACHOL) 20 mg tablet, Take 1 tablet (20 mg total) by mouth daily, Disp: 90 tablet, Rfl: 0    predniSONE 5 mg tablet, Take 1 tablet (5 mg total) by mouth daily, Disp: 90 tablet, Rfl: 3    repaglinide (PRANDIN) 2 mg tablet, Take 2 tablets (4 mg total) by mouth 3 (three) times a day before meals, Disp: 540 tablet, Rfl: 3

## 2021-07-19 ENCOUNTER — OFFICE VISIT (OUTPATIENT)
Dept: CARDIOLOGY CLINIC | Facility: CLINIC | Age: 86
End: 2021-07-19
Payer: COMMERCIAL

## 2021-07-19 VITALS
SYSTOLIC BLOOD PRESSURE: 122 MMHG | HEART RATE: 60 BPM | DIASTOLIC BLOOD PRESSURE: 70 MMHG | WEIGHT: 129 LBS | HEIGHT: 62 IN | BODY MASS INDEX: 23.74 KG/M2

## 2021-07-19 DIAGNOSIS — I35.0 AORTIC VALVE STENOSIS, ETIOLOGY OF CARDIAC VALVE DISEASE UNSPECIFIED: Primary | Chronic | ICD-10-CM

## 2021-07-19 PROCEDURE — 99214 OFFICE O/P EST MOD 30 MIN: CPT | Performed by: INTERNAL MEDICINE

## 2021-07-19 NOTE — PATIENT INSTRUCTIONS
Aortic Stenosis   WHAT YOU NEED TO KNOW:   What is aortic stenosis? Aortic stenosis is a condition that makes your aortic valve become narrow and stiff  The narrow, stiff valve causes your heart to work harder to pump blood into the aorta  What causes aortic stenosis? · Calcium buildup  can happen as you age  Calcium builds up on the aortic valve walls  The buildup stiffens and thickens the valve  · Congenital heart defect  means you were born with a heart problem  Over time, the problem may lead to narrowing or blocking of your aortic valve  · Rheumatic fever  can develop after you have a strep throat infection  Rheumatic fever causes your heart valves to thicken with scars  What are the signs and symptoms of aortic stenosis? · Chest pain or tightness    · Fast, jumpy, or fluttery heartbeat    · Shortness of breath during activity or when you lie down    · Severe tiredness    · Dizziness or feeling faint    How is aortic stenosis diagnosed? Your healthcare provider will ask about your signs and symptoms and listen to your heart  He or she will ask if you have had strep throat or rheumatic fever in the past  You may need any of the following tests:  · Blood tests: You may need blood taken to give healthcare providers information about how your body is working  The blood may be taken from your hand, arm, or IV  · A chest x-ray  shows the size of your heart  It may also show if fluid is around your heart and lungs  · An EKG  test records the electrical activity of your heart  It is used to check for abnormal heart rhythm caused by aortic stenosis  · An echocardiogram  is a type of ultrasound  Sound waves are used to show the structure and function of your heart  · A stress test  may show the changes that take place in your heart while it is under stress  Stress may be placed on your heart with exercise or medicine   Ask for more information about this test     · Cardiac catheterization  is a procedure done to find and treat heart blockages  A catheter (long thin tube) is inserted into your arm, neck, or groin and moved into your heart  An x-ray may be used to guide the tube to the right place  Contrast liquid may be put into your vein so the pictures show up better on a monitor  Tell the healthcare provider if you have ever had an allergic reaction to contrast liquid  How is aortic stenosis treated? · Valve replacement  is the main treatment for aortic stenosis  It is a surgery to remove part or all of your aortic valve  A new valve is then secured in place  The new valve may be from a donor (another person or animal), or may be an artificial valve  There are 2 different approaches for valve replacement  It may be done as a open heart procedure  Your valve may also be replaced through a catheter placed through a vessel in your groin area  Your healthcare provider will talk to you about which approach is right for you  · Balloon valvuloplasty  helps widen your aortic valve and allow blood to flow through easier  It is also called a closed valvotomy  A catheter with a balloon on the tip is inserted through a small incision in your arm or groin  The catheter is guided through a blood vessel and into your left atrium near your aortic valve  When the balloon is inflated, it stretches the valve opening  How can I manage my symptoms? · Limit activities  Your healthcare provider may have you limit strenuous activity  Strenuous activity will make your heart work too hard  Ask your healthcare provider what activities are safe for you to do  · Take your medicines as directed  You may need medicines to lower your blood pressure  You may also need medicine to help your heart's rhythm  Your healthcare provider will prescribe the medicine that is right for you  How can I prevent aortic stenosis? · Manage other health conditions    High blood pressure and high cholesterol levels increase your risk for aortic stenosis  Ask your healthcare providers for more information on managing these conditions  · Maintain a healthy weight  Being overweight can increase your risk for high blood pressure and coronary artery disease  These conditions can make your symptoms worse  Ask your healthcare provider how much you should weigh  Ask him or her to help you create a weight loss plan if you are overweight  · Eat heart healthy foods  Eat whole grains, fruits, and vegetables every day  Limit salt and high-fat foods  Ask your healthcare provider for more information on a heart healthy diet  · Get treatment for strep throat  If strep throat is not treated, it can cause rheumatic fever  · Take care of your teeth and gums  Gingivitis, a gum disease, increases your risk for aortic stenosis  See your dental provider regularly to treat problems early  Call 911 or have someone else call if:   · You have any of the following signs of a heart attack:      ? Squeezing, pressure, or pain in your chest    ? You may  also have any of the following:     § Discomfort or pain in your back, neck, jaw, stomach, or arm    § Shortness of breath    § Nausea or vomiting    § Lightheadedness or a sudden cold sweat    · You have any of the following signs of a stroke:      ? Numbness or drooping on one side of your face     ? Weakness in an arm or leg    ? Confusion or difficulty speaking    ? Dizziness, a severe headache, or vision loss    When should I seek immediate care? · You have chest pain when you move around  It goes away when you are still  · You have increasing shortness of breath  · You faint  When should I contact my healthcare provider? · The veins in your neck look swollen or are bulging  · You have increased swelling in your legs or ankles  · Your heart beats faster than usual     · You feel your heart flutter often  · You have questions or concerns about your condition or care      CARE AGREEMENT:   You have the right to help plan your care  Learn about your health condition and how it may be treated  Discuss treatment options with your healthcare providers to decide what care you want to receive  You always have the right to refuse treatment  The above information is an  only  It is not intended as medical advice for individual conditions or treatments  Talk to your doctor, nurse or pharmacist before following any medical regimen to see if it is safe and effective for you  © Copyright [employer] [facility] 2021 Information is for End User's use only and may not be sold, redistributed or otherwise used for commercial purposes   All illustrations and images included in CareNotes® are the copyrighted property of [employer]  or [address] [place] [address]

## 2021-07-19 NOTE — PROGRESS NOTES
Subjective:        Patient ID: Emigdio Bai is a 80 y o  female  Chief Complaint:  [place] presents with her sister and her niece with similar but chronic ongoing complaints, all quite vague, including atypical chest pains, malaise and fatigue, moderate dyspnea on exertion, anhedonia and asking the same questions that we have gone over many times in the recent past     She wanted guarantees that she would make it through any heart surgery adryan has been recommended, she went pre sized odds, except her  Throughout my visit today both her and her sister's phone repetitively interrupting our visit, and [place] called her son Chelsy Jackson and handed me the phone and he was clearly very angry and asked me to compare his mother's case with another  individual whom some other cardiologist recommended heart surgery for at [facility]  The phone call became more more argumentative and I simply handed back to [place] as there was nothing I could say to satisfy him even after quoting hippa violation guidelines  The following portions of the patient's history were reviewed and updated as appropriate: allergies, current medications, past family history, past medical history, past social history, past surgical history and problem list   Review of Systems   Constitutional: Positive for malaise/fatigue  Negative for chills, diaphoresis and weight gain  HENT: Negative for nosebleeds and stridor  Eyes: Negative for double vision, vision loss in left eye, vision loss in right eye and visual disturbance  Cardiovascular: Positive for chest pain and dyspnea on exertion  Negative for claudication, cyanosis, irregular heartbeat, leg swelling, near-syncope, orthopnea, palpitations, paroxysmal nocturnal dyspnea and syncope  Respiratory: Positive for shortness of breath  Negative for cough, snoring and wheezing  Endocrine: Negative for polydipsia, polyphagia and polyuria     Hematologic/Lymphatic: Negative for bleeding problem  Does not bruise/bleed easily  Skin: Negative for flushing and rash  Musculoskeletal: Positive for arthritis, muscle weakness and stiffness  Negative for falls and myalgias  Gastrointestinal: Negative for abdominal pain, heartburn, hematemesis, hematochezia, melena and nausea  Genitourinary: Negative for hematuria  Neurological: Positive for dizziness, light-headedness, loss of balance and weakness  Negative for brief paralysis, focal weakness, headaches and vertigo  Psychiatric/Behavioral: Negative for altered mental status and substance abuse  Allergic/Immunologic: Negative for hives  Objective:      /70   Pulse 60   Ht 5' 2" (1 575 m)   Wt 58 5 kg (129 lb)   BMI 23 59 kg/m²   Physical Exam  Vitals and nursing note reviewed  Constitutional:       Appearance: She is well-developed  HENT:      Head: Normocephalic and atraumatic  Eyes:      Pupils: Pupils are equal, round, and reactive to light  Neck:      Thyroid: No thyromegaly  Vascular: No JVD  Cardiovascular:      Rate and Rhythm: Normal rate and regular rhythm  Pulses: Intact distal pulses  Heart sounds: Murmur (Grade 3 AS quality murmur absent S2) heard  No friction rub  No gallop  Pulmonary:      Effort: Pulmonary effort is normal  No respiratory distress  Breath sounds: Normal breath sounds  No stridor  No wheezing or rales  Abdominal:      General: Bowel sounds are normal       Palpations: Abdomen is soft  There is no mass  Tenderness: There is no abdominal tenderness  Musculoskeletal:         General: Normal range of motion  Cervical back: Normal range of motion and neck supple  Right lower leg: Edema ( trace) present  Left lower leg: Edema ( trace) present  Lymphadenopathy:      Cervical: No cervical adenopathy  Skin:     General: Skin is warm and dry  Coloration: Skin is not pale  Findings: No rash     Neurological:      Mental Status: She is alert and oriented to person, place, and time  Psychiatric:         Behavior: Behavior normal          Thought Content:  Thought content normal          Judgment: Judgment normal          Lab Review:   Admission on 06/13/2021, Discharged on 06/15/2021   Component Date Value    Hemoglobin A1C 06/14/2021 11 6*    EAG 06/14/2021 286     Sodium 06/14/2021 141     Potassium 06/14/2021 3 8     Chloride 06/14/2021 108     CO2 06/14/2021 28     ANION GAP 06/14/2021 5     BUN 06/14/2021 14     Creatinine 06/14/2021 0 63     Glucose 06/14/2021 78     Calcium 06/14/2021 8 6     eGFR 06/14/2021 81     WBC 06/14/2021 7 41     RBC 06/14/2021 3 96     Hemoglobin 06/14/2021 10 4*    Hematocrit 06/14/2021 34 3*    MCV 06/14/2021 87     MCH 06/14/2021 26 3*    MCHC 06/14/2021 30 3*    RDW 06/14/2021 16 7*    MPV 06/14/2021 10 5     Platelets 35/04/8091 249     nRBC 06/14/2021 0     Neutrophils Relative 06/14/2021 60     Immat GRANS % 06/14/2021 0     Lymphocytes Relative 06/14/2021 30     Monocytes Relative 06/14/2021 7     Eosinophils Relative 06/14/2021 3     Basophils Relative 06/14/2021 0     Neutrophils Absolute 06/14/2021 4 40     Immature Grans Absolute 06/14/2021 0 03     Lymphocytes Absolute 06/14/2021 2 24     Monocytes Absolute 06/14/2021 0 52     Eosinophils Absolute 06/14/2021 0 19     Basophils Absolute 06/14/2021 0 03     POC Glucose 06/14/2021 75     POC Glucose 06/14/2021 203*    POC Glucose 06/14/2021 382*    POC Glucose 06/14/2021 337*    POC Glucose 06/15/2021 166*    POC Glucose 06/15/2021 263*    POC Glucose 06/15/2021 276*    POC Glucose 06/15/2021 331*   Admission on 06/13/2021, Discharged on 06/13/2021   Component Date Value    Sodium 06/13/2021 137     Potassium 06/13/2021 4 1     Chloride 06/13/2021 102     CO2 06/13/2021 29     ANION GAP 06/13/2021 6     BUN 06/13/2021 21     Creatinine 06/13/2021 0 90     Glucose 06/13/2021 307*    Calcium 2021 8 7     eGFR 2021 58     WBC 2021 7 21     RBC 2021 3 79*    Hemoglobin 2021 10 0*    Hematocrit 2021 33 2*    MCV 2021 88     MCH 2021 26 4*    MCHC 2021 30 1*    RDW 2021 16 7*    Platelets  246     MPV 2021 10 4     Protime 2021 12 3     INR 2021 0 93     PTT 2021 26     Troponin I 2021 0 09*    SARS-CoV-2 2021 Negative     Color, UA 2021 Light Yellow     Clarity, UA 2021 Clear     Specific Gravity, UA 2021 <=1 005     pH, UA 2021 6 5     Leukocytes, UA 2021 Elevated glucose may cause decreased leukocyte values  See urine microscopic for [facility] result/*    Nitrite, UA 2021 Negative     Protein, UA 2021 Negative     Glucose, UA 2021 >=1000 (1%)*    Ketones, UA 2021 Negative     Urobilinogen, UA 2021 0 2     Bilirubin, UA 2021 Negative     Blood, UA 2021 Negative     POC Glucose 2021 279*    RBC, UA 2021 None Seen     WBC, UA 2021 1-2     Epithelial Cells 2021 None Seen     Bacteria, UA 2021 None Seen     Ventricular Rate 2021 60     Atrial Rate 2021 60     CT Interval 2021 206     QRSD Interval 2021 112     QT Interval 2021 464     QTC Interval 2021 464     P Axis 2021 17     QRS Axis 2021 -54     T Wave Axis 2021 -46      Transthoracic Echocardiogram  2D, M-mode, Doppler, and Color Doppler     Study date:  15-Kei-2021     Patient: Jairo Becker  MR number: [id number]  Account number: [de-identified]  : 1933  Age: 80 years  Gender: Female  Status: Inpatient  Location: Bedside  Height: 62 in  Weight: 126 7 lb  BP: 105/ 67 mmHg     Indications:  Aortic valve disorer; Dizziness     Diagnoses: I35 9 - Nonrheumatic aortic valve disorder, unspecified, R42  - Dizziness and giddiness     Sonographer: ZUHAIR Kelley  Interpreting Physician:  Sammy Estevez MD  Primary Physician:  Shweta Yip DO  Referring Physician:  Annelise Willingham MD  [facility]  [facility]  Cardiology Fellow: Rey Mares MD     SUMMARY     LEFT VENTRICLE:  Size was normal   Systolic function was normal  Ejection fraction was estimated to be 60 %  There was mild concentric hypertrophy  Doppler parameters were consistent with abnormal left ventricular relaxation (grade 1 diastolic dysfunction)      RIGHT VENTRICLE:  The size was normal   Systolic function was normal      LEFT ATRIUM:  The atrium was mildly dilated      MITRAL VALVE:  There was mild annular calcification      AORTIC VALVE:  There was severe stenosis  There was mild regurgitation      TRICUSPID VALVE:  There was trace regurgitation  Pulmonary artery systolic pressure was within the normal range (29 mmHg)         Assessment:       1  Aortic valve stenosis, etiology of cardiac valve disease unspecified          Plan:       I was very clear with the patient, sister and niece as well as with her son Funmi Pang on the phone that myself and other physicians have been recommending TAVR evaluation/for valve replacement surgery for approximately 2 years now  I do believe she now has critical aortic stenosis and is at risk for sudden death which I explained to them in detail  I explained how there is no good nonsurgical therapy for this problem  I explained how I would be reluctant to reschedule a cardiac catheterization and TAVR eval without her being sure she wants to go through with it, as she has canceled such in the past   They understand  I was unable to answer any questions for Funmi Pang to his satisfaction, he seems quite angry throughout our conversation that I was even recommending any surgical procedure for his mother at her age    Particularly without guarantees, which I told him I could not provide and that if any physician does he should likely get a 2nd opinion  I told all them I feel the benefit of proceeding with TAVR workup and evaluation which includes cardiac catheterization and a minimum of CT scanning far outweighs the risk at this time of doing nothing  Sudden death, heart failure, syncope, permanent injury or disability all likely without intervention in the near term  For now I will see her back in 3 months or as needed, I asked them to give me a call sooner if  they would like to proceed with TAVR workup and evaluation/catheterization set up

## 2021-07-21 ENCOUNTER — CONSULT (OUTPATIENT)
Dept: OBGYN CLINIC | Facility: CLINIC | Age: 86
End: 2021-07-21
Payer: COMMERCIAL

## 2021-07-21 ENCOUNTER — APPOINTMENT (OUTPATIENT)
Dept: RADIOLOGY | Facility: MEDICAL CENTER | Age: 86
End: 2021-07-21
Payer: COMMERCIAL

## 2021-07-21 VITALS
HEART RATE: 60 BPM | WEIGHT: 129 LBS | BODY MASS INDEX: 23.74 KG/M2 | DIASTOLIC BLOOD PRESSURE: 83 MMHG | SYSTOLIC BLOOD PRESSURE: 127 MMHG | HEIGHT: 62 IN

## 2021-07-21 DIAGNOSIS — R10.2 PELVIC PAIN: Primary | ICD-10-CM

## 2021-07-21 DIAGNOSIS — R10.2 PELVIC PAIN: ICD-10-CM

## 2021-07-21 PROCEDURE — 99203 OFFICE O/P NEW LOW 30 MIN: CPT | Performed by: ORTHOPAEDIC SURGERY

## 2021-07-21 PROCEDURE — 72170 X-RAY EXAM OF PELVIS: CPT

## 2021-07-21 NOTE — PROGRESS NOTES
Chief Complaint  Left groin pain    History Of Presenting Illness  Nneka Menendez 1933 presents with left groin pain status post fall on June 13th, 2021  Patient was seen and treated in the emergency room and in [facility]  Patient was diagnosed to have possible pubic ramus fracture but this was also diagnosed a film from 2015  Patient had bilateral hip replacements done the left 1 was done by Dr Vargas West rarely [facility] 3 years ago  Patient always had some element of discomfort in the left groin but this is got worse after the fall  Patient presents for evaluation radiographs of the pelvis      Current Medications  Current Outpatient Medications   Medication Sig Dispense Refill    aspirin 81 MG tablet Take 1 tablet by mouth daily      Blood Glucose Monitoring Suppl (ONE TOUCH ULTRA MINI) w/Device KIT USE TO TEST 4 TIMES DAILY  0    Contour Next Test test strip Use as instructed UP TO FOUR TIMES DAILY   350 each 5    ergocalciferol (VITAMIN D2) 50,000 units Take 1 capsule (50,000 Units total) by mouth once a week 12 capsule 0    esomeprazole (NexIUM) 20 mg capsule Take 1 capsule (20 mg total) by mouth daily in the early morning 90 capsule 3    levothyroxine 75 mcg tablet Take 1 tablet (75 mcg total) by mouth daily in the early morning 90 tablet 3    metFORMIN (GLUCOPHAGE) 500 mg tablet Take 1 5 tablets (750 mg total) by mouth 2 (two) times a day with meals 180 tablet 0    metoprolol tartrate (LOPRESSOR) 25 mg tablet Take 1 tablet (25 mg total) by mouth 3 (three) times a day 270 tablet 3    ONE TOUCH ULTRA TEST test strip 4 (four) times a day Test  0    ONETOUCH DELICA LANCETS FINE MISC 4 (four) times a day Test  0    oxybutynin (DITROPAN-XL) 10 MG 24 hr tablet TAKE ONE (1) TABLET BY MOUTH TWICE A  tablet 1    pravastatin (PRAVACHOL) 20 mg tablet Take 1 tablet (20 mg total) by mouth daily 90 tablet 0    predniSONE 5 mg tablet Take 1 tablet (5 mg total) by mouth daily 90 tablet 3    repaglinide (PRANDIN) 2 mg tablet Take 2 tablets (4 mg total) by mouth 3 (three) times a day before meals 540 tablet 3    ciclopirox (PENLAC) 8 % solution Apply topically daily at bedtime (Patient not taking: Reported on 7/19/2021) 6 6 mL 5    ciclopirox (PENLAC) 8 % solution Apply topically daily at bedtime (Patient not taking: Reported on 7/19/2021) 6 6 mL 2    gabapentin (NEURONTIN) 100 mg capsule Take 1 capsule (100 mg total) by mouth 2 (two) times a day (Patient not taking: Reported on 7/19/2021) 60 capsule 0     No current facility-administered medications for this visit         Current Problems    Active Problems:   Patient Active Problem List    Diagnosis Date Noted    Dizziness and giddiness 06/13/2021    Pubic ramus fracture ([facility] ) 06/13/2021    Skin tear of left lower leg without complication 00/28/1959    OAB (overactive bladder) 11/22/2019    Medicare annual wellness visit, subsequent 04/23/2019    Slow transit constipation 02/25/2019    Adrenal insufficiency ([place]'s disease) ([facility] ) 11/08/2018    CAD (coronary artery disease) 09/17/2018    H/O tachycardia-bradycardia syndrome s/p PPM 05/15/2018    At high risk for falls 05/15/2018    Ambulatory dysfunction 05/15/2018    Hiatal hernia 03/24/2018    Thyroid nodule 03/24/2018    Chronic diastolic congestive heart failure ([address] ) 03/23/2018    Aortic valve stenosis 03/23/2018    Anemia 08/28/2017    CRISTAL (obstructive sleep apnea) 08/20/2017    History of CVA (cerebrovascular accident) 08/18/2017    Hypothyroidism 08/17/2017    Diabetic neuropathy ([address] ) 07/06/2017    Esophageal dysmotility 04/26/2017    Bilateral hearing loss 04/07/2017    Esophageal spasm 03/07/2017    Uncontrolled type 2 diabetes mellitus with hyperglycemia ([address] ) 01/13/2017    Hyperlipidemia 01/13/2017    Hypertension 01/13/2017    Chronic bilateral low back pain without sciatica 08/25/2016    Generalized osteoarthritis of multiple sites 10/23/2015  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[facility] ) 05/15/2015    Depression 03/07/2014    Vitamin D deficiency 12/30/2013         Review of Systems:    General: negative for - chills, fatigue, fever,  weight gain or weight loss  Psychological: negative for - anxiety, behavioral disorder, concentration difficulties  Ophthalmic: negative for - blurry vision, decreased vision, double vision,      Past Medical History:   Past Medical History:   Diagnosis Date    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[facility] )     Bradycardia     Coronary artery disease     Depression     Depression     Diabetes mellitus ([place] [address] )     Type 2 DM    Disease of thyroid gland     Essential (primary) hypertension     Facial droop     started 10 years ago    GERD (gastroesophageal reflux disease)     Heart murmur     History of echocardiogram 08/28/2017    2-D w/CFD:  EF 0 60 (60%), LVSF is normal  No regional wall abnormalities  Mild mitral valve regurg  AV was trileaflet  Moderate tricuspid regurg  Trace pulmonic valve  No pericardial effusion   History of echocardiogram 03/28/2018    2-D w/CFD:  EF 0 60 (60%), Mild regurg in the MV  AV was trileaflet, severe stenosis, trace regurg  Mild regurg in the TV  No pericardial effusion  Aortic root exhibited normal size   History of echocardiogram 01/29/2016    2-D:  EF 0 55 (55%), Normal LVSF  Mild concentric LVH  Mild MR  Mild AS with mild regurg      History of Holter monitoring     2/24/16, 9/27/17, 2/6/18    History of nuclear stress test 08/16/2017    Lexiscan MPI:  EF 0 76 (76%), no prior MI or ischemia,    Hyperlipidemia     Hypothyroidism     Nonrheumatic aortic (valve) stenosis     Obstructive sleep apnea (adult) (pediatric)     10/17/17 - NPSG with nasal CPAP/Bi-level titration     Osteoarthritis     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HCC)     SSS [facility]sick [facility] syndrome) ([facility]     SVT (supraventricular tachycardia) ([facility] )     Vitamin D deficiency        Past Surgical History:   Past Surgical History:   Procedure Laterality Date    ANKLE SURGERY Left 2005   [address] [facility]    CATARACT EXTRACTION Bilateral 09/1995    HEMORRHOID SURGERY  1978    HERNIA REPAIR  1990    INSERT / Duana Livers / REMOVE PACEMAKER  05/14/2018    dual lead pacer st yony model 2272    JOINT REPLACEMENT      b/l hip replacements, right 2006    NEUROPLASTY / TRANSPOSITION MEDIAN NERVE AT CARPAL TUNNEL  1977    PARTIAL HYSTERECTOMY  1971    TOTAL HIP ARTHROPLASTY      TRANSPHENOIDAL / TRANSNASAL HYPOPHYSECTOMY / RESECTION PITUITARY TUMOR  1989    excision of pituitary gland       Family History:  Family history reviewed and non-contributory  Family History   Problem Relation Age of Onset    Other Father         coal worker's pneumoconiosis    Heart disease Father     Diabetes Brother     Heart disease Brother     Prostate cancer Brother     Stomach cancer Maternal Grandmother     Diabetes Paternal Aunt        Social History:  Social History     Socioeconomic History    Marital status:      Spouse name: None    Number of children: None    Years of education: None    Highest education level: None   Occupational History    None   Tobacco Use    Smoking status: Never Smoker    Smokeless tobacco: Never Used   Vaping Use    Vaping Use: Never used   Substance and Sexual Activity    Alcohol use: Not Currently    Drug use: No    Sexual activity: Never   Other Topics Concern    None   Social History Narrative    Caffeine use    Dental care regularly    Good dental hygiene    Uses safety equipment: seatbelts     Social Determinants of Health     Financial Resource Strain:     Difficulty of Paying Living Expenses:    Food Insecurity:     Worried About Running Out of Food in the Last Year:     Ran Out of Food in the Last Year:    Transportation Needs:     Lack of Transportation (Medical):      Lack of Transportation (Non-Medical):    Physical Activity:     Days of Exercise per Week:     Minutes of Exercise per Session: Stress:     Feeling of Stress :    Social Connections:     Frequency of Communication with Friends and Family:     Frequency of Social Gatherings with Friends and Family:     Attends Yaz[employer] Services:     Active Member of Clubs or Organizations:     Attends Club or Organization Meetings:     Marital Status:    Intimate Partner Violence:     Fear of Current or Ex-Partner:     Emotionally Abused:     Physically Abused:     Sexually Abused: Allergies: Allergies   Allergen Reactions    Cephalosporins GI Intolerance    Levemir [Insulin Detemir]      Brittle diabetic  Hypersensitive to insulin  Severe hypoglycemia results if given insulin  Please speak with Kym bledsoe, before giving ANY type of insulin   Acetaminophen     Cephalexin GI Intolerance    Clindamycin      Other reaction(s): Unspecified    Demeclocycline     Hydrocodone      Bitartrate  Other reaction(s): Unspecified    Hydrocodone-Acetaminophen Nausea Only    Oxycodone-Acetaminophen      HCL    Oxycodone-Acetaminophen     Penicillins Hives     Other reaction(s):  Other (See Comments)  ITCHING/RASH    Simvastatin GI Intolerance    Sulfamethoxazole-Trimethoprim     Tetrabenazine     Tetracycline Nausea Only    Tetracyclines & Related     Vicodin [Hydrocodone-Acetaminophen]            Physical ExaminationBP 127/83   Pulse 60   Ht 5' 2" (1 575 m)   Wt 58 5 kg (129 lb)   BMI 23 59 kg/m²   Gen: Alert and oriented to person, place, time  HEENT: EOMI, eyes clear, moist mucus membranes, hearing intact      Orthopedic Exam  Pelvic compression is negative hip pain-free range of motion no distal neurovascular deficits          Impression  Left groin pain status post hip replacement aggravated by fall            Plan    Discussed treatment with the patient and family  Will start patient on a physical therapy program though she is reluctant  To do this over-the-counter pain medication as needed  Will repeat radiographs today  Patient will follow-up with the original hip replacement surgeon a copy of the radiographs will be given to the patient  Follow-up in 3 months if needed in my office if the hip is still symptomatic she might require bone scan    Jairo Horne MD        Portions of the record may have been created with voice recognition software  Occasional wrong word or "sound a like" substitutions may have occurred due to the inherent limitations of voice recognition software  Read the chart carefully and recognize, using context, where substitutions have occurred

## 2021-07-27 ENCOUNTER — OFFICE VISIT (OUTPATIENT)
Dept: INTERNAL MEDICINE CLINIC | Facility: CLINIC | Age: 86
End: 2021-07-27
Payer: COMMERCIAL

## 2021-07-27 VITALS
WEIGHT: 127.5 LBS | DIASTOLIC BLOOD PRESSURE: 78 MMHG | TEMPERATURE: 97.8 F | BODY MASS INDEX: 23.46 KG/M2 | HEIGHT: 62 IN | SYSTOLIC BLOOD PRESSURE: 124 MMHG

## 2021-07-27 DIAGNOSIS — R26.81 GAIT INSTABILITY: ICD-10-CM

## 2021-07-27 DIAGNOSIS — Z79.4 TYPE 2 DIABETES MELLITUS WITH HYPERGLYCEMIA, WITH LONG-TERM CURRENT USE OF INSULIN (HCC): Primary | ICD-10-CM

## 2021-07-27 DIAGNOSIS — R10.30 INGUINAL PAIN, UNSPECIFIED LATERALITY: ICD-10-CM

## 2021-07-27 DIAGNOSIS — Z00.00 MEDICARE ANNUAL WELLNESS VISIT, SUBSEQUENT: ICD-10-CM

## 2021-07-27 DIAGNOSIS — E11.65 TYPE 2 DIABETES MELLITUS WITH HYPERGLYCEMIA, WITH LONG-TERM CURRENT USE OF INSULIN (HCC): Primary | ICD-10-CM

## 2021-07-27 PROCEDURE — 3288F FALL RISK ASSESSMENT DOCD: CPT | Performed by: INTERNAL MEDICINE

## 2021-07-27 PROCEDURE — 1125F AMNT PAIN NOTED PAIN PRSNT: CPT | Performed by: INTERNAL MEDICINE

## 2021-07-27 PROCEDURE — 3725F SCREEN DEPRESSION PERFORMED: CPT | Performed by: INTERNAL MEDICINE

## 2021-07-27 PROCEDURE — G0439 PPPS, SUBSEQ VISIT: HCPCS | Performed by: INTERNAL MEDICINE

## 2021-07-27 PROCEDURE — 1160F RVW MEDS BY RX/DR IN RCRD: CPT | Performed by: INTERNAL MEDICINE

## 2021-07-27 PROCEDURE — 1170F FXNL STATUS ASSESSED: CPT | Performed by: INTERNAL MEDICINE

## 2021-07-27 PROCEDURE — 1036F TOBACCO NON-USER: CPT | Performed by: INTERNAL MEDICINE

## 2021-07-27 NOTE — PROGRESS NOTES
Assessment and Plan:     Problem List Items Addressed This Visit        Other    Medicare annual wellness visit, subsequent      Other Visit Diagnoses     Type 2 diabetes mellitus with hyperglycemia, with long-term current use of insulin ([address] )    -  Primary    Relevant Orders    HEMOGLOBIN A1C W/ EAG ESTIMATION    Lipid panel    Comprehensive metabolic panel    Inguinal pain, unspecified laterality        Gait instability        Relevant Orders    Ambulatory referral to Physical Therapy      Agreeable to in home therapy evaluation - pt son and GD agree along with pt to trial Ortho had recommended therapy as well  Sugars overall are lower with current regimen and will continue to monitor No s/s hypoglycemia on recent logs  Pt does not want TAVR or eval for valve surgery Her son and GD voice understanding that pt likely will become more sxs from her valve disease at some time I did discuss the transition to palliative/comfort measures when sxs progress and also mentioned the option of hospice care  Her GD mentioned possible in home help and her son states he is looking into that   Fall preacutions  Limit snacks but at the same time pt should have some thing she enjoys  Rto 3 months     Falls Plan of Care: balance, strength, and gait training instructions were provided  Preventive health issues were discussed with patient, and age appropriate screening tests were ordered as noted in patient's After Visit Summary  Personalized health advice and appropriate referrals for health education or preventive services given if needed, as noted in patient's After Visit Summary       History of Present Illness:     Patient presents for Medicare Annual Wellness visit    Patient Care Team:  Francois Funez DO as PCP - MD Francois Rios DO [place], MD Zerita Brigham, MD (Ophthalmology)  Cece Stover MD (Endocrinology)     Problem List:     Patient Active Problem List   Diagnosis    Uncontrolled type 2 diabetes mellitus with hyperglycemia (HCC)    Hyperlipidemia    Hypertension    Hypothyroidism    History of CVA (cerebrovascular accident)    CRISTAL (obstructive sleep apnea)    Chronic diastolic congestive heart failure [facility])    Aortic valve stenosis    Hiatal hernia    Thyroid nodule    Anemia    Bilateral hearing loss    Chronic bilateral low back pain without sciatica    Depression    Diabetic neuropathy ([facility])    Esophageal dysmotility    Esophageal spasm    Generalized osteoarthritis of multiple sites    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    Vitamin D deficiency    H/O tachycardia-bradycardia syndrome s/p PPM    At high risk for falls    Ambulatory dysfunction    CAD (coronary artery disease)    Adrenal insufficiency (Milam's disease) ([facility])    Slow transit constipation    Medicare annual wellness visit, subsequent    OAB (overactive bladder)    Skin tear of left lower leg without complication    Dizziness and giddiness    Pubic ramus fracture ([facility])      Past Medical and Surgical History:     Past Medical History:   Diagnosis Date    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[facility]     Bradycardia     Coronary artery disease     Depression     Depression     Diabetes mellitus ([facility] )     Type 2 DM    Disease of thyroid gland     Essential (primary) hypertension     Facial droop     started 10 years ago    GERD (gastroesophageal reflux disease)     Heart murmur     History of echocardiogram 08/28/2017    2-D w/CFD:  EF 0 60 (60%), LVSF is normal  No regional wall abnormalities  Mild mitral valve regurg  AV was trileaflet  Moderate tricuspid regurg  Trace pulmonic valve  No pericardial effusion   History of echocardiogram 03/28/2018    2-D w/CFD:  EF 0 60 (60%), Mild regurg in the MV  AV was trileaflet, severe stenosis, trace regurg  Mild regurg in the TV  No pericardial effusion  Aortic root exhibited normal size      History of echocardiogram 01/29/2016    2-D:  EF 0 55 (55%), Normal LVSF  Mild concentric LVH  Mild MR  Mild AS with mild regurg      History of Holter monitoring     2/24/16, 9/27/17, 2/6/18    History of nuclear stress test 08/16/2017    Lexiscan MPI:  EF 0 76 (76%), no prior MI or ischemia,    Hyperlipidemia     Hypothyroidism     Nonrheumatic aortic (valve) stenosis     Obstructive sleep apnea (adult) (pediatric)     10/17/17 - NPSG with nasal CPAP/Bi-level titration     Osteoarthritis     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HCC)     SSS [facility] syndrome) ([facility])     SVT [facility]supraventricular tachycardia) ([facility]     Vitamin D deficiency      Past Surgical History:   Procedure Laterality Date    ANKLE SURGERY Left 2005   [facility]    CATARACT EXTRACTION Bilateral 09/1995    HEMORRHOID SURGERY  1978    HERNIA REPAIR  1990    INSERT / REPLACE / REMOVE PACEMAKER  05/14/2018    dual lead pacer st yony model 2272    JOINT REPLACEMENT      b/l hip replacements, right 2006    NEUROPLASTY / TRANSPOSITION MEDIAN NERVE AT CARPAL TUNNEL  1977    PARTIAL HYSTERECTOMY  1971    TOTAL HIP ARTHROPLASTY      TRANSPHENOIDAL / TRANSNASAL HYPOPHYSECTOMY / RESECTION PITUITARY TUMOR  1989    excision of pituitary gland      Family History:     Family History   Problem Relation Age of Onset    Other Father         coal worker's pneumoconiosis    Heart disease Father     Diabetes Brother     Heart disease Brother     Prostate cancer Brother     Stomach cancer Maternal Grandmother     Diabetes Paternal Aunt       Social History:     Social History     Socioeconomic History    Marital status:      Spouse name: None    Number of children: None    Years of education: None    Highest education level: None   Occupational History    None   Tobacco Use    Smoking status: Never Smoker    Smokeless tobacco: Never Used   Vaping Use    Vaping Use: Never used   Substance and Sexual Activity    Alcohol use: Not Currently    Drug use: No    Sexual activity: Never   Other Topics Concern    None   Social History Narrative    Caffeine use    Dental care regularly    Good dental hygiene    Uses safety equipment: seatbelts     Social Determinants of Health     Financial Resource Strain:     Difficulty of Paying Living Expenses:    Food Insecurity:     Worried About Running Out of Food in the Last Year:     [address] in the Last Year:    Transportation Needs:     Lack of Transportation (Medical):  Lack of Transportation (Non-Medical):    Physical Activity:     Days of Exercise per Week:     Minutes of Exercise per Session:    Stress:     Feeling of Stress :    Social Connections:     Frequency of Communication with Friends and Family:     Frequency of Social Gatherings with Friends and Family:     Attends [employer] Services:     Active Member of Clubs or Organizations:     Attends Club or Organization Meetings:     Marital Status:    Intimate Partner Violence:     Fear of Current or Ex-Partner:     Emotionally Abused:     Physically Abused:     Sexually Abused:       Medications and Allergies:     Current Outpatient Medications   Medication Sig Dispense Refill    aspirin 81 MG tablet Take 1 tablet by mouth daily      Blood Glucose Monitoring Suppl (ONE TOUCH ULTRA MINI) w/Device KIT USE TO TEST 4 TIMES DAILY  0    Contour Next Test test strip Use as instructed UP TO FOUR TIMES DAILY   350 each 5    ergocalciferol (VITAMIN D2) 50,000 units Take 1 capsule (50,000 Units total) by mouth once a week 12 capsule 0    esomeprazole (NexIUM) 20 mg capsule Take 1 capsule (20 mg total) by mouth daily in the early morning 90 capsule 3    levothyroxine 75 mcg tablet Take 1 tablet (75 mcg total) by mouth daily in the early morning 90 tablet 3    metFORMIN (GLUCOPHAGE) 500 mg tablet Take 1 5 tablets (750 mg total) by mouth 2 (two) times a day with meals 180 tablet 0    metoprolol tartrate (LOPRESSOR) 25 mg tablet Take 1 tablet (25 mg total) by mouth 3 (three) times a day 270 tablet 3    ONE TOUCH ULTRA TEST test strip 4 (four) times a day Test  0    ONETOUCH DELICA LANCETS FINE MISC 4 (four) times a day Test  0    oxybutynin (DITROPAN-XL) 10 MG 24 hr tablet TAKE ONE (1) TABLET BY MOUTH TWICE A  tablet 1    pravastatin (PRAVACHOL) 20 mg tablet Take 1 tablet (20 mg total) by mouth daily 90 tablet 0    predniSONE 5 mg tablet Take 1 tablet (5 mg total) by mouth daily 90 tablet 3    repaglinide (PRANDIN) 2 mg tablet Take 2 tablets (4 mg total) by mouth 3 (three) times a day before meals 540 tablet 3     No current facility-administered medications for this visit  Allergies   Allergen Reactions    Cephalosporins GI Intolerance    Levemir [Insulin Detemir]      Brittle diabetic  Hypersensitive to insulin  Severe hypoglycemia results if given insulin  Please speak with son, Kym Bowers, before giving ANY type of insulin   Acetaminophen     Cephalexin GI Intolerance    Clindamycin      Other reaction(s): Unspecified    Demeclocycline     Hydrocodone      Bitartrate  Other reaction(s): Unspecified    Hydrocodone-Acetaminophen Nausea Only    Oxycodone-Acetaminophen      HCL    Oxycodone-Acetaminophen     Penicillins Hives     Other reaction(s):  Other (See Comments)  ITCHING/RASH    Simvastatin GI Intolerance    Sulfamethoxazole-Trimethoprim     Tetrabenazine     Tetracycline Nausea Only    Tetracyclines & Related     Vicodin [Hydrocodone-Acetaminophen]       Immunizations:     Immunization History   Administered Date(s) Administered    INFLUENZA 09/28/2020    Influenza Quadrivalent Preservative Free 3 years and older IM 10/23/2015    Influenza Split High Dose Preservative Free IM 10/24/2016, 09/27/2017    Influenza, high dose seasonal 0 7 mL 11/08/2018, 11/22/2019, 09/28/2020    Influenza, seasonal, injectable 01/01/2012, 09/16/2013, 09/16/2014    Pneumococcal Conjugate 13-Valent 08/20/2015    Pneumococcal Polysaccharide PPV23 05/25/2011    Tdap 11/07/2020      Health Maintenance: There are no preventive care reminders to display for this patient  Topic Date Due    COVID-19 Vaccine (1) Never done    Influenza Vaccine (1) 09/01/2021      Medicare Health Risk Assessment:     /78   Temp 97 8 °F (36 6 °C) (Temporal)   Ht 5' 2" (1 575 m)   Wt 57 8 kg (127 lb 8 oz)   BMI 23 32 kg/m²      Silvia Mosqueda is here for her Subsequent Wellness visit  Last Medicare Wellness visit information reviewed, patient interviewed and updates made to the record today  Health Risk Assessment:   Patient rates overall health as fair  Patient feels that their physical health rating is slightly worse  Patient is satisfied with their life  Eyesight was rated as same  Hearing was rated as same  Patient feels that their emotional and mental health rating is same  Patients states they are never, rarely angry  Patient states they are never, rarely unusually tired/fatigued  Pain experienced in the last 7 days has been some  Patient's pain rating has been 4/10  Patient states that she has experienced no weight loss or gain in last 6 months  Depression Screening:   PHQ-2 Score: 1  PHQ-9 Score: 2      Fall Risk Screening: In the past year, patient has experienced: history of falling in past year    Number of falls: 1  Injured during fall?: Yes    Feels unsteady when standing or walking?: Yes    Worried about falling?: No      Urinary Incontinence Screening:   Patient has leaked urine accidently in the last six months  Home Safety:  Patient has trouble with stairs inside or outside of their home  Patient has working smoke alarms and has working carbon monoxide detector  Home safety hazards include: none  Nutrition:   Current diet is Regular  Medications:   Patient is currently taking over-the-counter supplements  OTC medications include: see medication list  Patient is not able to manage medications   Family monitors meds    Activities of Daily Living (ADLs)/Instrumental Activities of Daily Living (IADLs):   Walk and transfer into and out of bed and chair?: Yes  Dress and groom yourself?: Yes    Bathe or shower yourself?: Yes    Feed yourself? Yes  Do your laundry/housekeeping?: No  Manage your money, pay your bills and track your expenses?: No  Make your own meals?: No    Do your own shopping?: No    ADL comments: Family helps with bathing if needed  Family takes care of shopping/meal prep and bill pay    Previous Hospitalizations:   Any hospitalizations or ED visits within the last 12 months?: Yes    How many hospitalizations have you had in the last year?: 3-4    Advance Care Planning:   Living will: Yes    Durable POA for healthcare: Yes    Advanced directive: Yes    End of Life Decisions reviewed with patient: Yes    Provider agrees with end of life decisions: Yes      Cognitive Screening:   Provider or family/friend/caregiver concerned regarding cognition?: No    PREVENTIVE SCREENINGS      Cardiovascular Screening:    General: Screening Not Indicated and History Lipid Disorder      Diabetes Screening:     General: Screening Not Indicated and History Diabetes      Colorectal Cancer Screening:     General: Screening Not Indicated      Breast Cancer Screening:     General: Patient Declines      Cervical Cancer Screening:    General: Screening Not Indicated      Osteoporosis Screening:    General: Patient Declines      Abdominal Aortic Aneurysm (AAA) Screening:        General: Screening Current      Lung Cancer Screening:     General: Screening Not Indicated      Hepatitis C Screening:    General: Patient Declines    Screening, Brief Intervention, and Referral to Treatment (SBIRT)    Screening  Typical number of drinks in a day: 0  Typical number of drinks in a week: 0  Interpretation: Low risk drinking behavior      AUDIT-C Screenin) How often did you have a drink containing alcohol in the past year? never  2) How many drinks did you have on a typical day when you were drinking in the past year? 0  3) How often did you have 6 or more drinks on one occasion in the past year? never    AUDIT-C Score: 0  Interpretation: Score 0-2 (female): Negative screen for alcohol misuse    Single Item Drug Screening:  How often have you used an illegal drug (including marijuana) or a prescription medication for non-medical reasons in the past year? never    Single Item Drug Screen Score: 0  Interpretation: Negative screen for possible drug use disorder    Brief Intervention  Alcohol & drug use screenings were reviewed  No concerns regarding substance use disorder identified  Other Counseling Topics:   Calcium and vitamin D intake         Delfino Cancer, DO

## 2021-07-27 NOTE — PATIENT INSTRUCTIONS

## 2021-08-05 DIAGNOSIS — E27.1 ADRENAL INSUFFICIENCY (ADDISON'S DISEASE) (HCC): ICD-10-CM

## 2021-08-05 DIAGNOSIS — E03.9 ACQUIRED HYPOTHYROIDISM: ICD-10-CM

## 2021-08-05 DIAGNOSIS — E13.9 DIABETES 1.5, MANAGED AS TYPE 1 (HCC): ICD-10-CM

## 2021-08-05 DIAGNOSIS — I10 ESSENTIAL HYPERTENSION: ICD-10-CM

## 2021-08-05 DIAGNOSIS — E78.2 MIXED HYPERLIPIDEMIA: ICD-10-CM

## 2021-08-05 DIAGNOSIS — E55.9 VITAMIN D DEFICIENCY: ICD-10-CM

## 2021-08-05 DIAGNOSIS — Z79.4 TYPE 2 DIABETES MELLITUS WITH HYPERGLYCEMIA, WITH LONG-TERM CURRENT USE OF INSULIN (HCC): ICD-10-CM

## 2021-08-05 DIAGNOSIS — E11.65 TYPE 2 DIABETES MELLITUS WITH HYPERGLYCEMIA, WITH LONG-TERM CURRENT USE OF INSULIN (HCC): ICD-10-CM

## 2021-08-05 RX ORDER — PREDNISONE 1 MG/1
5 TABLET ORAL DAILY
Qty: 90 TABLET | Refills: 3 | Status: SHIPPED | OUTPATIENT
Start: 2021-08-05

## 2021-08-05 RX ORDER — PRAVASTATIN SODIUM 20 MG
20 TABLET ORAL DAILY
Qty: 90 TABLET | Refills: 3 | Status: SHIPPED | OUTPATIENT
Start: 2021-08-05

## 2021-08-05 RX ORDER — LEVOTHYROXINE SODIUM 0.07 MG/1
75 TABLET ORAL
Qty: 90 TABLET | Refills: 3 | Status: SHIPPED | OUTPATIENT
Start: 2021-08-05

## 2021-08-05 RX ORDER — ERGOCALCIFEROL 1.25 MG/1
50000 CAPSULE ORAL WEEKLY
Qty: 12 CAPSULE | Refills: 3 | Status: SHIPPED | OUTPATIENT
Start: 2021-08-05

## 2021-08-05 RX ORDER — REPAGLINIDE 2 MG/1
4 TABLET ORAL
Qty: 540 TABLET | Refills: 3 | Status: SHIPPED | OUTPATIENT
Start: 2021-08-05 | End: 2021-11-03

## 2021-08-20 ENCOUNTER — DOCUMENTATION (OUTPATIENT)
Dept: CARDIOLOGY CLINIC | Facility: CLINIC | Age: 86
End: 2021-08-20

## 2021-08-20 NOTE — PROGRESS NOTES
Merlin remote check pacer  3 mode switches longest 20 seconds  1 VHR 16 minutes  Normal battery function  Current Outpatient Medications:     aspirin 81 MG tablet, Take 1 tablet by mouth daily, Disp: , Rfl:     Blood Glucose Monitoring Suppl (ONE TOUCH ULTRA MINI) w/Device KIT, USE TO TEST 4 TIMES DAILY, Disp: , Rfl: 0    ciclopirox (PENLAC) 8 % solution, Apply topically daily at bedtime, Disp: 6 6 mL, Rfl: 5    ciclopirox (PENLAC) 8 % solution, Apply topically daily at bedtime, Disp: 6 6 mL, Rfl: 2    Contour Next Test test strip, Use as instructed UP TO FOUR TIMES DAILY  , Disp: 350 each, Rfl: 5    ergocalciferol (VITAMIN D2) 50,000 units, Take 1 capsule (50,000 Units total) by mouth once a week, Disp: 12 capsule, Rfl: 0    esomeprazole (NexIUM) 20 mg capsule, Take 1 capsule (20 mg total) by mouth daily in the early morning, Disp: 90 capsule, Rfl: 3    gabapentin (NEURONTIN) 100 mg capsule, Take 1 capsule (100 mg total) by mouth 2 (two) times a day, Disp: 60 capsule, Rfl: 0    levothyroxine 75 mcg tablet, Take 1 tablet (75 mcg total) by mouth daily in the early morning, Disp: 90 tablet, Rfl: 3    metFORMIN (GLUCOPHAGE) 500 mg tablet, Take 1 5 tablets (750 mg total) by mouth 2 (two) times a day with meals, Disp: 180 tablet, Rfl: 0    metoprolol tartrate (LOPRESSOR) 25 mg tablet, Take 1 tablet (25 mg total) by mouth 3 (three) times a day, Disp: 270 tablet, Rfl: 3    ONE TOUCH ULTRA TEST test strip, 4 (four) times a day Test, Disp: , Rfl: 0    ONETOUCH DELICA LANCETS FINE MISC, 4 (four) times a day Test, Disp: , Rfl: 0    oxybutynin (DITROPAN-XL) 10 MG 24 hr tablet, TAKE ONE (1) TABLET BY MOUTH TWICE A DAY, Disp: 180 tablet, Rfl: 1    pravastatin (PRAVACHOL) 20 mg tablet, Take 1 tablet (20 mg total) by mouth daily, Disp: 90 tablet, Rfl: 0    predniSONE 5 mg tablet, Take 1 tablet (5 mg total) by mouth daily, Disp: 90 tablet, Rfl: 3    repaglinide (PRANDIN) 2 mg tablet, Take 2 tablets (4 mg total) by mouth 3 (three) times a day before meals, Disp: 540 tablet, Rfl: 3

## 2021-10-04 ENCOUNTER — TELEPHONE (OUTPATIENT)
Dept: OBGYN CLINIC | Facility: CLINIC | Age: 86
End: 2021-10-04

## 2021-10-14 ENCOUNTER — OFFICE VISIT (OUTPATIENT)
Dept: CARDIOLOGY CLINIC | Facility: CLINIC | Age: 86
End: 2021-10-14
Payer: COMMERCIAL

## 2021-10-14 ENCOUNTER — IN-CLINIC DEVICE VISIT (OUTPATIENT)
Dept: CARDIOLOGY CLINIC | Facility: CLINIC | Age: 86
End: 2021-10-14
Payer: COMMERCIAL

## 2021-10-14 VITALS
SYSTOLIC BLOOD PRESSURE: 130 MMHG | WEIGHT: 126 LBS | DIASTOLIC BLOOD PRESSURE: 70 MMHG | BODY MASS INDEX: 23.19 KG/M2 | HEART RATE: 60 BPM | HEIGHT: 62 IN

## 2021-10-14 DIAGNOSIS — I35.0 AORTIC VALVE STENOSIS, ETIOLOGY OF CARDIAC VALVE DISEASE UNSPECIFIED: Primary | Chronic | ICD-10-CM

## 2021-10-14 DIAGNOSIS — Z45.010 ENCOUNTER FOR CHECKING AND TESTING OF CARDIAC PACEMAKER PULSE GENERATOR (BATTERY): ICD-10-CM

## 2021-10-14 DIAGNOSIS — I49.5 SICK SINUS SYNDROME (HCC): Primary | ICD-10-CM

## 2021-10-14 PROCEDURE — 1036F TOBACCO NON-USER: CPT | Performed by: INTERNAL MEDICINE

## 2021-10-14 PROCEDURE — 99213 OFFICE O/P EST LOW 20 MIN: CPT | Performed by: INTERNAL MEDICINE

## 2021-10-14 PROCEDURE — 93288 INTERROG EVL PM/LDLS PM IP: CPT | Performed by: INTERNAL MEDICINE

## 2021-10-14 PROCEDURE — 1160F RVW MEDS BY RX/DR IN RCRD: CPT | Performed by: INTERNAL MEDICINE

## 2021-10-26 DIAGNOSIS — E13.9 DIABETES 1.5, MANAGED AS TYPE 1 (HCC): ICD-10-CM

## 2021-11-02 ENCOUNTER — LAB (OUTPATIENT)
Dept: LAB | Facility: MEDICAL CENTER | Age: 86
End: 2021-11-02
Payer: COMMERCIAL

## 2021-11-02 ENCOUNTER — OFFICE VISIT (OUTPATIENT)
Dept: FAMILY MEDICINE CLINIC | Facility: CLINIC | Age: 86
End: 2021-11-02
Payer: COMMERCIAL

## 2021-11-02 VITALS
DIASTOLIC BLOOD PRESSURE: 78 MMHG | TEMPERATURE: 97.6 F | RESPIRATION RATE: 18 BRPM | HEIGHT: 62 IN | HEART RATE: 72 BPM | BODY MASS INDEX: 23.52 KG/M2 | SYSTOLIC BLOOD PRESSURE: 126 MMHG | WEIGHT: 127.8 LBS

## 2021-11-02 DIAGNOSIS — E03.9 HYPOTHYROIDISM, UNSPECIFIED TYPE: ICD-10-CM

## 2021-11-02 DIAGNOSIS — E27.1 ADRENAL INSUFFICIENCY (ADDISON'S DISEASE) (HCC): Primary | ICD-10-CM

## 2021-11-02 DIAGNOSIS — R35.0 URINARY FREQUENCY: Primary | ICD-10-CM

## 2021-11-02 DIAGNOSIS — E11.65 TYPE 2 DIABETES MELLITUS WITH HYPERGLYCEMIA, WITH LONG-TERM CURRENT USE OF INSULIN (HCC): ICD-10-CM

## 2021-11-02 DIAGNOSIS — R35.0 URINARY FREQUENCY: ICD-10-CM

## 2021-11-02 DIAGNOSIS — I35.0 AORTIC VALVE STENOSIS, ETIOLOGY OF CARDIAC VALVE DISEASE UNSPECIFIED: Chronic | ICD-10-CM

## 2021-11-02 DIAGNOSIS — R26.2 AMBULATORY DYSFUNCTION: ICD-10-CM

## 2021-11-02 DIAGNOSIS — Z79.4 TYPE 2 DIABETES MELLITUS WITH HYPERGLYCEMIA, WITH LONG-TERM CURRENT USE OF INSULIN (HCC): ICD-10-CM

## 2021-11-02 DIAGNOSIS — E11.42 DIABETIC POLYNEUROPATHY ASSOCIATED WITH TYPE 2 DIABETES MELLITUS (HCC): ICD-10-CM

## 2021-11-02 PROBLEM — S81.812A SKIN TEAR OF LEFT LOWER LEG WITHOUT COMPLICATION: Status: RESOLVED | Noted: 2020-11-16 | Resolved: 2021-11-02

## 2021-11-02 LAB
ALBUMIN SERPL BCP-MCNC: 3.3 G/DL (ref 3.5–5)
ALP SERPL-CCNC: 52 U/L (ref 46–116)
ALT SERPL W P-5'-P-CCNC: 16 U/L (ref 12–78)
ANION GAP SERPL CALCULATED.3IONS-SCNC: 3 MMOL/L (ref 4–13)
AST SERPL W P-5'-P-CCNC: 12 U/L (ref 5–45)
BACTERIA UR QL AUTO: ABNORMAL /HPF
BILIRUB SERPL-MCNC: 0.5 MG/DL (ref 0.2–1)
BILIRUB UR QL STRIP: NEGATIVE
BUN SERPL-MCNC: 17 MG/DL (ref 5–25)
CALCIUM ALBUM COR SERPL-MCNC: 9.7 MG/DL (ref 8.3–10.1)
CALCIUM SERPL-MCNC: 9.1 MG/DL (ref 8.3–10.1)
CHLORIDE SERPL-SCNC: 101 MMOL/L (ref 100–108)
CHOLEST SERPL-MCNC: 160 MG/DL (ref 50–200)
CLARITY UR: ABNORMAL
CO2 SERPL-SCNC: 28 MMOL/L (ref 21–32)
COLOR UR: YELLOW
CREAT SERPL-MCNC: 0.94 MG/DL (ref 0.6–1.3)
CREAT UR-MCNC: 46.7 MG/DL
GFR SERPL CREATININE-BSD FRML MDRD: 55 ML/MIN/1.73SQ M
GLUCOSE SERPL-MCNC: 312 MG/DL (ref 65–140)
GLUCOSE UR STRIP-MCNC: ABNORMAL MG/DL
HDLC SERPL-MCNC: 60 MG/DL
HGB UR QL STRIP.AUTO: ABNORMAL
HYALINE CASTS #/AREA URNS LPF: ABNORMAL /LPF
KETONES UR STRIP-MCNC: NEGATIVE MG/DL
LDLC SERPL CALC-MCNC: 77 MG/DL (ref 0–100)
LEUKOCYTE ESTERASE UR QL STRIP: ABNORMAL
MICROALBUMIN UR-MCNC: 15.4 MG/L (ref 0–20)
MICROALBUMIN/CREAT 24H UR: 33 MG/G CREATININE (ref 0–30)
NITRITE UR QL STRIP: NEGATIVE
NON-SQ EPI CELLS URNS QL MICRO: ABNORMAL /HPF
NONHDLC SERPL-MCNC: 100 MG/DL
PH UR STRIP.AUTO: 5.5 [PH]
POTASSIUM SERPL-SCNC: 4.8 MMOL/L (ref 3.5–5.3)
PROT SERPL-MCNC: 6.8 G/DL (ref 6.4–8.2)
PROT UR STRIP-MCNC: NEGATIVE MG/DL
RBC #/AREA URNS AUTO: ABNORMAL /HPF
SODIUM SERPL-SCNC: 132 MMOL/L (ref 136–145)
SP GR UR STRIP.AUTO: 1.03 (ref 1–1.03)
TRIGL SERPL-MCNC: 116 MG/DL
UROBILINOGEN UR QL STRIP.AUTO: 0.2 E.U./DL
WBC #/AREA URNS AUTO: ABNORMAL /HPF

## 2021-11-02 PROCEDURE — 87147 CULTURE TYPE IMMUNOLOGIC: CPT

## 2021-11-02 PROCEDURE — 82570 ASSAY OF URINE CREATININE: CPT | Performed by: INTERNAL MEDICINE

## 2021-11-02 PROCEDURE — 80053 COMPREHEN METABOLIC PANEL: CPT

## 2021-11-02 PROCEDURE — 82043 UR ALBUMIN QUANTITATIVE: CPT | Performed by: INTERNAL MEDICINE

## 2021-11-02 PROCEDURE — 87086 URINE CULTURE/COLONY COUNT: CPT

## 2021-11-02 PROCEDURE — 99214 OFFICE O/P EST MOD 30 MIN: CPT | Performed by: INTERNAL MEDICINE

## 2021-11-02 PROCEDURE — 80061 LIPID PANEL: CPT

## 2021-11-02 PROCEDURE — 1036F TOBACCO NON-USER: CPT | Performed by: INTERNAL MEDICINE

## 2021-11-02 PROCEDURE — 81001 URINALYSIS AUTO W/SCOPE: CPT

## 2021-11-02 PROCEDURE — 1160F RVW MEDS BY RX/DR IN RCRD: CPT | Performed by: INTERNAL MEDICINE

## 2021-11-02 PROCEDURE — 36415 COLL VENOUS BLD VENIPUNCTURE: CPT

## 2021-11-03 LAB — BACTERIA UR CULT: ABNORMAL

## 2021-11-04 ENCOUNTER — TELEPHONE (OUTPATIENT)
Dept: FAMILY MEDICINE CLINIC | Facility: CLINIC | Age: 86
End: 2021-11-04

## 2021-11-04 DIAGNOSIS — N39.0 URINARY TRACT INFECTION WITHOUT HEMATURIA, SITE UNSPECIFIED: Primary | ICD-10-CM

## 2021-11-04 RX ORDER — DOXYCYCLINE HYCLATE 100 MG/1
100 CAPSULE ORAL EVERY 12 HOURS SCHEDULED
Qty: 6 CAPSULE | Refills: 0 | Status: SHIPPED | OUTPATIENT
Start: 2021-11-04 | End: 2021-11-07

## 2021-12-09 ENCOUNTER — TELEPHONE (OUTPATIENT)
Dept: FAMILY MEDICINE CLINIC | Facility: CLINIC | Age: 86
End: 2021-12-09

## 2021-12-20 ENCOUNTER — TELEPHONE (OUTPATIENT)
Dept: FAMILY MEDICINE CLINIC | Facility: CLINIC | Age: 86
End: 2021-12-20
